# Patient Record
Sex: MALE | Race: WHITE | Employment: UNEMPLOYED | URBAN - METROPOLITAN AREA
[De-identification: names, ages, dates, MRNs, and addresses within clinical notes are randomized per-mention and may not be internally consistent; named-entity substitution may affect disease eponyms.]

---

## 2020-04-03 ENCOUNTER — HOSPITAL ENCOUNTER (EMERGENCY)
Age: 45
Discharge: HOME | End: 2020-04-03

## 2020-04-03 VITALS
HEART RATE: 95 BPM | DIASTOLIC BLOOD PRESSURE: 72 MMHG | RESPIRATION RATE: 14 BRPM | WEIGHT: 135 LBS | SYSTOLIC BLOOD PRESSURE: 122 MMHG | BODY MASS INDEX: 20.46 KG/M2 | HEIGHT: 68 IN | OXYGEN SATURATION: 98 % | TEMPERATURE: 98.8 F

## 2020-04-03 DIAGNOSIS — R73.9 HYPERGLYCEMIA: Primary | ICD-10-CM

## 2020-04-03 LAB
ANION GAP SERPL CALC-SCNC: 10 MMOL/L (ref 7–16)
BASOPHILS # BLD: 0 K/UL (ref 0–0.2)
BASOPHILS NFR BLD: 0 % (ref 0–2)
BUN SERPL-MCNC: 7 MG/DL (ref 6–23)
CALCIUM SERPL-MCNC: 8.3 MG/DL (ref 8.3–10.4)
CHLORIDE SERPL-SCNC: 101 MMOL/L (ref 98–107)
CO2 SERPL-SCNC: 22 MMOL/L (ref 21–32)
CREAT SERPL-MCNC: 1.07 MG/DL (ref 0.8–1.5)
DIFFERENTIAL METHOD BLD: ABNORMAL
EOSINOPHIL # BLD: 0.1 K/UL (ref 0–0.8)
EOSINOPHIL NFR BLD: 1 % (ref 0.5–7.8)
ERYTHROCYTE [DISTWIDTH] IN BLOOD BY AUTOMATED COUNT: 15.5 % (ref 11.9–14.6)
GLUCOSE BLD STRIP.AUTO-MCNC: 443 MG/DL (ref 65–100)
GLUCOSE BLD STRIP.AUTO-MCNC: 447 MG/DL (ref 65–100)
GLUCOSE BLD STRIP.AUTO-MCNC: >600 MG/DL (ref 65–100)
GLUCOSE SERPL-MCNC: 617 MG/DL (ref 65–100)
HCT VFR BLD AUTO: 37.2 % (ref 41.1–50.3)
HGB BLD-MCNC: 11.2 G/DL (ref 13.6–17.2)
IMM GRANULOCYTES # BLD AUTO: 0 K/UL (ref 0–0.5)
IMM GRANULOCYTES NFR BLD AUTO: 0 % (ref 0–5)
LIPASE SERPL-CCNC: 22 U/L (ref 73–393)
LYMPHOCYTES # BLD: 1.4 K/UL (ref 0.5–4.6)
LYMPHOCYTES NFR BLD: 24 % (ref 13–44)
MCH RBC QN AUTO: 24.7 PG (ref 26.1–32.9)
MCHC RBC AUTO-ENTMCNC: 30.1 G/DL (ref 31.4–35)
MCV RBC AUTO: 82.1 FL (ref 79.6–97.8)
MONOCYTES # BLD: 0.5 K/UL (ref 0.1–1.3)
MONOCYTES NFR BLD: 8 % (ref 4–12)
NEUTS SEG # BLD: 3.8 K/UL (ref 1.7–8.2)
NEUTS SEG NFR BLD: 66 % (ref 43–78)
NRBC # BLD: 0 K/UL (ref 0–0.2)
PLATELET # BLD AUTO: 229 K/UL (ref 150–450)
PMV BLD AUTO: 10.3 FL (ref 9.4–12.3)
POTASSIUM SERPL-SCNC: 4.4 MMOL/L (ref 3.5–5.1)
RBC # BLD AUTO: 4.53 M/UL (ref 4.23–5.6)
SODIUM SERPL-SCNC: 133 MMOL/L (ref 136–145)
WBC # BLD AUTO: 5.8 K/UL (ref 4.3–11.1)

## 2020-04-03 PROCEDURE — 74011250637 HC RX REV CODE- 250/637

## 2020-04-03 PROCEDURE — 80048 BASIC METABOLIC PNL TOTAL CA: CPT

## 2020-04-03 PROCEDURE — 99285 EMERGENCY DEPT VISIT HI MDM: CPT

## 2020-04-03 PROCEDURE — 82962 GLUCOSE BLOOD TEST: CPT

## 2020-04-03 PROCEDURE — 85025 COMPLETE CBC W/AUTO DIFF WBC: CPT

## 2020-04-03 PROCEDURE — 74011636637 HC RX REV CODE- 636/637

## 2020-04-03 PROCEDURE — 96374 THER/PROPH/DIAG INJ IV PUSH: CPT

## 2020-04-03 PROCEDURE — 83690 ASSAY OF LIPASE: CPT

## 2020-04-03 PROCEDURE — 96375 TX/PRO/DX INJ NEW DRUG ADDON: CPT

## 2020-04-03 PROCEDURE — 74011250636 HC RX REV CODE- 250/636

## 2020-04-03 PROCEDURE — 96376 TX/PRO/DX INJ SAME DRUG ADON: CPT

## 2020-04-03 RX ORDER — SODIUM CHLORIDE 9 MG/ML
1000 INJECTION, SOLUTION INTRAVENOUS ONCE
Status: COMPLETED | OUTPATIENT
Start: 2020-04-03 | End: 2020-04-03

## 2020-04-03 RX ORDER — ONDANSETRON 2 MG/ML
4 INJECTION INTRAMUSCULAR; INTRAVENOUS
Status: COMPLETED | OUTPATIENT
Start: 2020-04-03 | End: 2020-04-03

## 2020-04-03 RX ORDER — IBUPROFEN 400 MG/1
400 TABLET ORAL
Status: COMPLETED | OUTPATIENT
Start: 2020-04-03 | End: 2020-04-03

## 2020-04-03 RX ORDER — METOCLOPRAMIDE HYDROCHLORIDE 5 MG/ML
10 INJECTION INTRAMUSCULAR; INTRAVENOUS
Status: COMPLETED | OUTPATIENT
Start: 2020-04-03 | End: 2020-04-03

## 2020-04-03 RX ORDER — BENZTROPINE MESYLATE 1 MG/ML
2 INJECTION INTRAMUSCULAR; INTRAVENOUS ONCE
Status: COMPLETED | OUTPATIENT
Start: 2020-04-03 | End: 2020-04-03

## 2020-04-03 RX ORDER — SODIUM CHLORIDE 9 MG/ML
1000 INJECTION, SOLUTION INTRAVENOUS ONCE
Status: DISCONTINUED | OUTPATIENT
Start: 2020-04-03 | End: 2020-04-03 | Stop reason: HOSPADM

## 2020-04-03 RX ORDER — DICLOFENAC SODIUM 50 MG/1
50 TABLET, DELAYED RELEASE ORAL 2 TIMES DAILY
Qty: 14 TAB | Refills: 0 | Status: SHIPPED | OUTPATIENT
Start: 2020-04-03 | End: 2020-04-05

## 2020-04-03 RX ADMIN — IBUPROFEN 400 MG: 400 TABLET, FILM COATED ORAL at 13:25

## 2020-04-03 RX ADMIN — ONDANSETRON 4 MG: 2 INJECTION INTRAMUSCULAR; INTRAVENOUS at 13:25

## 2020-04-03 RX ADMIN — INSULIN HUMAN 12 UNITS: 100 INJECTION, SOLUTION PARENTERAL at 12:59

## 2020-04-03 RX ADMIN — INSULIN HUMAN 6 UNITS: 100 INJECTION, SOLUTION PARENTERAL at 15:16

## 2020-04-03 RX ADMIN — METOCLOPRAMIDE HYDROCHLORIDE 10 MG: 5 INJECTION INTRAMUSCULAR; INTRAVENOUS at 15:15

## 2020-04-03 RX ADMIN — SODIUM CHLORIDE 1000 ML: 900 INJECTION, SOLUTION INTRAVENOUS at 12:21

## 2020-04-03 RX ADMIN — BENZTROPINE MESYLATE 2 MG: 1 INJECTION INTRAMUSCULAR; INTRAVENOUS at 15:18

## 2020-04-03 RX ADMIN — SODIUM CHLORIDE 1000 ML: 900 INJECTION, SOLUTION INTRAVENOUS at 15:15

## 2020-04-03 NOTE — ED NOTES
I have reviewed discharge instructions with the patient. The patient verbalized understanding. Patient left ED via Discharge Method: ambulatory to homeless shelter with (self). Opportunity for questions and clarification provided. Patient given 1 scripts. No e-sign. To continue your aftercare when you leave the hospital, you may receive an automated call from our care team to check in on how you are doing. This is a free service and part of our promise to provide the best care and service to meet your aftercare needs.  If you have questions, or wish to unsubscribe from this service please call [phone]. Thank you for Choosing our [facility] Department.

## 2020-04-03 NOTE — ED TRIAGE NOTES
Pt was at [place] yesterday to get sugar checked. It was running high and they kept him overnight. Pt has problems with DKA, and typically goes into DKA about \"every two weeks\" according to patient. Blood sugar was 328 in EMS. Patient is nauseous and has not been able to eat, therefore he has not been taking insulin as he is scared sugar will drop. Patient also does not have a place to stay tonight and needs to see a .

## 2020-04-03 NOTE — DISCHARGE INSTRUCTIONS
Patient Education        Learning About High Blood Sugar  What is high blood sugar? Your body turns the food you eat into glucose (sugar), which it uses for energy. But if your body isn't able to use the sugar right away, it can build up in your blood and lead to high blood sugar. When the amount of sugar in your blood stays too high for too much of the time, you may have diabetes. Diabetes is a disease that can cause serious health problems. The good news is that lifestyle changes may help you get your blood sugar back to normal and avoid or delay diabetes. What causes high blood sugar? Sugar (glucose) can build up in your blood if you:  · Are overweight. · Have a family history of diabetes. · Take certain medicines, such as steroids. What are the symptoms? Having high blood sugar may not cause any symptoms at all. Or it may make you feel very thirsty or very hungry. You may also urinate more often than usual, have blurry vision, or lose weight without trying. How is high blood sugar treated? You can take steps to lower your blood sugar level if you understand what makes it get higher. Your doctor may want you to learn how to test your blood sugar level at home. Then you can see how illness, stress, or different kinds of food or medicine raise or lower your blood sugar level. Other tests may be needed to see if you have diabetes. How can you prevent high blood sugar? · Watch your weight. If you're overweight, losing just a small amount of weight may help. Reducing fat around your waist is most important. · Limit the amount of calories, sweets, and unhealthy fat you eat. Ask your doctor if a dietitian can help you. A registered dietitian can help you create meal plans that fit your lifestyle. · Get at least 30 minutes of exercise on most days of the week. Exercise helps control your blood sugar. It also helps you maintain a healthy weight. Walking is a good choice.  You also may want to do other activities, such as running, swimming, cycling, or playing tennis or team sports. · If your doctor prescribed medicines, take them exactly as prescribed. Call your doctor if you think you are having a problem with your medicine. You will get more details on the specific medicines your doctor prescribes. Follow-up care is a key part of your treatment and safety. Be sure to make and go to all appointments, and call your doctor if you are having problems. It's also a good idea to know your test results and keep a list of the medicines you take. Where can you learn more? Go to http://pedrito-kandi.info/  Enter O108 in the search box to learn more about \"Learning About High Blood Sugar. \"  Current as of: December 19, 2019Content Version: 12.4  © 9186-9033 Health[employer], [employer]. Care instructions adapted under license by [employer] (which disclaims liability or warranty for this information). If you have questions about a medical condition or this instruction, always ask your healthcare professional. [address] any warranty or liability for your use of this information.

## 2020-04-03 NOTE — ED PROVIDER NOTES
82-year-old male brought in by ambulance was at [facility] yesterday reportedly was at another hospital prior to that possibly at [facility]. Patient has diabetes and his blood sugars been running high has not been feeling well she is not been eating is also been in and out of several homeless shelters. Patient complains of periumbilical pain which is been going on for over a month. Pain waxes and wanes. Patient also has dyskinesia which is chronic. Abnormal Lab Results   This is a chronic problem. The current episode started yesterday. The problem occurs constantly. The problem has not changed since onset. Pertinent negatives include no chest pain, no abdominal pain, no headaches and no shortness of breath. Nothing aggravates the symptoms. Nothing relieves the symptoms. He has tried nothing for the symptoms. No past medical history on file. No past surgical history on file. No family history on file.     Social History     Socioeconomic History    Marital status: Not on file     Spouse name: Not on file    Number of children: Not on file    Years of education: Not on file    Highest education level: Not on file   Occupational History    Not on file   Social Needs    Financial resource strain: Not on file    Food insecurity     Worry: Not on file     Inability: Not on file    Transportation needs     Medical: Not on file     Non-medical: Not on file   Tobacco Use    Smoking status: Not on file   Substance and Sexual Activity    Alcohol use: Not on file    Drug use: Not on file    Sexual activity: Not on file   Lifestyle    Physical activity     Days per week: Not on file     Minutes per session: Not on file    Stress: Not on file   Relationships    Social connections     Talks on phone: Not on file     Gets together: Not on file     Attends Mosque service: Not on file     Active member of club or organization: Not on file     Attends meetings of clubs or organizations: Not on file     Relationship status: Not on file    Intimate partner violence     Fear of current or ex partner: Not on file     Emotionally abused: Not on file     Physically abused: Not on file     Forced sexual activity: Not on file   Other Topics Concern    Not on file   Social History Narrative    Not on file         ALLERGIES: Patient has no allergy information on record. Review of Systems   Constitutional: Negative. Negative for activity change. HENT: Negative. Eyes: Negative. Respiratory: Negative. Negative for shortness of breath. Cardiovascular: Negative. Negative for chest pain. Gastrointestinal: Negative. Negative for abdominal pain. Genitourinary: Negative. Musculoskeletal: Negative. Skin: Negative. Neurological: Negative. Negative for headaches. Psychiatric/Behavioral: Negative. All other systems reviewed and are negative. There were no vitals filed for this visit. Physical Exam  Vitals signs and nursing note reviewed. Constitutional:       General: He is not in acute distress. Appearance: He is well-developed. He is not diaphoretic. HENT:      Head: Normocephalic and atraumatic. Right Ear: External ear normal.      Left Ear: External ear normal.      Nose: Nose normal.      Mouth/Throat:      Pharynx: No oropharyngeal exudate. Eyes:      General: No scleral icterus. Right eye: No discharge. Left eye: No discharge. Conjunctiva/sclera: Conjunctivae normal.      Pupils: Pupils are equal, round, and reactive to light. Neck:      Musculoskeletal: Normal range of motion and neck supple. Vascular: No JVD. Trachea: No tracheal deviation. Cardiovascular:      Rate and Rhythm: Normal rate and regular rhythm. Pulmonary:      Effort: Pulmonary effort is normal. No respiratory distress. Breath sounds: Normal breath sounds. No stridor. No wheezing. Chest:      Chest wall: No tenderness. Abdominal:      General: Bowel sounds are normal. There is no distension. Palpations: Abdomen is soft. There is no mass. Tenderness: There is no abdominal tenderness. Musculoskeletal: Normal range of motion. General: No tenderness. Skin:     General: Skin is warm and dry. Coloration: Skin is not pale. Findings: No erythema or rash. Neurological:      Mental Status: He is alert and oriented to person, place, and time. Cranial Nerves: No cranial nerve deficit. Psychiatric:         Behavior: Behavior normal.         Thought Content: Thought content normal.          MDM  Number of Diagnoses or Management Options  Hyperglycemia:   Diagnosis management comments: Patient's not been eating well so has not been taking his insulin which is making his blood sugar run high. A big part of this patient's issue is he is homeless and likes to be in the hospital.     discussed the possibility for shelters in the area he has a shelter open to him and is willing to go. Patient blood sugars trending down pending repeat of his blood sugar and lipase to be discharged if stable.        Amount and/or Complexity of Data Reviewed  Clinical lab tests: ordered and reviewed  Tests in the medicine section of CPT®: ordered and reviewed    Risk of Complications, Morbidity, and/or Mortality  Presenting problems: low  Diagnostic procedures: low  Management options: low           Procedures

## 2020-04-05 ENCOUNTER — APPOINTMENT (OUTPATIENT)
Dept: CT IMAGING | Age: 45
End: 2020-04-05
Attending: EMERGENCY MEDICINE
Payer: SUBSIDIZED

## 2020-04-05 ENCOUNTER — HOSPITAL ENCOUNTER (EMERGENCY)
Age: 45
Discharge: HOME | End: 2020-04-08
Attending: EMERGENCY MEDICINE
Payer: SUBSIDIZED

## 2020-04-05 DIAGNOSIS — Z59.00 HOMELESSNESS: ICD-10-CM

## 2020-04-05 DIAGNOSIS — R19.7 COMBINED ABDOMINAL PAIN, VOMITING, AND DIARRHEA: ICD-10-CM

## 2020-04-05 DIAGNOSIS — Z79.4 TYPE 2 DIABETES MELLITUS WITH HYPERGLYCEMIA, WITH LONG-TERM CURRENT USE OF INSULIN (HCC): Primary | ICD-10-CM

## 2020-04-05 DIAGNOSIS — R45.851 SUICIDAL IDEATION: ICD-10-CM

## 2020-04-05 DIAGNOSIS — R11.10 COMBINED ABDOMINAL PAIN, VOMITING, AND DIARRHEA: ICD-10-CM

## 2020-04-05 DIAGNOSIS — E11.65 TYPE 2 DIABETES MELLITUS WITH HYPERGLYCEMIA, WITH LONG-TERM CURRENT USE OF INSULIN (HCC): Primary | ICD-10-CM

## 2020-04-05 DIAGNOSIS — R10.9 COMBINED ABDOMINAL PAIN, VOMITING, AND DIARRHEA: ICD-10-CM

## 2020-04-05 LAB
ALBUMIN SERPL-MCNC: 3.3 G/DL (ref 3.5–5)
ALBUMIN/GLOB SERPL: 1.1 {RATIO} (ref 1.2–3.5)
ALP SERPL-CCNC: 99 U/L (ref 50–136)
ALT SERPL-CCNC: 38 U/L (ref 12–65)
AMPHET UR QL SCN: NEGATIVE
ANION GAP SERPL CALC-SCNC: 13 MMOL/L (ref 7–16)
AST SERPL-CCNC: 38 U/L (ref 15–37)
BARBITURATES UR QL SCN: NEGATIVE
BASOPHILS # BLD: 0 K/UL (ref 0–0.2)
BASOPHILS NFR BLD: 1 % (ref 0–2)
BENZODIAZ UR QL: NEGATIVE
BILIRUB SERPL-MCNC: 0.3 MG/DL (ref 0.2–1.1)
BUN SERPL-MCNC: 10 MG/DL (ref 6–23)
CALCIUM SERPL-MCNC: 8.2 MG/DL (ref 8.3–10.4)
CANNABINOIDS UR QL SCN: NEGATIVE
CHLORIDE SERPL-SCNC: 91 MMOL/L (ref 98–107)
CO2 SERPL-SCNC: 19 MMOL/L (ref 21–32)
COCAINE UR QL SCN: NEGATIVE
CREAT SERPL-MCNC: 1.24 MG/DL (ref 0.8–1.5)
DIFFERENTIAL METHOD BLD: ABNORMAL
EOSINOPHIL # BLD: 0.1 K/UL (ref 0–0.8)
EOSINOPHIL NFR BLD: 1 % (ref 0.5–7.8)
ERYTHROCYTE [DISTWIDTH] IN BLOOD BY AUTOMATED COUNT: 16.5 % (ref 11.9–14.6)
EST. AVERAGE GLUCOSE BLD GHB EST-MCNC: 249 MG/DL
ETHANOL SERPL-MCNC: <3 MG/DL
GLOBULIN SER CALC-MCNC: 3 G/DL (ref 2.3–3.5)
GLUCOSE BLD STRIP.AUTO-MCNC: 552 MG/DL (ref 65–100)
GLUCOSE SERPL-MCNC: 975 MG/DL (ref 65–100)
HBA1C MFR BLD: 10.3 % (ref 4.8–6)
HCT VFR BLD AUTO: 31.1 % (ref 41.1–50.3)
HGB BLD-MCNC: 9.2 G/DL (ref 13.6–17.2)
IMM GRANULOCYTES # BLD AUTO: 0 K/UL (ref 0–0.5)
IMM GRANULOCYTES NFR BLD AUTO: 0 % (ref 0–5)
LIPASE SERPL-CCNC: 26 U/L (ref 73–393)
LYMPHOCYTES # BLD: 1.2 K/UL (ref 0.5–4.6)
LYMPHOCYTES NFR BLD: 19 % (ref 13–44)
MCH RBC QN AUTO: 24.5 PG (ref 26.1–32.9)
MCHC RBC AUTO-ENTMCNC: 29.6 G/DL (ref 31.4–35)
MCV RBC AUTO: 82.9 FL (ref 79.6–97.8)
METHADONE UR QL: NEGATIVE
MONOCYTES # BLD: 0.5 K/UL (ref 0.1–1.3)
MONOCYTES NFR BLD: 8 % (ref 4–12)
NEUTS SEG # BLD: 4.5 K/UL (ref 1.7–8.2)
NEUTS SEG NFR BLD: 71 % (ref 43–78)
NRBC # BLD: 0 K/UL (ref 0–0.2)
OPIATES UR QL: NEGATIVE
PCP UR QL: NEGATIVE
PLATELET # BLD AUTO: 213 K/UL (ref 150–450)
PMV BLD AUTO: 10.7 FL (ref 9.4–12.3)
POTASSIUM SERPL-SCNC: 4.7 MMOL/L (ref 3.5–5.1)
PROT SERPL-MCNC: 6.3 G/DL (ref 6.3–8.2)
RBC # BLD AUTO: 3.75 M/UL (ref 4.23–5.6)
SODIUM SERPL-SCNC: 123 MMOL/L (ref 136–145)
WBC # BLD AUTO: 6.3 K/UL (ref 4.3–11.1)

## 2020-04-05 PROCEDURE — 81003 URINALYSIS AUTO W/O SCOPE: CPT

## 2020-04-05 PROCEDURE — 96375 TX/PRO/DX INJ NEW DRUG ADDON: CPT

## 2020-04-05 PROCEDURE — 80307 DRUG TEST PRSMV CHEM ANLYZR: CPT

## 2020-04-05 PROCEDURE — 82962 GLUCOSE BLOOD TEST: CPT

## 2020-04-05 PROCEDURE — 96372 THER/PROPH/DIAG INJ SC/IM: CPT

## 2020-04-05 PROCEDURE — 74011636637 HC RX REV CODE- 636/637: Performed by: EMERGENCY MEDICINE

## 2020-04-05 PROCEDURE — 96374 THER/PROPH/DIAG INJ IV PUSH: CPT

## 2020-04-05 PROCEDURE — 83036 HEMOGLOBIN GLYCOSYLATED A1C: CPT

## 2020-04-05 PROCEDURE — 85025 COMPLETE CBC W/AUTO DIFF WBC: CPT

## 2020-04-05 PROCEDURE — 74011000258 HC RX REV CODE- 258: Performed by: EMERGENCY MEDICINE

## 2020-04-05 PROCEDURE — 80053 COMPREHEN METABOLIC PANEL: CPT

## 2020-04-05 PROCEDURE — 83690 ASSAY OF LIPASE: CPT

## 2020-04-05 PROCEDURE — 99285 EMERGENCY DEPT VISIT HI MDM: CPT

## 2020-04-05 PROCEDURE — 74011250637 HC RX REV CODE- 250/637: Performed by: EMERGENCY MEDICINE

## 2020-04-05 PROCEDURE — 74011250636 HC RX REV CODE- 250/636: Performed by: EMERGENCY MEDICINE

## 2020-04-05 PROCEDURE — 74177 CT ABD & PELVIS W/CONTRAST: CPT

## 2020-04-05 PROCEDURE — 74011636320 HC RX REV CODE- 636/320: Performed by: EMERGENCY MEDICINE

## 2020-04-05 RX ORDER — DIPHENHYDRAMINE HYDROCHLORIDE 50 MG/ML
12.5 INJECTION, SOLUTION INTRAMUSCULAR; INTRAVENOUS ONCE
Status: COMPLETED | OUTPATIENT
Start: 2020-04-05 | End: 2020-04-05

## 2020-04-05 RX ORDER — ONDANSETRON 2 MG/ML
4 INJECTION INTRAMUSCULAR; INTRAVENOUS
Status: COMPLETED | OUTPATIENT
Start: 2020-04-05 | End: 2020-04-05

## 2020-04-05 RX ORDER — LORAZEPAM 1 MG/1
1 TABLET ORAL
Status: DISCONTINUED | OUTPATIENT
Start: 2020-04-05 | End: 2020-04-08 | Stop reason: HOSPADM

## 2020-04-05 RX ORDER — METFORMIN HYDROCHLORIDE 1000 MG/1
1000 TABLET ORAL 2 TIMES DAILY WITH MEALS
COMMUNITY
End: 2020-07-28 | Stop reason: SDUPTHER

## 2020-04-05 RX ORDER — QUETIAPINE FUMARATE 100 MG/1
100 TABLET, FILM COATED ORAL 2 TIMES DAILY
Status: DISCONTINUED | OUTPATIENT
Start: 2020-04-05 | End: 2020-04-08

## 2020-04-05 RX ORDER — MORPHINE SULFATE 4 MG/ML
4 INJECTION INTRAVENOUS
Status: COMPLETED | OUTPATIENT
Start: 2020-04-05 | End: 2020-04-05

## 2020-04-05 RX ORDER — SODIUM CHLORIDE 0.9 % (FLUSH) 0.9 %
10 SYRINGE (ML) INJECTION
Status: ACTIVE | OUTPATIENT
Start: 2020-04-05 | End: 2020-04-06

## 2020-04-05 RX ORDER — HYDROCODONE BITARTRATE AND ACETAMINOPHEN 5; 325 MG/1; MG/1
1 TABLET ORAL ONCE
Status: DISPENSED | OUTPATIENT
Start: 2020-04-05 | End: 2020-04-06

## 2020-04-05 RX ORDER — DICYCLOMINE HYDROCHLORIDE 20 MG/1
20 TABLET ORAL
Status: COMPLETED | OUTPATIENT
Start: 2020-04-05 | End: 2020-04-05

## 2020-04-05 RX ORDER — DOCUSATE SODIUM 100 MG/1
100 CAPSULE, LIQUID FILLED ORAL 2 TIMES DAILY
Status: DISCONTINUED | OUTPATIENT
Start: 2020-04-06 | End: 2020-04-08 | Stop reason: HOSPADM

## 2020-04-05 RX ORDER — HALOPERIDOL 5 MG/ML
5 INJECTION INTRAMUSCULAR
Status: COMPLETED | OUTPATIENT
Start: 2020-04-05 | End: 2020-04-05

## 2020-04-05 RX ADMIN — SODIUM CHLORIDE 100 ML: 900 INJECTION, SOLUTION INTRAVENOUS at 20:26

## 2020-04-05 RX ADMIN — HALOPERIDOL LACTATE 5 MG: 5 INJECTION INTRAMUSCULAR at 19:33

## 2020-04-05 RX ADMIN — DICYCLOMINE HYDROCHLORIDE 20 MG: 20 TABLET ORAL at 16:21

## 2020-04-05 RX ADMIN — ONDANSETRON 4 MG: 2 INJECTION INTRAMUSCULAR; INTRAVENOUS at 16:52

## 2020-04-05 RX ADMIN — MORPHINE SULFATE 4 MG: 4 INJECTION INTRAVENOUS at 18:26

## 2020-04-05 RX ADMIN — IOPAMIDOL 100 ML: 755 INJECTION, SOLUTION INTRAVENOUS at 20:26

## 2020-04-05 RX ADMIN — SODIUM CHLORIDE 1000 ML: 900 INJECTION, SOLUTION INTRAVENOUS at 18:26

## 2020-04-05 RX ADMIN — SODIUM CHLORIDE 500 ML: 900 INJECTION, SOLUTION INTRAVENOUS at 16:52

## 2020-04-05 RX ADMIN — INSULIN HUMAN 8 UNITS: 100 INJECTION, SOLUTION PARENTERAL at 16:53

## 2020-04-05 RX ADMIN — DIPHENHYDRAMINE HYDROCHLORIDE 12.5 MG: 50 INJECTION, SOLUTION INTRAMUSCULAR; INTRAVENOUS at 19:33

## 2020-04-05 RX ADMIN — DIATRIZOATE MEGLUMINE AND DIATRIZOATE SODIUM 15 ML: 660; 100 LIQUID ORAL; RECTAL at 18:26

## 2020-04-05 RX ADMIN — QUETIAPINE FUMARATE 100 MG: 100 TABLET ORAL at 18:26

## 2020-04-05 SDOH — ECONOMIC STABILITY - HOUSING INSECURITY: HOMELESSNESS UNSPECIFIED: Z59.00

## 2020-04-05 NOTE — ED TRIAGE NOTES
Patient arrives via EMS from [facility] rescue mission for high blood sugar. BGL was 595. Seen recently for the same here and at [address] [facility]. Patient took insulin this morning as well as drank water and ate a jelly donut. Patient reports feeling woozy. VSS for EMS. Patient states he is on metformin twice daily, an insulin with meals, and a long-acting insulin. Patient cannot recall the name of his insulins. Patient also reports abdominal pain \"all around outline of abdomen. \"

## 2020-04-05 NOTE — ED PROVIDER NOTES
HPI:  39 male who is currently homeless is here with complaint of severe abdominal pain has been ongoing for the past 3 days. Stated soft stool. Stated he has had prior history of pancreatitis diabetes and gastroparesis. Still consume alcohol but denies any drug use. Also history of diabetes which he takes some type of injection but cannot remember the name. Denies any flank pain. No cough. No fever. No known direct exposure to coronavirus. Of note patient is also stated that he is in a bad spot right now and has been thinking of jumping in front of a car to harm himself. Stated he has history psychiatric problem. ROS  Constitutional: No fever, no chills  Skin: no rash  Eye: No vision changes  ENMT: No sore throat  Respiratory: No shortness of breath, no cough  Cardiovascular: No chest pain, no palpitations  Gastrointestinal: + vomiting, + nausea, + diarrhea, + abdominal pain  : No dysuria  MSK: No back pain, no muscle pain, no joint pain  Neuro: No headache, no change in mental status, no numbness, no tingling, no weakness  Psych: suicidal ideation   Endocrine:   All other review of systems positive per history of present illness and the above otherwise negative or noncontributory. Visit Vitals  /72 (BP 1 Location: Left arm, BP Patient Position: At rest)   Pulse 92   Temp 99.1 °F (37.3 °C)   Resp 20   Ht 5' 8\" (1.727 m)   Wt 61.2 kg (135 lb)   SpO2 99%   BMI 20.53 kg/m²     No past medical history on file. No past surgical history on file. Prior to Admission Medications   Prescriptions Last Dose Informant Patient Reported? Taking?   metFORMIN (GLUCOPHAGE) 1,000 mg tablet   Yes Yes   Sig: Take 1,000 mg by mouth two (2) times daily (with meals).       Facility-Administered Medications: None         Adult Exam   General: alert, no acute distress  Head: normocephalic, atraumatic  ENT: moist mucous membranes  Neck: supple, non-tender; full range of motion  Cardiovascular: regular rate and rhythm, normal peripheral perfusion, no edema  Respiratory:  normal respirations; no wheezing, rales or rhonchi  Gastrointestinal: soft, complains of diffuse abdominal pain throughout the abdomen. Patient would not relax his abdomen during exam. no rebound or guarding, no peritoneal signs, no distension  Back: non-tender, full range of motion  Musculoskeletal: normal range of motion, normal strength, no gross deformities  Neurological: alert and oriented x 4, no gross focal deficits; normal speech  Psychiatric: cooperative; appropriate mood and affect    MDM:  Temp 99.1. He is not tachycardic. Normal oxygenation with normal blood pressure. Complaint of severe abdominal pain on exam.  Unable to obtain a good abdominal exam because he would not relax his abdominal wall during exam.  Will obtain lab work to evaluate for pancreatitis, DKA, colitis, diverticulitis. He also complained of psychiatric suicidal ideation. Will request psychiatry evaluation. Will give a dose of Zofran, Bentyl and 500 cc of IV fluid and reassess    5:52 PM  Give a dose of morphine here for pain. He spoke with psychiatry. Would like to reassess the patient in a few days however does have concern patient may be suicidal.  Recommended restarting Seroquel 100 mg twice daily since she is unsure whether he is taking it. He is very vague about what he is supposed to be taking and how compliant he is. Glucose of 975. Not in DKA. Hemoglobin A1c of 10.3. Will give 8 units of insulin. Urine does not show signs of infection. We will obtain a CT scan of the abdomen pelvis for assessment of his abdominal pain. This may be secondary to pancreatitis, colitis, diverticulitis. Corrected sodium level is 137-1 44    7:15 PM  2-hour after 8 units of insulin glucose is now down to 550. Awaiting CT abdomen pelvis for assessment of abdominal pain.   We will give a dose of Haldol and Benadryl for abdominal pain  Will needs psychiatric inpatient admission for bipolar with concern for manic state with suicidal ideation as recommended by psychiatry. We will also consult diabetic educator for recommendation on insulin. Patient is unable to tell me what medication he is on.    8:59 PM  CT abdomen pelvis is unremarkable. His blood sugar has dropped significantly. He appears well-hydrated. Still has some abdominal discomfort. Will give a tablet of Norco.  I have ordered for Seroquel 100 mg twice daily as recommended by psychiatry. Case management for placement. Involuntary commitment paperwork has been filed. There are no acute surgical pathology noted. Have also consulted diabetic educator for recommendation on insulin and medication regimen. The pain is likely secondary to a combination gastroparesis and constipation. Recent Results (from the past 12 hour(s))   METABOLIC PANEL, COMPREHENSIVE    Collection Time: 04/05/20  3:52 PM   Result Value Ref Range    Sodium 123 (LL) 136 - 145 mmol/L    Potassium 4.7 3.5 - 5.1 mmol/L    Chloride 91 (L) 98 - 107 mmol/L    CO2 19 (L) 21 - 32 mmol/L    Anion gap 13 7 - 16 mmol/L    Glucose 975 (HH) 65 - 100 mg/dL    BUN 10 6 - 23 MG/DL    Creatinine 1.24 0.8 - 1.5 MG/DL    GFR est AA >60 >60 ml/min/1.73m2    GFR est non-AA >60 >60 ml/min/1.73m2    Calcium 8.2 (L) 8.3 - 10.4 MG/DL    Bilirubin, total 0.3 0.2 - 1.1 MG/DL    ALT (SGPT) 38 12 - 65 U/L    AST (SGOT) 38 (H) 15 - 37 U/L    Alk.  phosphatase 99 50 - 136 U/L    Protein, total 6.3 6.3 - 8.2 g/dL    Albumin 3.3 (L) 3.5 - 5.0 g/dL    Globulin 3.0 2.3 - 3.5 g/dL    A-G Ratio 1.1 (L) 1.2 - 3.5     LIPASE    Collection Time: 04/05/20  3:52 PM   Result Value Ref Range    Lipase 26 (L) 73 - 393 U/L   ETHYL ALCOHOL    Collection Time: 04/05/20  3:52 PM   Result Value Ref Range    ALCOHOL(ETHYL),SERUM <3 MG/DL   DRUG SCREEN, URINE    Collection Time: 04/05/20  4:11 PM   Result Value Ref Range    PCP(PHENCYCLIDINE) NEGATIVE       BENZODIAZEPINES NEGATIVE COCAINE NEGATIVE       AMPHETAMINES NEGATIVE       METHADONE NEGATIVE       THC (TH-CANNABINOL) NEGATIVE       OPIATES NEGATIVE       BARBITURATES NEGATIVE      CBC WITH AUTOMATED DIFF    Collection Time: 04/05/20  4:24 PM   Result Value Ref Range    WBC 6.3 4.3 - 11.1 K/uL    RBC 3.75 (L) 4.23 - 5.6 M/uL    HGB 9.2 (L) 13.6 - 17.2 g/dL    HCT 31.1 (L) 41.1 - 50.3 %    MCV 82.9 79.6 - 97.8 FL    MCH 24.5 (L) 26.1 - 32.9 PG    MCHC 29.6 (L) 31.4 - 35.0 g/dL    RDW 16.5 (H) 11.9 - 14.6 %    PLATELET 995 305 - 286 K/uL    MPV 10.7 9.4 - 12.3 FL    ABSOLUTE NRBC 0.00 0.0 - 0.2 K/uL    DF AUTOMATED      NEUTROPHILS 71 43 - 78 %    LYMPHOCYTES 19 13 - 44 %    MONOCYTES 8 4.0 - 12.0 %    EOSINOPHILS 1 0.5 - 7.8 %    BASOPHILS 1 0.0 - 2.0 %    IMMATURE GRANULOCYTES 0 0.0 - 5.0 %    ABS. NEUTROPHILS 4.5 1.7 - 8.2 K/UL    ABS. LYMPHOCYTES 1.2 0.5 - 4.6 K/UL    ABS. MONOCYTES 0.5 0.1 - 1.3 K/UL    ABS. EOSINOPHILS 0.1 0.0 - 0.8 K/UL    ABS. BASOPHILS 0.0 0.0 - 0.2 K/UL    ABS. IMM. GRANS. 0.0 0.0 - 0.5 K/UL   HEMOGLOBIN A1C WITH EAG    Collection Time: 04/05/20  4:24 PM   Result Value Ref Range    Hemoglobin A1c 10.3 (H) 4.8 - 6.0 %    Est. average glucose 249 mg/dL   GLUCOSE, POC    Collection Time: 04/05/20  6:59 PM   Result Value Ref Range    Glucose (POC) 552 (HH) 65 - 100 mg/dL       Ct Abd Pelv W Cont    Result Date: 4/5/2020  CT abdomen and pelvis with contrast COMPARISON: None. INDICATION: Epigastric pain. TECHNIQUE: CT imaging was performed of the abdomen and pelvis following the uncomplicated administration of intravenous contrast (Isovue 370, 100 mL). Oral contrast was administered. Radiation dose reduction techniques were used for this study:  Our CT scanners use one or all of the following: Automated exposure control, adjustment of the mA and/or kVp according to patient's size, iterative reconstruction. FINDINGS: Visualized lung bases are unremarkable. Abdomen findings:  The liver, gallbladder, spleen, adrenal glands and the abdominal aorta are unremarkable. Stomach is normal in contour. Small bowel loops are normal in caliber. There is no small bowel obstruction. Pancreas is atrophic. There are multiple coarse calcifications within the pancreas, most likely sequela of chronic pancreatitis. No peripancreatic fluid collection. There is a 13 mm cyst in the left kidney. Mild symmetric bilateral hydronephrosis, likely secondary to overdistended urinary bladder. There is no evidence of lymphadenopathy. Pelvic findings: The colon is normal in course and caliber with moderate stool volume. Appendix is not positively identified. No findings to suggest appendicitis. There is no free air or free fluid. Surrounding bones are intact. IMPRESSION: 1. Moderate stool volume in the colon. No evidence of colitis, diverticulitis or bowel obstruction. No acute inflammatory process is identified, by CT imaging. 2. Mild symmetric dilatation of the renal collecting systems, likely due to overdistended urinary bladder. Dragon voice recognition software was used to create this note. Although the note has been reviewed and corrected where necessary, additional errors may have been overlooked and remain in the text.

## 2020-04-06 ENCOUNTER — PATIENT OUTREACH (OUTPATIENT)
Dept: CASE MANAGEMENT | Age: 45
End: 2020-04-06

## 2020-04-06 LAB
GLUCOSE BLD STRIP.AUTO-MCNC: 221 MG/DL (ref 65–100)
GLUCOSE BLD STRIP.AUTO-MCNC: 234 MG/DL (ref 65–100)
GLUCOSE BLD STRIP.AUTO-MCNC: 263 MG/DL (ref 65–100)
GLUCOSE BLD STRIP.AUTO-MCNC: 273 MG/DL (ref 65–100)
GLUCOSE BLD STRIP.AUTO-MCNC: 393 MG/DL (ref 65–100)
GLUCOSE BLD STRIP.AUTO-MCNC: 420 MG/DL (ref 65–100)

## 2020-04-06 PROCEDURE — 74011636637 HC RX REV CODE- 636/637: Performed by: EMERGENCY MEDICINE

## 2020-04-06 PROCEDURE — 82962 GLUCOSE BLOOD TEST: CPT

## 2020-04-06 PROCEDURE — 96375 TX/PRO/DX INJ NEW DRUG ADDON: CPT

## 2020-04-06 PROCEDURE — 96376 TX/PRO/DX INJ SAME DRUG ADON: CPT

## 2020-04-06 PROCEDURE — 74011250637 HC RX REV CODE- 250/637: Performed by: EMERGENCY MEDICINE

## 2020-04-06 PROCEDURE — 96372 THER/PROPH/DIAG INJ SC/IM: CPT

## 2020-04-06 RX ORDER — INSULIN LISPRO 100 [IU]/ML
INJECTION, SOLUTION INTRAVENOUS; SUBCUTANEOUS
Status: DISCONTINUED | OUTPATIENT
Start: 2020-04-06 | End: 2020-04-07

## 2020-04-06 RX ORDER — ACETAMINOPHEN 500 MG
1000 TABLET ORAL
Status: DISCONTINUED | OUTPATIENT
Start: 2020-04-06 | End: 2020-04-08 | Stop reason: HOSPADM

## 2020-04-06 RX ORDER — INSULIN GLARGINE 100 [IU]/ML
30 INJECTION, SOLUTION SUBCUTANEOUS DAILY
Status: DISCONTINUED | OUTPATIENT
Start: 2020-04-06 | End: 2020-04-08 | Stop reason: HOSPADM

## 2020-04-06 RX ADMIN — LORAZEPAM 1 MG: 1 TABLET ORAL at 09:50

## 2020-04-06 RX ADMIN — ACETAMINOPHEN 1000 MG: 500 TABLET, FILM COATED ORAL at 09:47

## 2020-04-06 RX ADMIN — QUETIAPINE FUMARATE 100 MG: 100 TABLET ORAL at 08:55

## 2020-04-06 RX ADMIN — INSULIN LISPRO 10 UNITS: 100 INJECTION, SOLUTION INTRAVENOUS; SUBCUTANEOUS at 12:20

## 2020-04-06 RX ADMIN — INSULIN LISPRO 4 UNITS: 100 INJECTION, SOLUTION INTRAVENOUS; SUBCUTANEOUS at 22:51

## 2020-04-06 RX ADMIN — LORAZEPAM 1 MG: 1 TABLET ORAL at 18:06

## 2020-04-06 RX ADMIN — QUETIAPINE FUMARATE 100 MG: 100 TABLET ORAL at 18:06

## 2020-04-06 RX ADMIN — DOCUSATE SODIUM 100 MG: 100 CAPSULE, LIQUID FILLED ORAL at 18:06

## 2020-04-06 RX ADMIN — INSULIN LISPRO 4 UNITS: 100 INJECTION, SOLUTION INTRAVENOUS; SUBCUTANEOUS at 16:30

## 2020-04-06 RX ADMIN — ACETAMINOPHEN 1000 MG: 500 TABLET, FILM COATED ORAL at 18:06

## 2020-04-06 RX ADMIN — DOCUSATE SODIUM 100 MG: 100 CAPSULE, LIQUID FILLED ORAL at 08:55

## 2020-04-06 RX ADMIN — INSULIN HUMAN 10 UNITS: 100 INJECTION, SOLUTION PARENTERAL at 04:15

## 2020-04-06 RX ADMIN — INSULIN GLARGINE 30 UNITS: 100 INJECTION, SOLUTION SUBCUTANEOUS at 09:26

## 2020-04-06 NOTE — ED NOTES
Pt ambulated back to bed at this time, pt lying on right side facing wall with blankets over him, lights of room are off, call button within reach, bed in low and locked position with right side rail elevated.

## 2020-04-06 NOTE — DIABETES MGMT
Patient presented to ED from rescue mission for high blood sugar. Admitting blood glucose 975. HbA1c 70.3 (eAG 249). Patient currently has sitter due to suicide risk. Per chart review patient is homeless and has a history of ETOH abuse. Patient seen for assessment regarding diabetes management. When entering room, noted lights off, patient awakens to voice, patient laying on side in bed facing wall, patient will not turn over to look at educator. Introduced self and asked if I could do an assessment regarding diabetes patient states \"yes if it's quick. \" Patient states they were diagnosed \"5-6\" years ago with diabetes and voices no known family history of diabetes. Patient cannot state what medications he was taking at home for management of diabetes. Patient states he uses insulin pens but cannot tell educator any of the doses he takes stating \"its a sliding scale. \" Patient \"does not remember\" if he has attended formal diabetes education in the past. Questioned if patient would like diabetes educational materials left at bedside. Patient states \"yes. \" Questioned if patient would like educational materials reviewed with him patient states \"no. \" Noted last FSBS was 420. Noted patient has empty togo box at bedside. Reviewed patient current regimen: Lantus 30 units daily and Humalog SSI. Patient would likely benefit from prandial insulin to help offset post meal time glucose spike.

## 2020-04-06 NOTE — ED NOTES
at this time. Dr. Marisol Keller notified. 10 units humalog given as ordered. No new orders received.

## 2020-04-06 NOTE — ED NOTES
Patient report received from Lanette Black, [address] [place] [address]. Care assumed at this time.

## 2020-04-06 NOTE — ED NOTES
Patient resting with eyes closed on right side with blankets. Respirations even and unlabored. Sitter at doorway.

## 2020-04-06 NOTE — ED NOTES
Patient  Yes - Name: [facility]   Patient  Oriented YES   High risk patients are in line of sight at all times Yes   Excess equipment/medical supplies not necessary for the care of the patient removed Yes   All sharp or dangerous objects are removed from room: including but not limited to belts, pens & pencils, needles, medications, cosmetics, lighters, matches, nail files, watches, necklaces, glass objects, razors, razor blades, knives, aerosol sprays, drawstring pants, shoes, cords (telephone, call bells, etc.) cleaning wipes or other cleaning items, aluminum cans, not permanently attached wall décor Yes   Telephone/cell phone removed as well as TV remote (batteries can be swallowed) Yes   Patient belongings removed and labeled at nurses station Yes   Excess linen is removed from room Yes   All plastic bags are removed from the room and replaced with paper trash bags Yes   Patient is in gown and using hospital socks with rubber soles Yes   No metal, hard eating utensils or hard plates are on meal tray Yes   Remove all cleaning agents used by Иван's Yes   Ensure bathroom door key is easily accessible Yes   If Crucifix is hanging on a nail, remove Crucifix as well as the nail Yes       *If any question above is answered \"No,\" documentation is required.

## 2020-04-06 NOTE — ED NOTES
Patient changed into paper scrubs. Patient belongings taken, placed in bag, labeled, and put under charge nurses desk.  Pt bed changed, clean linens put on stretcher,

## 2020-04-06 NOTE — ED NOTES
Pt resting on stretcher on left side with eyes closed. Lights off. Respirations even and nonlabored. Sitter at door. Will continue to monitor.

## 2020-04-07 LAB
ANION GAP SERPL CALC-SCNC: 8 MMOL/L (ref 7–16)
BUN SERPL-MCNC: 8 MG/DL (ref 6–23)
CALCIUM SERPL-MCNC: 6.8 MG/DL (ref 8.3–10.4)
CHLORIDE SERPL-SCNC: 111 MMOL/L (ref 98–107)
CO2 SERPL-SCNC: 22 MMOL/L (ref 21–32)
CREAT SERPL-MCNC: 0.47 MG/DL (ref 0.8–1.5)
GLUCOSE BLD STRIP.AUTO-MCNC: 144 MG/DL (ref 65–100)
GLUCOSE BLD STRIP.AUTO-MCNC: 215 MG/DL (ref 65–100)
GLUCOSE BLD STRIP.AUTO-MCNC: 307 MG/DL (ref 65–100)
GLUCOSE BLD STRIP.AUTO-MCNC: 61 MG/DL (ref 65–100)
GLUCOSE SERPL-MCNC: 235 MG/DL (ref 65–100)
POTASSIUM SERPL-SCNC: 3.6 MMOL/L (ref 3.5–5.1)
SODIUM SERPL-SCNC: 141 MMOL/L (ref 136–145)

## 2020-04-07 PROCEDURE — 96372 THER/PROPH/DIAG INJ SC/IM: CPT

## 2020-04-07 PROCEDURE — 74011250636 HC RX REV CODE- 250/636: Performed by: EMERGENCY MEDICINE

## 2020-04-07 PROCEDURE — 82962 GLUCOSE BLOOD TEST: CPT

## 2020-04-07 PROCEDURE — 80048 BASIC METABOLIC PNL TOTAL CA: CPT

## 2020-04-07 PROCEDURE — 96376 TX/PRO/DX INJ SAME DRUG ADON: CPT

## 2020-04-07 PROCEDURE — 74011250637 HC RX REV CODE- 250/637: Performed by: EMERGENCY MEDICINE

## 2020-04-07 PROCEDURE — 74011636637 HC RX REV CODE- 636/637: Performed by: EMERGENCY MEDICINE

## 2020-04-07 RX ORDER — PROMETHAZINE HYDROCHLORIDE 25 MG/1
25 TABLET ORAL
Status: DISPENSED | OUTPATIENT
Start: 2020-04-07 | End: 2020-04-08

## 2020-04-07 RX ORDER — HALOPERIDOL 5 MG/ML
5 INJECTION INTRAMUSCULAR ONCE
Status: COMPLETED | OUTPATIENT
Start: 2020-04-07 | End: 2020-04-07

## 2020-04-07 RX ORDER — LORAZEPAM 1 MG/1
2 TABLET ORAL
Status: COMPLETED | OUTPATIENT
Start: 2020-04-07 | End: 2020-04-07

## 2020-04-07 RX ORDER — DIPHENHYDRAMINE HYDROCHLORIDE 50 MG/ML
12.5 INJECTION, SOLUTION INTRAMUSCULAR; INTRAVENOUS ONCE
Status: COMPLETED | OUTPATIENT
Start: 2020-04-07 | End: 2020-04-07

## 2020-04-07 RX ORDER — INSULIN LISPRO 100 [IU]/ML
6 INJECTION, SOLUTION INTRAVENOUS; SUBCUTANEOUS
Status: DISCONTINUED | OUTPATIENT
Start: 2020-04-07 | End: 2020-04-08 | Stop reason: HOSPADM

## 2020-04-07 RX ORDER — SUCRALFATE 1 G/1
1 TABLET ORAL
Status: COMPLETED | OUTPATIENT
Start: 2020-04-07 | End: 2020-04-07

## 2020-04-07 RX ORDER — MAG HYDROX/ALUMINUM HYD/SIMETH 200-200-20
30 SUSPENSION, ORAL (FINAL DOSE FORM) ORAL
Status: DISPENSED | OUTPATIENT
Start: 2020-04-07 | End: 2020-04-08

## 2020-04-07 RX ORDER — METFORMIN HYDROCHLORIDE 500 MG/1
1000 TABLET ORAL 2 TIMES DAILY WITH MEALS
Status: DISCONTINUED | OUTPATIENT
Start: 2020-04-07 | End: 2020-04-08 | Stop reason: HOSPADM

## 2020-04-07 RX ORDER — HALOPERIDOL 5 MG/ML
5 INJECTION INTRAMUSCULAR
Status: COMPLETED | OUTPATIENT
Start: 2020-04-07 | End: 2020-04-07

## 2020-04-07 RX ADMIN — LORAZEPAM 1 MG: 1 TABLET ORAL at 08:18

## 2020-04-07 RX ADMIN — LORAZEPAM 2 MG: 1 TABLET ORAL at 14:13

## 2020-04-07 RX ADMIN — SUCRALFATE 1 G: 1 TABLET ORAL at 14:14

## 2020-04-07 RX ADMIN — DIPHENHYDRAMINE HYDROCHLORIDE 12.5 MG: 50 INJECTION, SOLUTION INTRAMUSCULAR; INTRAVENOUS at 13:36

## 2020-04-07 RX ADMIN — INSULIN LISPRO 10 UNITS: 100 INJECTION, SOLUTION INTRAVENOUS; SUBCUTANEOUS at 18:19

## 2020-04-07 RX ADMIN — ACETAMINOPHEN 1000 MG: 500 TABLET, FILM COATED ORAL at 08:18

## 2020-04-07 RX ADMIN — HALOPERIDOL LACTATE 5 MG: 5 INJECTION INTRAMUSCULAR at 13:37

## 2020-04-07 RX ADMIN — INSULIN LISPRO 8 UNITS: 100 INJECTION, SOLUTION INTRAVENOUS; SUBCUTANEOUS at 12:24

## 2020-04-07 RX ADMIN — DOCUSATE SODIUM 100 MG: 100 CAPSULE, LIQUID FILLED ORAL at 07:46

## 2020-04-07 RX ADMIN — HALOPERIDOL LACTATE 5 MG: 5 INJECTION INTRAMUSCULAR at 18:42

## 2020-04-07 RX ADMIN — QUETIAPINE FUMARATE 100 MG: 100 TABLET ORAL at 07:46

## 2020-04-07 RX ADMIN — INSULIN GLARGINE 30 UNITS: 100 INJECTION, SOLUTION SUBCUTANEOUS at 07:53

## 2020-04-07 NOTE — ED NOTES
Patient  Yes - Name: Shauna Degree   Patient  Oriented YES   High risk patients are in line of sight at all times Yes   Excess equipment/medical supplies not necessary for the care of the patient removed Yes   All sharp or dangerous objects are removed from room: including but not limited to belts, pens & pencils, needles, medications, cosmetics, lighters, matches, nail files, watches, necklaces, glass objects, razors, razor blades, knives, aerosol sprays, drawstring pants, shoes, cords (telephone, call bells, etc.) cleaning wipes or other cleaning items, aluminum cans, not permanently attached wall décor Yes   Telephone/cell phone removed as well as TV remote (batteries can be swallowed) Yes   Patient belongings removed and labeled at nurses station Yes   Excess linen is removed from room Yes   All plastic bags are removed from the room and replaced with paper trash bags Yes   Patient is in gown and using hospital socks with rubber soles Yes   No metal, hard eating utensils or hard plates are on meal tray Yes   Remove all cleaning agents used by Иван's Yes   Ensure bathroom door key is easily accessible Yes   If Crucifix is hanging on a nail, remove Crucifix as well as the nail Yes         *If any question above is answered \"No,\" documentation is required.

## 2020-04-07 NOTE — ED NOTES
Patient is angry he cannot go home, and states we Kale Stake not doing anything to help his stomach pain\" and he is \"very hungry\" and wants more food even though he has already received a meal tray for lunch today. Patient became belligerent and screamed \"F*ck\" multiple times. Security was called. Patient given haldol, benedryl, and an extra meal tray and calmed down. Patient is now resting comfortably.

## 2020-04-07 NOTE — ED NOTES
Patient  Yes - Name: [facility]   Patient  Oriented YES   High risk patients are in line of sight at all times Yes   Excess equipment/medical supplies not necessary for the care of the patient removed Yes   All sharp or dangerous objects are removed from room: including but not limited to belts, pens & pencils, needles, medications, cosmetics, lighters, matches, nail files, watches, necklaces, glass objects, razors, razor blades, knives, aerosol sprays, drawstring pants, shoes, cords (telephone, call bells, etc.) cleaning wipes or other cleaning items, aluminum cans, not permanently attached wall décor Yes   Telephone/cell phone removed as well as TV remote (batteries can be swallowed) Yes   Patient belongings removed and labeled at nurses station Yes   Excess linen is removed from room Yes   All plastic bags are removed from the room and replaced with paper trash bags Yes   Patient is in gown and using hospital socks with rubber soles Yes   No metal, hard eating utensils or hard plates are on meal tray Yes   Remove all cleaning agents used by Иван's Yes   Ensure bathroom door key is easily accessible Yes   If Crucifix is hanging on a nail, remove Crucifix as well as the nail Yes       *If any question above is answered \"No,\" documentation is required.

## 2020-04-07 NOTE — ED NOTES
[facility]. Care assumed. Pt resting quietly in bed, sitter at bedside.

## 2020-04-07 NOTE — DIABETES MGMT
Patient's blood glucose ranged 221-402 yesterday with patient receiving Lantus 30 units and 28 units of bolus insulin. Blood glucose 144 with breakfast and 307 at lunch. Patient would likely benefit from initiation of prandial insulin with meals to improve control due to mealtime excursions. Notified primary RN. Patient still awaiting placement.

## 2020-04-07 NOTE — ED NOTES
Was reevaluated by tele-psychiatry and they feel as though he needs to be maintained on involuntary commitment papers. Will have them follow his blood sugar serially.   We will also recontact tele-psychiatry for long-term ongoing emergency medical  management psychiatric meds

## 2020-04-07 NOTE — ED NOTES
Pt eating dinner tray while actively vomiting. RN instructed patient to stop eating if vomiting. Pt states he is hungry and will continue eating. MD notified, see orders.

## 2020-04-07 NOTE — PROGRESS NOTES
Spoke with patient today who stated that he felt better and wanted to be re-evaluated. Primary ED MD agreed. Tele-psych has evaluated and felt patient needed to continue to stay on involuntary commitment papers. Patient is still on wait list for [facility].      ALAN has also referred patient to [facility].

## 2020-04-08 VITALS
RESPIRATION RATE: 16 BRPM | TEMPERATURE: 99 F | HEART RATE: 79 BPM | DIASTOLIC BLOOD PRESSURE: 75 MMHG | WEIGHT: 135 LBS | HEIGHT: 68 IN | BODY MASS INDEX: 20.46 KG/M2 | OXYGEN SATURATION: 99 % | SYSTOLIC BLOOD PRESSURE: 117 MMHG

## 2020-04-08 LAB
GLUCOSE BLD STRIP.AUTO-MCNC: 109 MG/DL (ref 65–100)
GLUCOSE BLD STRIP.AUTO-MCNC: 129 MG/DL (ref 65–100)
GLUCOSE BLD STRIP.AUTO-MCNC: 285 MG/DL (ref 65–100)
GLUCOSE BLD STRIP.AUTO-MCNC: 317 MG/DL (ref 65–100)

## 2020-04-08 PROCEDURE — 74011636637 HC RX REV CODE- 636/637: Performed by: EMERGENCY MEDICINE

## 2020-04-08 PROCEDURE — 74011250637 HC RX REV CODE- 250/637: Performed by: EMERGENCY MEDICINE

## 2020-04-08 PROCEDURE — 74011250636 HC RX REV CODE- 250/636: Performed by: EMERGENCY MEDICINE

## 2020-04-08 PROCEDURE — 96372 THER/PROPH/DIAG INJ SC/IM: CPT

## 2020-04-08 PROCEDURE — 96375 TX/PRO/DX INJ NEW DRUG ADDON: CPT

## 2020-04-08 PROCEDURE — 74011000250 HC RX REV CODE- 250: Performed by: EMERGENCY MEDICINE

## 2020-04-08 PROCEDURE — 82962 GLUCOSE BLOOD TEST: CPT

## 2020-04-08 RX ORDER — HALOPERIDOL 5 MG/1
5 TABLET ORAL
Status: DISCONTINUED | OUTPATIENT
Start: 2020-04-08 | End: 2020-04-08 | Stop reason: HOSPADM

## 2020-04-08 RX ORDER — KETOROLAC TROMETHAMINE 30 MG/ML
60 INJECTION, SOLUTION INTRAMUSCULAR; INTRAVENOUS
Status: COMPLETED | OUTPATIENT
Start: 2020-04-08 | End: 2020-04-08

## 2020-04-08 RX ORDER — DIPHENHYDRAMINE HCL 50 MG
50 CAPSULE ORAL
Status: DISCONTINUED | OUTPATIENT
Start: 2020-04-08 | End: 2020-04-08 | Stop reason: HOSPADM

## 2020-04-08 RX ORDER — PROCHLORPERAZINE MALEATE 10 MG
10 TABLET ORAL
Status: DISCONTINUED | OUTPATIENT
Start: 2020-04-08 | End: 2020-04-08 | Stop reason: SDUPTHER

## 2020-04-08 RX ORDER — ESCITALOPRAM OXALATE 10 MG/1
10 TABLET ORAL DAILY
Status: DISCONTINUED | OUTPATIENT
Start: 2020-04-08 | End: 2020-04-08 | Stop reason: HOSPADM

## 2020-04-08 RX ORDER — QUETIAPINE FUMARATE 100 MG/1
200 TABLET, FILM COATED ORAL
Status: DISCONTINUED | OUTPATIENT
Start: 2020-04-08 | End: 2020-04-08 | Stop reason: HOSPADM

## 2020-04-08 RX ORDER — MAG HYDROX/ALUMINUM HYD/SIMETH 200-200-20
30 SUSPENSION, ORAL (FINAL DOSE FORM) ORAL
Status: COMPLETED | OUTPATIENT
Start: 2020-04-08 | End: 2020-04-08

## 2020-04-08 RX ORDER — KETOROLAC TROMETHAMINE 30 MG/ML
60 INJECTION, SOLUTION INTRAMUSCULAR; INTRAVENOUS
Status: DISCONTINUED | OUTPATIENT
Start: 2020-04-08 | End: 2020-04-08 | Stop reason: SDUPTHER

## 2020-04-08 RX ORDER — PROCHLORPERAZINE EDISYLATE 5 MG/ML
10 INJECTION INTRAMUSCULAR; INTRAVENOUS
Status: DISCONTINUED | OUTPATIENT
Start: 2020-04-08 | End: 2020-04-08 | Stop reason: HOSPADM

## 2020-04-08 RX ORDER — PROCHLORPERAZINE EDISYLATE 5 MG/ML
10 INJECTION INTRAMUSCULAR; INTRAVENOUS ONCE
Status: DISCONTINUED | OUTPATIENT
Start: 2020-04-08 | End: 2020-04-08 | Stop reason: SDUPTHER

## 2020-04-08 RX ORDER — LIDOCAINE HYDROCHLORIDE 20 MG/ML
15 SOLUTION OROPHARYNGEAL
Status: COMPLETED | OUTPATIENT
Start: 2020-04-08 | End: 2020-04-08

## 2020-04-08 RX ADMIN — ESCITALOPRAM OXALATE 10 MG: 10 TABLET ORAL at 10:18

## 2020-04-08 RX ADMIN — ALUMINUM HYDROXIDE, MAGNESIUM HYDROXIDE, AND SIMETHICONE 30 ML: 200; 200; 20 SUSPENSION ORAL at 14:50

## 2020-04-08 RX ADMIN — LORAZEPAM 1 MG: 1 TABLET ORAL at 11:02

## 2020-04-08 RX ADMIN — INSULIN GLARGINE 30 UNITS: 100 INJECTION, SOLUTION SUBCUTANEOUS at 09:40

## 2020-04-08 RX ADMIN — LIDOCAINE HYDROCHLORIDE 15 ML: 20 SOLUTION ORAL; TOPICAL at 14:50

## 2020-04-08 RX ADMIN — LORAZEPAM 1 MG: 1 TABLET ORAL at 17:03

## 2020-04-08 RX ADMIN — KETOROLAC TROMETHAMINE 60 MG: 30 INJECTION, SOLUTION INTRAMUSCULAR at 08:47

## 2020-04-08 RX ADMIN — METFORMIN HYDROCHLORIDE 1000 MG: 500 TABLET ORAL at 10:18

## 2020-04-08 RX ADMIN — HALOPERIDOL 5 MG: 5 TABLET ORAL at 11:22

## 2020-04-08 RX ADMIN — PROCHLORPERAZINE EDISYLATE 10 MG: 5 INJECTION INTRAMUSCULAR; INTRAVENOUS at 08:47

## 2020-04-08 RX ADMIN — ACETAMINOPHEN 1000 MG: 500 TABLET, FILM COATED ORAL at 10:18

## 2020-04-08 RX ADMIN — INSULIN LISPRO 6 UNITS: 100 INJECTION, SOLUTION INTRAVENOUS; SUBCUTANEOUS at 13:45

## 2020-04-08 NOTE — ED NOTES
Pt is screaming out constantly across the ER about being in pain . Pt was asked not to yell. Pt is being ride and disruptive . Pt was told that the doctor would be notified of his pain issues.

## 2020-04-08 NOTE — ED NOTES
Patient  Yes - Name: Julian Phelps   Patient  Oriented YES   High risk patients are in line of sight at all times Yes   Excess equipment/medical supplies not necessary for the care of the patient removed Yes   All sharp or dangerous objects are removed from room: including but not limited to belts, pens & pencils, needles, medications, cosmetics, lighters, matches, nail files, watches, necklaces, glass objects, razors, razor blades, knives, aerosol sprays, drawstring pants, shoes, cords (telephone, call bells, etc.) cleaning wipes or other cleaning items, aluminum cans, not permanently attached wall décor Yes   Telephone/cell phone removed as well as TV remote (batteries can be swallowed) Yes   Patient belongings removed and labeled at nurses station Yes   Excess linen is removed from room Yes   All plastic bags are removed from the room and replaced with paper trash bags Yes   Patient is in gown and using hospital socks with rubber soles Yes   No metal, hard eating utensils or hard plates are on meal tray Yes   Remove all cleaning agents used by Иван's Yes   Ensure bathroom door key is easily accessible Yes   If Crucifix is hanging on a nail, remove Crucifix as well as the nail Yes       *If any question above is answered \"No,\" documentation is required.

## 2020-04-08 NOTE — ED NOTES
Pt attempting to leave room to smoke. Pt made aware by primary RN that he cannot go outside to smoke. Pt asking for another meal tray.  Pt has been provided with snacks and an extra meal tray this AM. Security called to assistance patient back into room

## 2020-04-08 NOTE — PROGRESS NOTES
ROSE discussed with patient of plan to [facility] on tomorrow. Patient still continues to be disruptive in ER. He has asked ROSE constantly for a room with a TV. He has a history of multiple ER visits to [facility]. Very similar behavior in their ER.

## 2020-04-08 NOTE — PROGRESS NOTES
SW-CM attempted to speak with patient who continued to use offensive and rude language as SW-CM attempted to update patient on Psychiatric hold.

## 2020-04-08 NOTE — ED NOTES
I have reviewed discharge instructions with the patient. The patient verbalized understanding. Patient left ED via Discharge Method: ambulatory to Home with self). Opportunity for questions and clarification provided. Patient given 0 scripts. To continue your aftercare when you leave the hospital, you may receive an automated call from our care team to check in on how you are doing. This is a free service and part of our promise to provide the best care and service to meet your aftercare needs.  If you have questions, or wish to unsubscribe from this service please call [phone]. Thank you for Choosing our [facility] Emergency Department.

## 2020-04-08 NOTE — PROGRESS NOTES
Spoke with [facility] Karthik  at [employer] who is agreeable to accept the patient to on tomorrow.  has discussed plan with patient who is agreeable and optimistic about going. He states that he is also agreeable to follow-up with Mental Health while he is there. ROSE discussed plan for medication vouchers for mental health medication vouchers with patient and TJ. Patient will need medications prior to arrival. Case Management will arrange. ROSE has discussed with Charge Nurse Dave Rodrigues and Attending MD Dr. Dougie Weaver. Both are agreeable with plan and will discussed with providers at shift change.

## 2020-04-08 NOTE — ED NOTES
[facility] ED Psychiatric RECHECK NOTE for 2020  Arrival Date/Time: 2020  3:44 PM      Adán Rockwell  MRN: [id number]    YOB: 1975   39 y.o. male    [facility] EMERGENCY DEPT ER14/14  Seen on 2020 @ 11:01 AM        he is on papers. Commitment Papers  on: 20 @ 17:30    he has completed a tele-psych evaluation. 20     Adán Rockwell is a 39 y.o. male here for suicidal ideations. He continues to be verbally abusive at times. Medication changes made. Awaiting placement    Objective:   Vitals:    20 2301 20 2331 20 1300 20 0936   BP: 101/65 119/74 112/67 114/74   Pulse:   81 82   Resp:   20 16   Temp:   98.4 °F (36.9 °C) 99 °F (37.2 °C)   SpO2:   94% 99%       Recent Labs:   Recent Labs     20  1158 20  1552    123*   K 3.6 4.7   * 91*   CO2 22 19*   BUN 8 10   CREA 0.47* 1.24   CA 6.8* 8.2*   * 975*      No lab exists for component: JENNIFER,  YAKELIN    Current Facility-Administered Medications   Medication Dose Route Frequency    prochlorperazine (COMPAZINE) injection 10 mg  10 mg IntraMUSCular Q6H PRN    QUEtiapine (SEROquel) tablet 200 mg  200 mg Oral QHS    escitalopram oxalate (LEXAPRO) tablet 10 mg  10 mg Oral DAILY    haloperidoL (HALDOL) tablet 5 mg  5 mg Oral Q6H PRN    diphenhydrAMINE (BENADRYL) capsule 50 mg  50 mg Oral Q6H PRN    metFORMIN (GLUCOPHAGE) tablet 1,000 mg  1,000 mg Oral BID WITH MEALS    insulin lispro (HUMALOG) injection 6 Units  6 Units SubCUTAneous AC&HS    insulin glargine (LANTUS) injection 30 Units  30 Units SubCUTAneous DAILY    acetaminophen (TYLENOL) tablet 1,000 mg  1,000 mg Oral Q6H PRN    docusate sodium (COLACE) capsule 100 mg  100 mg Oral BID    LORazepam (ATIVAN) tablet 1 mg  1 mg Oral Q6H PRN     Current Outpatient Medications   Medication Sig    metFORMIN (GLUCOPHAGE) 1,000 mg tablet Take 1,000 mg by mouth two (2) times daily (with meals).        Assessment and Plan:  Bipolar; DM; SI Romana Vazquez DO; 4/8/2020 @11:01 AM =============== negative...

## 2020-04-08 NOTE — ED NOTES
Spoke with diabetic educator.  She states to not give him the premeal 6units of insulin if patient is still vomiting this AM.

## 2020-04-08 NOTE — ED NOTES
Pt is disruptive and combative again at this time . Pt has been given meds for anxiety and agitation but pt is still yelling across ER.

## 2020-04-08 NOTE — DIABETES MGMT
Patient's blood glucose ranged  yesterday with patient receiving Lantus 30 units and Humalog 18 units. Note that Humalog 6 units in addition to SSI was started with dinner yesterday, but patient was vomiting and trying to eat per RN progress note. Patient had received prandial dose, but was unable to retain food this likely contributed to blood glucose of 61. Spoke with primary RN and clarified that prandial insulin (the set amount the patient receives with meals) is only to be given if patient is not vomiting and can retain his food. SSI can be given to correct the elevation in blood glucose but prandial insulin is meant for the excursion in blood glucose due to carb intake so if patient is unable to to eat and absorb those carbs he won't require the prandial insulin. Primary RN verbalized understanding. Primary RN to reevaluate patient's blood glucose this AM. Patient continues to await bed at [facility]. Benedict Ospina as telepsych reevaluation still recommends involuntary commitment.

## 2020-04-08 NOTE — ED NOTES
Gladys stated that patient had vomited. Patient stating that he has nausea and abdominal pain. Dr hernandez notified.

## 2020-04-09 ENCOUNTER — HOSPITAL ENCOUNTER (EMERGENCY)
Age: 45
Discharge: HOME | End: 2020-04-09
Attending: EMERGENCY MEDICINE

## 2020-04-09 VITALS
OXYGEN SATURATION: 99 % | SYSTOLIC BLOOD PRESSURE: 138 MMHG | BODY MASS INDEX: 23.49 KG/M2 | RESPIRATION RATE: 16 BRPM | HEIGHT: 68 IN | DIASTOLIC BLOOD PRESSURE: 93 MMHG | TEMPERATURE: 98.2 F | WEIGHT: 155 LBS | HEART RATE: 94 BPM

## 2020-04-09 DIAGNOSIS — R73.9 HYPERGLYCEMIA: Primary | ICD-10-CM

## 2020-04-09 LAB — GLUCOSE BLD STRIP.AUTO-MCNC: 595 MG/DL (ref 65–100)

## 2020-04-09 PROCEDURE — 82962 GLUCOSE BLOOD TEST: CPT

## 2020-04-09 PROCEDURE — 99284 EMERGENCY DEPT VISIT MOD MDM: CPT

## 2020-04-09 PROCEDURE — 81003 URINALYSIS AUTO W/O SCOPE: CPT

## 2020-04-09 NOTE — ED PROVIDER NOTES
HPI:  39 male homeless was just discharged from our facility this morning after 7 days in the emergency department with complaint of his sugar being high, abdominal pain. He stated he cannot find his insulin. He apparently approach paramedic unit at a local  parking lot and requested to be transferred back here. ROS  Constitutional: No fever, no chills  Skin: no rash  Eye:   ENMT:  Respiratory: No shortness of breath, no cough  Cardiovascular: No chest pain, no palpitations  Gastrointestinal: + vomiting, + nausea, no diarrhea, + abdominal pain  : No dysuria  MSK:  no muscle pain, no joint pain  Neuro: no numbness, no tingling, no weakness  Psych:   Endocrine:   All other review of systems positive per history of present illness and the above otherwise negative or noncontributory. Visit Vitals  BP (!) 138/93 (BP 1 Location: Left arm, BP Patient Position: At rest)   Pulse 94   Temp 98.2 °F (36.8 °C)   Resp 16   Ht 5' 8\" (1.727 m)   Wt 70.3 kg (155 lb)   SpO2 99%   BMI 23.57 kg/m²     History reviewed. No pertinent past medical history. History reviewed. No pertinent surgical history. Cannot display prior to admission medications because the patient has not been admitted in this contact. Adult Exam   General: alert, no acute distress  Head: normocephalic, atraumatic  ENT: moist mucous membranes  Neck:  full range of motion  Cardiovascular:  Respiratory:  normal respirations  Gastrointestinal:   Back: non-tender, full range of motion  Musculoskeletal: normal range of motion, normal strength, no gross deformities  Neurological:  no gross focal deficits; normal speech  Psychiatric:     MDM:  While patient was here he was constantly requesting a room with a TV. Was constantly asking for pain medication. Has exhibited similar behavior on multiple other ER visit at [facility].  Case management has been involved. We have offer an help from multiple different standpoint.   Now the patient is complaining he cannot find his insulin. He told me the same similar story last week when I initially saw him in the ER. I am concerned the patient is malingering. I suspect he is not following any sort of diabetic diet and is likely noncompliant with his medication which is causing recurrent hypoglycemia. I am also concerned that he is drug-seeking. He is constantly asking for pain medication. At this point he has been here for 1 week was discharged a few hours ago and back. His urine shows no ketones. No signs of infection. Glucose of 500. I have asked case management to help with his insulin. Patient will be discharged at this time. No results found. Recent Results (from the past 24 hour(s))   GLUCOSE, POC    Collection Time: 04/08/20  1:37 PM   Result Value Ref Range    Glucose (POC) 285 (H) 65 - 100 mg/dL   GLUCOSE, POC    Collection Time: 04/08/20  5:05 PM   Result Value Ref Range    Glucose (POC) 317 (H) 65 - 100 mg/dL         Dragon voice recognition software was used to create this note. Although the note has been reviewed and corrected where necessary, additional errors may have been overlooked and remain in the text.

## 2020-04-09 NOTE — ED NOTES
Patient arrived via EMS with c/o high blood sugar. Pt walked up to ambulance when parked at  and requested transport. Pt was discharged from our facility yesterday.  VSS en route for EMS.

## 2020-04-09 NOTE — PROGRESS NOTES
Looked for patient to discuss assist with medications / patient already discharged / not in lobby or outside of ER.

## 2020-04-09 NOTE — ED NOTES
I have reviewed discharge instructions with the patient. The patient verbalized understanding. Patient left ED via Discharge Method: ambulatory to Home with self. Opportunity for questions and clarification provided. Patient given 0 scripts. To continue your aftercare when you leave the hospital, you may receive an automated call from our care team to check in on how you are doing. This is a free service and part of our promise to provide the best care and service to meet your aftercare needs.  If you have questions, or wish to unsubscribe from this service please call [phone]. Thank you for Choosing our [facility] Emergency Department.

## 2020-04-10 ENCOUNTER — PATIENT OUTREACH (OUTPATIENT)
Dept: CASE MANAGEMENT | Age: 45
End: 2020-04-10

## 2020-04-10 NOTE — PROGRESS NOTES
Attempted to contact patient for COVID-19 risk education. Patient's numbers on file are both not in service. No further outreaches will be attempted at this time. CC will be removed from care team and the episode will be closed.

## 2020-05-30 ENCOUNTER — HOSPITAL ENCOUNTER (INPATIENT)
Age: 45
LOS: 6 days | Discharge: HOME | DRG: 638 | End: 2020-06-05
Attending: EMERGENCY MEDICINE | Admitting: HOSPITALIST
Payer: SUBSIDIZED

## 2020-05-30 DIAGNOSIS — E10.10 DIABETIC KETOACIDOSIS WITHOUT COMA ASSOCIATED WITH TYPE 1 DIABETES MELLITUS (HCC): Primary | ICD-10-CM

## 2020-05-30 PROBLEM — F31.9 BIPOLAR DISORDER (HCC): Status: ACTIVE | Noted: 2020-05-30

## 2020-05-30 PROBLEM — F10.20 ALCOHOL DEPENDENCE (HCC): Status: ACTIVE | Noted: 2020-05-30

## 2020-05-30 PROBLEM — I10 HTN (HYPERTENSION): Status: ACTIVE | Noted: 2020-05-30

## 2020-05-30 PROBLEM — E11.10 DKA (DIABETIC KETOACIDOSES): Status: ACTIVE | Noted: 2020-05-30

## 2020-05-30 LAB
ADMINISTERED INITIALS, ADMINIT: NORMAL
ALBUMIN SERPL-MCNC: 3.4 G/DL (ref 3.5–5)
ALBUMIN/GLOB SERPL: 1.1 {RATIO} (ref 1.2–3.5)
ALP SERPL-CCNC: 104 U/L (ref 50–136)
ALT SERPL-CCNC: 28 U/L (ref 12–65)
ANION GAP SERPL CALC-SCNC: 16 MMOL/L (ref 7–16)
ANION GAP SERPL CALC-SCNC: 7 MMOL/L (ref 7–16)
ANION GAP SERPL CALC-SCNC: 7 MMOL/L (ref 7–16)
AST SERPL-CCNC: 28 U/L (ref 15–37)
BASOPHILS # BLD: 0 K/UL (ref 0–0.2)
BASOPHILS NFR BLD: 0 % (ref 0–2)
BILIRUB SERPL-MCNC: 0.3 MG/DL (ref 0.2–1.1)
BUN SERPL-MCNC: 4 MG/DL (ref 6–23)
BUN SERPL-MCNC: 5 MG/DL (ref 6–23)
BUN SERPL-MCNC: 5 MG/DL (ref 6–23)
CALCIUM SERPL-MCNC: 8.2 MG/DL (ref 8.3–10.4)
CALCIUM SERPL-MCNC: 8.3 MG/DL (ref 8.3–10.4)
CALCIUM SERPL-MCNC: 8.3 MG/DL (ref 8.3–10.4)
CHLORIDE SERPL-SCNC: 106 MMOL/L (ref 98–107)
CHLORIDE SERPL-SCNC: 107 MMOL/L (ref 98–107)
CHLORIDE SERPL-SCNC: 93 MMOL/L (ref 98–107)
CO2 SERPL-SCNC: 15 MMOL/L (ref 21–32)
CO2 SERPL-SCNC: 25 MMOL/L (ref 21–32)
CO2 SERPL-SCNC: 26 MMOL/L (ref 21–32)
CREAT SERPL-MCNC: 0.6 MG/DL (ref 0.8–1.5)
CREAT SERPL-MCNC: 0.64 MG/DL (ref 0.8–1.5)
CREAT SERPL-MCNC: 1.04 MG/DL (ref 0.8–1.5)
D50 ADMINISTERED, D50ADM: 0 ML
D50 ADMINISTERED, D50ADM: 18 ML
D50 ORDER, D50ORD: 0 ML
D50 ORDER, D50ORD: 18 ML
DIFFERENTIAL METHOD BLD: ABNORMAL
EOSINOPHIL # BLD: 0.1 K/UL (ref 0–0.8)
EOSINOPHIL NFR BLD: 1 % (ref 0.5–7.8)
ERYTHROCYTE [DISTWIDTH] IN BLOOD BY AUTOMATED COUNT: 20 % (ref 11.9–14.6)
EST. AVERAGE GLUCOSE BLD GHB EST-MCNC: 280 MG/DL
GLOBULIN SER CALC-MCNC: 3.1 G/DL (ref 2.3–3.5)
GLSCOM COMMENTS: NORMAL
GLUCOSE BLD STRIP.AUTO-MCNC: 102 MG/DL (ref 65–100)
GLUCOSE BLD STRIP.AUTO-MCNC: 107 MG/DL (ref 65–100)
GLUCOSE BLD STRIP.AUTO-MCNC: 119 MG/DL (ref 65–100)
GLUCOSE BLD STRIP.AUTO-MCNC: 285 MG/DL (ref 65–100)
GLUCOSE BLD STRIP.AUTO-MCNC: 54 MG/DL (ref 65–100)
GLUCOSE BLD STRIP.AUTO-MCNC: 542 MG/DL (ref 65–100)
GLUCOSE BLD STRIP.AUTO-MCNC: 94 MG/DL (ref 65–100)
GLUCOSE SERPL-MCNC: 860 MG/DL (ref 65–100)
GLUCOSE SERPL-MCNC: 91 MG/DL (ref 65–100)
GLUCOSE SERPL-MCNC: 92 MG/DL (ref 65–100)
GLUCOSE, GLC: 102 MG/DL
GLUCOSE, GLC: 107 MG/DL
GLUCOSE, GLC: 121 MG/DL
GLUCOSE, GLC: 285 MG/DL
GLUCOSE, GLC: 54 MG/DL
GLUCOSE, GLC: 542 MG/DL
GLUCOSE, GLC: 94 MG/DL
HBA1C MFR BLD: 11.4 % (ref 4.8–6)
HCT VFR BLD AUTO: 33.7 % (ref 41.1–50.3)
HGB BLD-MCNC: 10 G/DL (ref 13.6–17.2)
HIGH TARGET, HITG: 250 MG/DL
IMM GRANULOCYTES # BLD AUTO: 0 K/UL (ref 0–0.5)
IMM GRANULOCYTES NFR BLD AUTO: 0 % (ref 0–5)
INSULIN ADMINSTERED, INSADM: 0 UNITS/HOUR
INSULIN ADMINSTERED, INSADM: 0.6 UNITS/HOUR
INSULIN ADMINSTERED, INSADM: 4.5 UNITS/HOUR
INSULIN ADMINSTERED, INSADM: 9.6 UNITS/HOUR
INSULIN ORDER, INSORD: 0 UNITS/HOUR
INSULIN ORDER, INSORD: 0.6 UNITS/HOUR
INSULIN ORDER, INSORD: 4.5 UNITS/HOUR
INSULIN ORDER, INSORD: 9.6 UNITS/HOUR
LACTATE SERPL-SCNC: 2.8 MMOL/L (ref 0.4–2)
LIPASE SERPL-CCNC: 28 U/L (ref 73–393)
LOW TARGET, LOT: 150 MG/DL
LYMPHOCYTES # BLD: 1.7 K/UL (ref 0.5–4.6)
LYMPHOCYTES NFR BLD: 19 % (ref 13–44)
MAGNESIUM SERPL-MCNC: 1.9 MG/DL (ref 1.8–2.4)
MCH RBC QN AUTO: 24.3 PG (ref 26.1–32.9)
MCHC RBC AUTO-ENTMCNC: 29.7 G/DL (ref 31.4–35)
MCV RBC AUTO: 82 FL (ref 79.6–97.8)
MINUTES UNTIL NEXT BG, NBG: 15 MIN
MINUTES UNTIL NEXT BG, NBG: 60 MIN
MONOCYTES # BLD: 0.7 K/UL (ref 0.1–1.3)
MONOCYTES NFR BLD: 7 % (ref 4–12)
MULTIPLIER, MUL: 0
MULTIPLIER, MUL: 0.01
MULTIPLIER, MUL: 0.02
MULTIPLIER, MUL: 0.02
NEUTS SEG # BLD: 6.5 K/UL (ref 1.7–8.2)
NEUTS SEG NFR BLD: 72 % (ref 43–78)
NRBC # BLD: 0 K/UL (ref 0–0.2)
ORDER INITIALS, ORDINIT: NORMAL
PHOSPHATE SERPL-MCNC: 3.5 MG/DL (ref 2.5–4.5)
PHOSPHATE SERPL-MCNC: 3.6 MG/DL (ref 2.5–4.5)
PLATELET # BLD AUTO: 271 K/UL (ref 150–450)
PMV BLD AUTO: 10.5 FL (ref 9.4–12.3)
POTASSIUM SERPL-SCNC: 3.2 MMOL/L (ref 3.5–5.1)
POTASSIUM SERPL-SCNC: 3.5 MMOL/L (ref 3.5–5.1)
POTASSIUM SERPL-SCNC: 4.2 MMOL/L (ref 3.5–5.1)
PROT SERPL-MCNC: 6.5 G/DL (ref 6.3–8.2)
RBC # BLD AUTO: 4.11 M/UL (ref 4.23–5.6)
SODIUM SERPL-SCNC: 124 MMOL/L (ref 136–145)
SODIUM SERPL-SCNC: 139 MMOL/L (ref 136–145)
SODIUM SERPL-SCNC: 139 MMOL/L (ref 136–145)
WBC # BLD AUTO: 9.1 K/UL (ref 4.3–11.1)

## 2020-05-30 PROCEDURE — 82962 GLUCOSE BLOOD TEST: CPT

## 2020-05-30 PROCEDURE — 74011000250 HC RX REV CODE- 250: Performed by: HOSPITALIST

## 2020-05-30 PROCEDURE — 84100 ASSAY OF PHOSPHORUS: CPT

## 2020-05-30 PROCEDURE — 74011000258 HC RX REV CODE- 258: Performed by: EMERGENCY MEDICINE

## 2020-05-30 PROCEDURE — 96375 TX/PRO/DX INJ NEW DRUG ADDON: CPT

## 2020-05-30 PROCEDURE — 74011250637 HC RX REV CODE- 250/637: Performed by: HOSPITALIST

## 2020-05-30 PROCEDURE — 80053 COMPREHEN METABOLIC PANEL: CPT

## 2020-05-30 PROCEDURE — 74011636637 HC RX REV CODE- 636/637: Performed by: HOSPITALIST

## 2020-05-30 PROCEDURE — 65610000001 HC ROOM ICU GENERAL

## 2020-05-30 PROCEDURE — 74011636637 HC RX REV CODE- 636/637: Performed by: EMERGENCY MEDICINE

## 2020-05-30 PROCEDURE — 80048 BASIC METABOLIC PNL TOTAL CA: CPT

## 2020-05-30 PROCEDURE — 83605 ASSAY OF LACTIC ACID: CPT

## 2020-05-30 PROCEDURE — 96365 THER/PROPH/DIAG IV INF INIT: CPT

## 2020-05-30 PROCEDURE — 83735 ASSAY OF MAGNESIUM: CPT

## 2020-05-30 PROCEDURE — 74011000258 HC RX REV CODE- 258: Performed by: HOSPITALIST

## 2020-05-30 PROCEDURE — 74011250636 HC RX REV CODE- 250/636: Performed by: EMERGENCY MEDICINE

## 2020-05-30 PROCEDURE — 85025 COMPLETE CBC W/AUTO DIFF WBC: CPT

## 2020-05-30 PROCEDURE — 74011250636 HC RX REV CODE- 250/636: Performed by: HOSPITALIST

## 2020-05-30 PROCEDURE — 83690 ASSAY OF LIPASE: CPT

## 2020-05-30 PROCEDURE — 83036 HEMOGLOBIN GLYCOSYLATED A1C: CPT

## 2020-05-30 PROCEDURE — 99284 EMERGENCY DEPT VISIT MOD MDM: CPT

## 2020-05-30 PROCEDURE — 81003 URINALYSIS AUTO W/O SCOPE: CPT

## 2020-05-30 PROCEDURE — 36415 COLL VENOUS BLD VENIPUNCTURE: CPT

## 2020-05-30 RX ORDER — SODIUM CHLORIDE AND POTASSIUM CHLORIDE .9; .15 G/100ML; G/100ML
SOLUTION INTRAVENOUS CONTINUOUS
Status: DISCONTINUED | OUTPATIENT
Start: 2020-05-30 | End: 2020-05-30

## 2020-05-30 RX ORDER — DEXTROSE 50 % IN WATER (D50W) INTRAVENOUS SYRINGE
25-50 AS NEEDED
Status: DISCONTINUED | OUTPATIENT
Start: 2020-05-30 | End: 2020-06-05 | Stop reason: HOSPADM

## 2020-05-30 RX ORDER — DEXTROSE 40 %
15 GEL (GRAM) ORAL AS NEEDED
Status: DISCONTINUED | OUTPATIENT
Start: 2020-05-30 | End: 2020-05-31

## 2020-05-30 RX ORDER — SODIUM CHLORIDE 0.9 % (FLUSH) 0.9 %
5-40 SYRINGE (ML) INJECTION AS NEEDED
Status: DISCONTINUED | OUTPATIENT
Start: 2020-05-30 | End: 2020-06-05 | Stop reason: HOSPADM

## 2020-05-30 RX ORDER — MORPHINE SULFATE 2 MG/ML
2 INJECTION, SOLUTION INTRAMUSCULAR; INTRAVENOUS
Status: DISCONTINUED | OUTPATIENT
Start: 2020-05-30 | End: 2020-05-31

## 2020-05-30 RX ORDER — DEXTROSE 40 %
15 GEL (GRAM) ORAL AS NEEDED
Status: DISCONTINUED | OUTPATIENT
Start: 2020-05-30 | End: 2020-06-05 | Stop reason: HOSPADM

## 2020-05-30 RX ORDER — DEXTROSE 50 % IN WATER (D50W) INTRAVENOUS SYRINGE
25-50 AS NEEDED
Status: DISCONTINUED | OUTPATIENT
Start: 2020-05-30 | End: 2020-05-31

## 2020-05-30 RX ORDER — ACETAMINOPHEN 325 MG/1
650 TABLET ORAL
Status: DISCONTINUED | OUTPATIENT
Start: 2020-05-30 | End: 2020-06-05 | Stop reason: HOSPADM

## 2020-05-30 RX ORDER — ONDANSETRON 2 MG/ML
4 INJECTION INTRAMUSCULAR; INTRAVENOUS
Status: DISCONTINUED | OUTPATIENT
Start: 2020-05-30 | End: 2020-06-03

## 2020-05-30 RX ORDER — BISACODYL 5 MG
5 TABLET, DELAYED RELEASE (ENTERIC COATED) ORAL DAILY PRN
Status: DISCONTINUED | OUTPATIENT
Start: 2020-05-30 | End: 2020-06-05 | Stop reason: HOSPADM

## 2020-05-30 RX ORDER — SODIUM CHLORIDE 0.9 % (FLUSH) 0.9 %
5-40 SYRINGE (ML) INJECTION EVERY 8 HOURS
Status: DISCONTINUED | OUTPATIENT
Start: 2020-05-30 | End: 2020-06-05 | Stop reason: HOSPADM

## 2020-05-30 RX ORDER — DEXTROSE, SODIUM CHLORIDE, AND POTASSIUM CHLORIDE 5; .9; .15 G/100ML; G/100ML; G/100ML
150 INJECTION INTRAVENOUS CONTINUOUS
Status: DISCONTINUED | OUTPATIENT
Start: 2020-05-30 | End: 2020-05-31

## 2020-05-30 RX ORDER — ONDANSETRON 2 MG/ML
4 INJECTION INTRAMUSCULAR; INTRAVENOUS
Status: COMPLETED | OUTPATIENT
Start: 2020-05-30 | End: 2020-05-30

## 2020-05-30 RX ORDER — NALOXONE HYDROCHLORIDE 0.4 MG/ML
0.4 INJECTION, SOLUTION INTRAMUSCULAR; INTRAVENOUS; SUBCUTANEOUS AS NEEDED
Status: DISCONTINUED | OUTPATIENT
Start: 2020-05-30 | End: 2020-06-05 | Stop reason: HOSPADM

## 2020-05-30 RX ORDER — HYDROCODONE BITARTRATE AND ACETAMINOPHEN 5; 325 MG/1; MG/1
1 TABLET ORAL
Status: DISCONTINUED | OUTPATIENT
Start: 2020-05-30 | End: 2020-06-02

## 2020-05-30 RX ORDER — DIPHENHYDRAMINE HCL 25 MG
25 CAPSULE ORAL
Status: DISCONTINUED | OUTPATIENT
Start: 2020-05-30 | End: 2020-06-05 | Stop reason: HOSPADM

## 2020-05-30 RX ORDER — ENOXAPARIN SODIUM 100 MG/ML
40 INJECTION SUBCUTANEOUS EVERY 24 HOURS
Status: DISCONTINUED | OUTPATIENT
Start: 2020-05-30 | End: 2020-06-05 | Stop reason: HOSPADM

## 2020-05-30 RX ORDER — HYDROMORPHONE HYDROCHLORIDE 1 MG/ML
0.5 INJECTION, SOLUTION INTRAMUSCULAR; INTRAVENOUS; SUBCUTANEOUS
Status: COMPLETED | OUTPATIENT
Start: 2020-05-30 | End: 2020-05-30

## 2020-05-30 RX ADMIN — HYDROMORPHONE HYDROCHLORIDE 0.5 MG: 1 INJECTION, SOLUTION INTRAMUSCULAR; INTRAVENOUS; SUBCUTANEOUS at 17:22

## 2020-05-30 RX ADMIN — MORPHINE SULFATE 2 MG: 2 INJECTION, SOLUTION INTRAMUSCULAR; INTRAVENOUS at 23:56

## 2020-05-30 RX ADMIN — SODIUM CHLORIDE 0.6 UNITS/HR: 900 INJECTION, SOLUTION INTRAVENOUS at 19:56

## 2020-05-30 RX ADMIN — ONDANSETRON 4 MG: 2 INJECTION INTRAMUSCULAR; INTRAVENOUS at 19:56

## 2020-05-30 RX ADMIN — ONDANSETRON 4 MG: 2 INJECTION INTRAMUSCULAR; INTRAVENOUS at 17:22

## 2020-05-30 RX ADMIN — HYDROCODONE BITARTRATE AND ACETAMINOPHEN 1 TABLET: 5; 325 TABLET ORAL at 21:46

## 2020-05-30 RX ADMIN — SODIUM CHLORIDE 9.6 UNITS/HR: 900 INJECTION, SOLUTION INTRAVENOUS at 17:42

## 2020-05-30 RX ADMIN — Medication 10 ML: at 21:12

## 2020-05-30 RX ADMIN — ONDANSETRON 4 MG: 2 INJECTION INTRAMUSCULAR; INTRAVENOUS at 23:56

## 2020-05-30 RX ADMIN — SODIUM CHLORIDE 1000 ML: 9 INJECTION, SOLUTION INTRAVENOUS at 17:25

## 2020-05-30 RX ADMIN — DEXTROSE 50 % IN WATER (D50W) INTRAVENOUS SYRINGE 18 ML: at 21:07

## 2020-05-30 RX ADMIN — DEXTROSE MONOHYDRATE, SODIUM CHLORIDE, AND POTASSIUM CHLORIDE 150 ML/HR: 50; 9; 1.49 INJECTION, SOLUTION INTRAVENOUS at 21:23

## 2020-05-30 RX ADMIN — MORPHINE SULFATE 2 MG: 2 INJECTION, SOLUTION INTRAMUSCULAR; INTRAVENOUS at 19:56

## 2020-05-30 RX ADMIN — INSULIN HUMAN 10 UNITS: 100 INJECTION, SOLUTION PARENTERAL at 17:22

## 2020-05-30 NOTE — ED PROVIDER NOTES
Patient is a 82-year-old male who presents with nausea, vomiting, abdominal pain. States he has not been taking his insulin because he has not had a place to live, was seen at emergency department yesterday for abdominal pain with a CT abdomen consistent with chronic pancreatitis with no acute findings. Today states symptoms have worsened. History reviewed. No pertinent past medical history. History reviewed. No pertinent surgical history. History reviewed. No pertinent family history. Social History     Socioeconomic History    Marital status: SINGLE     Spouse name: Not on file    Number of children: Not on file    Years of education: Not on file    Highest education level: Not on file   Occupational History    Not on file   Social Needs    Financial resource strain: Not on file    Food insecurity     Worry: Not on file     Inability: Not on file    Transportation needs     Medical: Not on file     Non-medical: Not on file   Tobacco Use    Smoking status: Not on file   Substance and Sexual Activity    Alcohol use: Not on file    Drug use: Not on file    Sexual activity: Not on file   Lifestyle    Physical activity     Days per week: Not on file     Minutes per session: Not on file    Stress: Not on file   Relationships    Social connections     Talks on phone: Not on file     Gets together: Not on file     Attends [employer] service: Not on file     Active member of club or organization: Not on file     Attends meetings of clubs or organizations: Not on file     Relationship status: Not on file    Intimate partner violence     Fear of current or ex partner: Not on file     Emotionally abused: Not on file     Physically abused: Not on file     Forced sexual activity: Not on file   Other Topics Concern    Not on file   Social History Narrative    Not on file         ALLERGIES: Toradol [ketorolac]    Review of Systems   Constitutional: Positive for fatigue.  Negative for chills and fever. HENT: Negative for rhinorrhea and sore throat. Eyes: Negative for visual disturbance. Respiratory: Negative for cough and shortness of breath. Cardiovascular: Negative for chest pain and leg swelling. Gastrointestinal: Positive for abdominal pain, nausea and vomiting. Negative for diarrhea. Genitourinary: Negative for dysuria. Musculoskeletal: Negative for back pain and neck pain. Skin: Negative for rash. Neurological: Negative for weakness and headaches. Psychiatric/Behavioral: The patient is not nervous/anxious. Vitals:    05/30/20 1401   BP: 120/67   Pulse: 98   Resp: 18   Temp: 98.7 °F (37.1 °C)   SpO2: 98%   Weight: 65.8 kg (145 lb)   Height: 5' 8\" (1.727 m)            Physical Exam  Vitals signs and nursing note reviewed. Constitutional:       Appearance: He is well-developed. HENT:      Head: Normocephalic. Right Ear: External ear normal.      Left Ear: External ear normal.   Eyes:      Conjunctiva/sclera: Conjunctivae normal.      Pupils: Pupils are equal, round, and reactive to light. Neck:      Musculoskeletal: Normal range of motion and neck supple. Trachea: No tracheal deviation. Cardiovascular:      Rate and Rhythm: Normal rate and regular rhythm. Heart sounds: Normal heart sounds. No murmur. Pulmonary:      Effort: Pulmonary effort is normal. No respiratory distress. Breath sounds: Normal breath sounds. Abdominal:      General: There is no distension. Palpations: Abdomen is soft. There is no mass. Tenderness: There is abdominal tenderness. Hernia: No hernia is present. Musculoskeletal: Normal range of motion. Skin:     Findings: No rash. Neurological:      Mental Status: He is alert and oriented to person, place, and time. Cranial Nerves: No cranial nerve deficit.           MDM  Number of Diagnoses or Management Options     Amount and/or Complexity of Data Reviewed  Clinical lab tests: ordered and reviewed  Tests in the radiology section of CPT®: ordered and reviewed  Tests in the medicine section of CPT®: ordered and reviewed  Review and summarize past medical records: yes    Risk of Complications, Morbidity, and/or Mortality  Presenting problems: high  Diagnostic procedures: high  Management options: high  General comments: CRITICAL CARE NOTE :    5:27 PM      IMPENDING DETERIORATION -Metabolic    ASSOCIATED RISK FACTORS - Metabolic changes    MANAGEMENT- insulin, fluids    INTERPRETATION -  Blood Gases and ECG    INTERVENTIONS - Metobolic interventions    CASE REVIEW - Hospitalist    TREATMENT RESPONSE -Stable    PERFORMED BY - Self        NOTES   :      I have spent 31 minutes of critical care time involved in lab review, consultations with specialist, family decision- making, bedside attention and documentation. During this entire length of time I was immediately available to the patient . Mariia Arevalo MD                                                Critical Care  Total time providing critical care: 30-74 minutes    Patient Progress  Patient progress: stable         Procedures  Recent Results (from the past 12 hour(s))   CBC WITH AUTOMATED DIFF    Collection Time: 05/30/20  2:07 PM   Result Value Ref Range    WBC 9.1 4.3 - 11.1 K/uL    RBC 4.11 (L) 4.23 - 5.6 M/uL    HGB 10.0 (L) 13.6 - 17.2 g/dL    HCT 33.7 (L) 41.1 - 50.3 %    MCV 82.0 79.6 - 97.8 FL    MCH 24.3 (L) 26.1 - 32.9 PG    MCHC 29.7 (L) 31.4 - 35.0 g/dL    RDW 20.0 (H) 11.9 - 14.6 %    PLATELET 151 708 - 077 K/uL    MPV 10.5 9.4 - 12.3 FL    ABSOLUTE NRBC 0.00 0.0 - 0.2 K/uL    DF AUTOMATED      NEUTROPHILS 72 43 - 78 %    LYMPHOCYTES 19 13 - 44 %    MONOCYTES 7 4.0 - 12.0 %    EOSINOPHILS 1 0.5 - 7.8 %    BASOPHILS 0 0.0 - 2.0 %    IMMATURE GRANULOCYTES 0 0.0 - 5.0 %    ABS. NEUTROPHILS 6.5 1.7 - 8.2 K/UL    ABS. LYMPHOCYTES 1.7 0.5 - 4.6 K/UL    ABS. MONOCYTES 0.7 0.1 - 1.3 K/UL    ABS. EOSINOPHILS 0.1 0.0 - 0.8 K/UL    ABS. BASOPHILS 0.0 0.0 - 0.2 K/UL    ABS. IMM. GRANS. 0.0 0.0 - 0.5 K/UL   METABOLIC PANEL, COMPREHENSIVE    Collection Time: 05/30/20  2:07 PM   Result Value Ref Range    Sodium 124 (LL) 136 - 145 mmol/L    Potassium 4.2 3.5 - 5.1 mmol/L    Chloride 93 (L) 98 - 107 mmol/L    CO2 15 (L) 21 - 32 mmol/L    Anion gap 16 7 - 16 mmol/L    Glucose 860 (HH) 65 - 100 mg/dL    BUN 4 (L) 6 - 23 MG/DL    Creatinine 1.04 0.8 - 1.5 MG/DL    GFR est AA >60 >60 ml/min/1.73m2    GFR est non-AA >60 >60 ml/min/1.73m2    Calcium 8.2 (L) 8.3 - 10.4 MG/DL    Bilirubin, total 0.3 0.2 - 1.1 MG/DL    ALT (SGPT) 28 12 - 65 U/L    AST (SGOT) 28 15 - 37 U/L    Alk. phosphatase 104 50 - 136 U/L    Protein, total 6.5 6.3 - 8.2 g/dL    Albumin 3.4 (L) 3.5 - 5.0 g/dL    Globulin 3.1 2.3 - 3.5 g/dL    A-G Ratio 1.1 (L) 1.2 - 3.5       CT Abd: reviewed from OSH from yesterday and negative for acute process    19-year-old male with DKA:    Given fluids, insulin bolus and gtt, discussed with hospitalist for admission. Will monitor BGL q30 minutes while in the emergency department. Given dilaudid for abdominal pain and zofran for vomiting.

## 2020-05-30 NOTE — ED TRIAGE NOTES
Pt arrives via EMS from [facility] with c/o feeling like BGL has been up for about a week and hasn't been able to get to his meter or meds in that time because it is in [place]. 600mL LR given in route after reads \"hi\" in route on glucometer. All VSS. Pt reports lower abd pain as well. Pt given 4mg Zofran IV in route.

## 2020-05-30 NOTE — H&P
Hospitalist Note     Admit Date:  2020  4:58 PM   Name:  Orlin Muhammad   Age:  39 y.o.  :  1975   MRN:  [id number]   PCP:  None  Treatment Team: Attending Provider: Nadia Kelly MD; Primary Nurse: Yazmin Douglas RN    HPI/Subjective:   Chief complaint nausea vomiting abdominal pain    Patient is a 47-year-old male with past medical history significant for bipolar disorder, chronic pancreatitis, insulin-dependent diabetes mellitus, homelessness, hypertension, tobacco abuse presented to the ER today with nausea vomiting and abdominal pain. Patient states that he has not been taking insulin because he is homeless and could not go to the shelter. Of note patient was seen in the ER at [address] yesterday with similar presentation. He had a CT scan of the abdomen and pelvis which showed findings consistent with chronic pancreatitis and esophagitis. For the last 5 days, he reports he is unable to keep anything down. Intractable nausea and vomiting. Diffuse abdominal pain 10/10 in severity no aggravating or alleviating factors. ER nurse reported that the patient has not been wincing in pain, but started doing it again after I came to see him. No constipation no diarrhea. No cough no shortness of breath no chest pain no palpitations. 10 systems reviewed and negative except as noted in HPI. Patient admitted for further evaluation management of DKA. Past medical history  Diabetes mellitus  Alcohol dependence  Bipolar disorder  Chronic pancreatitis  Hypertension  Tobacco abuse  Narcotic drug-seeking behavior    Past surgical history  Previous history of hydrocephalus with shunt which was subsequently removed as an adult. Family history  Alcohol dependence in father and brother. Social history  Tobacco use-current smoker and smokes about 1 pack a day  Alcohol use patient states he quit drinking about a month ago  He denies any illicit drug use. History reviewed.  No pertinent past medical history. History reviewed. No pertinent surgical history. Allergies   Allergen Reactions    Toradol [Ketorolac] Nausea and Vomiting      Social History     Tobacco Use    Smoking status: Not on file   Substance Use Topics    Alcohol use: Not on file      History reviewed. No pertinent family history. There is no immunization history on file for this patient. PTA Medications:  Prior to Admission Medications   Prescriptions Last Dose Informant Patient Reported? Taking?   metFORMIN (GLUCOPHAGE) 1,000 mg tablet   Yes No   Sig: Take 1,000 mg by mouth two (2) times daily (with meals). Facility-Administered Medications: None       Objective:     Patient Vitals for the past 24 hrs:   Temp Pulse Resp BP SpO2   05/30/20 1401 98.7 °F (37.1 °C) 98 18 120/67 98 %     Oxygen Therapy  O2 Sat (%): 98 % (05/30/20 1401)    No intake or output data in the 24 hours ending 05/30/20 1808    *Note that automatically entered I/Os may not be accurate; dependent on patient compliance with collection and accurate  by assistants. Physical Exam:  General:    Pt is dishevelled,    Eyes:   Normal sclerae. Extraocular movements intact. HENT:  Normocephalic, atraumatic. Very dry mucous membranes  CV:   Tachycardic, regular rhythm. No  Murmurs rubs or gallops  Lungs:  CTAB. No wheezing, rhonchi, or rales. Abdomen: Soft, diffuse tenderness, nondistended. Active bowel sounds, no organomegaly  Extremities: Warm and dry. No cyanosis or edema. Neurologic: CN II-XII grossly intact. Sensation intact. Skin:     No rashes or jaundice. Normal coloration  Psych:  Normal mood and affect. I reviewed the labs, imaging, EKGs, telemetry, and other studies done this admission.   Data Review:   Recent Results (from the past 24 hour(s))   CBC WITH AUTOMATED DIFF    Collection Time: 05/30/20  2:07 PM   Result Value Ref Range    WBC 9.1 4.3 - 11.1 K/uL    RBC 4.11 (L) 4.23 - 5.6 M/uL    HGB 10.0 (L) 13.6 - 17.2 g/dL    HCT 33.7 (L) 41.1 - 50.3 %    MCV 82.0 79.6 - 97.8 FL    MCH 24.3 (L) 26.1 - 32.9 PG    MCHC 29.7 (L) 31.4 - 35.0 g/dL    RDW 20.0 (H) 11.9 - 14.6 %    PLATELET 571 231 - 124 K/uL    MPV 10.5 9.4 - 12.3 FL    ABSOLUTE NRBC 0.00 0.0 - 0.2 K/uL    DF AUTOMATED      NEUTROPHILS 72 43 - 78 %    LYMPHOCYTES 19 13 - 44 %    MONOCYTES 7 4.0 - 12.0 %    EOSINOPHILS 1 0.5 - 7.8 %    BASOPHILS 0 0.0 - 2.0 %    IMMATURE GRANULOCYTES 0 0.0 - 5.0 %    ABS. NEUTROPHILS 6.5 1.7 - 8.2 K/UL    ABS. LYMPHOCYTES 1.7 0.5 - 4.6 K/UL    ABS. MONOCYTES 0.7 0.1 - 1.3 K/UL    ABS. EOSINOPHILS 0.1 0.0 - 0.8 K/UL    ABS. BASOPHILS 0.0 0.0 - 0.2 K/UL    ABS. IMM. GRANS. 0.0 0.0 - 0.5 K/UL   METABOLIC PANEL, COMPREHENSIVE    Collection Time: 05/30/20  2:07 PM   Result Value Ref Range    Sodium 124 (LL) 136 - 145 mmol/L    Potassium 4.2 3.5 - 5.1 mmol/L    Chloride 93 (L) 98 - 107 mmol/L    CO2 15 (L) 21 - 32 mmol/L    Anion gap 16 7 - 16 mmol/L    Glucose 860 (HH) 65 - 100 mg/dL    BUN 4 (L) 6 - 23 MG/DL    Creatinine 1.04 0.8 - 1.5 MG/DL    GFR est AA >60 >60 ml/min/1.73m2    GFR est non-AA >60 >60 ml/min/1.73m2    Calcium 8.2 (L) 8.3 - 10.4 MG/DL    Bilirubin, total 0.3 0.2 - 1.1 MG/DL    ALT (SGPT) 28 12 - 65 U/L    AST (SGOT) 28 15 - 37 U/L    Alk.  phosphatase 104 50 - 136 U/L    Protein, total 6.5 6.3 - 8.2 g/dL    Albumin 3.4 (L) 3.5 - 5.0 g/dL    Globulin 3.1 2.3 - 3.5 g/dL    A-G Ratio 1.1 (L) 1.2 - 3.5     LIPASE    Collection Time: 05/30/20  2:07 PM   Result Value Ref Range    Lipase 28 (L) 73 - 393 U/L   MAGNESIUM    Collection Time: 05/30/20  2:07 PM   Result Value Ref Range    Magnesium 1.9 1.8 - 2.4 mg/dL   PHOSPHORUS    Collection Time: 05/30/20  2:07 PM   Result Value Ref Range    Phosphorus 3.5 2.5 - 4.5 MG/DL   GLUCOSE, POC    Collection Time: 05/30/20  5:38 PM   Result Value Ref Range    Glucose (POC) 542 (HH) 65 - 100 mg/dL   GLUCOSTABILIZER    Collection Time: 05/30/20  5:42 PM   Result Value Ref Range Glucose 542 mg/dL    Insulin order 9.6 units/hour    Insulin adminstered 9.6 units/hour    Multiplier 0.020     Low target 150 mg/dL    High target 250 mg/dL    D50 order 0.0 ml    D50 administered 0.00 ml    Minutes until next BG 60 min    Order initials VC     Administered initials VC     GLSCOM Comments         All Micro Results     None          Current Facility-Administered Medications   Medication Dose Route Frequency    sodium chloride 0.9 % bolus infusion 1,000 mL  1,000 mL IntraVENous ONCE    dextrose 40% (GLUTOSE) oral gel 1 Tube  15 g Oral PRN    dextrose (D50W) injection syrg 12.5-25 g  25-50 mL IntraVENous PRN    insulin regular (NOVOLIN R, HUMULIN R) 100 Units in 0.9% sodium chloride 100 mL infusion  0-50 Units/hr IntraVENous TITRATE    dextrose 40% (GLUTOSE) oral gel 1 Tube  15 g Oral PRN    glucagon (GLUCAGEN) injection 1 mg  1 mg IntraMUSCular PRN    dextrose (D50W) injection syrg 12.5-25 g  25-50 mL IntraVENous PRN    0.9% sodium chloride with KCl 20 mEq/L infusion   IntraVENous CONTINUOUS    sodium chloride (NS) flush 5-40 mL  5-40 mL IntraVENous Q8H    sodium chloride (NS) flush 5-40 mL  5-40 mL IntraVENous PRN    acetaminophen (TYLENOL) tablet 650 mg  650 mg Oral Q4H PRN    HYDROcodone-acetaminophen (NORCO) 5-325 mg per tablet 1 Tab  1 Tab Oral Q4H PRN    morphine injection 2 mg  2 mg IntraVENous Q4H PRN    naloxone (NARCAN) injection 0.4 mg  0.4 mg IntraVENous PRN    diphenhydrAMINE (BENADRYL) capsule 25 mg  25 mg Oral Q4H PRN    ondansetron (ZOFRAN) injection 4 mg  4 mg IntraVENous Q4H PRN    bisacodyL (DULCOLAX) tablet 5 mg  5 mg Oral DAILY PRN    enoxaparin (LOVENOX) injection 40 mg  40 mg SubCUTAneous Q24H     Current Outpatient Medications   Medication Sig    metFORMIN (GLUCOPHAGE) 1,000 mg tablet Take 1,000 mg by mouth two (2) times daily (with meals). Other Studies:  No results found.     Assessment and Plan:     Hospital Problems as of 5/30/2020 Never Reviewed Codes Class Noted - Resolved POA    * (Principal) DKA (diabetic ketoacidoses) ([place] [id number].) ICD-10-CM: E11.10  ICD-9-CM: 250.12  5/30/2020 - Present Unknown        Bipolar disorder ([place] [id number].) ICD-10-CM: F31.9  ICD-9-CM: 296.80  5/30/2020 - Present Unknown        HTN (hypertension) ICD-10-CM: I10  ICD-9-CM: 401.9  5/30/2020 - Present Unknown        Alcohol dependence ([place] [id number].) ICD-10-CM: F10.20  ICD-9-CM: 303.90  5/30/2020 - Present Unknown              Plan: This is a 26-year-old male with    Diabetic ketoacidosis secondary to medication noncompliance  Blood glucose of 860 on arrival.  Anion gap of 16. Bicarbonate of 15. Admit to ICU. DKA protocol. Insulin drip and IV fluids per protocol. BMP every 4 hours until anion gap closed and blood glucoses improved. N.p.o.  Cautious pain medications. A1c 10.3 last month. Diabetes education. Bipolar disorder  Resume home medications when able to take p.o. Hypertension    Alcohol dependence  Alcohol withdrawal protocol.     Discharge planning: Inpatient ICU    DVT ppx: Lovenox    Code status:  DNR  DPOA: Relative [phone]  Estimated LOS:  Greater than 2 midnights  Risk:  high    Signed:  Kaelyn Hernandez MD

## 2020-05-31 PROBLEM — E87.6 HYPOKALEMIA: Status: ACTIVE | Noted: 2020-05-31

## 2020-05-31 PROBLEM — E83.42 HYPOMAGNESEMIA: Status: ACTIVE | Noted: 2020-05-31

## 2020-05-31 LAB
ADMINISTERED INITIALS, ADMINIT: NORMAL
ANION GAP SERPL CALC-SCNC: 8 MMOL/L (ref 7–16)
BUN SERPL-MCNC: 5 MG/DL (ref 6–23)
CALCIUM SERPL-MCNC: 7.6 MG/DL (ref 8.3–10.4)
CHLORIDE SERPL-SCNC: 108 MMOL/L (ref 98–107)
CO2 SERPL-SCNC: 25 MMOL/L (ref 21–32)
CREAT SERPL-MCNC: 0.55 MG/DL (ref 0.8–1.5)
D50 ADMINISTERED, D50ADM: 0 ML
D50 ORDER, D50ORD: 0 ML
GLSCOM COMMENTS: NORMAL
GLUCOSE BLD STRIP.AUTO-MCNC: 102 MG/DL (ref 65–100)
GLUCOSE BLD STRIP.AUTO-MCNC: 145 MG/DL (ref 65–100)
GLUCOSE BLD STRIP.AUTO-MCNC: 202 MG/DL (ref 65–100)
GLUCOSE BLD STRIP.AUTO-MCNC: 205 MG/DL (ref 65–100)
GLUCOSE BLD STRIP.AUTO-MCNC: 219 MG/DL (ref 65–100)
GLUCOSE BLD STRIP.AUTO-MCNC: 441 MG/DL (ref 65–100)
GLUCOSE SERPL-MCNC: 102 MG/DL (ref 65–100)
GLUCOSE, GLC: 102 MG/DL
HIGH TARGET, HITG: 250 MG/DL
INSULIN ADMINSTERED, INSADM: 0 UNITS/HOUR
INSULIN ORDER, INSORD: 0 UNITS/HOUR
LACTATE SERPL-SCNC: 0.8 MMOL/L (ref 0.4–2)
LOW TARGET, LOT: 150 MG/DL
MAGNESIUM SERPL-MCNC: 1.7 MG/DL (ref 1.8–2.4)
MINUTES UNTIL NEXT BG, NBG: 60 MIN
MULTIPLIER, MUL: 0
ORDER INITIALS, ORDINIT: NORMAL
POTASSIUM SERPL-SCNC: 3.2 MMOL/L (ref 3.5–5.1)
SODIUM SERPL-SCNC: 141 MMOL/L (ref 136–145)

## 2020-05-31 PROCEDURE — 74011250636 HC RX REV CODE- 250/636: Performed by: INTERNAL MEDICINE

## 2020-05-31 PROCEDURE — 83605 ASSAY OF LACTIC ACID: CPT

## 2020-05-31 PROCEDURE — 80048 BASIC METABOLIC PNL TOTAL CA: CPT

## 2020-05-31 PROCEDURE — 83735 ASSAY OF MAGNESIUM: CPT

## 2020-05-31 PROCEDURE — 74011250637 HC RX REV CODE- 250/637: Performed by: HOSPITALIST

## 2020-05-31 PROCEDURE — 36415 COLL VENOUS BLD VENIPUNCTURE: CPT

## 2020-05-31 PROCEDURE — 74011636637 HC RX REV CODE- 636/637: Performed by: HOSPITALIST

## 2020-05-31 PROCEDURE — 74011250636 HC RX REV CODE- 250/636: Performed by: HOSPITALIST

## 2020-05-31 PROCEDURE — 74011636637 HC RX REV CODE- 636/637: Performed by: INTERNAL MEDICINE

## 2020-05-31 PROCEDURE — 82962 GLUCOSE BLOOD TEST: CPT

## 2020-05-31 PROCEDURE — 65270000029 HC RM PRIVATE

## 2020-05-31 RX ORDER — MAGNESIUM SULFATE HEPTAHYDRATE 40 MG/ML
2 INJECTION, SOLUTION INTRAVENOUS ONCE
Status: COMPLETED | OUTPATIENT
Start: 2020-05-31 | End: 2020-05-31

## 2020-05-31 RX ORDER — INSULIN GLARGINE 100 [IU]/ML
20 INJECTION, SOLUTION SUBCUTANEOUS
COMMUNITY
End: 2020-06-05

## 2020-05-31 RX ORDER — HYDROMORPHONE HYDROCHLORIDE 1 MG/ML
2 INJECTION, SOLUTION INTRAMUSCULAR; INTRAVENOUS; SUBCUTANEOUS
Status: DISCONTINUED | OUTPATIENT
Start: 2020-05-31 | End: 2020-06-01

## 2020-05-31 RX ORDER — INSULIN LISPRO 100 [IU]/ML
INJECTION, SOLUTION INTRAVENOUS; SUBCUTANEOUS
COMMUNITY
End: 2020-06-15

## 2020-05-31 RX ORDER — QUETIAPINE FUMARATE 200 MG/1
200 TABLET, FILM COATED ORAL 2 TIMES DAILY
Status: ON HOLD | COMMUNITY
End: 2020-06-15 | Stop reason: SDUPTHER

## 2020-05-31 RX ORDER — GABAPENTIN 400 MG/1
400 CAPSULE ORAL 3 TIMES DAILY
Status: ON HOLD | COMMUNITY
End: 2020-06-15 | Stop reason: SDUPTHER

## 2020-05-31 RX ORDER — HYDROMORPHONE HYDROCHLORIDE 2 MG/ML
2 INJECTION, SOLUTION INTRAMUSCULAR; INTRAVENOUS; SUBCUTANEOUS
Status: DISCONTINUED | OUTPATIENT
Start: 2020-05-31 | End: 2020-05-31

## 2020-05-31 RX ORDER — SERTRALINE HYDROCHLORIDE 50 MG/1
100 TABLET, FILM COATED ORAL DAILY
Status: ON HOLD | COMMUNITY
End: 2020-06-15 | Stop reason: SDUPTHER

## 2020-05-31 RX ORDER — INSULIN GLARGINE 100 [IU]/ML
20 INJECTION, SOLUTION SUBCUTANEOUS
Status: DISCONTINUED | OUTPATIENT
Start: 2020-05-31 | End: 2020-06-02

## 2020-05-31 RX ORDER — BUSPIRONE HYDROCHLORIDE 30 MG/1
60 TABLET ORAL DAILY
Status: ON HOLD | COMMUNITY
End: 2020-06-15 | Stop reason: SDUPTHER

## 2020-05-31 RX ORDER — INSULIN LISPRO 100 [IU]/ML
10 INJECTION, SOLUTION INTRAVENOUS; SUBCUTANEOUS ONCE
Status: COMPLETED | OUTPATIENT
Start: 2020-05-31 | End: 2020-05-31

## 2020-05-31 RX ORDER — SODIUM CHLORIDE 9 MG/ML
125 INJECTION, SOLUTION INTRAVENOUS CONTINUOUS
Status: DISCONTINUED | OUTPATIENT
Start: 2020-05-31 | End: 2020-06-01

## 2020-05-31 RX ORDER — INSULIN LISPRO 100 [IU]/ML
INJECTION, SOLUTION INTRAVENOUS; SUBCUTANEOUS
Status: DISCONTINUED | OUTPATIENT
Start: 2020-05-31 | End: 2020-06-05 | Stop reason: HOSPADM

## 2020-05-31 RX ADMIN — HYDROCODONE BITARTRATE AND ACETAMINOPHEN 1 TABLET: 5; 325 TABLET ORAL at 06:58

## 2020-05-31 RX ADMIN — Medication 5 ML: at 22:54

## 2020-05-31 RX ADMIN — ENOXAPARIN SODIUM 40 MG: 40 INJECTION SUBCUTANEOUS at 18:32

## 2020-05-31 RX ADMIN — MORPHINE SULFATE 2 MG: 2 INJECTION, SOLUTION INTRAMUSCULAR; INTRAVENOUS at 04:06

## 2020-05-31 RX ADMIN — ONDANSETRON 4 MG: 2 INJECTION INTRAMUSCULAR; INTRAVENOUS at 22:58

## 2020-05-31 RX ADMIN — SODIUM CHLORIDE 125 ML/HR: 9 INJECTION, SOLUTION INTRAVENOUS at 20:45

## 2020-05-31 RX ADMIN — INSULIN LISPRO 10 UNITS: 100 INJECTION, SOLUTION INTRAVENOUS; SUBCUTANEOUS at 17:16

## 2020-05-31 RX ADMIN — MAGNESIUM SULFATE HEPTAHYDRATE 2 G: 40 INJECTION, SOLUTION INTRAVENOUS at 06:58

## 2020-05-31 RX ADMIN — DEXTROSE MONOHYDRATE, SODIUM CHLORIDE, AND POTASSIUM CHLORIDE 150 ML/HR: 50; 9; 1.49 INJECTION, SOLUTION INTRAVENOUS at 04:10

## 2020-05-31 RX ADMIN — INSULIN LISPRO 4 UNITS: 100 INJECTION, SOLUTION INTRAVENOUS; SUBCUTANEOUS at 06:58

## 2020-05-31 RX ADMIN — INSULIN GLARGINE 20 UNITS: 100 INJECTION, SOLUTION SUBCUTANEOUS at 22:46

## 2020-05-31 RX ADMIN — HYDROCODONE BITARTRATE AND ACETAMINOPHEN 1 TABLET: 5; 325 TABLET ORAL at 16:20

## 2020-05-31 RX ADMIN — ONDANSETRON 4 MG: 2 INJECTION INTRAMUSCULAR; INTRAVENOUS at 06:58

## 2020-05-31 RX ADMIN — ONDANSETRON 4 MG: 2 INJECTION INTRAMUSCULAR; INTRAVENOUS at 11:40

## 2020-05-31 RX ADMIN — MORPHINE SULFATE 2 MG: 2 INJECTION, SOLUTION INTRAMUSCULAR; INTRAVENOUS at 08:44

## 2020-05-31 RX ADMIN — HYDROMORPHONE HYDROCHLORIDE 2 MG: 1 INJECTION, SOLUTION INTRAMUSCULAR; INTRAVENOUS; SUBCUTANEOUS at 18:32

## 2020-05-31 RX ADMIN — POTASSIUM BICARBONATE 40 MEQ: 782 TABLET, EFFERVESCENT ORAL at 08:20

## 2020-05-31 RX ADMIN — POTASSIUM BICARBONATE 40 MEQ: 782 TABLET, EFFERVESCENT ORAL at 17:16

## 2020-05-31 RX ADMIN — INSULIN LISPRO 4 UNITS: 100 INJECTION, SOLUTION INTRAVENOUS; SUBCUTANEOUS at 22:52

## 2020-05-31 RX ADMIN — MORPHINE SULFATE 2 MG: 2 INJECTION, SOLUTION INTRAMUSCULAR; INTRAVENOUS at 15:07

## 2020-05-31 RX ADMIN — SODIUM CHLORIDE 125 ML/HR: 9 INJECTION, SOLUTION INTRAVENOUS at 04:31

## 2020-05-31 RX ADMIN — HYDROMORPHONE HYDROCHLORIDE 2 MG: 1 INJECTION, SOLUTION INTRAMUSCULAR; INTRAVENOUS; SUBCUTANEOUS at 22:45

## 2020-05-31 RX ADMIN — SODIUM CHLORIDE 125 ML/HR: 9 INJECTION, SOLUTION INTRAVENOUS at 11:42

## 2020-05-31 RX ADMIN — HYDROCODONE BITARTRATE AND ACETAMINOPHEN 1 TABLET: 5; 325 TABLET ORAL at 11:45

## 2020-05-31 RX ADMIN — Medication 5 ML: at 06:00

## 2020-05-31 RX ADMIN — ONDANSETRON 4 MG: 2 INJECTION INTRAMUSCULAR; INTRAVENOUS at 16:20

## 2020-05-31 NOTE — PROGRESS NOTES
Problem: Falls - Risk of  Goal: *Absence of Falls  Description: Document Earma Erika Fall Risk and appropriate interventions in the flowsheet.   Outcome: Progressing Towards Goal  Note: Fall Risk Interventions:            Medication Interventions: Teach patient to arise slowly                   Problem: Patient Education: Go to Patient Education Activity  Goal: Patient/Family Education  Outcome: Progressing Towards Goal

## 2020-05-31 NOTE — PROGRESS NOTES
Problem: Falls - Risk of  Goal: *Absence of Falls  Description: Document Arnold Sol Fall Risk and appropriate interventions in the flowsheet.   Outcome: Progressing Towards Goal  Note: Fall Risk Interventions:            Medication Interventions: Evaluate medications/consider consulting pharmacy, Patient to call before getting OOB                   Problem: Patient Education: Go to Patient Education Activity  Goal: Patient/Family Education  Outcome: Progressing Towards Goal

## 2020-05-31 NOTE — PROGRESS NOTES
Hospitalist Note     Admit Date:  2020  4:58 PM   Name:  Kaylyn Figueredo   Age:  39 y.o.  :  1975   MRN:  [id number]   PCP:  None  Treatment Team: Attending Provider: Francesca Paz MD    HPI/Subjective:   Kaylyn Figueredo is a 39 y.o. male with past medical history significant for bipolar disorder, chronic pancreatitis, insulin-dependent diabetes mellitus, homelessness, hypertension, tobacco abuse who was admitted for nausea, vomiting and abdominal pain and was unable to keep anything down in past 5 days. He has not been taking his insulin because he is homeless and unable to go to the shelter. He was seen in the ED and [facility] for similar presentation. CT scan of abdomen/pelvis showed findings consistent of chronic pancreatitis and esophagitis. He was found to have blood glucose of 860 on arrival.  Anion gap of 16. Bicarbonate of 15 and was admitted to ICU for DKA. His hyperglycemia has resolved and gapped has closed and he is transferred to general medical floor for further management. 20 :  Patient is seen and examined at bedside. No acute events reported overnight by nursing staff. Anion gap has been closed. Blood sugars/FS has down trended with latest BG of 102 and FS of 219  Insulin gtt has been stopped overnight. Patient was seen resting comfortably and sleeping. Upon waking him up, he states he has abdominal pain with nausea but appeared comfortable. Patient denies fever, chills, chest pains, shortness of breath. ROS:  10 point review of systems is otherwise negative with the exception of the elements mentioned above.     Objective:     Patient Vitals for the past 24 hrs:   Temp Pulse Resp BP SpO2   20 0700 98.5 °F (36.9 °C) 76 16 103/64 96 %   20 0300 98.2 °F (36.8 °C) 65 10 99/60 97 %   20 0104  65 9 102/65 98 %   20 0000 98 °F (36.7 °C) 66 14 118/74 98 %   20 2345  67 13 112/73 97 %   20 2330  68 30 113/71 98 %   20 2315  76 12 112/74 98 %   05/30/20 2300  72 14 109/71 97 %   05/30/20 2245  77 16 101/68 96 %   05/30/20 2230  76 14 109/68 96 %   05/30/20 2215  74 16 107/65 96 %   05/30/20 2200  73 15 113/67 96 %   05/30/20 2145  78 17 105/62 96 %   05/30/20 2130  80 28 111/71 99 %   05/30/20 2101 98.1 °F (36.7 °C) 80 20 122/77 98 %   05/30/20 2055  81   98 %   05/30/20 2010  87 20 123/79 97 %   05/30/20 1401 98.7 °F (37.1 °C) 98 18 120/67 98 %     Oxygen Therapy  O2 Sat (%): 96 % (05/31/20 0700)  Pulse via Oximetry: 75 beats per minute (05/31/20 0700)  O2 Device: Room air (05/31/20 0700)    Estimated body mass index is 22.05 kg/m² as calculated from the following:    Height as of this encounter: 5' 8\" (1.727 m). Weight as of this encounter: 65.8 kg (145 lb). No intake or output data in the 24 hours ending 05/31/20 0809    *Note that automatically entered I/Os may not be accurate; dependent on patient compliance with collection and accurate  by techs. Physical Exam:   General:     alert, awake, no acute distress. Thin appearing  Head:   normocephalic, atraumatic  Eyes, Ears, nose: PERRL, EOMI. Normal conjunctiva  Neck:    supple, non-tender. Trachea midline. Lungs:   CTAB, no wheezing, rhonchi, rales  Cardiac:   RRR, Normal S1 and S2. No S3, S4 or murmurs. Abdomen:   Soft, non distended, nontender, +BS, no guarding/rebound  Extremities:   Warm, dry. No edema   Skin:   No rashes, no jaundice  Neuro:  AAOx3.  No gross focal neurological deficit  Psychiatric:  No anxiety, calm, cooperative    Data Review:  I have reviewed all labs, meds, and studies from the last 24 hours:    Recent Results (from the past 24 hour(s))   CBC WITH AUTOMATED DIFF    Collection Time: 05/30/20  2:07 PM   Result Value Ref Range    WBC 9.1 4.3 - 11.1 K/uL    RBC 4.11 (L) 4.23 - 5.6 M/uL    HGB 10.0 (L) 13.6 - 17.2 g/dL    HCT 33.7 (L) 41.1 - 50.3 %    MCV 82.0 79.6 - 97.8 FL    MCH 24.3 (L) 26.1 - 32.9 PG    MCHC 29.7 (L) 31.4 - 35.0 g/dL    RDW 20.0 (H) 11.9 - 14.6 %    PLATELET 559 352 - 866 K/uL    MPV 10.5 9.4 - 12.3 FL    ABSOLUTE NRBC 0.00 0.0 - 0.2 K/uL    DF AUTOMATED      NEUTROPHILS 72 43 - 78 %    LYMPHOCYTES 19 13 - 44 %    MONOCYTES 7 4.0 - 12.0 %    EOSINOPHILS 1 0.5 - 7.8 %    BASOPHILS 0 0.0 - 2.0 %    IMMATURE GRANULOCYTES 0 0.0 - 5.0 %    ABS. NEUTROPHILS 6.5 1.7 - 8.2 K/UL    ABS. LYMPHOCYTES 1.7 0.5 - 4.6 K/UL    ABS. MONOCYTES 0.7 0.1 - 1.3 K/UL    ABS. EOSINOPHILS 0.1 0.0 - 0.8 K/UL    ABS. BASOPHILS 0.0 0.0 - 0.2 K/UL    ABS. IMM. GRANS. 0.0 0.0 - 0.5 K/UL   METABOLIC PANEL, COMPREHENSIVE    Collection Time: 05/30/20  2:07 PM   Result Value Ref Range    Sodium 124 (LL) 136 - 145 mmol/L    Potassium 4.2 3.5 - 5.1 mmol/L    Chloride 93 (L) 98 - 107 mmol/L    CO2 15 (L) 21 - 32 mmol/L    Anion gap 16 7 - 16 mmol/L    Glucose 860 (HH) 65 - 100 mg/dL    BUN 4 (L) 6 - 23 MG/DL    Creatinine 1.04 0.8 - 1.5 MG/DL    GFR est AA >60 >60 ml/min/1.73m2    GFR est non-AA >60 >60 ml/min/1.73m2    Calcium 8.2 (L) 8.3 - 10.4 MG/DL    Bilirubin, total 0.3 0.2 - 1.1 MG/DL    ALT (SGPT) 28 12 - 65 U/L    AST (SGOT) 28 15 - 37 U/L    Alk.  phosphatase 104 50 - 136 U/L    Protein, total 6.5 6.3 - 8.2 g/dL    Albumin 3.4 (L) 3.5 - 5.0 g/dL    Globulin 3.1 2.3 - 3.5 g/dL    A-G Ratio 1.1 (L) 1.2 - 3.5     LIPASE    Collection Time: 05/30/20  2:07 PM   Result Value Ref Range    Lipase 28 (L) 73 - 393 U/L   MAGNESIUM    Collection Time: 05/30/20  2:07 PM   Result Value Ref Range    Magnesium 1.9 1.8 - 2.4 mg/dL   PHOSPHORUS    Collection Time: 05/30/20  2:07 PM   Result Value Ref Range    Phosphorus 3.5 2.5 - 4.5 MG/DL   LACTIC ACID    Collection Time: 05/30/20  5:21 PM   Result Value Ref Range    Lactic acid 2.8 (HH) 0.4 - 2.0 MMOL/L   GLUCOSE, POC    Collection Time: 05/30/20  5:38 PM   Result Value Ref Range    Glucose (POC) 542 (HH) 65 - 100 mg/dL   GLUCOSTABILIZER    Collection Time: 05/30/20  5:42 PM   Result Value Ref Range Glucose 542 mg/dL    Insulin order 9.6 units/hour    Insulin adminstered 9.6 units/hour    Multiplier 0.020     Low target 150 mg/dL    High target 250 mg/dL    D50 order 0.0 ml    D50 administered 0.00 ml    Minutes until next BG 60 min    Order initials VC     Administered initials VC     GLSCOM Comments     GLUCOSE, POC    Collection Time: 05/30/20  6:54 PM   Result Value Ref Range    Glucose (POC) 285 (H) 65 - 100 mg/dL   GLUCOSTABILIZER    Collection Time: 05/30/20  6:56 PM   Result Value Ref Range    Glucose 285 mg/dL    Insulin order 4.5 units/hour    Insulin adminstered 4.5 units/hour    Multiplier 0.020     Low target 150 mg/dL    High target 250 mg/dL    D50 order 0.0 ml    D50 administered 0.00 ml    Minutes until next BG 60 min    Order initials VC     Administered initials VC     GLSCOM Comments     GLUCOSE, POC    Collection Time: 05/30/20  7:55 PM   Result Value Ref Range    Glucose (POC) 119 (H) 65 - 100 mg/dL   GLUCOSTABILIZER    Collection Time: 05/30/20  8:02 PM   Result Value Ref Range    Glucose 121 mg/dL    Insulin order 0.6 units/hour    Insulin adminstered 0.6 units/hour    Multiplier 0.010     Low target 150 mg/dL    High target 250 mg/dL    D50 order 0.0 ml    D50 administered 0.00 ml    Minutes until next BG 60 min    Order initials [facility]     Administered initials CL     GLSCOM Comments     GLUCOSE, POC    Collection Time: 05/30/20  9:01 PM   Result Value Ref Range    Glucose (POC) 54 (L) 65 - 100 mg/dL   GLUCOSTABILIZER    Collection Time: 05/30/20  9:02 PM   Result Value Ref Range    Glucose 54 mg/dL    Insulin order 0.0 units/hour    Insulin adminstered 0.0 units/hour    Multiplier 0.000     Low target 150 mg/dL    High target 250 mg/dL    D50 order 18.0 ml    D50 administered 18.00 ml    Minutes until next BG 15 min    Order initials SA     Administered initials SA     GLSCOM Comments     GLUCOSE, POC    Collection Time: 05/30/20  9:17 PM   Result Value Ref Range    Glucose (POC) 107 (H) 65 - 100 mg/dL   GLUCOSTABILIZER    Collection Time: 05/30/20  9:17 PM   Result Value Ref Range    Glucose 107 mg/dL    Insulin order 0.0 units/hour    Insulin adminstered 0.0 units/hour    Multiplier 0.000     Low target 150 mg/dL    High target 250 mg/dL    D50 order 0.0 ml    D50 administered 0.00 ml    Minutes until next BG 60 min    Order initials SA     Administered initials SA     GLSCOM Comments     METABOLIC PANEL, BASIC    Collection Time: 05/30/20  9:22 PM   Result Value Ref Range    Sodium 139 136 - 145 mmol/L    Potassium 3.5 3.5 - 5.1 mmol/L    Chloride 107 98 - 107 mmol/L    CO2 25 21 - 32 mmol/L    Anion gap 7 7 - 16 mmol/L    Glucose 91 65 - 100 mg/dL    BUN 5 (L) 6 - 23 MG/DL    Creatinine 0.60 (L) 0.8 - 1.5 MG/DL    GFR est AA >60 >60 ml/min/1.73m2    GFR est non-AA >60 >60 ml/min/1.73m2    Calcium 8.3 8.3 - 10.4 MG/DL   MAGNESIUM    Collection Time: 05/30/20  9:22 PM   Result Value Ref Range    Magnesium 1.9 1.8 - 2.4 mg/dL   PHOSPHORUS    Collection Time: 05/30/20  9:22 PM   Result Value Ref Range    Phosphorus 3.6 2.5 - 4.5 MG/DL   HEMOGLOBIN A1C WITH EAG    Collection Time: 05/30/20  9:22 PM   Result Value Ref Range    Hemoglobin A1c 11.4 (H) 4.8 - 6.0 %    Est. average glucose 334 mg/dL   METABOLIC PANEL, BASIC    Collection Time: 05/30/20  9:22 PM   Result Value Ref Range    Sodium 139 136 - 145 mmol/L    Potassium 3.2 (L) 3.5 - 5.1 mmol/L    Chloride 106 98 - 107 mmol/L    CO2 26 21 - 32 mmol/L    Anion gap 7 7 - 16 mmol/L    Glucose 92 65 - 100 mg/dL    BUN 5 (L) 6 - 23 MG/DL    Creatinine 0.64 (L) 0.8 - 1.5 MG/DL    GFR est AA >60 >60 ml/min/1.73m2    GFR est non-AA >60 >60 ml/min/1.73m2    Calcium 8.3 8.3 - 10.4 MG/DL   MAGNESIUM    Collection Time: 05/30/20  9:22 PM   Result Value Ref Range    Magnesium 1.9 1.8 - 2.4 mg/dL   GLUCOSE, POC    Collection Time: 05/30/20 10:17 PM   Result Value Ref Range    Glucose (POC) 94 65 - 100 mg/dL   GLUCOSTABILIZER    Collection Time: 05/30/20 10:18 PM   Result Value Ref Range    Glucose 94 mg/dL    Insulin order 0.0 units/hour    Insulin adminstered 0.0 units/hour    Multiplier 0.000     Low target 150 mg/dL    High target 250 mg/dL    D50 order 0.0 ml    D50 administered 0.00 ml    Minutes until next BG 60 min    Order initials SA     Administered initials      GLSCOM Comments     GLUCOSE, POC    Collection Time: 05/30/20 11:15 PM   Result Value Ref Range    Glucose (POC) 102 (H) 65 - 100 mg/dL   GLUCOSTABILIZER    Collection Time: 05/30/20 11:16 PM   Result Value Ref Range    Glucose 102 mg/dL    Insulin order 0.0 units/hour    Insulin adminstered 0.0 units/hour    Multiplier 0.000     Low target 150 mg/dL    High target 250 mg/dL    D50 order 0.0 ml    D50 administered 0.00 ml    Minutes until next BG 60 min    Order initials SA     Administered initials      GLSCOM Comments     GLUCOSE, POC    Collection Time: 05/31/20 12:24 AM   Result Value Ref Range    Glucose (POC) 102 (H) 65 - 100 mg/dL   GLUCOSTABILIZER    Collection Time: 05/31/20 12:25 AM   Result Value Ref Range    Glucose 102 mg/dL    Insulin order 0.0 units/hour    Insulin adminstered 0.0 units/hour    Multiplier 0.000     Low target 150 mg/dL    High target 250 mg/dL    D50 order 0.0 ml    D50 administered 0.00 ml    Minutes until next BG 60 min    Order initials sa     Administered initials      GLSCOM Comments     METABOLIC PANEL, BASIC    Collection Time: 05/31/20 12:28 AM   Result Value Ref Range    Sodium 141 136 - 145 mmol/L    Potassium 3.2 (L) 3.5 - 5.1 mmol/L    Chloride 108 (H) 98 - 107 mmol/L    CO2 25 21 - 32 mmol/L    Anion gap 8 7 - 16 mmol/L    Glucose 102 (H) 65 - 100 mg/dL    BUN 5 (L) 6 - 23 MG/DL    Creatinine 0.55 (L) 0.8 - 1.5 MG/DL    GFR est AA >60 >60 ml/min/1.73m2    GFR est non-AA >60 >60 ml/min/1.73m2    Calcium 7.6 (L) 8.3 - 10.4 MG/DL   MAGNESIUM    Collection Time: 05/31/20 12:28 AM   Result Value Ref Range    Magnesium 1.7 (L) 1.8 - 2.4 mg/dL   LACTIC ACID Collection Time: 05/31/20 12:28 AM   Result Value Ref Range    Lactic acid 0.8 0.4 - 2.0 MMOL/L   GLUCOSE, POC    Collection Time: 05/31/20  4:04 AM   Result Value Ref Range    Glucose (POC) 202 (H) 65 - 100 mg/dL   GLUCOSE, POC    Collection Time: 05/31/20  6:52 AM   Result Value Ref Range    Glucose (POC) 219 (H) 65 - 100 mg/dL        All Micro Results     None          Current Meds:  Current Facility-Administered Medications   Medication Dose Route Frequency    insulin glargine (LANTUS) injection 20 Units  20 Units SubCUTAneous QHS    insulin lispro (HUMALOG) injection   SubCUTAneous AC&HS    0.9% sodium chloride infusion  125 mL/hr IntraVENous CONTINUOUS    NUTRITIONAL SUPPORT ELECTROLYTE PRN ORDERS   Does Not Apply PRN    potassium bicarb-citric acid (EFFER-K) tablet 40 mEq  40 mEq Oral BID    dextrose 40% (GLUTOSE) oral gel 1 Tube  15 g Oral PRN    dextrose (D50W) injection syrg 12.5-25 g  25-50 mL IntraVENous PRN    sodium chloride (NS) flush 5-40 mL  5-40 mL IntraVENous Q8H    sodium chloride (NS) flush 5-40 mL  5-40 mL IntraVENous PRN    acetaminophen (TYLENOL) tablet 650 mg  650 mg Oral Q4H PRN    HYDROcodone-acetaminophen (NORCO) 5-325 mg per tablet 1 Tab  1 Tab Oral Q4H PRN    morphine injection 2 mg  2 mg IntraVENous Q4H PRN    naloxone (NARCAN) injection 0.4 mg  0.4 mg IntraVENous PRN    diphenhydrAMINE (BENADRYL) capsule 25 mg  25 mg Oral Q4H PRN    ondansetron (ZOFRAN) injection 4 mg  4 mg IntraVENous Q4H PRN    bisacodyL (DULCOLAX) tablet 5 mg  5 mg Oral DAILY PRN    enoxaparin (LOVENOX) injection 40 mg  40 mg SubCUTAneous Q24H       Other Studies:    No results found. Assessment and Plan:      Active Hospital Problems    Diagnosis Date Noted    Hypokalemia 05/31/2020    Hypomagnesemia 05/31/2020    Type II diabetes mellitus with complication, uncontrolled ([facility] 05/31/2020    [facility] 05/30/2020    Bipolar disorder ([facility] 05/30/2020    HTN (hypertension) 05/30/2020    Alcohol dependence ([facility] [address] 05/30/2020       Plan:    DKA due to medication noncompliance  T2DM  - A1c of 10.3 last month  - Gap has closed and insulin gtt turned off   - start diabetic diet  - ISS and lantus at night time  - diabetic education  - counseled on medication compliance    Chronic pancreatitis : seen on CT and appeared to be in not in exacerbation. HTN: c/w home meds  Bipolar Disorder: c/w home meds    Hypokalemia and hypomagnesemia: replete and monitor    Alcohol Dependence  - c/w alcohol withdrawal protocol    Narcotic Drug-seeking behavior: judicious use of pain medications    Diet:  DIET DIABETIC CONSISTENT CARB  DVT PPx: lovenox  Code: DNR      Medical Decision Making:    Labs/Imaging reviewed. Additional information obtained from nursing staff. Patient is high risk due to medical condition and comorbidities. In addition, requires monitoring due to receiving medications with high toxic profiles. Plan discussed with nursing staff. Plan discussed with patient/family. All questions/concerns were addressed. Pt/family agrees with the plan.          Signed By: Ruby Vail MD     May 31, 2020

## 2020-05-31 NOTE — PROGRESS NOTES
TRANSFER - IN REPORT:    Verbal report received from 1200 Anant Langley RN(name) on Jaja Ramirez  being received from ER(unit) for routine progression of care      Report consisted of patients Situation, Background, Assessment and   Recommendations(SBAR). Information from the following report(s) ED Summary was reviewed with the receiving nurse. Opportunity for questions and clarification was provided. Assessment completed upon patients arrival to unit and care assumed.

## 2020-05-31 NOTE — PROGRESS NOTES
TRANSFER - IN REPORT:    Verbal report received from Alfredo Ashby 12 (name) on Dilcia Akers  being received from ICU (unit) for routine progression of care      Report consisted of patients Situation, Background, Assessment and   Recommendations(SBAR). Information from the following report(s) SBAR and Kardex was reviewed with the receiving nurse. Opportunity for questions and clarification was provided. Assessment completed upon patients arrival to unit and care assumed.

## 2020-05-31 NOTE — PROGRESS NOTES
Pt arrived from ER. Placed on ICU monitor. A.O x 4. Vitals stable. CHG done, skin intact. Will continue care.

## 2020-05-31 NOTE — PROGRESS NOTES
05/31/20 0850   Dual Skin Pressure Injury Assessment   Dual Skin Pressure Injury Assessment WDL   Second Care Provider (Based on [address] [place] [address]) Yelitza Lim.  RN    Skin Integumentary   Skin Integumentary (WDL) WDL    Pressure  Injury Documentation No Pressure Injury Noted-Pressure Ulcer Prevention Initiated

## 2020-05-31 NOTE — ED NOTES
TRANSFER - OUT REPORT:    Verbal report given to lexx Cox on Crystal Whiteside  being transferred to [facility] for routine progression of care       Report consisted of patients Situation, Background, Assessment and   Recommendations(SBAR). Information from the following report(s) SBAR was reviewed with the receiving nurse. Lines:   Peripheral IV 05/30/20 Left Hand (Active)        Opportunity for questions and clarification was provided.       Patient transported with:   Monitor  Registered Nurse

## 2020-05-31 NOTE — PROGRESS NOTES
TRANSFER - OUT REPORT:    Verbal report given to Magalys Limon (name) on Deandre Bloom  being transferred to 8th floor (unit) for routine progression of care       Report consisted of patients Situation, Background, Assessment and   Recommendations(SBAR). Information from the following report(s) SBAR, ED Summary, Procedure Summary, Intake/Output, Cardiac Rhythm NSR and Alarm Parameters  was reviewed with the receiving nurse. Lines:   Peripheral IV 05/30/20 Left Hand (Active)   Site Assessment Clean, dry, & intact 5/31/2020  7:00 AM   Phlebitis Assessment 0 5/31/2020  7:00 AM   Infiltration Assessment 0 5/31/2020  7:00 AM   Dressing Status Clean, dry, & intact 5/31/2020  7:00 AM   Dressing Type Transparent;Tape 5/31/2020  7:00 AM   Hub Color/Line Status Patent; Flushed 5/31/2020  7:00 AM        Opportunity for questions and clarification was provided.       Patient transported with:   Registered Nurse

## 2020-05-31 NOTE — PROGRESS NOTES
Received report from [place], [facility]. Insulin gtt off and patient is resting quietly in bed.

## 2020-06-01 LAB
ANION GAP SERPL CALC-SCNC: 7 MMOL/L (ref 7–16)
BUN SERPL-MCNC: 8 MG/DL (ref 6–23)
CALCIUM SERPL-MCNC: 8.1 MG/DL (ref 8.3–10.4)
CHLORIDE SERPL-SCNC: 105 MMOL/L (ref 98–107)
CO2 SERPL-SCNC: 26 MMOL/L (ref 21–32)
CREAT SERPL-MCNC: 0.57 MG/DL (ref 0.8–1.5)
ERYTHROCYTE [DISTWIDTH] IN BLOOD BY AUTOMATED COUNT: 19.9 % (ref 11.9–14.6)
GLUCOSE BLD STRIP.AUTO-MCNC: 150 MG/DL (ref 65–100)
GLUCOSE BLD STRIP.AUTO-MCNC: 153 MG/DL (ref 65–100)
GLUCOSE BLD STRIP.AUTO-MCNC: 197 MG/DL (ref 65–100)
GLUCOSE BLD STRIP.AUTO-MCNC: 207 MG/DL (ref 65–100)
GLUCOSE SERPL-MCNC: 146 MG/DL (ref 65–100)
HCT VFR BLD AUTO: 33.6 % (ref 41.1–50.3)
HGB BLD-MCNC: 10.3 G/DL (ref 13.6–17.2)
LIPASE SERPL-CCNC: 18 U/L (ref 73–393)
MAGNESIUM SERPL-MCNC: 1.8 MG/DL (ref 1.8–2.4)
MCH RBC QN AUTO: 24.8 PG (ref 26.1–32.9)
MCHC RBC AUTO-ENTMCNC: 30.7 G/DL (ref 31.4–35)
MCV RBC AUTO: 80.8 FL (ref 79.6–97.8)
NRBC # BLD: 0 K/UL (ref 0–0.2)
PLATELET # BLD AUTO: 226 K/UL (ref 150–450)
PMV BLD AUTO: 10 FL (ref 9.4–12.3)
POTASSIUM SERPL-SCNC: 4.2 MMOL/L (ref 3.5–5.1)
RBC # BLD AUTO: 4.16 M/UL (ref 4.23–5.6)
SODIUM SERPL-SCNC: 138 MMOL/L (ref 136–145)
WBC # BLD AUTO: 4.7 K/UL (ref 4.3–11.1)

## 2020-06-01 PROCEDURE — 74011250637 HC RX REV CODE- 250/637: Performed by: HOSPITALIST

## 2020-06-01 PROCEDURE — 74011250636 HC RX REV CODE- 250/636: Performed by: HOSPITALIST

## 2020-06-01 PROCEDURE — 83690 ASSAY OF LIPASE: CPT

## 2020-06-01 PROCEDURE — 82962 GLUCOSE BLOOD TEST: CPT

## 2020-06-01 PROCEDURE — 36415 COLL VENOUS BLD VENIPUNCTURE: CPT

## 2020-06-01 PROCEDURE — 74011250636 HC RX REV CODE- 250/636: Performed by: INTERNAL MEDICINE

## 2020-06-01 PROCEDURE — 65270000029 HC RM PRIVATE

## 2020-06-01 PROCEDURE — 85027 COMPLETE CBC AUTOMATED: CPT

## 2020-06-01 PROCEDURE — 74011636637 HC RX REV CODE- 636/637: Performed by: HOSPITALIST

## 2020-06-01 PROCEDURE — 74011250637 HC RX REV CODE- 250/637: Performed by: INTERNAL MEDICINE

## 2020-06-01 PROCEDURE — 80048 BASIC METABOLIC PNL TOTAL CA: CPT

## 2020-06-01 PROCEDURE — 83735 ASSAY OF MAGNESIUM: CPT

## 2020-06-01 RX ORDER — PROMETHAZINE HYDROCHLORIDE 25 MG/ML
6.25 INJECTION, SOLUTION INTRAMUSCULAR; INTRAVENOUS
Status: DISCONTINUED | OUTPATIENT
Start: 2020-06-01 | End: 2020-06-03

## 2020-06-01 RX ORDER — HYDROMORPHONE HYDROCHLORIDE 4 MG/1
4 TABLET ORAL
Status: DISCONTINUED | OUTPATIENT
Start: 2020-06-01 | End: 2020-06-04

## 2020-06-01 RX ADMIN — HYDROMORPHONE HYDROCHLORIDE 4 MG: 4 TABLET ORAL at 12:33

## 2020-06-01 RX ADMIN — SODIUM CHLORIDE 125 ML/HR: 9 INJECTION, SOLUTION INTRAVENOUS at 04:47

## 2020-06-01 RX ADMIN — HYDROCODONE BITARTRATE AND ACETAMINOPHEN 1 TABLET: 5; 325 TABLET ORAL at 11:00

## 2020-06-01 RX ADMIN — ONDANSETRON 4 MG: 2 INJECTION INTRAMUSCULAR; INTRAVENOUS at 11:00

## 2020-06-01 RX ADMIN — Medication 10 ML: at 05:10

## 2020-06-01 RX ADMIN — HYDROMORPHONE HYDROCHLORIDE 2 MG: 1 INJECTION, SOLUTION INTRAMUSCULAR; INTRAVENOUS; SUBCUTANEOUS at 08:45

## 2020-06-01 RX ADMIN — Medication 5 ML: at 14:10

## 2020-06-01 RX ADMIN — HYDROMORPHONE HYDROCHLORIDE 4 MG: 4 TABLET ORAL at 16:18

## 2020-06-01 RX ADMIN — INSULIN LISPRO 2 UNITS: 100 INJECTION, SOLUTION INTRAVENOUS; SUBCUTANEOUS at 08:45

## 2020-06-01 RX ADMIN — INSULIN LISPRO 4 UNITS: 100 INJECTION, SOLUTION INTRAVENOUS; SUBCUTANEOUS at 16:53

## 2020-06-01 RX ADMIN — INSULIN LISPRO 2 UNITS: 100 INJECTION, SOLUTION INTRAVENOUS; SUBCUTANEOUS at 21:55

## 2020-06-01 RX ADMIN — INSULIN GLARGINE 20 UNITS: 100 INJECTION, SOLUTION SUBCUTANEOUS at 21:55

## 2020-06-01 RX ADMIN — ENOXAPARIN SODIUM 40 MG: 40 INJECTION SUBCUTANEOUS at 19:36

## 2020-06-01 RX ADMIN — HYDROCODONE BITARTRATE AND ACETAMINOPHEN 1 TABLET: 5; 325 TABLET ORAL at 00:27

## 2020-06-01 RX ADMIN — INSULIN LISPRO 2 UNITS: 100 INJECTION, SOLUTION INTRAVENOUS; SUBCUTANEOUS at 12:32

## 2020-06-01 RX ADMIN — HYDROMORPHONE HYDROCHLORIDE 4 MG: 4 TABLET ORAL at 21:23

## 2020-06-01 RX ADMIN — Medication 10 ML: at 21:56

## 2020-06-01 RX ADMIN — HYDROMORPHONE HYDROCHLORIDE 2 MG: 1 INJECTION, SOLUTION INTRAMUSCULAR; INTRAVENOUS; SUBCUTANEOUS at 04:48

## 2020-06-01 NOTE — PROGRESS NOTES
Hospitalist Note     Admit Date:  2020  4:58 PM   Name:  Jacob Fuentes   Age:  39 y.o.  :  1975   MRN:  [id number]   PCP:  None  Treatment Team: Attending Provider: Ye Ordaz MD; Care Manager: Marisol Quiñones.; Utilization Review: Mikayla Lee RN    HPI/Subjective:   Jacob Fuentes is a 39 y.o. male with past medical history significant for bipolar disorder, chronic pancreatitis, insulin-dependent diabetes mellitus, homelessness, hypertension, tobacco abuse who was admitted for nausea, vomiting and abdominal pain and was unable to keep anything down in past 5 days. He has not been taking his insulin because he is homeless and unable to go to the shelter. He was seen in the ED and [facility] for similar presentation. CT scan of abdomen/pelvis showed findings consistent of chronic pancreatitis and esophagitis. He was found to have blood glucose of 860 on arrival.  Anion gap of 16. Bicarbonate of 15 and was admitted to ICU for DKA. His hyperglycemia has resolved and gapped has closed and he is transferred to general medical floor for further management. 20 :  Patient is seen and examined at bedside. No acute events reported overnight by nursing staff. Continues to complain of abdominal pain continues to complain of abdominal pain and getting pain medications around the clock. Patient denies fever, chills, chest pains, shortness of breath. ROS:  10 point review of systems is otherwise negative with the exception of the elements mentioned above.     Objective:     Patient Vitals for the past 24 hrs:   Temp Pulse Resp BP SpO2   20 1136 97.8 °F (36.6 °C) 67 24 92/54 98 %   20 0730 97.5 °F (36.4 °C) 61 15 92/60 98 %   20 0359 97.6 °F (36.4 °C) 64 18 94/52 96 %   20 0106 97.5 °F (36.4 °C) 60 20 93/50 98 %   20 1950 98 °F (36.7 °C) 74 18 98/57 97 %   20 1654 98 °F (36.7 °C) 70 18 104/69 97 %     Oxygen Therapy  O2 Sat (%): 98 % (20 1136)  Pulse via Oximetry: 73 beats per minute (05/31/20 0822)  O2 Device: Room air (05/31/20 0700)    Estimated body mass index is 22.05 kg/m² as calculated from the following:    Height as of this encounter: 5' 8\" (1.727 m). Weight as of this encounter: 65.8 kg (145 lb). Intake/Output Summary (Last 24 hours) at 6/1/2020 1256  Last data filed at 6/1/2020 0851  Gross per 24 hour   Intake 2075 ml   Output 2450 ml   Net -375 ml       *Note that automatically entered I/Os may not be accurate; dependent on patient compliance with collection and accurate  by techs. Physical Exam:   General:     alert, awake, no acute distress. Thin appearing  Head:   normocephalic, atraumatic  Eyes, Ears, nose: PERRL, EOMI. Normal conjunctiva  Neck:    supple, non-tender. Trachea midline. Lungs:   CTAB, no wheezing, rhonchi, rales  Cardiac:   RRR, Normal S1 and S2. No S3, S4 or murmurs. Abdomen:   Soft, non distended, nontender, +BS, no guarding/rebound  Extremities:   Warm, dry. No edema   Skin:   No rashes, no jaundice  Neuro:  AAOx3.  No gross focal neurological deficit  Psychiatric:  No anxiety, calm, cooperative    Data Review:  I have reviewed all labs, meds, and studies from the last 24 hours:    Recent Results (from the past 24 hour(s))   GLUCOSE, POC    Collection Time: 05/31/20  4:52 PM   Result Value Ref Range    Glucose (POC) 441 (H) 65 - 100 mg/dL   GLUCOSE, POC    Collection Time: 05/31/20  9:16 PM   Result Value Ref Range    Glucose (POC) 205 (H) 65 - 100 mg/dL   MAGNESIUM    Collection Time: 06/01/20  6:33 AM   Result Value Ref Range    Magnesium 1.8 1.8 - 2.4 mg/dL   METABOLIC PANEL, BASIC    Collection Time: 06/01/20  6:33 AM   Result Value Ref Range    Sodium 138 136 - 145 mmol/L    Potassium 4.2 3.5 - 5.1 mmol/L    Chloride 105 98 - 107 mmol/L    CO2 26 21 - 32 mmol/L    Anion gap 7 7 - 16 mmol/L    Glucose 146 (H) 65 - 100 mg/dL    BUN 8 6 - 23 MG/DL    Creatinine 0.57 (L) 0.8 - 1.5 MG/DL GFR est AA >60 >60 ml/min/1.73m2    GFR est non-AA >60 >60 ml/min/1.73m2    Calcium 8.1 (L) 8.3 - 10.4 MG/DL   CBC W/O DIFF    Collection Time: 06/01/20  6:33 AM   Result Value Ref Range    WBC 4.7 4.3 - 11.1 K/uL    RBC 4.16 (L) 4.23 - 5.6 M/uL    HGB 10.3 (L) 13.6 - 17.2 g/dL    HCT 33.6 (L) 41.1 - 50.3 %    MCV 80.8 79.6 - 97.8 FL    MCH 24.8 (L) 26.1 - 32.9 PG    MCHC 30.7 (L) 31.4 - 35.0 g/dL    RDW 19.9 (H) 11.9 - 14.6 %    PLATELET 115 336 - 847 K/uL    MPV 10.0 9.4 - 12.3 FL    ABSOLUTE NRBC 0.00 0.0 - 0.2 K/uL   GLUCOSE, POC    Collection Time: 06/01/20  7:36 AM   Result Value Ref Range    Glucose (POC) 153 (H) 65 - 100 mg/dL   GLUCOSE, POC    Collection Time: 06/01/20 11:31 AM   Result Value Ref Range    Glucose (POC) 197 (H) 65 - 100 mg/dL        All Micro Results     None          Current Meds:  Current Facility-Administered Medications   Medication Dose Route Frequency    HYDROmorphone (DILAUDID) tablet 4 mg  4 mg Oral Q4H PRN    promethazine (PHENERGAN) injection 6.25 mg  6.25 mg IntraMUSCular Q6H PRN    insulin glargine (LANTUS) injection 20 Units  20 Units SubCUTAneous QHS    insulin lispro (HUMALOG) injection   SubCUTAneous AC&HS    NUTRITIONAL SUPPORT ELECTROLYTE PRN ORDERS   Does Not Apply PRN    dextrose 40% (GLUTOSE) oral gel 1 Tube  15 g Oral PRN    dextrose (D50W) injection syrg 12.5-25 g  25-50 mL IntraVENous PRN    sodium chloride (NS) flush 5-40 mL  5-40 mL IntraVENous Q8H    sodium chloride (NS) flush 5-40 mL  5-40 mL IntraVENous PRN    acetaminophen (TYLENOL) tablet 650 mg  650 mg Oral Q4H PRN    HYDROcodone-acetaminophen (NORCO) 5-325 mg per tablet 1 Tab  1 Tab Oral Q4H PRN    naloxone (NARCAN) injection 0.4 mg  0.4 mg IntraVENous PRN    diphenhydrAMINE (BENADRYL) capsule 25 mg  25 mg Oral Q4H PRN    ondansetron (ZOFRAN) injection 4 mg  4 mg IntraVENous Q4H PRN    bisacodyL (DULCOLAX) tablet 5 mg  5 mg Oral DAILY PRN    enoxaparin (LOVENOX) injection 40 mg  40 mg SubCUTAneous Q24H       Other Studies:    No results found. Assessment and Plan: Active Hospital Problems    Diagnosis Date Noted    Hypokalemia 05/31/2020    Hypomagnesemia 05/31/2020    Type II diabetes mellitus with complication, uncontrolled ([facility] 05/31/2020    DKA (diabetic ketoacidoses) ([facility] 05/30/2020    Bipolar disorder ([facility] 05/30/2020    HTN (hypertension) 05/30/2020    [facility] 05/30/2020       Plan:    DKA due to medication noncompliance  T2DM  - A1c of 10.3 last month  - diabetic diet  - ISS and lantus at night time  - diabetic education  - counseled on medication compliance    Chronic pancreatitis : seen on CT (5/24) appeared to be in not in exacerbation. HTN: c/w home meds  Bipolar Disorder: c/w home meds    Hypokalemia and hypomagnesemia: replete and monitor    Alcohol Dependence  - c/w alcohol withdrawal protocol    Narcotic Drug-seeking behavior: judicious use of pain medications. Will try PO meds in attempt to control the pain and for dc. Diet:  DIET DIABETIC WITH OPTIONS  DVT PPx: lovenox  Code: DNR      Medical Decision Making:    Labs/Imaging reviewed. Additional information obtained from nursing staff. Patient is moderate risk due to medical condition and comorbidities. In addition, requires monitoring due to receiving medications with high toxic profiles. Plan discussed with nursing staff. Plan discussed with patient/family. All questions/concerns were addressed. Pt/family agrees with the plan.          Signed By: Iva Rinne, MD     June 1, 2020

## 2020-06-01 NOTE — PROGRESS NOTES
Problem: Falls - Risk of  Goal: *Absence of Falls  Description: Document Bell Toure Fall Risk and appropriate interventions in the flowsheet.   6/1/2020 0742 by Humphrey Weller  Outcome: Progressing Towards Goal  Note: Fall Risk Interventions:            Medication Interventions: Teach patient to arise slowly                6/1/2020 0741 by Humphrey Weller  Outcome: Progressing Towards Goal  Note: Fall Risk Interventions:            Medication Interventions: Teach patient to arise slowly                   Problem: Patient Education: Go to Patient Education Activity  Goal: Patient/Family Education  6/1/2020 0742 by Humphrey Weller  Outcome: Progressing Towards Goal  6/1/2020 0741 by Humphrey Weller  Outcome: Progressing Towards Goal

## 2020-06-01 NOTE — PROGRESS NOTES
Care Management Interventions  PCP Verified by CM: Yes  Transition of Care Consult (CM Consult): Other  Physical Therapy Consult: No  Current Support Network: Shelter  Confirm Follow Up Transport: Other (see comment)  Freedom of Choice List was Provided with Basic Dialogue that Supports the Patient's Individualized Plan of Care/Goals, Treatment Preferences and Shares the Quality Data Associated with the Providers?: Yes  [place] Resource Information Provided?: No  Discharge Location  Discharge Placement: Shelter  Patient lives in a shelter in [facility] and wants transportation at discharge to return to it. Patient tells CM that he doesn't have any family for support. Patient uses [facility] to get his medication. CM following.

## 2020-06-01 NOTE — PROGRESS NOTES
Shift assessment; Pt alert, oriented X4. On room air. Respirations even, unlabored. Lung sounds clear. No distress noted. HR regular. Abdomen soft, tender. Bowel sounds active. C/o pain 5/10. Denies any other needs at this time. Call light within reach. Bed in low, locked position.

## 2020-06-01 NOTE — PROGRESS NOTES
Problem: Falls - Risk of  Goal: *Absence of Falls  Description: Document Meng Mckeon Fall Risk and appropriate interventions in the flowsheet.   Outcome: Progressing Towards Goal  Note: Fall Risk Interventions:            Medication Interventions: Teach patient to arise slowly                   Problem: Pain  Goal: *Control of Pain  Outcome: Progressing Towards Goal

## 2020-06-01 NOTE — PROGRESS NOTES
Received pt from RN Clarita Burkitt) in stable condition. Pt in bed resting quietly. Resp even & unlabored on room air; no acute signs of distress noted. Bed low & locked; call light in reach; no needs voiced.

## 2020-06-01 NOTE — PROGRESS NOTES
Critical Care Outreach Nurse Progress Report:    Subjective: In to assess pt secondary to transfer from ICU     MEWS Score: 1 (05/31/20 1654)    Vitals:    05/31/20 0859 05/31/20 1225 05/31/20 1654 05/31/20 1950   BP: 114/70 113/69 104/69 98/57   Pulse: 74 77 70 74   Resp: 16 17 18 18   Temp: 97.8 °F (36.6 °C) 98.1 °F (36.7 °C) 98 °F (36.7 °C) 98 °F (36.7 °C)   SpO2: 98% 98% 97% 97%   Weight:       Height:            LAB DATA:    Recent Labs     05/31/20  0028 05/30/20 2122 05/30/20  1407    139  139 124*   K 3.2* 3.2*  3.5 4.2   * 106  107 93*   CO2 25 26  25 15*   AGAP 8 7  7 16   * 92  91 860*   BUN 5* 5*  5* 4*   CREA 0.55* 0.64*  0.60* 1.04   GFRAA >60 >60  >60 >60   GFRNA >60 >60  >60 >60   CA 7.6* 8.3  8.3 8.2*   MG 1.7* 1.9  1.9 1.9   PHOS  --  3.6 3.5   ALB  --   --  3.4*   TP  --   --  6.5   GLOB  --   --  3.1   AGRAT  --   --  1.1*   ALT  --   --  28        Recent Labs     05/30/20  1407   WBC 9.1   HGB 10.0*   HCT 33.7*           Objective: In bed. Pain Intensity 1: 10 (05/31/20 1832)  Pain Location 1: Abdomen  Pain Intervention(s) 1: Medication (see MAR)  Patient Stated Pain Goal: 0    Assessment: A/O x 4. No distress noted. Plan: Follow per outreach protocol.

## 2020-06-02 ENCOUNTER — APPOINTMENT (OUTPATIENT)
Dept: GENERAL RADIOLOGY | Age: 45
DRG: 638 | End: 2020-06-02
Attending: INTERNAL MEDICINE
Payer: SUBSIDIZED

## 2020-06-02 PROBLEM — R10.9 ABDOMINAL PAIN: Status: ACTIVE | Noted: 2020-06-02

## 2020-06-02 LAB
ANION GAP SERPL CALC-SCNC: 6 MMOL/L (ref 7–16)
BUN SERPL-MCNC: 6 MG/DL (ref 6–23)
CALCIUM SERPL-MCNC: 8.5 MG/DL (ref 8.3–10.4)
CHLORIDE SERPL-SCNC: 105 MMOL/L (ref 98–107)
CO2 SERPL-SCNC: 27 MMOL/L (ref 21–32)
CREAT SERPL-MCNC: 0.57 MG/DL (ref 0.8–1.5)
ERYTHROCYTE [DISTWIDTH] IN BLOOD BY AUTOMATED COUNT: 19.9 % (ref 11.9–14.6)
GLUCOSE BLD STRIP.AUTO-MCNC: 171 MG/DL (ref 65–100)
GLUCOSE BLD STRIP.AUTO-MCNC: 179 MG/DL (ref 65–100)
GLUCOSE BLD STRIP.AUTO-MCNC: 231 MG/DL (ref 65–100)
GLUCOSE BLD STRIP.AUTO-MCNC: 295 MG/DL (ref 65–100)
GLUCOSE SERPL-MCNC: 172 MG/DL (ref 65–100)
HCT VFR BLD AUTO: 35.3 % (ref 41.1–50.3)
HGB BLD-MCNC: 11 G/DL (ref 13.6–17.2)
MAGNESIUM SERPL-MCNC: 1.9 MG/DL (ref 1.8–2.4)
MCH RBC QN AUTO: 24.9 PG (ref 26.1–32.9)
MCHC RBC AUTO-ENTMCNC: 31.2 G/DL (ref 31.4–35)
MCV RBC AUTO: 79.9 FL (ref 79.6–97.8)
NRBC # BLD: 0 K/UL (ref 0–0.2)
PLATELET # BLD AUTO: 266 K/UL (ref 150–450)
PMV BLD AUTO: 10.4 FL (ref 9.4–12.3)
POTASSIUM SERPL-SCNC: 4.1 MMOL/L (ref 3.5–5.1)
RBC # BLD AUTO: 4.42 M/UL (ref 4.23–5.6)
SODIUM SERPL-SCNC: 138 MMOL/L (ref 136–145)
WBC # BLD AUTO: 7.1 K/UL (ref 4.3–11.1)

## 2020-06-02 PROCEDURE — 74011250637 HC RX REV CODE- 250/637: Performed by: INTERNAL MEDICINE

## 2020-06-02 PROCEDURE — 74011250637 HC RX REV CODE- 250/637: Performed by: HOSPITALIST

## 2020-06-02 PROCEDURE — 80048 BASIC METABOLIC PNL TOTAL CA: CPT

## 2020-06-02 PROCEDURE — 74011250637 HC RX REV CODE- 250/637: Performed by: FAMILY MEDICINE

## 2020-06-02 PROCEDURE — 85027 COMPLETE CBC AUTOMATED: CPT

## 2020-06-02 PROCEDURE — 74011250636 HC RX REV CODE- 250/636: Performed by: HOSPITALIST

## 2020-06-02 PROCEDURE — 74011636637 HC RX REV CODE- 636/637: Performed by: HOSPITALIST

## 2020-06-02 PROCEDURE — 36415 COLL VENOUS BLD VENIPUNCTURE: CPT

## 2020-06-02 PROCEDURE — 65270000029 HC RM PRIVATE

## 2020-06-02 PROCEDURE — 74011636637 HC RX REV CODE- 636/637: Performed by: INTERNAL MEDICINE

## 2020-06-02 PROCEDURE — 83735 ASSAY OF MAGNESIUM: CPT

## 2020-06-02 PROCEDURE — 82962 GLUCOSE BLOOD TEST: CPT

## 2020-06-02 PROCEDURE — 74018 RADEX ABDOMEN 1 VIEW: CPT

## 2020-06-02 RX ORDER — OXYCODONE HYDROCHLORIDE 5 MG/1
10 TABLET ORAL
Status: DISCONTINUED | OUTPATIENT
Start: 2020-06-02 | End: 2020-06-05 | Stop reason: HOSPADM

## 2020-06-02 RX ORDER — ZOLPIDEM TARTRATE 5 MG/1
5 TABLET ORAL ONCE
Status: COMPLETED | OUTPATIENT
Start: 2020-06-02 | End: 2020-06-02

## 2020-06-02 RX ORDER — DOCUSATE SODIUM 100 MG/1
100 CAPSULE, LIQUID FILLED ORAL 2 TIMES DAILY
Status: DISCONTINUED | OUTPATIENT
Start: 2020-06-02 | End: 2020-06-05 | Stop reason: HOSPADM

## 2020-06-02 RX ORDER — KETOROLAC TROMETHAMINE 15 MG/ML
30 INJECTION, SOLUTION INTRAMUSCULAR; INTRAVENOUS
Status: DISCONTINUED | OUTPATIENT
Start: 2020-06-02 | End: 2020-06-02

## 2020-06-02 RX ORDER — ZOLPIDEM TARTRATE 5 MG/1
5 TABLET ORAL ONCE
Status: ACTIVE | OUTPATIENT
Start: 2020-06-02 | End: 2020-06-03

## 2020-06-02 RX ADMIN — ONDANSETRON 4 MG: 2 INJECTION INTRAMUSCULAR; INTRAVENOUS at 12:37

## 2020-06-02 RX ADMIN — INSULIN LISPRO 6 UNITS: 100 INJECTION, SOLUTION INTRAVENOUS; SUBCUTANEOUS at 16:30

## 2020-06-02 RX ADMIN — HUMAN INSULIN 12 UNITS: 100 INJECTION, SUSPENSION SUBCUTANEOUS at 17:10

## 2020-06-02 RX ADMIN — HYDROMORPHONE HYDROCHLORIDE 4 MG: 4 TABLET ORAL at 03:03

## 2020-06-02 RX ADMIN — HYDROMORPHONE HYDROCHLORIDE 4 MG: 4 TABLET ORAL at 08:17

## 2020-06-02 RX ADMIN — ONDANSETRON 4 MG: 2 INJECTION INTRAMUSCULAR; INTRAVENOUS at 19:39

## 2020-06-02 RX ADMIN — Medication 10 ML: at 14:00

## 2020-06-02 RX ADMIN — INSULIN LISPRO 4 UNITS: 100 INJECTION, SOLUTION INTRAVENOUS; SUBCUTANEOUS at 12:41

## 2020-06-02 RX ADMIN — INSULIN LISPRO 2 UNITS: 100 INJECTION, SOLUTION INTRAVENOUS; SUBCUTANEOUS at 22:08

## 2020-06-02 RX ADMIN — Medication 10 ML: at 22:10

## 2020-06-02 RX ADMIN — Medication 10 ML: at 05:21

## 2020-06-02 RX ADMIN — HYDROMORPHONE HYDROCHLORIDE 4 MG: 4 TABLET ORAL at 17:14

## 2020-06-02 RX ADMIN — ENOXAPARIN SODIUM 40 MG: 40 INJECTION SUBCUTANEOUS at 19:39

## 2020-06-02 RX ADMIN — ZOLPIDEM TARTRATE 5 MG: 5 TABLET ORAL at 22:08

## 2020-06-02 RX ADMIN — OXYCODONE 10 MG: 5 TABLET ORAL at 14:04

## 2020-06-02 RX ADMIN — HYDROCODONE BITARTRATE AND ACETAMINOPHEN 1 TABLET: 5; 325 TABLET ORAL at 11:12

## 2020-06-02 RX ADMIN — INSULIN LISPRO 2 UNITS: 100 INJECTION, SOLUTION INTRAVENOUS; SUBCUTANEOUS at 08:18

## 2020-06-02 RX ADMIN — OXYCODONE 10 MG: 5 TABLET ORAL at 19:39

## 2020-06-02 RX ADMIN — HYDROMORPHONE HYDROCHLORIDE 4 MG: 4 TABLET ORAL at 12:37

## 2020-06-02 NOTE — DIABETES MGMT
Consult for DM management noted. Patient refused DM education during previous hospitalization in April. Primary RN to contact educator if patient refuses this time as well.

## 2020-06-02 NOTE — PROGRESS NOTES
Patient medicated with Norco 5/325 mg for pain level of 8/10.  Now states pain is generalized but also abdominal.

## 2020-06-02 NOTE — DIABETES MGMT
Note patient received Zofran and Dilaudid for pain and nausea. Will try DM education tomorrow unless patient refuses.

## 2020-06-02 NOTE — PROGRESS NOTES
Patient medicated with Dilaudid 4 mg PO for pain level of 10/10. Patient states the pain is abdominal in nature.

## 2020-06-02 NOTE — PROGRESS NOTES
Date of Outreach Update:  Kaylyn Figueredo was seen and assessed. MEWS Score: 1 (06/01/20 1949)  Vitals:    06/01/20 1136 06/01/20 1545 06/01/20 1949 06/01/20 2332   BP: 92/54 102/61 110/70 99/61   Pulse: 67 60 60 61   Resp: 24 21 20 20   Temp: 97.8 °F (36.6 °C) 98.3 °F (36.8 °C) 98.4 °F (36.9 °C) 98.3 °F (36.8 °C)   SpO2: 98%  98% 97%   Weight:       Height:             Pain Assessment  Pain Intensity 1: 4 (06/01/20 2208)  Pain Location 1: Abdomen  Pain Intervention(s) 1: Medication (see MAR)  Patient Stated Pain Goal: 0      Previous Outreach assessment has been reviewed. There have been no significant clinical changes since the completion of the last dated Outreach assessment. Patient sleeping at time of assessment. Glucose 150 on last finger stick. VSS. Labs, notes, and chart reviewed. Will monitor. Will continue to follow up per outreach protocol.     Signed By:   Jaqueline Mackay RN    June 1, 2020 11:59 PM

## 2020-06-02 NOTE — PROGRESS NOTES
Hospitalist Progress Note    Patient: Jayden Mar MRN: [id number]  SSN: [id number]    YOB: 1975  Age: 39 y.o. Sex: male      Admit Date: 5/30/2020    LOS: 3 days     Subjective:     39year old CM with a PMH of Bipolar disease, homelessness, drug use and DM admitted for DKA and mild pancreatitis. His DKA and pancreatitis resolved and he was moved from the ICU to the floor. 6/2 - He complains of abdominal pain this AM. Grimaces everywhere abdomen is touched. Denies N/V/D. Denies CP/SOB. Review of systems negative except stated above. Objective:     Visit Vitals  /66   Pulse 74   Temp 97.4 °F (36.3 °C)   Resp 20   Ht 5' 8\" (1.727 m)   Wt 65.8 kg (145 lb)   SpO2 98%   BMI 22.05 kg/m²      Oxygen Therapy  O2 Sat (%): 98 % (06/02/20 0800)  Pulse via Oximetry: 73 beats per minute (05/31/20 0822)  O2 Device: Room air (05/31/20 0700)      Intake and Output:     Intake/Output Summary (Last 24 hours) at 6/2/2020 1226  Last data filed at 6/1/2020 1655  Gross per 24 hour   Intake    Output 450 ml   Net -450 ml         Physical Exam:   GENERAL: alert, cooperative, no distress, appears stated age  EYE: conjunctivae/corneas clear. PERRL. THROAT & NECK: normal and no erythema or exudates noted. LUNG: clear to auscultation bilaterally  HEART: regular rate and rhythm, S1S2, no murmur, no JVD  ABDOMEN: soft, ?tender, non-distended. Bowel sounds normal.   EXTREMITIES:  No edema, 2+ pedal/radial pulses bilaterally  SKIN: no rash or abnormalities  NEUROLOGIC: A&Ox3. Cranial nerves 2-12 grossly intact.     Lab/Data Review:  Recent Results (from the past 24 hour(s))   GLUCOSE, POC    Collection Time: 06/01/20  3:50 PM   Result Value Ref Range    Glucose (POC) 207 (H) 65 - 100 mg/dL   GLUCOSE, POC    Collection Time: 06/01/20  9:21 PM   Result Value Ref Range    Glucose (POC) 150 (H) 65 - 100 mg/dL   CBC W/O DIFF    Collection Time: 06/02/20  7:42 AM   Result Value Ref Range    WBC 7.1 4.3 - 11.1 K/uL RBC 4.42 4.23 - 5.6 M/uL    HGB 11.0 (L) 13.6 - 17.2 g/dL    HCT 35.3 (L) 41.1 - 50.3 %    MCV 79.9 79.6 - 97.8 FL    MCH 24.9 (L) 26.1 - 32.9 PG    MCHC 31.2 (L) 31.4 - 35.0 g/dL    RDW 19.9 (H) 11.9 - 14.6 %    PLATELET 873 953 - 667 K/uL    MPV 10.4 9.4 - 12.3 FL    ABSOLUTE NRBC 0.00 0.0 - 0.2 K/uL   METABOLIC PANEL, BASIC    Collection Time: 06/02/20  7:42 AM   Result Value Ref Range    Sodium 138 136 - 145 mmol/L    Potassium 4.1 3.5 - 5.1 mmol/L    Chloride 105 98 - 107 mmol/L    CO2 27 21 - 32 mmol/L    Anion gap 6 (L) 7 - 16 mmol/L    Glucose 172 (H) 65 - 100 mg/dL    BUN 6 6 - 23 MG/DL    Creatinine 0.57 (L) 0.8 - 1.5 MG/DL    GFR est AA >60 >60 ml/min/1.73m2    GFR est non-AA >60 >60 ml/min/1.73m2    Calcium 8.5 8.3 - 10.4 MG/DL   MAGNESIUM    Collection Time: 06/02/20  7:42 AM   Result Value Ref Range    Magnesium 1.9 1.8 - 2.4 mg/dL   GLUCOSE, POC    Collection Time: 06/02/20  7:48 AM   Result Value Ref Range    Glucose (POC) 171 (H) 65 - 100 mg/dL       Imaging:  No results found. No results found for this visit on 05/30/20. Cultures: All Micro Results     None          Assessment/Plan:     Principal Problem:    DKA (diabetic ketoacidoses) ([facility] (5/30/2020)  - Resolved  - Non-compliant --> A1C 11.4  - Change Lantus 20U to NPH 12U BID  - Humalog SSI    Active Problems:    Abdominal pain (6/2/2020)  - Unsure etiology  - Lipase 18  - CT unremarkable  - Had normal stool  - Check KUB      Alcohol dependence ([facility] (5/30/2020)  - No acute issues      Hypokalemia (5/31/2020)  - Resolved      Hypomagnesemia (5/31/2020)  - Resolved      Bipolar disorder ([facility] (5/30/2020)  - Refuses meds      HTN (hypertension) (5/30/2020)  - Refuses meds      Today's Plan: Check KUB. Change Lantus to NPH.     DIET DIABETIC WITH OPTIONS Consistent Carb 1800kcal; Regular    DVT Prophylaxis: Lovenox    Discharge Plan: \"Home\" tomorrow AM      Signed By: Jamari Marques DO     June 2, 2020

## 2020-06-02 NOTE — PROGRESS NOTES
Patient refused Lactulose and Colace. Patient states he is not constipated and is having loose stools. Discussed results of KUB per Dr. Pat Lieberman and patient insists he does not need to have a bowel movement. Medicated patient with Dilaudid 4 mg PO and Zofran 4 mg IV.

## 2020-06-02 NOTE — PROGRESS NOTES
Assessment complete via flow sheet. Pt A&Ox4. Respirations even and unlabored. S1 S2 auscultated. Bowel sounds active, abdomen soft and tender. Denies other needs. Bed in lowest position, side rails up x3, call bell in reach. Instructed to call for assistance. Pt verbalized understanding. Plan of care reviewed with patient.

## 2020-06-02 NOTE — PROGRESS NOTES
Interdisciplinary team rounds were held 6/2/2020 with the following team members:Care Management, Nursing and Physician. Plan of care discussed. See clinical pathway and/or care plan for interventions and desired outcomes.

## 2020-06-03 ENCOUNTER — APPOINTMENT (OUTPATIENT)
Dept: CT IMAGING | Age: 45
DRG: 638 | End: 2020-06-03
Attending: INTERNAL MEDICINE
Payer: SUBSIDIZED

## 2020-06-03 LAB
ANION GAP SERPL CALC-SCNC: 8 MMOL/L (ref 7–16)
BUN SERPL-MCNC: 10 MG/DL (ref 6–23)
CALCIUM SERPL-MCNC: 8.6 MG/DL (ref 8.3–10.4)
CHLORIDE SERPL-SCNC: 105 MMOL/L (ref 98–107)
CO2 SERPL-SCNC: 26 MMOL/L (ref 21–32)
CREAT SERPL-MCNC: 0.59 MG/DL (ref 0.8–1.5)
ERYTHROCYTE [DISTWIDTH] IN BLOOD BY AUTOMATED COUNT: 19.5 % (ref 11.9–14.6)
GLUCOSE BLD STRIP.AUTO-MCNC: 109 MG/DL (ref 65–100)
GLUCOSE BLD STRIP.AUTO-MCNC: 198 MG/DL (ref 65–100)
GLUCOSE BLD STRIP.AUTO-MCNC: 296 MG/DL (ref 65–100)
GLUCOSE BLD STRIP.AUTO-MCNC: 322 MG/DL (ref 65–100)
GLUCOSE SERPL-MCNC: 73 MG/DL (ref 65–100)
HCT VFR BLD AUTO: 36.7 % (ref 41.1–50.3)
HGB BLD-MCNC: 11.4 G/DL (ref 13.6–17.2)
MAGNESIUM SERPL-MCNC: 1.7 MG/DL (ref 1.8–2.4)
MCH RBC QN AUTO: 24.7 PG (ref 26.1–32.9)
MCHC RBC AUTO-ENTMCNC: 31.1 G/DL (ref 31.4–35)
MCV RBC AUTO: 79.6 FL (ref 79.6–97.8)
NRBC # BLD: 0 K/UL (ref 0–0.2)
PLATELET # BLD AUTO: 282 K/UL (ref 150–450)
PMV BLD AUTO: 9.9 FL (ref 9.4–12.3)
POTASSIUM SERPL-SCNC: 3.8 MMOL/L (ref 3.5–5.1)
RBC # BLD AUTO: 4.61 M/UL (ref 4.23–5.6)
SODIUM SERPL-SCNC: 139 MMOL/L (ref 136–145)
WBC # BLD AUTO: 6.5 K/UL (ref 4.3–11.1)

## 2020-06-03 PROCEDURE — 74011250637 HC RX REV CODE- 250/637: Performed by: INTERNAL MEDICINE

## 2020-06-03 PROCEDURE — 74177 CT ABD & PELVIS W/CONTRAST: CPT

## 2020-06-03 PROCEDURE — 74011636637 HC RX REV CODE- 636/637: Performed by: INTERNAL MEDICINE

## 2020-06-03 PROCEDURE — 82962 GLUCOSE BLOOD TEST: CPT

## 2020-06-03 PROCEDURE — 74011636320 HC RX REV CODE- 636/320: Performed by: INTERNAL MEDICINE

## 2020-06-03 PROCEDURE — 65270000029 HC RM PRIVATE

## 2020-06-03 PROCEDURE — 80048 BASIC METABOLIC PNL TOTAL CA: CPT

## 2020-06-03 PROCEDURE — 74011250637 HC RX REV CODE- 250/637: Performed by: HOSPITALIST

## 2020-06-03 PROCEDURE — 83735 ASSAY OF MAGNESIUM: CPT

## 2020-06-03 PROCEDURE — 74011636637 HC RX REV CODE- 636/637: Performed by: HOSPITALIST

## 2020-06-03 PROCEDURE — 74011000258 HC RX REV CODE- 258: Performed by: INTERNAL MEDICINE

## 2020-06-03 PROCEDURE — 85027 COMPLETE CBC AUTOMATED: CPT

## 2020-06-03 PROCEDURE — 74011250636 HC RX REV CODE- 250/636: Performed by: INTERNAL MEDICINE

## 2020-06-03 PROCEDURE — 36415 COLL VENOUS BLD VENIPUNCTURE: CPT

## 2020-06-03 RX ORDER — KETOROLAC TROMETHAMINE 15 MG/ML
30 INJECTION, SOLUTION INTRAMUSCULAR; INTRAVENOUS
Status: DISCONTINUED | OUTPATIENT
Start: 2020-06-03 | End: 2020-06-03

## 2020-06-03 RX ORDER — PROMETHAZINE HYDROCHLORIDE 25 MG/1
25 TABLET ORAL
Status: DISCONTINUED | OUTPATIENT
Start: 2020-06-03 | End: 2020-06-05 | Stop reason: HOSPADM

## 2020-06-03 RX ORDER — ONDANSETRON 4 MG/1
4 TABLET, ORALLY DISINTEGRATING ORAL
Status: DISCONTINUED | OUTPATIENT
Start: 2020-06-03 | End: 2020-06-05 | Stop reason: HOSPADM

## 2020-06-03 RX ORDER — SODIUM CHLORIDE 0.9 % (FLUSH) 0.9 %
10 SYRINGE (ML) INJECTION
Status: COMPLETED | OUTPATIENT
Start: 2020-06-03 | End: 2020-06-03

## 2020-06-03 RX ORDER — MAGNESIUM SULFATE HEPTAHYDRATE 40 MG/ML
2 INJECTION, SOLUTION INTRAVENOUS ONCE
Status: COMPLETED | OUTPATIENT
Start: 2020-06-03 | End: 2020-06-03

## 2020-06-03 RX ADMIN — INSULIN LISPRO 6 UNITS: 100 INJECTION, SOLUTION INTRAVENOUS; SUBCUTANEOUS at 23:12

## 2020-06-03 RX ADMIN — HYDROMORPHONE HYDROCHLORIDE 4 MG: 4 TABLET ORAL at 00:12

## 2020-06-03 RX ADMIN — HYDROMORPHONE HYDROCHLORIDE 4 MG: 4 TABLET ORAL at 18:25

## 2020-06-03 RX ADMIN — ONDANSETRON 4 MG: 4 TABLET, ORALLY DISINTEGRATING ORAL at 14:09

## 2020-06-03 RX ADMIN — IOPAMIDOL 100 ML: 755 INJECTION, SOLUTION INTRAVENOUS at 20:14

## 2020-06-03 RX ADMIN — INSULIN LISPRO 2 UNITS: 100 INJECTION, SOLUTION INTRAVENOUS; SUBCUTANEOUS at 12:06

## 2020-06-03 RX ADMIN — Medication 10 ML: at 20:14

## 2020-06-03 RX ADMIN — SODIUM CHLORIDE 100 ML: 900 INJECTION, SOLUTION INTRAVENOUS at 20:14

## 2020-06-03 RX ADMIN — PROMETHAZINE HYDROCHLORIDE 25 MG: 25 TABLET ORAL at 23:12

## 2020-06-03 RX ADMIN — Medication 10 ML: at 23:12

## 2020-06-03 RX ADMIN — Medication 10 ML: at 14:00

## 2020-06-03 RX ADMIN — HYDROMORPHONE HYDROCHLORIDE 4 MG: 4 TABLET ORAL at 14:08

## 2020-06-03 RX ADMIN — HYDROMORPHONE HYDROCHLORIDE 4 MG: 4 TABLET ORAL at 09:41

## 2020-06-03 RX ADMIN — MAGNESIUM SULFATE HEPTAHYDRATE 2 G: 40 INJECTION, SOLUTION INTRAVENOUS at 09:09

## 2020-06-03 RX ADMIN — DIPHENHYDRAMINE HYDROCHLORIDE 25 MG: 25 CAPSULE ORAL at 23:40

## 2020-06-03 RX ADMIN — INSULIN LISPRO 8 UNITS: 100 INJECTION, SOLUTION INTRAVENOUS; SUBCUTANEOUS at 17:17

## 2020-06-03 RX ADMIN — HUMAN INSULIN 12 UNITS: 100 INJECTION, SUSPENSION SUBCUTANEOUS at 07:30

## 2020-06-03 RX ADMIN — HYDROMORPHONE HYDROCHLORIDE 4 MG: 4 TABLET ORAL at 23:12

## 2020-06-03 RX ADMIN — Medication 10 ML: at 05:25

## 2020-06-03 RX ADMIN — ONDANSETRON 4 MG: 4 TABLET, ORALLY DISINTEGRATING ORAL at 19:49

## 2020-06-03 RX ADMIN — LACTULOSE 30 ML: 20 SOLUTION ORAL at 11:22

## 2020-06-03 RX ADMIN — HUMAN INSULIN 12 UNITS: 100 INJECTION, SUSPENSION SUBCUTANEOUS at 16:30

## 2020-06-03 RX ADMIN — PROMETHAZINE HYDROCHLORIDE 25 MG: 25 TABLET ORAL at 18:26

## 2020-06-03 RX ADMIN — DIATRIZOATE MEGLUMINE AND DIATRIZOATE SODIUM 15 ML: 660; 100 LIQUID ORAL; RECTAL at 18:27

## 2020-06-03 RX ADMIN — OXYCODONE 10 MG: 5 TABLET ORAL at 19:48

## 2020-06-03 NOTE — PROGRESS NOTES
Patient medicated with Dilaudid 4 mg PO for pain of 8/10. Medicated with Zofran ODT 4 mg PO for nausea.

## 2020-06-03 NOTE — PROGRESS NOTES
Patient requesting to be discharged this afternoon. Discussed with Dr. Maryjo Durham. Awaiting orders.

## 2020-06-03 NOTE — PROGRESS NOTES
called [facility] in [place] ([phone], [address] [place] [address]) to inquire about patient returning today. Shelter can accept patient back today. Addendum  [address] is now saying patient can't return because he has too many health issues. Patient will need to discharge to the [address] [place] [address].

## 2020-06-03 NOTE — PROGRESS NOTES
met with patient. Patient states he is being discharged and has nowhere to go, but would not elaborate.  offered support.

## 2020-06-03 NOTE — DIABETES MGMT
Patient not up for diabetic education receiving Dilaudid and Zofran for pain an nausea. Primary nurse states patient not interested in education which correlates with last hospitalization as well. Spoke with provider about regimen yesterday plan is to send patient out on more affordable regimen. Recommend regimen of NPH and regular as patient usually resides at a shelter currently but concern patient could have food insecurity in the future so would not recommend 70/30 to reduce risk for hypoglycemia.

## 2020-06-03 NOTE — PROGRESS NOTES
Hospitalist Progress Note    Patient: Brittany Ruiz MRN: [id number]  SSN: [id number]    YOB: 1975  Age: 39 y.o. Sex: male      Admit Date: 5/30/2020    LOS: 4 days     Subjective:     39year old CM with a PMH of Bipolar disease, homelessness, drug use and DM admitted for DKA and mild pancreatitis. His DKA and pancreatitis resolved and he was moved from the ICU to the floor. 6/3 - He continues to complain of abdominal pain this AM. Grimaces everywhere abdomen is touched. Denies N/V/D. Denies CP/SOB. KUB and CT with constipation, but patient refusing stool softeners + laxative as of yesterday. Review of systems negative except stated above. Objective:     Visit Vitals  BP 99/59   Pulse 69   Temp 98.4 °F (36.9 °C)   Resp 18   Ht 5' 8\" (1.727 m)   Wt 65.8 kg (145 lb)   SpO2 98%   BMI 22.05 kg/m²      Oxygen Therapy  O2 Sat (%): 98 % (06/03/20 0736)  Pulse via Oximetry: 73 beats per minute (05/31/20 0822)  O2 Device: Room air (05/31/20 0700)      Intake and Output:     Intake/Output Summary (Last 24 hours) at 6/3/2020 1027  Last data filed at 6/2/2020 1843  Gross per 24 hour   Intake 240 ml   Output    Net 240 ml         Physical Exam:   GENERAL: alert, cooperative, no distress, appears stated age  EYE: conjunctivae/corneas clear. PERRL. THROAT & NECK: normal and no erythema or exudates noted. LUNG: clear to auscultation bilaterally  HEART: regular rate and rhythm, S1S2, no murmur, no JVD  ABDOMEN: soft, ?tender, non-distended. Bowel sounds normal.   EXTREMITIES:  No edema, 2+ pedal/radial pulses bilaterally  SKIN: no rash or abnormalities  NEUROLOGIC: A&Ox3. Cranial nerves 2-12 grossly intact.     Lab/Data Review:  Recent Results (from the past 24 hour(s))   GLUCOSE, POC    Collection Time: 06/02/20 12:32 PM   Result Value Ref Range    Glucose (POC) 231 (H) 65 - 100 mg/dL   GLUCOSE, POC    Collection Time: 06/02/20  5:03 PM   Result Value Ref Range    Glucose (POC) 295 (H) 65 - 100 mg/dL GLUCOSE, POC    Collection Time: 06/02/20  9:19 PM   Result Value Ref Range    Glucose (POC) 179 (H) 65 - 100 mg/dL   MAGNESIUM    Collection Time: 06/03/20  6:02 AM   Result Value Ref Range    Magnesium 1.7 (L) 1.8 - 2.4 mg/dL   METABOLIC PANEL, BASIC    Collection Time: 06/03/20  6:02 AM   Result Value Ref Range    Sodium 139 136 - 145 mmol/L    Potassium 3.8 3.5 - 5.1 mmol/L    Chloride 105 98 - 107 mmol/L    CO2 26 21 - 32 mmol/L    Anion gap 8 7 - 16 mmol/L    Glucose 73 65 - 100 mg/dL    BUN 10 6 - 23 MG/DL    Creatinine 0.59 (L) 0.8 - 1.5 MG/DL    GFR est AA >60 >60 ml/min/1.73m2    GFR est non-AA >60 >60 ml/min/1.73m2    Calcium 8.6 8.3 - 10.4 MG/DL   CBC W/O DIFF    Collection Time: 06/03/20  6:02 AM   Result Value Ref Range    WBC 6.5 4.3 - 11.1 K/uL    RBC 4.61 4.23 - 5.6 M/uL    HGB 11.4 (L) 13.6 - 17.2 g/dL    HCT 36.7 (L) 41.1 - 50.3 %    MCV 79.6 79.6 - 97.8 FL    MCH 24.7 (L) 26.1 - 32.9 PG    MCHC 31.1 (L) 31.4 - 35.0 g/dL    RDW 19.5 (H) 11.9 - 14.6 %    PLATELET 187 101 - 092 K/uL    MPV 9.9 9.4 - 12.3 FL    ABSOLUTE NRBC 0.00 0.0 - 0.2 K/uL   GLUCOSE, POC    Collection Time: 06/03/20  7:34 AM   Result Value Ref Range    Glucose (POC) 109 (H) 65 - 100 mg/dL       Imaging:  No results found. No results found for this visit on 05/30/20. Cultures:   All Micro Results     None          Assessment/Plan:     Principal Problem:    DKA (diabetic ketoacidoses) ([facility] (5/30/2020)  - Resolved  - Non-compliant --> A1C 11.4  - Change Lantus 20U to NPH 12U BID  - Humalog SSI    Active Problems:    Abdominal pain (6/2/2020)  - Unsure etiology  - Lipase 18  - CT unremarkable  - Had normal stool  - Check KUB      Alcohol dependence ([facility] (5/30/2020)  - No acute issues      Hypokalemia (5/31/2020)  - Resolved      Hypomagnesemia (5/31/2020)  - Resolved      Bipolar disorder ([facility] (5/30/2020)  - Refuses meds      HTN (hypertension) (5/30/2020)  - Refuses meds      Today's Plan: Give Lactulose + Colace - patient agreed    DIET DIABETIC WITH OPTIONS Consistent Carb 1800kcal; Regular    DVT Prophylaxis: Lovenox    Discharge Plan: \"Home\" tomorrow AM      Signed By: Daily Holliday DO     Prema 3, 2020

## 2020-06-03 NOTE — PROGRESS NOTES
Pt given Roxicodone 10 mg PO for c/o pain of 9/10. Pt given Zofran 4mg IVPS slowly and diluted for c/o nausea.  Will continue to monitor

## 2020-06-03 NOTE — PROGRESS NOTES
2210 Pt given Ambien 5 mg for sleep  0012 Pt given Dilaudid 4 mg PO for complaint of pain 8/10. Will continue to monitor.

## 2020-06-03 NOTE — PROGRESS NOTES
Problem: Falls - Risk of  Goal: *Absence of Falls  Description: Document [place] Fall Risk and appropriate interventions in the flowsheet. Outcome: Progressing Towards Goal  Note: Fall Risk Interventions:    Medication Interventions: Patient to call before getting OOB, Teach patient to arise slowly    Elimination Interventions: Call light in reach              Problem: Patient Education: Go to Patient Education Activity  Goal: Patient/Family Education  Outcome: Progressing Towards Goal     Problem: Diabetes Self-Management  Goal: *Disease process and treatment process  Description: Define diabetes and identify own type of diabetes; list 3 options for treating diabetes. Outcome: Progressing Towards Goal  Goal: *Incorporating nutritional management into lifestyle  Description: Describe effect of type, amount and timing of food on blood glucose; list 3 methods for planning meals. Outcome: Progressing Towards Goal  Goal: *Incorporating physical activity into lifestyle  Description: State effect of exercise on blood glucose levels. Outcome: Progressing Towards Goal  Goal: *Developing strategies to promote health/change behavior  Description: Define the ABC's of diabetes; identify appropriate screenings, schedule and personal plan for screenings. Outcome: Progressing Towards Goal  Goal: *Using medications safely  Description: State effect of diabetes medications on diabetes; name diabetes medication taking, action and side effects. Outcome: Progressing Towards Goal  Goal: *Monitoring blood glucose, interpreting and using results  Description: Identify recommended blood glucose targets  and personal targets. Outcome: Progressing Towards Goal  Goal: *Prevention, detection, treatment of acute complications  Description: List symptoms of hyper- and hypoglycemia; describe how to treat low blood sugar and actions for lowering  high blood glucose level.   Outcome: Progressing Towards Goal  Goal: *Prevention, detection and treatment of chronic complications  Description: Define the natural course of diabetes and describe the relationship of blood glucose levels to long term complications of diabetes.   Outcome: Progressing Towards Goal  Goal: *Developing strategies to address psychosocial issues  Description: Describe feelings about living with diabetes; identify support needed and support network  Outcome: Progressing Towards Goal  Goal: *Insulin pump training  Outcome: Progressing Towards Goal  Goal: *Sick day guidelines  Outcome: Progressing Towards Goal  Goal: *Patient Specific Goal (EDIT GOAL, INSERT TEXT)  Outcome: Progressing Towards Goal     Problem: Patient Education: Go to Patient Education Activity  Goal: Patient/Family Education  Outcome: Progressing Towards Goal     Problem: Pain  Goal: *Control of Pain  Outcome: Progressing Towards Goal  Goal: *PALLIATIVE CARE:  Alleviation of Pain  Outcome: Progressing Towards Goal     Problem: Patient Education: Go to Patient Education Activity  Goal: Patient/Family Education  Outcome: Progressing Towards Goal

## 2020-06-04 LAB
ANION GAP SERPL CALC-SCNC: 6 MMOL/L (ref 7–16)
BUN SERPL-MCNC: 8 MG/DL (ref 6–23)
CALCIUM SERPL-MCNC: 8.7 MG/DL (ref 8.3–10.4)
CHLORIDE SERPL-SCNC: 106 MMOL/L (ref 98–107)
CO2 SERPL-SCNC: 28 MMOL/L (ref 21–32)
CREAT SERPL-MCNC: 0.55 MG/DL (ref 0.8–1.5)
ERYTHROCYTE [DISTWIDTH] IN BLOOD BY AUTOMATED COUNT: 19.2 % (ref 11.9–14.6)
GLUCOSE BLD STRIP.AUTO-MCNC: 111 MG/DL (ref 65–100)
GLUCOSE BLD STRIP.AUTO-MCNC: 146 MG/DL (ref 65–100)
GLUCOSE BLD STRIP.AUTO-MCNC: 147 MG/DL (ref 65–100)
GLUCOSE BLD STRIP.AUTO-MCNC: 213 MG/DL (ref 65–100)
GLUCOSE SERPL-MCNC: 87 MG/DL (ref 65–100)
HCT VFR BLD AUTO: 36.6 % (ref 41.1–50.3)
HGB BLD-MCNC: 11 G/DL (ref 13.6–17.2)
MAGNESIUM SERPL-MCNC: 2 MG/DL (ref 1.8–2.4)
MCH RBC QN AUTO: 24.5 PG (ref 26.1–32.9)
MCHC RBC AUTO-ENTMCNC: 30.1 G/DL (ref 31.4–35)
MCV RBC AUTO: 81.5 FL (ref 79.6–97.8)
NRBC # BLD: 0 K/UL (ref 0–0.2)
PLATELET # BLD AUTO: 243 K/UL (ref 150–450)
PMV BLD AUTO: 10.1 FL (ref 9.4–12.3)
POTASSIUM SERPL-SCNC: 3.9 MMOL/L (ref 3.5–5.1)
RBC # BLD AUTO: 4.49 M/UL (ref 4.23–5.6)
SODIUM SERPL-SCNC: 140 MMOL/L (ref 136–145)
WBC # BLD AUTO: 4.5 K/UL (ref 4.3–11.1)

## 2020-06-04 PROCEDURE — 82962 GLUCOSE BLOOD TEST: CPT

## 2020-06-04 PROCEDURE — 74011250636 HC RX REV CODE- 250/636: Performed by: HOSPITALIST

## 2020-06-04 PROCEDURE — 83735 ASSAY OF MAGNESIUM: CPT

## 2020-06-04 PROCEDURE — 74011250637 HC RX REV CODE- 250/637: Performed by: INTERNAL MEDICINE

## 2020-06-04 PROCEDURE — 36415 COLL VENOUS BLD VENIPUNCTURE: CPT

## 2020-06-04 PROCEDURE — 74011250636 HC RX REV CODE- 250/636: Performed by: INTERNAL MEDICINE

## 2020-06-04 PROCEDURE — 65270000029 HC RM PRIVATE

## 2020-06-04 PROCEDURE — 74011636637 HC RX REV CODE- 636/637: Performed by: HOSPITALIST

## 2020-06-04 PROCEDURE — 85027 COMPLETE CBC AUTOMATED: CPT

## 2020-06-04 PROCEDURE — 80048 BASIC METABOLIC PNL TOTAL CA: CPT

## 2020-06-04 PROCEDURE — 74011636637 HC RX REV CODE- 636/637: Performed by: INTERNAL MEDICINE

## 2020-06-04 RX ORDER — HYDROMORPHONE HYDROCHLORIDE 1 MG/ML
0.2 INJECTION, SOLUTION INTRAMUSCULAR; INTRAVENOUS; SUBCUTANEOUS
Status: DISCONTINUED | OUTPATIENT
Start: 2020-06-04 | End: 2020-06-05 | Stop reason: HOSPADM

## 2020-06-04 RX ORDER — INSULIN LISPRO 100 [IU]/ML
4 INJECTION, SOLUTION INTRAVENOUS; SUBCUTANEOUS
Status: DISCONTINUED | OUTPATIENT
Start: 2020-06-04 | End: 2020-06-05 | Stop reason: HOSPADM

## 2020-06-04 RX ADMIN — LACTULOSE 30 ML: 20 SOLUTION ORAL at 17:06

## 2020-06-04 RX ADMIN — LACTULOSE 30 ML: 20 SOLUTION ORAL at 08:47

## 2020-06-04 RX ADMIN — PROMETHAZINE HYDROCHLORIDE 25 MG: 25 TABLET ORAL at 18:21

## 2020-06-04 RX ADMIN — PROMETHAZINE HYDROCHLORIDE 25 MG: 25 TABLET ORAL at 09:16

## 2020-06-04 RX ADMIN — Medication 10 ML: at 05:07

## 2020-06-04 RX ADMIN — DOCUSATE SODIUM 100 MG: 100 CAPSULE, LIQUID FILLED ORAL at 17:06

## 2020-06-04 RX ADMIN — ENOXAPARIN SODIUM 40 MG: 40 INJECTION SUBCUTANEOUS at 21:42

## 2020-06-04 RX ADMIN — HUMAN INSULIN 12 UNITS: 100 INJECTION, SUSPENSION SUBCUTANEOUS at 17:07

## 2020-06-04 RX ADMIN — HYDROMORPHONE HYDROCHLORIDE 0.2 MG: 1 INJECTION, SOLUTION INTRAMUSCULAR; INTRAVENOUS; SUBCUTANEOUS at 09:23

## 2020-06-04 RX ADMIN — HYDROMORPHONE HYDROCHLORIDE 0.2 MG: 1 INJECTION, SOLUTION INTRAMUSCULAR; INTRAVENOUS; SUBCUTANEOUS at 18:21

## 2020-06-04 RX ADMIN — INSULIN LISPRO 4 UNITS: 100 INJECTION, SOLUTION INTRAVENOUS; SUBCUTANEOUS at 17:07

## 2020-06-04 RX ADMIN — INSULIN LISPRO 4 UNITS: 100 INJECTION, SOLUTION INTRAVENOUS; SUBCUTANEOUS at 12:20

## 2020-06-04 RX ADMIN — DOCUSATE SODIUM 100 MG: 100 CAPSULE, LIQUID FILLED ORAL at 08:47

## 2020-06-04 RX ADMIN — INSULIN LISPRO 4 UNITS: 100 INJECTION, SOLUTION INTRAVENOUS; SUBCUTANEOUS at 17:06

## 2020-06-04 RX ADMIN — Medication 10 ML: at 21:43

## 2020-06-04 RX ADMIN — HYDROMORPHONE HYDROCHLORIDE 0.2 MG: 1 INJECTION, SOLUTION INTRAMUSCULAR; INTRAVENOUS; SUBCUTANEOUS at 14:01

## 2020-06-04 RX ADMIN — INSULIN LISPRO 4 UNITS: 100 INJECTION, SOLUTION INTRAVENOUS; SUBCUTANEOUS at 22:00

## 2020-06-04 RX ADMIN — LACTULOSE 30 ML: 20 SOLUTION ORAL at 21:46

## 2020-06-04 RX ADMIN — HUMAN INSULIN 12 UNITS: 100 INJECTION, SUSPENSION SUBCUTANEOUS at 08:47

## 2020-06-04 RX ADMIN — OXYCODONE 10 MG: 5 TABLET ORAL at 21:42

## 2020-06-04 NOTE — CONSULTS
[facility] Consult Note       Primary GI Physician: New patient-Dr Allan Guajardo    Referring Provider:  Dr Odin Hemphill Date:  6/4/2020    Admit Date:  5/30/2020    Chief Complaint:  Abdominal pain, abnormal CT scan    Subjective:     History of Present Illness:  Patient is a 39 y.o. male with PMH of etoh dependence, bipolar disorder, chronic pancreatitis, HTN, tobacco abuse. Drug seeking behavior, and DM, who is seen in consultation at the request of Dr. Cammy Ramirez for abdominal pain and abnormal CT scan. Pt presented to ED 5/30/2020 with nausea, vomiting, and abdominal pain. He reported that he had not been taking insulin due to being homeless. He had been at another ED 5/29/2020 with abdominal pain. CT scan findings consistent with chronic pancreatitis with no acute findings. Pt complained of fatigue, abdominal pain, nausea, and vomiting. Pt was admitted to hospitalist for diabetic ketoacidosis secondary to medical noncompliance. A1C 11.4.  on arrival. He was admitted to ICU on insulin drip. DKA quickly resolved. He was moved to floor. He continued to complain of abdominal pain and was getting pain medication around the clock as of 6/1/2020. He was switched to PO pain medications 6/1/2020. He continued to complain of abdominal pain. Lipase normal at 18. He has refused his medications for bipolar disorder. He has refused lactulose and colace despite possible constipation seen on KUB 6/2/2020. He has refused diabetic education. He has done this on previous hospitalizations as well. He continues to receive frequent doses of PO pain medication. IV dilaudid has been added back to meds today prn.  CT 6/3/2020 with chronic pancreatitis, gastric distention (possible gastroparesis), and moderate to large amount of gas and stool throughout the colon. Pt asleep when I walk in room.  As he wakes up, he immediately states that he is in severe pain and the \"other doctor\" won't give him pain medication. He denies any nausea or vomiting. The last time he had any n/v was the day of admission. He denies any constipation. He actually reports multiple loose stools daily for years. He denies any BRB or melena. He has no heartburn or GERD. He does smoke daily. Last etoh use reported as 2 months ago. Prior to that was drinking 1.75L liquor daily for years. He has not seen a GI provider in the past. He again requests pain medication, stating he \"needs help and they won't give me pain medication\". He denies any chest pain or SOB. He has been tolerating diet. He has had 4 ED visits to [facility] since April 2020. He had 7 ED visits at [facility] between March 2020 and April 2020. He had 10 ED visits to [facility] between April and May 2020. Documentation from [facility] and [facility] indicate possible drug seeking behavior. He is also non compliant with diabetic medications. There are also indications of malingering. PMH:  PMH of etoh dependence, bipolar disorder, chronic pancreatitis, HTN, tobacco abuse. Drug seeking behavior, and DM    PSH:  History reviewed. No pertinent surgical history. Allergies: Allergies   Allergen Reactions    Toradol [Ketorolac] Nausea and Vomiting       Home Medications:  Prior to Admission medications    Medication Sig Start Date End Date Taking? Authorizing Provider   sertraline (Zoloft) 50 mg tablet Take 100 mg by mouth daily. Indications: anxiousness associated with depression   Yes Provider, Historical   QUEtiapine (SEROqueL) 200 mg tablet Take 200 mg by mouth two (2) times a day. Yes Provider, Historical   gabapentin (Neurontin) 400 mg capsule Take 400 mg by mouth three (3) times daily. Yes Provider, Historical   busPIRone (BUSPAR) 30 mg tablet Take 60 mg by mouth daily. Yes Provider, Historical   insulin lispro (HumaLOG U-100 Insulin) 100 unit/mL injection by SubCUTAneous route.  SSI   Yes Provider, Historical   insulin glargine (Lantus U-100 Insulin) 100 unit/mL injection 20 Units by SubCUTAneous route nightly. Yes Provider, Historical   metFORMIN (GLUCOPHAGE) 1,000 mg tablet Take 1,000 mg by mouth two (2) times daily (with meals). Other, MD Renate       Hospital Medications:  Current Facility-Administered Medications   Medication Dose Route Frequency    lactulose (CHRONULAC) 10 gram/15 mL solution 30 mL  20 g Oral TID    HYDROmorphone (PF) (DILAUDID) injection 0.2 mg  0.2 mg IntraVENous Q4H PRN    insulin lispro (HUMALOG) injection 4 Units  4 Units SubCUTAneous AC&HS    ondansetron (ZOFRAN ODT) tablet 4 mg  4 mg Oral Q6H PRN    promethazine (PHENERGAN) tablet 25 mg  25 mg Oral Q6H PRN    oxyCODONE IR (ROXICODONE) tablet 10 mg  10 mg Oral Q4H PRN    insulin NPH (NOVOLIN N, HUMULIN N) injection 12 Units  12 Units SubCUTAneous ACB&D    docusate sodium (COLACE) capsule 100 mg  100 mg Oral BID    insulin lispro (HUMALOG) injection   SubCUTAneous AC&HS    NUTRITIONAL SUPPORT ELECTROLYTE PRN ORDERS   Does Not Apply PRN    dextrose 40% (GLUTOSE) oral gel 1 Tube  15 g Oral PRN    dextrose (D50W) injection syrg 12.5-25 g  25-50 mL IntraVENous PRN    sodium chloride (NS) flush 5-40 mL  5-40 mL IntraVENous Q8H    sodium chloride (NS) flush 5-40 mL  5-40 mL IntraVENous PRN    acetaminophen (TYLENOL) tablet 650 mg  650 mg Oral Q4H PRN    naloxone (NARCAN) injection 0.4 mg  0.4 mg IntraVENous PRN    diphenhydrAMINE (BENADRYL) capsule 25 mg  25 mg Oral Q4H PRN    bisacodyL (DULCOLAX) tablet 5 mg  5 mg Oral DAILY PRN    enoxaparin (LOVENOX) injection 40 mg  40 mg SubCUTAneous Q24H       Social History:  Social History     Tobacco Use    Smoking status: Not on file   Substance Use Topics    Alcohol use: Not on file         Family History:  History reviewed. No pertinent family history. Review of Systems:  A detailed 10 system ROS is obtained, with pertinent positives as listed above. All others are negative.     Diet:  Diabetic diet    Objective: Physical Exam:  Vitals:  Visit Vitals  BP 98/66   Pulse 62   Temp 97.6 °F (36.4 °C)   Resp 20   Ht 5' 8\" (1.727 m)   Wt 65.8 kg (145 lb)   SpO2 98%   BMI 22.05 kg/m²     Gen:  Pt is alert, cooperative, no acute distress  Skin:  Extremities and face reveal no rashes. HEENT: Sclerae anicteric. Extra-occular muscles are intact. No oral ulcers. No abnormal pigmentation of the lips. The neck is supple. Poor dentition  Cardiovascular: Regular rate and rhythm. No murmurs, gallops, or rubs. Respiratory:  Comfortable breathing with no accessory muscle use. Clear breath sounds anteriorly with no wheezes, rales, or rhonchi. GI:  Abdomen nondistended, soft. Pt winches with pain prior to any palpation. Winching and groaning with light palpation. Normal active bowel sounds. No enlargement of the liver or spleen. No masses palpable. Rectal:  Deferred  Musculoskeletal:  No pitting edema of the lower legs. Neurological:  Gross memory appears intact. Patient is alert and oriented. Psychiatric:  Mood appears appropriate with judgement intact. Lymphatic:  No cervical or supraclavicular adenopathy. Laboratory:    Recent Labs     06/04/20  0617 06/03/20  0602 06/02/20  0742   WBC 4.5 6.5 7.1   HGB 11.0* 11.4* 11.0*   HCT 36.6* 36.7* 35.3*    282 266   MCV 81.5 79.6 79.9    139 138   K 3.9 3.8 4.1    105 105   CO2 28 26 27   BUN 8 10 6   CREA 0.55* 0.59* 0.57*   CA 8.7 8.6 8.5   MG 2.0 1.7* 1.9   GLU 87 73 172*          CT A/P 6/3/2020 [facility]  IMPRESSION  IMPRESSION:   1. Pancreatic atrophy and calcifications compatible with chronic pancreatitis.   2. Significant gastric distention. GI follow-up is recommended to assess for  evidence of gastroparesis.   3. Moderate to large amount of gas and stool throughout the colon. KUB  6/2/2020  FINDINGS:  There is mild diffuse prominence of the stool pattern. There is no  small bowel distention. There is no evidence of obstruction.   No renal calculi  are seen. Lung bases are clear.   IMPRESSION  IMPRESSION: Possible constipation    CT A/P IV contrast 5/29/2020  [facility]  IMPRESSION:   1. Again seen are sequelae of chronic pancreatitis. 2.  Unchanged mild diffuse thickening of the distal esophagus, possibly related to esophagitis. CT A/P with contrast 5/23/2020  [facility]  FINDINGS:   Visualized lower thorax unremarkable. ABDOMEN AND PELVIS  Liver: Within normal limits. Biliary: Within normal limits. Pancreas: Diffuse pancreatic parenchymal atrophy and parenchymal calcification.  Similar prominence of the pancreatic duct. Spleen: Within normal limits. Adrenals: Within normal limits. Kidneys and Ureters: Symmetric renal enhancement.  No hydronephrosis.  Stable left renal cyst  Bladder: No focal bladder wall thickening. Reproductive: Unremarkable. Gastrointestinal tract: Mild circumferential thickening of the distal esophagus.  No evidence of bowel obstruction or inflammation.  Appendix normal.  Vascular: Normal course and caliber of the abdominal aorta. Peritoneum and mesenteries: No pathologic retroperitoneal or mesenteric adenopathy. No free fluid or free air. MSK: No acute or aggressive osseous finding. CT A/P 5/12/2020 [facility]  Impression:  1. No evidence of bowel obstruction. 2. Moderate stool burden. 3. Chronic pancreatitis. CT A/P 4/23/2020 [facility]  FINDINGS:  Abdomen:  Lung bases:  Visualized lung parenchyma demonstrates no consolidation, effusion, nor pneumothorax. Base of heart:  Base of the heart is unremarkable with no pericardial effusion. Osseous structures:  No acute fracture nor dislocation. No acute pathology of the liver, spleen, gallbladder, pancrease, adrenal glands, kidneys, mesentery, vasculature nor samll bowel unless specified below:   Moderate thickening of the distal thoracic esophagus present nonspecific.  Marked distention of the gastric lumen and large volume of retained debris noted in the gastric lumen. [place] volume of retained stool is present in the colon mild prominence of small bowel loops noted with small bowel feces sign. 15 mm Bosniak 1 cyst mid pole left kidney stable.  Extensive pancreatic atrophy calcification related to chronic pancreatitis.  Gallbladder is not enlarged.  No common bile duct dilatation there is some minimal periportal edema present. Pelvis:    No acute pathology of the large bowel nor pelvic viscera unless specified below:  Large volume retained stool present throughout the colon suspicious for constipation bladder is unremarkable. CT A/P 4/9/2020 [facility]  Impression:  1. Mural thickening and mucosal hyperenhancement of the mid and lower thoracic esophagus, consistent with esophagitis  2. Findings consistent with chronic pancreatitis. CT 4/5/2020 [facility]  IMPRESSION:   1. Moderate stool volume in the colon. No evidence of colitis, diverticulitis or  bowel obstruction. No acute inflammatory process is identified, by CT imaging.   2. Mild symmetric dilatation of the renal collecting systems, likely due to  overdistended urinary bladder. CT A/P 3/26/2020 [facility]   IMPRESSION  IMPRESSION:   1. No acute abnormality of the abdomen or pelvis. 2.  Sequelae of chronic pancreatitis. Assessment:     Principal Problem:    DKA (diabetic ketoacidoses) ([address] (5/30/2020)    Active Problems:    Bipolar disorder ([address] (5/30/2020)      HTN (hypertension) (5/30/2020)      Alcohol dependence ([address] (5/30/2020)      Hypokalemia (5/31/2020)      Hypomagnesemia (5/31/2020)      Type II diabetes mellitus with complication, uncontrolled ([address] (5/31/2020)      Abdominal pain (6/2/2020)     Patient is a 39 y.o. male with PMH of etoh dependence, bipolar disorder, chronic pancreatitis, HTN, tobacco abuse, drug seeking behavior, and DM, who is seen in consultation at the request of Dr. Virgil Caraballo for abdominal pain and abnormal CT scan. Pt admitted 5/30 for DKA secondary to non compliance.  He has had numerous ED visits and admissions to multiple hospitals in the last several months. DKA resolved with insulin drip and he was moved to floor. He has continued to complain of severe abdominal pain and is receiving pain medication frequently but reports no improvement. Pt requests pain medications twice while I am in the room. He has no nausea or vomiting. He reports multiple loose stools daily. He denies signs of GIB. He denies any GERD or HB. Multiple imaging studies over the last several months with findings as listed above. Chronic pancreatitis without evidence of acute pancreatitis on all CT scans back to March 2020. Last etoh use 2 months ago. Continues to smoke. Pt is homeless and case management attempting to find placement at a rescue mission. Differential includes pain from chronic pancreatitis, narcotic bowel syndrome, drug seeking behavior, gastritis, esophagitis, PUD, neoplastic process. Plan:     -Pain medication as per primary team. Would limit use given possible gastroparesis  -Large amount of stool on CT-recommend lactulose and colace as ordered by primary team  -Chronic pancreatitis with no evidence for acute pancreatitis. -Recommend strict BG control to manage DM and gastroparesis  KONSTANTIN Baig  Patient is seen and examined in collaboration with Dr. Da Gaines. Assessment and plan as per Dr. Da Gaines. ATTENDING NOTE:  I have seen and examined the patient along with my NP/PA. The assessment and plan above is my own. Clearly demonstrates drug seeking behavior, (over stating pain, multiple admissions to multiple facilities)   Chronic pancreatitis - may be a cause for pain but unwise to prescribe pain meds given social situation and noncompliance with f/u. NORMAL exam when distracted. IV DILAUDID is not going to help if he has chronic pancreatitis   See above for plan   Not planning endoscopy or furtther imaging.   Although he may have some gastroparesis, he isnt having vomiting and tolerating diet. Will be available but OK to discharge.  to help advise for f/u care.   Cecelia Ford MD

## 2020-06-04 NOTE — PROGRESS NOTES
EOS : Norco, Dilaudid, zofran and phenergan given each 1x. Pt got aggravated with me when I did not allow him to smoke a cigarette. . stated I was being \"difficult\". No other complaints.

## 2020-06-04 NOTE — PROGRESS NOTES
Shift assessment complete. Pt resting in bed. A&Ox4. Respirations present, even, unlabored. Lung sounds clear to auscultation. Pt on RA. HR regular, S1&S2 auscultated. Abdomen soft with active bowel sounds in all 4 quadrants. IV capped and patent. Pt denies pain at this time. Bed in lowest position, call light within reach, side rails x3. Encouraged to call for help when needed.

## 2020-06-04 NOTE — PROGRESS NOTES
Hospitalist Progress Note    Patient: Brittany Ruiz MRN: [id number]  SSN: [id number]    YOB: 1975  Age: 39 y.o. Sex: male      Admit Date: 5/30/2020    LOS: 5 days     Subjective:     39year old CM with a PMH of Bipolar disease, homelessness, drug use and DM admitted for DKA and mild pancreatitis. His DKA and pancreatitis resolved and he was moved from the ICU to the floor. 6/4 - He continues to complain of abdominal pain this AM. Grimaces everywhere abdomen is touched. Denies N/V/D. Denies CP/SOB. Asking for IV narcotics. I explained that this caused constipation so he needed to participate with constipation medications, which he agreed to do. Review of systems negative except stated above. Objective:     Visit Vitals  BP 98/66   Pulse 62   Temp 97.6 °F (36.4 °C)   Resp 20   Ht 5' 8\" (1.727 m)   Wt 65.8 kg (145 lb)   SpO2 98%   BMI 22.05 kg/m²      Oxygen Therapy  O2 Sat (%): 98 % (06/04/20 0831)  Pulse via Oximetry: 73 beats per minute (05/31/20 0822)  O2 Device: Room air (06/03/20 0800)      Intake and Output:   No intake or output data in the 24 hours ending 06/04/20 0846      Physical Exam:   GENERAL: alert, cooperative, no distress, appears stated age  EYE: conjunctivae/corneas clear. PERRL. THROAT & NECK: normal and no erythema or exudates noted. LUNG: clear to auscultation bilaterally  HEART: regular rate and rhythm, S1S2, no murmur, no JVD  ABDOMEN: soft, ?tender, non-distended. Bowel sounds normal.   EXTREMITIES:  No edema, 2+ pedal/radial pulses bilaterally  SKIN: no rash or abnormalities  NEUROLOGIC: A&Ox3. Cranial nerves 2-12 grossly intact.     Lab/Data Review:  Recent Results (from the past 24 hour(s))   GLUCOSE, POC    Collection Time: 06/03/20 11:11 AM   Result Value Ref Range    Glucose (POC) 198 (H) 65 - 100 mg/dL   GLUCOSE, POC    Collection Time: 06/03/20  4:35 PM   Result Value Ref Range    Glucose (POC) 322 (H) 65 - 100 mg/dL   GLUCOSE, POC    Collection Time: 06/03/20  9:19 PM   Result Value Ref Range    Glucose (POC) 296 (H) 65 - 100 mg/dL   MAGNESIUM    Collection Time: 06/04/20  6:17 AM   Result Value Ref Range    Magnesium 2.0 1.8 - 2.4 mg/dL   METABOLIC PANEL, BASIC    Collection Time: 06/04/20  6:17 AM   Result Value Ref Range    Sodium 140 136 - 145 mmol/L    Potassium 3.9 3.5 - 5.1 mmol/L    Chloride 106 98 - 107 mmol/L    CO2 28 21 - 32 mmol/L    Anion gap 6 (L) 7 - 16 mmol/L    Glucose 87 65 - 100 mg/dL    BUN 8 6 - 23 MG/DL    Creatinine 0.55 (L) 0.8 - 1.5 MG/DL    GFR est AA >60 >60 ml/min/1.73m2    GFR est non-AA >60 >60 ml/min/1.73m2    Calcium 8.7 8.3 - 10.4 MG/DL   CBC W/O DIFF    Collection Time: 06/04/20  6:17 AM   Result Value Ref Range    WBC 4.5 4.3 - 11.1 K/uL    RBC 4.49 4.23 - 5.6 M/uL    HGB 11.0 (L) 13.6 - 17.2 g/dL    HCT 36.6 (L) 41.1 - 50.3 %    MCV 81.5 79.6 - 97.8 FL    MCH 24.5 (L) 26.1 - 32.9 PG    MCHC 30.1 (L) 31.4 - 35.0 g/dL    RDW 19.2 (H) 11.9 - 14.6 %    PLATELET 601 462 - 482 K/uL    MPV 10.1 9.4 - 12.3 FL    ABSOLUTE NRBC 0.00 0.0 - 0.2 K/uL   GLUCOSE, POC    Collection Time: 06/04/20  7:28 AM   Result Value Ref Range    Glucose (POC) 111 (H) 65 - 100 mg/dL       Imaging:  No results found. No results found for this visit on 05/30/20. Cultures:   All Micro Results     None          Assessment/Plan:     Principal Problem:    DKA (diabetic ketoacidoses) ([facility] (5/30/2020)  - Resolved  - Non-compliant --> A1C 11.4  - Change Lantus 20U to NPH 12U BID  - Add Humalog 4U AC  - Humalog SSI    Active Problems:    Abdominal pain (6/2/2020)  - Unsure etiology  - Lipase 18  - CT unremarkable  - Had normal stool  - CT with large stool and chronic pancreatitis      Alcohol dependence ([facility] (5/30/2020)  - No acute issues      Hypokalemia (5/31/2020)  - Resolved      Hypomagnesemia (5/31/2020)  - Resolved      Bipolar disorder ([facility] [address] (5/30/2020)  - Refuses meds      HTN (hypertension) (5/30/2020)  - Refuses meds      Today's Plan: Give Lactulose + Colace - patient agreed    DIET DIABETIC WITH OPTIONS Consistent Carb 1800kcal; Regular    DVT Prophylaxis: Lovenox    Discharge Plan: \"Home\" once he has BMs      Signed By: Dejah Jefferson DO     June 4, 2020

## 2020-06-04 NOTE — PROGRESS NOTES
Problem: Falls - Risk of  Goal: *Absence of Falls  Description: Document Pedro Brownlee Fall Risk and appropriate interventions in the flowsheet. Outcome: Progressing Towards Goal  Note: Fall Risk Interventions:            Medication Interventions: Bed/chair exit alarm    Elimination Interventions: Bed/chair exit alarm              Problem: Diabetes Self-Management  Goal: *Disease process and treatment process  Description: Define diabetes and identify own type of diabetes; list 3 options for treating diabetes. Outcome: Progressing Towards Goal  Goal: *Incorporating nutritional management into lifestyle  Description: Describe effect of type, amount and timing of food on blood glucose; list 3 methods for planning meals. Outcome: Progressing Towards Goal  Goal: *Incorporating physical activity into lifestyle  Description: State effect of exercise on blood glucose levels. Outcome: Progressing Towards Goal  Goal: *Developing strategies to promote health/change behavior  Description: Define the ABC's of diabetes; identify appropriate screenings, schedule and personal plan for screenings. Outcome: Progressing Towards Goal  Goal: *Using medications safely  Description: State effect of diabetes medications on diabetes; name diabetes medication taking, action and side effects. Outcome: Progressing Towards Goal  Goal: *Monitoring blood glucose, interpreting and using results  Description: Identify recommended blood glucose targets  and personal targets. Outcome: Progressing Towards Goal  Goal: *Prevention, detection, treatment of acute complications  Description: List symptoms of hyper- and hypoglycemia; describe how to treat low blood sugar and actions for lowering  high blood glucose level.   Outcome: Progressing Towards Goal  Goal: *Prevention, detection and treatment of chronic complications  Description: Define the natural course of diabetes and describe the relationship of blood glucose levels to long term complications of diabetes.   Outcome: Progressing Towards Goal  Goal: *Developing strategies to address psychosocial issues  Description: Describe feelings about living with diabetes; identify support needed and support network  Outcome: Progressing Towards Goal  Goal: *Insulin pump training  Outcome: Progressing Towards Goal  Goal: *Sick day guidelines  Outcome: Progressing Towards Goal  Goal: *Patient Specific Goal (EDIT GOAL, INSERT TEXT)  Outcome: Progressing Towards Goal     Problem: Pain  Goal: *Control of Pain  Outcome: Progressing Towards Goal

## 2020-06-04 NOTE — PROGRESS NOTES
Interdisciplinary team rounds were held 6/4/2020 with the following team members:Care Management, Nursing and Physician. Plan of care discussed. See clinical pathway and/or care plan for interventions and desired outcomes.

## 2020-06-05 VITALS
HEART RATE: 78 BPM | RESPIRATION RATE: 18 BRPM | SYSTOLIC BLOOD PRESSURE: 97 MMHG | WEIGHT: 145 LBS | HEIGHT: 68 IN | TEMPERATURE: 98.1 F | OXYGEN SATURATION: 98 % | DIASTOLIC BLOOD PRESSURE: 62 MMHG | BODY MASS INDEX: 21.98 KG/M2

## 2020-06-05 LAB
ANION GAP SERPL CALC-SCNC: 9 MMOL/L (ref 7–16)
BUN SERPL-MCNC: 11 MG/DL (ref 6–23)
CALCIUM SERPL-MCNC: 9 MG/DL (ref 8.3–10.4)
CHLORIDE SERPL-SCNC: 108 MMOL/L (ref 98–107)
CO2 SERPL-SCNC: 23 MMOL/L (ref 21–32)
CREAT SERPL-MCNC: 0.6 MG/DL (ref 0.8–1.5)
ERYTHROCYTE [DISTWIDTH] IN BLOOD BY AUTOMATED COUNT: 20 % (ref 11.9–14.6)
GLUCOSE BLD STRIP.AUTO-MCNC: 126 MG/DL (ref 65–100)
GLUCOSE BLD STRIP.AUTO-MCNC: 183 MG/DL (ref 65–100)
GLUCOSE SERPL-MCNC: 138 MG/DL (ref 65–100)
HCT VFR BLD AUTO: 39 % (ref 41.1–50.3)
HGB BLD-MCNC: 12.1 G/DL (ref 13.6–17.2)
MAGNESIUM SERPL-MCNC: 2 MG/DL (ref 1.8–2.4)
MCH RBC QN AUTO: 24.8 PG (ref 26.1–32.9)
MCHC RBC AUTO-ENTMCNC: 31 G/DL (ref 31.4–35)
MCV RBC AUTO: 80.1 FL (ref 79.6–97.8)
NRBC # BLD: 0 K/UL (ref 0–0.2)
PLATELET # BLD AUTO: 272 K/UL (ref 150–450)
PMV BLD AUTO: 9.9 FL (ref 9.4–12.3)
POTASSIUM SERPL-SCNC: 3.9 MMOL/L (ref 3.5–5.1)
RBC # BLD AUTO: 4.87 M/UL (ref 4.23–5.6)
SODIUM SERPL-SCNC: 140 MMOL/L (ref 136–145)
WBC # BLD AUTO: 4.2 K/UL (ref 4.3–11.1)

## 2020-06-05 PROCEDURE — 36415 COLL VENOUS BLD VENIPUNCTURE: CPT

## 2020-06-05 PROCEDURE — 83735 ASSAY OF MAGNESIUM: CPT

## 2020-06-05 PROCEDURE — 74011250636 HC RX REV CODE- 250/636: Performed by: INTERNAL MEDICINE

## 2020-06-05 PROCEDURE — 80048 BASIC METABOLIC PNL TOTAL CA: CPT

## 2020-06-05 PROCEDURE — 74011636637 HC RX REV CODE- 636/637: Performed by: INTERNAL MEDICINE

## 2020-06-05 PROCEDURE — 85027 COMPLETE CBC AUTOMATED: CPT

## 2020-06-05 PROCEDURE — 82962 GLUCOSE BLOOD TEST: CPT

## 2020-06-05 PROCEDURE — 74011250637 HC RX REV CODE- 250/637: Performed by: INTERNAL MEDICINE

## 2020-06-05 PROCEDURE — 74011636637 HC RX REV CODE- 636/637: Performed by: HOSPITALIST

## 2020-06-05 RX ADMIN — HUMAN INSULIN 12 UNITS: 100 INJECTION, SUSPENSION SUBCUTANEOUS at 08:10

## 2020-06-05 RX ADMIN — HYDROMORPHONE HYDROCHLORIDE 0.2 MG: 1 INJECTION, SOLUTION INTRAMUSCULAR; INTRAVENOUS; SUBCUTANEOUS at 13:24

## 2020-06-05 RX ADMIN — HYDROMORPHONE HYDROCHLORIDE 0.2 MG: 1 INJECTION, SOLUTION INTRAMUSCULAR; INTRAVENOUS; SUBCUTANEOUS at 04:48

## 2020-06-05 RX ADMIN — DOCUSATE SODIUM 100 MG: 100 CAPSULE, LIQUID FILLED ORAL at 08:09

## 2020-06-05 RX ADMIN — INSULIN LISPRO 2 UNITS: 100 INJECTION, SOLUTION INTRAVENOUS; SUBCUTANEOUS at 11:39

## 2020-06-05 RX ADMIN — INSULIN LISPRO 4 UNITS: 100 INJECTION, SOLUTION INTRAVENOUS; SUBCUTANEOUS at 11:39

## 2020-06-05 RX ADMIN — INSULIN LISPRO 4 UNITS: 100 INJECTION, SOLUTION INTRAVENOUS; SUBCUTANEOUS at 08:09

## 2020-06-05 RX ADMIN — ONDANSETRON 4 MG: 4 TABLET, ORALLY DISINTEGRATING ORAL at 13:27

## 2020-06-05 RX ADMIN — OXYCODONE 10 MG: 5 TABLET ORAL at 10:37

## 2020-06-05 RX ADMIN — OXYCODONE 10 MG: 5 TABLET ORAL at 15:04

## 2020-06-05 RX ADMIN — LACTULOSE 30 ML: 20 SOLUTION ORAL at 08:09

## 2020-06-05 NOTE — PROGRESS NOTES
Care Management Interventions  PCP Verified by CM: Yes  Transition of Care Consult (CM Consult): Other  Physical Therapy Consult: No  Current Support Network: Shelter  Confirm Follow Up Transport: Other (see comment)  Freedom of Choice List was Provided with Basic Dialogue that Supports the Patient's Individualized Plan of Care/Goals, Treatment Preferences and Shares the Quality Data Associated with the Providers?: Yes  [place] Resource Information Provided?: No  Discharge Location  Discharge Placement: Shelter  Patient will take a cab to [facility].

## 2020-06-05 NOTE — PROGRESS NOTES
Shift assessment complete. Pt resting in bed. A&Ox4. Respirations present, even, unlabored. Lung sounds clear to auscultation. HR regular, S1&S2 auscultated. IV capped and patent. Pt denies pain at this time. Bed in lowest position, call light within reach, side rails x3. Encouraged to call for help when needed.

## 2020-06-05 NOTE — PROGRESS NOTES
Problem: Falls - Risk of  Goal: *Absence of Falls  Description: Document Katalina Pena Fall Risk and appropriate interventions in the flowsheet. Outcome: Progressing Towards Goal  Note: Fall Risk Interventions:            Medication Interventions: Bed/chair exit alarm    Elimination Interventions: Call light in reach              Problem: Diabetes Self-Management  Goal: *Disease process and treatment process  Description: Define diabetes and identify own type of diabetes; list 3 options for treating diabetes. Outcome: Progressing Towards Goal  Goal: *Incorporating nutritional management into lifestyle  Description: Describe effect of type, amount and timing of food on blood glucose; list 3 methods for planning meals. Outcome: Progressing Towards Goal  Goal: *Incorporating physical activity into lifestyle  Description: State effect of exercise on blood glucose levels. Outcome: Progressing Towards Goal  Goal: *Developing strategies to promote health/change behavior  Description: Define the ABC's of diabetes; identify appropriate screenings, schedule and personal plan for screenings. Outcome: Progressing Towards Goal  Goal: *Using medications safely  Description: State effect of diabetes medications on diabetes; name diabetes medication taking, action and side effects. Outcome: Progressing Towards Goal  Goal: *Monitoring blood glucose, interpreting and using results  Description: Identify recommended blood glucose targets  and personal targets. Outcome: Progressing Towards Goal  Goal: *Prevention, detection, treatment of acute complications  Description: List symptoms of hyper- and hypoglycemia; describe how to treat low blood sugar and actions for lowering  high blood glucose level.   Outcome: Progressing Towards Goal  Goal: *Prevention, detection and treatment of chronic complications  Description: Define the natural course of diabetes and describe the relationship of blood glucose levels to long term complications of diabetes.   Outcome: Progressing Towards Goal  Goal: *Developing strategies to address psychosocial issues  Description: Describe feelings about living with diabetes; identify support needed and support network  Outcome: Progressing Towards Goal  Goal: *Insulin pump training  Outcome: Progressing Towards Goal  Goal: *Sick day guidelines  Outcome: Progressing Towards Goal  Goal: *Patient Specific Goal (EDIT GOAL, INSERT TEXT)  Outcome: Progressing Towards Goal     Problem: Pain  Goal: *Control of Pain  Outcome: Progressing Towards Goal

## 2020-06-05 NOTE — DISCHARGE INSTRUCTIONS
DISCHARGE SUMMARY from Nurse    PATIENT INSTRUCTIONS:    After general anesthesia or intravenous sedation, for 24 hours or while taking prescription Narcotics:  · Limit your activities  · Do not drive and operate hazardous machinery  · Do not make important personal or business decisions  · Do  not drink alcoholic beverages  · If you have not urinated within 8 hours after discharge, please contact your surgeon on call. Report the following to your surgeon:  · Excessive pain, swelling, redness or odor of or around the surgical area  · Temperature over 100.5  · Nausea and vomiting lasting longer than 4 hours or if unable to take medications  · Any signs of decreased circulation or nerve impairment to extremity: change in color, persistent  numbness, tingling, coldness or increase pain  · Any questions    What to do at Home:  Recommended activity: Activity as tolerated    * Please give a list of your current medications to your Primary Care Provider. *  Please update this list whenever your medications are discontinued, doses are      changed, or new medications (including over-the-counter products) are added. *  Please carry medication information at all times in case of emergency situations. These are general instructions for a healthy lifestyle:    No smoking/ No tobacco products/ Avoid exposure to second hand smoke  Surgeon General's Warning:  Quitting smoking now greatly reduces serious risk to your health. Obesity, smoking, and sedentary lifestyle greatly increases your risk for illness    A healthy diet, regular physical exercise & weight monitoring are important for maintaining a healthy lifestyle    You may be retaining fluid if you have a history of heart failure or if you experience any of the following symptoms:  Weight gain of 3 pounds or more overnight or 5 pounds in a week, increased swelling in our hands or feet or shortness of breath while lying flat in bed.   Please call your doctor as soon as you notice any of these symptoms; do not wait until your next office visit. The discharge information has been reviewed with the {PATIENT PARENT GUARDIAN:[id number]}. The {PATIENT PARENT GUARDIAN:85356} verbalized understanding. Discharge medications reviewed with the {D[facility] meds reviewed TXDI:33254} and appropriate educational materials and side effects teaching were provided.   ___________________________________________________________________________________________________________________________________

## 2020-06-05 NOTE — DIABETES MGMT
Patient admitted with DKA. Blood glucose ranged  yesterday with patient receiving NPH 24 units and Humalog 16 units. Blood glucose this morning was 138. Reviewed patient current regimen: NPH 12 units BID, Humalog 4 units with meals and SSI. Noted patient was started on prandial insulin yesterday with lunch. Overall blood glucose levels were in hospital target goals yesterday. Patient previously refused education, will continue to follow along loosely. At discharge patient would likely benefit from NPH and Regular insulin instead of 70/30 insulin as patient could have food insecurity in the future which would increase patient risk of hypoglycemia.

## 2020-06-05 NOTE — DISCHARGE SUMMARY
Hospitalist Discharge Summary     Patient ID:  Prasanth Velázquez  [id number]  42 y.o.  1975  Admit date: 5/30/2020  4:58 PM  Discharge date and time: 6/5/2020  Attending: Ilsa Powers DO  PCP:  None  Treatment Team: Attending Provider: Ilsa Powers DO; Care Manager: Lizzy Jain.; Utilization Review: Reynaldo Mena RN    Principal Diagnosis DKA (diabetic ketoacidoses) [facility])    Hospital Problems as of 6/5/2020 Never Reviewed          Codes Class Noted - Resolved POA    * (Principal) DKA (diabetic ketoacidoses) ([facility] ICD-10-CM: E11.10  ICD-9-CM: 250.12  5/30/2020 - Present Yes        Abdominal pain ICD-10-CM: R10.9  ICD-9-CM: 789.00  6/2/2020 - Present Yes        Hypokalemia ICD-10-CM: E87.6  ICD-9-CM: 276.8  5/31/2020 - Present Yes        Hypomagnesemia ICD-10-CM: E83.42  ICD-9-CM: 275.2  5/31/2020 - Present Yes        Alcohol dependence ([facility] ICD-10-CM: F10.20  ICD-9-CM: 303.90  5/30/2020 - Present Yes        Type II diabetes mellitus with complication, uncontrolled ([facility] ICD-10-CM: E11.8, E11.65  ICD-9-CM: 250.92  5/31/2020 - Present Yes        Bipolar disorder ([facility] ICD-10-CM: F31.9  ICD-9-CM: 296.80  5/30/2020 - Present Yes        HTN (hypertension) ICD-10-CM: I10  ICD-9-CM: 401.9  5/30/2020 - Present Yes              Hospital Course:  39year old CM with a PMH of Bipolar disease, homelessness, drug use and DM admitted for DKA and mild pancreatitis. His DKA and pancreatitis resolved and he was moved from the ICU to the floor. He continued to complain of 10/10 abdominal pain so a CT A/P was ordered which showed chronic pancreatitis and constipation. GI was consulted who had no recommendations other than stopping narcotics. Despite him complaining of 10/10 abdominal pain and asking for IV Dilaudid, he always appeared comfortable and ate every meal 100%. He was discharged back to the homeless shelter.      Significant Diagnostic Studies:    Labs: Results:       Chemistry Recent Labs 06/05/20  0716 06/04/20  0617 06/03/20  0602   * 87 73    140 139   K 3.9 3.9 3.8   * 106 105   CO2 23 28 26   BUN 11 8 10   CREA 0.60* 0.55* 0.59*   CA 9.0 8.7 8.6   AGAP 9 6* 8      CBC w/Diff Recent Labs     06/05/20  0716 06/04/20  0617 06/03/20  0602   WBC 4.2* 4.5 6.5   RBC 4.87 4.49 4.61   HGB 12.1* 11.0* 11.4*   HCT 39.0* 36.6* 36.7*    243 282      Cardiac Enzymes No results for input(s): CPK, CKND1, MARYJO in the last 72 hours. No lab exists for component: CKRMB, TROIP   Coagulation No results for input(s): PTP, INR, APTT, INREXT in the last 72 hours. Lipid Panel No results found for: CHOL, CHOLPOCT, CHOLX, CHLST, CHOLV, 360194, HDL, HDLP, LDL, LDLC, DLDLP, 639769, VLDLC, VLDL, TGLX, TRIGL, TRIGP, TGLPOCT, CHHD, CHHDX   BNP No results for input(s): BNPP in the last 72 hours. Liver Enzymes No results for input(s): TP, ALB, TBIL, AP in the last 72 hours. No lab exists for component: SGOT, GPT, DBIL   Thyroid Studies No results found for: T4, T3U, TSH, TSHEXT         Imaging:  Xr Abd (kub)    Result Date: 6/2/2020  IMPRESSION: Possible constipation    Ct Abd Pelv W Cont    Result Date: 6/3/2020  IMPRESSION: 1. Pancreatic atrophy and calcifications compatible with chronic pancreatitis. 2. Significant gastric distention. GI follow-up is recommended to assess for evidence of gastroparesis. 3. Moderate to large amount of gas and stool throughout the colon. Microbiology/Cultures: All Micro Results     None          Discharge Exam:  Visit Vitals  BP 97/62 (BP 1 Location: Left arm, BP Patient Position: At rest)   Pulse 78   Temp 98.1 °F (36.7 °C)   Resp 18   Ht 5' 8\" (1.727 m)   Wt 65.8 kg (145 lb)   SpO2 98%   BMI 22.05 kg/m²     General appearance: alert, cooperative, no distress, appears stated age  Lungs: clear to auscultation bilaterally  Heart: regular rate and rhythm, S1, S2 normal, no murmur, click, rub or gallop  Abdomen: soft, ?tender.  Bowel sounds normal. No masses, no organomegaly  Extremities: no cyanosis or edema  Neurologic: Grossly normal    Disposition: home  Discharge Condition: stable  Patient Instructions:   Current Discharge Medication List      START taking these medications    Details   insulin NPH (NOVOLIN N, HUMULIN N) 100 unit/mL injection 12 Units by SubCUTAneous route Before breakfast and dinner. Qty: 1 Vial, Refills: 2         CONTINUE these medications which have NOT CHANGED    Details   sertraline (Zoloft) 50 mg tablet Take 100 mg by mouth daily. Indications: anxiousness associated with depression      QUEtiapine (SEROqueL) 200 mg tablet Take 200 mg by mouth two (2) times a day.      gabapentin (Neurontin) 400 mg capsule Take 400 mg by mouth three (3) times daily. busPIRone (BUSPAR) 30 mg tablet Take 60 mg by mouth daily. insulin lispro (HumaLOG U-100 Insulin) 100 unit/mL injection by SubCUTAneous route. SSI      metFORMIN (GLUCOPHAGE) 1,000 mg tablet Take 1,000 mg by mouth two (2) times daily (with meals).          STOP taking these medications       insulin glargine (Lantus U-100 Insulin) 100 unit/mL injection Comments:   Reason for Stopping:               Activity: Activity as tolerated  Diet: Diabetic Diet  Wound Care: None needed    Follow-up  · PCP in one week    Time spent to discharge patient 35 minutes  Signed:  Jamari Marques DO  6/5/2020  11:33 AM

## 2020-06-05 NOTE — PROGRESS NOTES
Shift assessment completed. Refer to flow sheet for details. Drowsy at time of assessment. Responds to verbal stimuli. NAD noted. Pt c/o abdominal pain rated 9/10. Will medicate shortly. Bed in low and locked position. Call light within reach.

## 2020-06-05 NOTE — PROGRESS NOTES
Discharge instructions provided to patient. Chance given to ask questions and answers provided. IV removed, tip intact. Rx provided. Waiting for cab.

## 2020-06-06 ENCOUNTER — PATIENT OUTREACH (OUTPATIENT)
Dept: CASE MANAGEMENT | Age: 45
End: 2020-06-06

## 2020-06-06 NOTE — PROGRESS NOTES
CTN attempted to outreach to patient per hospital discharge 6/5/2020 for concern for COVID call (to provide education due to at risk conditions) Unable to reach patient or leave message due to invalid number listed and no emergency contact listed. Will close episode at this time.

## 2020-06-08 ENCOUNTER — HOSPITAL ENCOUNTER (EMERGENCY)
Age: 45
Discharge: HOME | End: 2020-06-08
Attending: EMERGENCY MEDICINE
Payer: SUBSIDIZED

## 2020-06-08 ENCOUNTER — APPOINTMENT (OUTPATIENT)
Dept: GENERAL RADIOLOGY | Age: 45
End: 2020-06-08
Attending: EMERGENCY MEDICINE
Payer: SUBSIDIZED

## 2020-06-08 VITALS
OXYGEN SATURATION: 99 % | RESPIRATION RATE: 22 BRPM | HEART RATE: 84 BPM | WEIGHT: 140 LBS | BODY MASS INDEX: 21.22 KG/M2 | HEIGHT: 68 IN | DIASTOLIC BLOOD PRESSURE: 73 MMHG | SYSTOLIC BLOOD PRESSURE: 114 MMHG | TEMPERATURE: 98.1 F

## 2020-06-08 DIAGNOSIS — R73.9 HYPERGLYCEMIA: Primary | ICD-10-CM

## 2020-06-08 DIAGNOSIS — R10.84 ABDOMINAL PAIN, GENERALIZED: ICD-10-CM

## 2020-06-08 LAB
ALBUMIN SERPL-MCNC: 3.6 G/DL (ref 3.5–5)
ALBUMIN/GLOB SERPL: 1.1 {RATIO} (ref 1.2–3.5)
ALP SERPL-CCNC: 91 U/L (ref 50–136)
ALT SERPL-CCNC: 25 U/L (ref 12–65)
AMPHET UR QL SCN: NEGATIVE
ANION GAP SERPL CALC-SCNC: 11 MMOL/L (ref 7–16)
AST SERPL-CCNC: 14 U/L (ref 15–37)
BARBITURATES UR QL SCN: NEGATIVE
BASOPHILS # BLD: 0 K/UL (ref 0–0.2)
BASOPHILS NFR BLD: 0 % (ref 0–2)
BENZODIAZ UR QL: NEGATIVE
BILIRUB SERPL-MCNC: 0.2 MG/DL (ref 0.2–1.1)
BUN SERPL-MCNC: 10 MG/DL (ref 6–23)
CALCIUM SERPL-MCNC: 8.9 MG/DL (ref 8.3–10.4)
CANNABINOIDS UR QL SCN: NEGATIVE
CHLORIDE SERPL-SCNC: 98 MMOL/L (ref 98–107)
CO2 SERPL-SCNC: 23 MMOL/L (ref 21–32)
COCAINE UR QL SCN: NEGATIVE
CREAT SERPL-MCNC: 1.39 MG/DL (ref 0.8–1.5)
DIFFERENTIAL METHOD BLD: ABNORMAL
EOSINOPHIL # BLD: 0.1 K/UL (ref 0–0.8)
EOSINOPHIL NFR BLD: 1 % (ref 0.5–7.8)
ERYTHROCYTE [DISTWIDTH] IN BLOOD BY AUTOMATED COUNT: 19.5 % (ref 11.9–14.6)
ETHANOL SERPL-MCNC: <3 MG/DL
GLOBULIN SER CALC-MCNC: 3.3 G/DL (ref 2.3–3.5)
GLUCOSE BLD STRIP.AUTO-MCNC: 154 MG/DL (ref 65–100)
GLUCOSE BLD STRIP.AUTO-MCNC: 404 MG/DL (ref 65–100)
GLUCOSE SERPL-MCNC: 866 MG/DL (ref 65–100)
HCT VFR BLD AUTO: 33.3 % (ref 41.1–50.3)
HGB BLD-MCNC: 10.3 G/DL (ref 13.6–17.2)
IMM GRANULOCYTES # BLD AUTO: 0 K/UL (ref 0–0.5)
IMM GRANULOCYTES NFR BLD AUTO: 0 % (ref 0–5)
LACTATE SERPL-SCNC: 3.8 MMOL/L (ref 0.4–2)
LYMPHOCYTES # BLD: 1.2 K/UL (ref 0.5–4.6)
LYMPHOCYTES NFR BLD: 17 % (ref 13–44)
MCH RBC QN AUTO: 24.9 PG (ref 26.1–32.9)
MCHC RBC AUTO-ENTMCNC: 30.9 G/DL (ref 31.4–35)
MCV RBC AUTO: 80.6 FL (ref 79.6–97.8)
METHADONE UR QL: NEGATIVE
MONOCYTES # BLD: 0.9 K/UL (ref 0.1–1.3)
MONOCYTES NFR BLD: 13 % (ref 4–12)
NEUTS SEG # BLD: 4.8 K/UL (ref 1.7–8.2)
NEUTS SEG NFR BLD: 68 % (ref 43–78)
NRBC # BLD: 0 K/UL (ref 0–0.2)
OPIATES UR QL: NEGATIVE
PCP UR QL: NEGATIVE
PLATELET # BLD AUTO: 265 K/UL (ref 150–450)
PMV BLD AUTO: 10.5 FL (ref 9.4–12.3)
POTASSIUM SERPL-SCNC: 4.4 MMOL/L (ref 3.5–5.1)
PROCALCITONIN SERPL-MCNC: <0.05 NG/ML
PROT SERPL-MCNC: 6.9 G/DL (ref 6.3–8.2)
RBC # BLD AUTO: 4.13 M/UL (ref 4.23–5.6)
SODIUM SERPL-SCNC: 132 MMOL/L (ref 136–145)
TROPONIN-HIGH SENSITIVITY: <3 PG/ML (ref 0–14)
WBC # BLD AUTO: 7.1 K/UL (ref 4.3–11.1)

## 2020-06-08 PROCEDURE — 83605 ASSAY OF LACTIC ACID: CPT

## 2020-06-08 PROCEDURE — 85025 COMPLETE CBC W/AUTO DIFF WBC: CPT

## 2020-06-08 PROCEDURE — 96374 THER/PROPH/DIAG INJ IV PUSH: CPT

## 2020-06-08 PROCEDURE — 96376 TX/PRO/DX INJ SAME DRUG ADON: CPT

## 2020-06-08 PROCEDURE — 80053 COMPREHEN METABOLIC PANEL: CPT

## 2020-06-08 PROCEDURE — 74011250636 HC RX REV CODE- 250/636: Performed by: EMERGENCY MEDICINE

## 2020-06-08 PROCEDURE — 74011636637 HC RX REV CODE- 636/637: Performed by: EMERGENCY MEDICINE

## 2020-06-08 PROCEDURE — 82962 GLUCOSE BLOOD TEST: CPT

## 2020-06-08 PROCEDURE — 81003 URINALYSIS AUTO W/O SCOPE: CPT

## 2020-06-08 PROCEDURE — 80307 DRUG TEST PRSMV CHEM ANLYZR: CPT

## 2020-06-08 PROCEDURE — 96375 TX/PRO/DX INJ NEW DRUG ADDON: CPT

## 2020-06-08 PROCEDURE — 93005 ELECTROCARDIOGRAM TRACING: CPT | Performed by: EMERGENCY MEDICINE

## 2020-06-08 PROCEDURE — 84145 PROCALCITONIN (PCT): CPT

## 2020-06-08 PROCEDURE — 71045 X-RAY EXAM CHEST 1 VIEW: CPT

## 2020-06-08 PROCEDURE — 99285 EMERGENCY DEPT VISIT HI MDM: CPT

## 2020-06-08 PROCEDURE — 84484 ASSAY OF TROPONIN QUANT: CPT

## 2020-06-08 RX ORDER — MORPHINE SULFATE 4 MG/ML
4 INJECTION INTRAVENOUS
Status: DISCONTINUED | OUTPATIENT
Start: 2020-06-08 | End: 2020-06-08 | Stop reason: SDUPTHER

## 2020-06-08 RX ORDER — MORPHINE SULFATE 4 MG/ML
4 INJECTION INTRAVENOUS
Status: DISCONTINUED | OUTPATIENT
Start: 2020-06-08 | End: 2020-06-08

## 2020-06-08 RX ORDER — ONDANSETRON 2 MG/ML
4 INJECTION INTRAMUSCULAR; INTRAVENOUS
Status: COMPLETED | OUTPATIENT
Start: 2020-06-08 | End: 2020-06-08

## 2020-06-08 RX ORDER — MORPHINE SULFATE 2 MG/ML
4 INJECTION, SOLUTION INTRAMUSCULAR; INTRAVENOUS ONCE
Status: COMPLETED | OUTPATIENT
Start: 2020-06-08 | End: 2020-06-08

## 2020-06-08 RX ORDER — MORPHINE SULFATE 4 MG/ML
6 INJECTION INTRAVENOUS
Status: COMPLETED | OUTPATIENT
Start: 2020-06-08 | End: 2020-06-08

## 2020-06-08 RX ADMIN — MORPHINE SULFATE 4 MG: 2 INJECTION, SOLUTION INTRAMUSCULAR; INTRAVENOUS at 14:33

## 2020-06-08 RX ADMIN — INSULIN HUMAN 5 UNITS: 100 INJECTION, SOLUTION PARENTERAL at 14:32

## 2020-06-08 RX ADMIN — MORPHINE SULFATE 6 MG: 4 INJECTION INTRAVENOUS at 10:44

## 2020-06-08 RX ADMIN — SODIUM CHLORIDE 1000 ML: 9 INJECTION, SOLUTION INTRAVENOUS at 11:52

## 2020-06-08 RX ADMIN — ONDANSETRON 4 MG: 2 INJECTION INTRAMUSCULAR; INTRAVENOUS at 10:45

## 2020-06-08 RX ADMIN — SODIUM CHLORIDE 1000 ML: 9 INJECTION, SOLUTION INTRAVENOUS at 10:44

## 2020-06-08 RX ADMIN — INSULIN HUMAN 10 UNITS: 100 INJECTION, SOLUTION PARENTERAL at 11:51

## 2020-06-08 NOTE — ED TRIAGE NOTES
Patient ambulatory to triage without difficulty. States seen at this facility yesterday. States he has been seeing people who are not there or who are dead that began today. States he has been having abdominal pain as well. Patient states he fell down 10 times today. States he doesn't know why keeps falling down, but he would like to see a psychiatrist because he isn't sure if it is working and if the Seroquel is causing the falls. States he is homeless. Had incontinence of bladder today and reports this is because of his fall.  Denies suicidal or homicidal ideations

## 2020-06-08 NOTE — ED NOTES
I have reviewed discharge instructions with the patient. The patient verbalized understanding. Patient left ED via Discharge Method: ambulatory to Home with (self  Opportunity for questions and clarification provided. Patient given 0 scripts. No e-sign        To continue your aftercare when you leave the hospital, you may receive an automated call from our care team to check in on how you are doing. This is a free service and part of our promise to provide the best care and service to meet your aftercare needs.  If you have questions, or wish to unsubscribe from this service please call [phone]. Thank you for Choosing our [facility].

## 2020-06-08 NOTE — DISCHARGE INSTRUCTIONS
Patient Education        Abdominal Pain: Care Instructions  Your Care Instructions     Abdominal pain has many possible causes. Some aren't serious and get better on their own in a few days. Others need more testing and treatment. If your pain continues or gets worse, you need to be rechecked and may need more tests to find out what is wrong. You may need surgery to correct the problem. Don't ignore new symptoms, such as fever, nausea and vomiting, urination problems, pain that gets worse, and dizziness. These may be signs of a more serious problem. Your doctor may have recommended a follow-up visit in the next 8 to 12 hours. If you are not getting better, you may need more tests or treatment. The doctor has checked you carefully, but problems can develop later. If you notice any problems or new symptoms, get medical treatment right away. Follow-up care is a key part of your treatment and safety. Be sure to make and go to all appointments, and call your doctor if you are having problems. It's also a good idea to know your test results and keep a list of the medicines you take. How can you care for yourself at home? · Rest until you feel better. · To prevent dehydration, drink plenty of fluids, enough so that your urine is light yellow or clear like water. Choose water and other caffeine-free clear liquids until you feel better. If you have kidney, heart, or liver disease and have to limit fluids, talk with your doctor before you increase the amount of fluids you drink. · If your stomach is upset, eat mild foods, such as rice, dry toast or crackers, bananas, and applesauce. Try eating several small meals instead of two or three large ones. · Wait until 48 hours after all symptoms have gone away before you have spicy foods, alcohol, and drinks that contain caffeine. · Do not eat foods that are high in fat. · Avoid anti-inflammatory medicines such as aspirin, ibuprofen (Advil, Motrin), and naproxen (Aleve). These can cause stomach upset. Talk to your doctor if you take daily aspirin for another health problem. When should you call for help? [id number] anytime you think you may need emergency care. For example, call if:  · You passed out (lost consciousness). · You pass maroon or very bloody stools. · You vomit blood or what looks like coffee grounds. · You have new, severe belly pain. Call your doctor now or seek immediate medical care if:  · Your pain gets worse, especially if it becomes focused in one area of your belly. · You have a new or higher fever. · Your stools are black and look like tar, or they have streaks of blood. · You have unexpected vaginal bleeding. · You have symptoms of a urinary tract infection. These may include:  ? Pain when you urinate. ? Urinating more often than usual.  ? Blood in your urine. · You are dizzy or lightheaded, or you feel like you may faint. Watch closely for changes in your health, and be sure to contact your doctor if:  · You are not getting better after 1 day (24 hours). Where can you learn more? Go to http://[employer] Groupkandi.info/  Enter J967 in the search box to learn more about \"Abdominal Pain: Care Instructions. \"  Current as of: June 26, 2019               Content Version: 12.5  © 7620-1965 Healthwise, Incorporated. Care instructions adapted under license by [employer] (which disclaims liability or warranty for this information). If you have questions about a medical condition or this instruction, always ask your healthcare professional. [place] [address] any warranty or liability for your use of this information. Patient Education        Learning About High Blood Sugar  What is high blood sugar? Your body turns the food you eat into glucose (sugar), which it uses for energy. But if your body isn't able to use the sugar right away, it can build up in your blood and lead to high blood sugar.   When the amount of sugar in your blood stays too high for too much of the time, you may have diabetes. Diabetes is a disease that can cause serious health problems. The good news is that lifestyle changes may help you get your blood sugar back to normal and avoid or delay diabetes. What causes high blood sugar? Sugar (glucose) can build up in your blood if you:  · Are overweight. · Have a family history of diabetes. · Take certain medicines, such as steroids. What are the symptoms? Having high blood sugar may not cause any symptoms at all. Or it may make you feel very thirsty or very hungry. You may also urinate more often than usual, have blurry vision, or lose weight without trying. How is high blood sugar treated? You can take steps to lower your blood sugar level if you understand what makes it get higher. Your doctor may want you to learn how to test your blood sugar level at home. Then you can see how illness, stress, or different kinds of food or medicine raise or lower your blood sugar level. Other tests may be needed to see if you have diabetes. How can you prevent high blood sugar? · Watch your weight. If you're overweight, losing just a small amount of weight may help. Reducing fat around your waist is most important. · Limit the amount of calories, sweets, and unhealthy fat you eat. Ask your doctor if a dietitian can help you. A registered dietitian can help you create meal plans that fit your lifestyle. · Get at least 30 minutes of exercise on most days of the week. Exercise helps control your blood sugar. It also helps you maintain a healthy weight. Walking is a good choice. You also may want to do other activities, such as running, swimming, cycling, or playing tennis or team sports. · If your doctor prescribed medicines, take them exactly as prescribed. Call your doctor if you think you are having a problem with your medicine.  You will get more details on the specific medicines your doctor prescribes. Follow-up care is a key part of your treatment and safety. Be sure to make and go to all appointments, and call your doctor if you are having problems. It's also a good idea to know your test results and keep a list of the medicines you take. Where can you learn more? Go to http://pedrito-kandi.info/  Enter O108 in the search box to learn more about \"Learning About High Blood Sugar. \"  Current as of: December 20, 2019               Content Version: 12.5  © 2115-3369 Healthwise, Incorporated. Care instructions adapted under license by [employer] (which disclaims liability or warranty for this information). If you have questions about a medical condition or this instruction, always ask your healthcare professional. [address] any warranty or liability for your use of this information.

## 2020-06-08 NOTE — ED PROVIDER NOTES
Patient admitted to [facility] June 5-7. Note follows. Hospital Course:     1. Diabetes mellitus, uncontrolled, off insulin due to cost - pt's initial severe hyperglycemia was managed with IV bolus then subcutaneous insulin dosing and had resolved by the next morning. Pt was continued on his insulin regimen. 2. Suicidal ideation - psychiatry consulted and found pt to deny SI and psych cleared for d/c, reviewed with them again today prior to d/c.  3. Chronic abdominal pain - 6+ CT's in last month, many different hospital ED's. Multifactorial with chronic pancreatitis as well as lab-documented / pt-denied ongoing alcohol use, likely gastroparesis (suggested by CT's, no emptying study available), and now pt tells me he was diagnosed with strictures 3 years ago. Given his difficulty accessing care, I prolonged his hospitalization to pursue inpatient GI evaluation but pt requested to leave AMA today and therefore I have arranged with GI to perform this outpatient instead. I explained face-to-face to patient multiple times per day that this pain is not appropriate for narcotic therapy which overall which just make his long-term outcomes much much worse. He was counseled on small, slow meals but not adherent. I did not make this an [place] discharge as his abdominal complaints can be further evaluated as an outpatient and I was simply trying to do him a courtesy to stay inpatient for expedited evaluation. 4. Alcohol use disorder - pt did not have significant CIWA requirements. He did have some behavioral difficulty, yelling insults at staff, pacing angrily about the room, but these were in the setting of demands about his care (for pain meds, particular foods, etc) and not accompanied by any other s/s withdrawal. He was counseled. Patient was discharged yesterday from [facility]. States the psych doctor yesterday asked him about hallucinations. He has never had these before.   Then while walking around yesterday afternoon/evening felt he was talking to people who were not there. Continues to have chronic abdominal pain. So came here for both. The history is provided by the patient. No  was used. Mental Health Problem    This is a recurrent problem. The current episode started yesterday. The problem has not changed since onset. Associated symptoms include hallucinations. Pertinent negatives include no confusion, no unresponsiveness, no weakness, no agitation, no delusions, no self-injury, no violence, no tingling and no numbness. Mental status baseline is normal.  His past medical history is significant for diabetes and psychotropic medication treatment. Abdominal Pain    Associated symptoms include nausea and vomiting. Pertinent negatives include no fever, no diarrhea, no dysuria, no hematuria, no headaches, no chest pain and no back pain. No past medical history on file. No past surgical history on file. No family history on file.     Social History     Socioeconomic History    Marital status: SINGLE     Spouse name: Not on file    Number of children: Not on file    Years of education: Not on file    Highest education level: Not on file   Occupational History    Not on file   Social Needs    Financial resource strain: Not on file    Food insecurity     Worry: Not on file     Inability: Not on file    Transportation needs     Medical: Not on file     Non-medical: Not on file   Tobacco Use    Smoking status: Not on file   Substance and Sexual Activity    Alcohol use: Not on file    Drug use: Not on file    Sexual activity: Not on file   Lifestyle    Physical activity     Days per week: Not on file     Minutes per session: Not on file    Stress: Not on file   Relationships    Social connections     Talks on phone: Not on file     Gets together: Not on file     Attends Religion service: Not on file     Active member of club or organization: Not on file     Attends meetings of clubs or organizations: Not on file     Relationship status: Not on file    Intimate partner violence     Fear of current or ex partner: Not on file     Emotionally abused: Not on file     Physically abused: Not on file     Forced sexual activity: Not on file   Other Topics Concern    Not on file   Social History Narrative    Not on file         ALLERGIES: Toradol [ketorolac]    Review of Systems   Constitutional: Negative for chills and fever. HENT: Negative for rhinorrhea and sore throat. Eyes: Negative for pain and redness. Respiratory: Negative for chest tightness, shortness of breath and wheezing. Cardiovascular: Negative for chest pain and leg swelling. Gastrointestinal: Positive for abdominal pain, nausea and vomiting. Negative for diarrhea. Genitourinary: Negative for dysuria and hematuria. Musculoskeletal: Negative for back pain, gait problem, neck pain and neck stiffness. Skin: Negative for color change and rash. Neurological: Negative for tingling, weakness, numbness and headaches. Psychiatric/Behavioral: Positive for hallucinations. Negative for agitation, confusion, self-injury and suicidal ideas. Vitals:    06/08/20 0947   BP: 136/87   Pulse: (!) 116   Resp: 22   Temp: 98.1 °F (36.7 °C)   SpO2: 98%   Weight: 63.5 kg (140 lb)   Height: 5' 8\" (1.727 m)            Physical Exam  Constitutional:       Appearance: He is well-developed. HENT:      Head: Normocephalic and atraumatic. Eyes:      Extraocular Movements: Extraocular movements intact. Pupils: Pupils are equal, round, and reactive to light. Neck:      Musculoskeletal: Normal range of motion and neck supple. Cardiovascular:      Rate and Rhythm: Normal rate and regular rhythm. Pulmonary:      Effort: Pulmonary effort is normal.      Breath sounds: Normal breath sounds. Abdominal:      Palpations: Abdomen is soft. Tenderness: There is abdominal tenderness (diffuse).       Comments: JESSE decreased. Musculoskeletal: Normal range of motion. General: No swelling. Skin:     General: Skin is warm and dry. Neurological:      General: No focal deficit present. Mental Status: He is alert and oriented to person, place, and time. MDM  Number of Diagnoses or Management Options  Diagnosis management comments: Negative CT abd 5/29. Patient with hyperglycemia and chronic abdominal pain. Blood sugar improved after insulin. Noncompliant at home. Will discharge with continued treatment at home. Amount and/or Complexity of Data Reviewed  Clinical lab tests: ordered and reviewed  Tests in the radiology section of CPT®: ordered and reviewed  Tests in the medicine section of CPT®: ordered and reviewed    Patient Progress  Patient progress: stable         Procedures          EKG: nonspecific ST and T waves changes, sinus tachycardia, RBBB. Rate 109. Results Include:    Recent Results (from the past 24 hour(s))   EKG, 12 LEAD, INITIAL    Collection Time: 06/08/20  9:53 AM   Result Value Ref Range    Ventricular Rate 109 BPM    Atrial Rate 109 BPM    P-R Interval 164 ms    QRS Duration 150 ms    Q-T Interval 386 ms    QTC Calculation (Bezet) 519 ms    Calculated P Axis 62 degrees    Calculated R Axis 124 degrees    Calculated T Axis 8 degrees    Diagnosis       !!! Poor data quality, interpretation may be adversely affected  Sinus tachycardia  Possible Left atrial enlargement  Right bundle branch block  Left posterior fascicular block  !!! Bifascicular block !!!   Abnormal ECG  No previous ECGs available     CBC WITH AUTOMATED DIFF    Collection Time: 06/08/20 10:15 AM   Result Value Ref Range    WBC 7.1 4.3 - 11.1 K/uL    RBC 4.13 (L) 4.23 - 5.6 M/uL    HGB 10.3 (L) 13.6 - 17.2 g/dL    HCT 33.3 (L) 41.1 - 50.3 %    MCV 80.6 79.6 - 97.8 FL    MCH 24.9 (L) 26.1 - 32.9 PG    MCHC 30.9 (L) 31.4 - 35.0 g/dL    RDW 19.5 (H) 11.9 - 14.6 %    PLATELET 962 281 - 412 K/uL    MPV 10.5 9.4 - 12.3 FL    ABSOLUTE NRBC 0.00 0.0 - 0.2 K/uL    DF AUTOMATED      NEUTROPHILS 68 43 - 78 %    LYMPHOCYTES 17 13 - 44 %    MONOCYTES 13 (H) 4.0 - 12.0 %    EOSINOPHILS 1 0.5 - 7.8 %    BASOPHILS 0 0.0 - 2.0 %    IMMATURE GRANULOCYTES 0 0.0 - 5.0 %    ABS. NEUTROPHILS 4.8 1.7 - 8.2 K/UL    ABS. LYMPHOCYTES 1.2 0.5 - 4.6 K/UL    ABS. MONOCYTES 0.9 0.1 - 1.3 K/UL    ABS. EOSINOPHILS 0.1 0.0 - 0.8 K/UL    ABS. BASOPHILS 0.0 0.0 - 0.2 K/UL    ABS. IMM. GRANS. 0.0 0.0 - 0.5 K/UL   METABOLIC PANEL, COMPREHENSIVE    Collection Time: 06/08/20 10:15 AM   Result Value Ref Range    Sodium 132 (L) 136 - 145 mmol/L    Potassium 4.4 3.5 - 5.1 mmol/L    Chloride 98 98 - 107 mmol/L    CO2 23 21 - 32 mmol/L    Anion gap 11 7 - 16 mmol/L    Glucose 866 (HH) 65 - 100 mg/dL    BUN 10 6 - 23 MG/DL    Creatinine 1.39 0.8 - 1.5 MG/DL    GFR est AA >60 >60 ml/min/1.73m2    GFR est non-AA 59 (L) >60 ml/min/1.73m2    Calcium 8.9 8.3 - 10.4 MG/DL    Bilirubin, total 0.2 0.2 - 1.1 MG/DL    ALT (SGPT) 25 12 - 65 U/L    AST (SGOT) 14 (L) 15 - 37 U/L    Alk.  phosphatase 91 50 - 136 U/L    Protein, total 6.9 6.3 - 8.2 g/dL    Albumin 3.6 3.5 - 5.0 g/dL    Globulin 3.3 2.3 - 3.5 g/dL    A-G Ratio 1.1 (L) 1.2 - 3.5     TROPONIN-HIGH SENSITIVITY    Collection Time: 06/08/20 10:15 AM   Result Value Ref Range    Troponin-High Sensitivity <3.0 0 - 14 pg/mL   PROCALCITONIN    Collection Time: 06/08/20 10:15 AM   Result Value Ref Range    Procalcitonin <0.05 ng/mL   ETHYL ALCOHOL    Collection Time: 06/08/20 10:18 AM   Result Value Ref Range    ALCOHOL(ETHYL),SERUM <3 MG/DL   LACTIC ACID    Collection Time: 06/08/20 10:18 AM   Result Value Ref Range    Lactic acid 3.8 (HH) 0.4 - 2.0 MMOL/L   DRUG SCREEN, URINE    Collection Time: 06/08/20 10:33 AM   Result Value Ref Range    PCP(PHENCYCLIDINE) Negative      BENZODIAZEPINES Negative      COCAINE Negative      AMPHETAMINES Negative      METHADONE Negative      THC (TH-CANNABINOL) Negative OPIATES Negative      BARBITURATES Negative     GLUCOSE, POC    Collection Time: 06/08/20  2:18 PM   Result Value Ref Range    Glucose (POC) 404 (H) 65 - 100 mg/dL          XR CHEST PORT (Final result)   Result time 06/08/20 11:30:10   Final result by Mary Guadalupe MD (06/08/20 11:30:10)                Impression:    IMPRESSION:  Negative for acute change. Narrative:    CHEST X-RAY, one view. HISTORY:  Chest trauma sustained multiple falls.      TECHNIQUE:  AP upright portable view. COMPARISON: None. FINDINGS:     -The lungs: are clear. No pneumothorax.  -The heart size: is normal.   -The costophrenic angles: are sharp.   -The pulmonary vasculature: is unremarkable. -Included portion of the upper abdomen: is unremarkable. -Bones: No gross fracture.   -Other: None.

## 2020-06-09 ENCOUNTER — PATIENT OUTREACH (OUTPATIENT)
Dept: CASE MANAGEMENT | Age: 45
End: 2020-06-09

## 2020-06-09 LAB
ATRIAL RATE: 109 BPM
CALCULATED P AXIS, ECG09: 62 DEGREES
CALCULATED R AXIS, ECG10: 124 DEGREES
CALCULATED T AXIS, ECG11: 8 DEGREES
DIAGNOSIS, 93000: NORMAL
P-R INTERVAL, ECG05: 164 MS
Q-T INTERVAL, ECG07: 386 MS
QRS DURATION, ECG06: 150 MS
QTC CALCULATION (BEZET), ECG08: 519 MS
VENTRICULAR RATE, ECG03: 109 BPM

## 2020-06-09 NOTE — PROGRESS NOTES
RNCM notified of ED ALLISON. Inadequate contact information to perform ED ALLISON for abd pain. Will close episode.

## 2020-06-10 ENCOUNTER — HOSPITAL ENCOUNTER (EMERGENCY)
Age: 45
Discharge: HOME | End: 2020-06-10
Attending: EMERGENCY MEDICINE
Payer: SUBSIDIZED

## 2020-06-10 ENCOUNTER — APPOINTMENT (OUTPATIENT)
Dept: GENERAL RADIOLOGY | Age: 45
End: 2020-06-10
Attending: EMERGENCY MEDICINE
Payer: SUBSIDIZED

## 2020-06-10 VITALS
SYSTOLIC BLOOD PRESSURE: 142 MMHG | HEIGHT: 68 IN | RESPIRATION RATE: 16 BRPM | TEMPERATURE: 98.2 F | HEART RATE: 78 BPM | BODY MASS INDEX: 21.22 KG/M2 | WEIGHT: 140 LBS | DIASTOLIC BLOOD PRESSURE: 93 MMHG | OXYGEN SATURATION: 100 %

## 2020-06-10 DIAGNOSIS — R73.9 HYPERGLYCEMIA: Primary | ICD-10-CM

## 2020-06-10 LAB
ALBUMIN SERPL-MCNC: 3.4 G/DL (ref 3.5–5)
ALBUMIN/GLOB SERPL: 1 {RATIO} (ref 1.2–3.5)
ALP SERPL-CCNC: 114 U/L (ref 50–136)
ALT SERPL-CCNC: 24 U/L (ref 12–65)
ANION GAP SERPL CALC-SCNC: 12 MMOL/L (ref 7–16)
AST SERPL-CCNC: 26 U/L (ref 15–37)
ATRIAL RATE: 76 BPM
BASOPHILS # BLD: 0 K/UL (ref 0–0.2)
BASOPHILS NFR BLD: 1 % (ref 0–2)
BILIRUB SERPL-MCNC: 0.4 MG/DL (ref 0.2–1.1)
BUN SERPL-MCNC: 5 MG/DL (ref 6–23)
CALCIUM SERPL-MCNC: 9 MG/DL (ref 8.3–10.4)
CALCULATED P AXIS, ECG09: 61 DEGREES
CALCULATED R AXIS, ECG10: 91 DEGREES
CALCULATED T AXIS, ECG11: 53 DEGREES
CHLORIDE SERPL-SCNC: 100 MMOL/L (ref 98–107)
CO2 SERPL-SCNC: 21 MMOL/L (ref 21–32)
CREAT SERPL-MCNC: 0.66 MG/DL (ref 0.8–1.5)
DIAGNOSIS, 93000: NORMAL
DIFFERENTIAL METHOD BLD: ABNORMAL
EOSINOPHIL # BLD: 0.2 K/UL (ref 0–0.8)
EOSINOPHIL NFR BLD: 4 % (ref 0.5–7.8)
ERYTHROCYTE [DISTWIDTH] IN BLOOD BY AUTOMATED COUNT: 19.8 % (ref 11.9–14.6)
GLOBULIN SER CALC-MCNC: 3.5 G/DL (ref 2.3–3.5)
GLUCOSE BLD STRIP.AUTO-MCNC: 228 MG/DL (ref 65–100)
GLUCOSE BLD STRIP.AUTO-MCNC: 316 MG/DL (ref 65–100)
GLUCOSE BLD STRIP.AUTO-MCNC: 377 MG/DL (ref 65–100)
GLUCOSE SERPL-MCNC: 391 MG/DL (ref 65–100)
HCT VFR BLD AUTO: 37.1 % (ref 41.1–50.3)
HGB BLD-MCNC: 11.5 G/DL (ref 13.6–17.2)
IMM GRANULOCYTES # BLD AUTO: 0 K/UL (ref 0–0.5)
IMM GRANULOCYTES NFR BLD AUTO: 1 % (ref 0–5)
LIPASE SERPL-CCNC: 23 U/L (ref 73–393)
LYMPHOCYTES # BLD: 1.4 K/UL (ref 0.5–4.6)
LYMPHOCYTES NFR BLD: 24 % (ref 13–44)
MCH RBC QN AUTO: 24.7 PG (ref 26.1–32.9)
MCHC RBC AUTO-ENTMCNC: 31 G/DL (ref 31.4–35)
MCV RBC AUTO: 79.8 FL (ref 79.6–97.8)
MONOCYTES # BLD: 0.5 K/UL (ref 0.1–1.3)
MONOCYTES NFR BLD: 8 % (ref 4–12)
NEUTS SEG # BLD: 3.9 K/UL (ref 1.7–8.2)
NEUTS SEG NFR BLD: 64 % (ref 43–78)
NRBC # BLD: 0 K/UL (ref 0–0.2)
P-R INTERVAL, ECG05: 156 MS
PLATELET # BLD AUTO: 272 K/UL (ref 150–450)
PMV BLD AUTO: 10.3 FL (ref 9.4–12.3)
POTASSIUM SERPL-SCNC: 4 MMOL/L (ref 3.5–5.1)
PROT SERPL-MCNC: 6.9 G/DL (ref 6.3–8.2)
Q-T INTERVAL, ECG07: 440 MS
QRS DURATION, ECG06: 130 MS
QTC CALCULATION (BEZET), ECG08: 495 MS
RBC # BLD AUTO: 4.65 M/UL (ref 4.23–5.6)
SODIUM SERPL-SCNC: 133 MMOL/L (ref 136–145)
VENTRICULAR RATE, ECG03: 76 BPM
WBC # BLD AUTO: 6 K/UL (ref 4.3–11.1)

## 2020-06-10 PROCEDURE — 85025 COMPLETE CBC W/AUTO DIFF WBC: CPT

## 2020-06-10 PROCEDURE — 82962 GLUCOSE BLOOD TEST: CPT

## 2020-06-10 PROCEDURE — 96374 THER/PROPH/DIAG INJ IV PUSH: CPT

## 2020-06-10 PROCEDURE — 83690 ASSAY OF LIPASE: CPT

## 2020-06-10 PROCEDURE — 96376 TX/PRO/DX INJ SAME DRUG ADON: CPT

## 2020-06-10 PROCEDURE — 93005 ELECTROCARDIOGRAM TRACING: CPT | Performed by: EMERGENCY MEDICINE

## 2020-06-10 PROCEDURE — 74022 RADEX COMPL AQT ABD SERIES: CPT

## 2020-06-10 PROCEDURE — 99285 EMERGENCY DEPT VISIT HI MDM: CPT

## 2020-06-10 PROCEDURE — 96361 HYDRATE IV INFUSION ADD-ON: CPT

## 2020-06-10 PROCEDURE — 74011250636 HC RX REV CODE- 250/636: Performed by: EMERGENCY MEDICINE

## 2020-06-10 PROCEDURE — 81003 URINALYSIS AUTO W/O SCOPE: CPT

## 2020-06-10 PROCEDURE — 74011636637 HC RX REV CODE- 636/637: Performed by: EMERGENCY MEDICINE

## 2020-06-10 PROCEDURE — 80053 COMPREHEN METABOLIC PANEL: CPT

## 2020-06-10 RX ADMIN — SODIUM CHLORIDE 1000 ML: 900 INJECTION, SOLUTION INTRAVENOUS at 07:59

## 2020-06-10 RX ADMIN — INSULIN HUMAN 10 UNITS: 100 INJECTION, SOLUTION PARENTERAL at 09:23

## 2020-06-10 RX ADMIN — SODIUM CHLORIDE 1000 ML: 900 INJECTION, SOLUTION INTRAVENOUS at 10:45

## 2020-06-10 RX ADMIN — INSULIN HUMAN 10 UNITS: 100 INJECTION, SOLUTION PARENTERAL at 10:45

## 2020-06-10 NOTE — ED PROVIDER NOTES
Patient is a 44-year-old male who presents with nausea, vomiting, and elevated blood glucose. States that he has been unable to get his medications from the shelter so has not been taking his insulin. States some pain in his abdomen with vomiting, denies any fevers or chills, no further complaints. Of note patient was admitted for DKA within the past couple weeks      Abdominal Pain    Associated symptoms include nausea and vomiting. Pertinent negatives include no fever, no diarrhea, no dysuria, no headaches, no chest pain and no back pain. Vomiting    Associated symptoms include abdominal pain. Pertinent negatives include no chills, no fever, no diarrhea, no headaches, no cough and no headaches. Past Medical History:   Diagnosis Date    [facility]     Depression     Diabetes ([facility]     PTSD (post-traumatic stress disorder)        History reviewed. No pertinent surgical history. History reviewed. No pertinent family history. Social History     Socioeconomic History    Marital status: SINGLE     Spouse name: Not on file    Number of children: Not on file    Years of education: Not on file    Highest education level: Not on file   Occupational History    Not on file   Social Needs    Financial resource strain: Not on file    Food insecurity     Worry: Not on file     Inability: Not on file    Transportation needs     Medical: Not on file     Non-medical: Not on file   Tobacco Use    Smoking status: Current Every Day Smoker     Packs/day: 1.00    Smokeless tobacco: Never Used   Substance and Sexual Activity    Alcohol use:  Yes    Drug use: Not Currently    Sexual activity: Not on file   Lifestyle    Physical activity     Days per week: Not on file     Minutes per session: Not on file    Stress: Not on file   Relationships    Social connections     Talks on phone: Not on file     Gets together: Not on file     Attends Voodoo service: Not on file     Active member of club or organization: Not on file     Attends meetings of clubs or organizations: Not on file     Relationship status: Not on file    Intimate partner violence     Fear of current or ex partner: Not on file     Emotionally abused: Not on file     Physically abused: Not on file     Forced sexual activity: Not on file   Other Topics Concern    Not on file   Social History Narrative    Not on file         ALLERGIES: Toradol [ketorolac]    Review of Systems   Constitutional: Negative for chills and fever. HENT: Negative for rhinorrhea and sore throat. Eyes: Negative for visual disturbance. Respiratory: Negative for cough and shortness of breath. Cardiovascular: Negative for chest pain and leg swelling. Gastrointestinal: Positive for abdominal pain, nausea and vomiting. Negative for diarrhea. Genitourinary: Negative for dysuria. Musculoskeletal: Negative for back pain and neck pain. Skin: Negative for rash. Neurological: Negative for weakness and headaches. Psychiatric/Behavioral: The patient is not nervous/anxious. Vitals:    06/10/20 0730   BP: (!) 158/98   Pulse: 82   Resp: 16   Temp: 98.7 °F (37.1 °C)   SpO2: 100%   Weight: 63.5 kg (140 lb)   Height: 5' 8\" (1.727 m)            Physical Exam  Vitals signs and nursing note reviewed. Constitutional:       Appearance: He is well-developed. HENT:      Head: Normocephalic. Right Ear: External ear normal.      Left Ear: External ear normal.   Eyes:      Conjunctiva/sclera: Conjunctivae normal.      Pupils: Pupils are equal, round, and reactive to light. Neck:      Musculoskeletal: Normal range of motion and neck supple. Trachea: No tracheal deviation. Cardiovascular:      Rate and Rhythm: Normal rate and regular rhythm. Heart sounds: Normal heart sounds. No murmur. Pulmonary:      Effort: Pulmonary effort is normal. No respiratory distress. Breath sounds: Normal breath sounds.    Abdominal:      General: There is no distension. Palpations: Abdomen is soft. There is no mass. Tenderness: There is abdominal tenderness. There is no right CVA tenderness, left CVA tenderness, guarding or rebound. Hernia: No hernia is present. Comments: Mild diffuse nonfocal tenderness. Musculoskeletal: Normal range of motion. Skin:     Findings: No rash. Neurological:      Mental Status: He is alert and oriented to person, place, and time. Cranial Nerves: No cranial nerve deficit.           MDM  Number of Diagnoses or Management Options     Amount and/or Complexity of Data Reviewed  Clinical lab tests: ordered and reviewed  Tests in the radiology section of CPT®: ordered and reviewed  Tests in the medicine section of CPT®: ordered and reviewed  Review and summarize past medical records: yes    Risk of Complications, Morbidity, and/or Mortality  Presenting problems: high  Diagnostic procedures: high  Management options: high    Patient Progress  Patient progress: stable         Procedures  Recent Results (from the past 12 hour(s))   EKG, 12 LEAD, INITIAL    Collection Time: 06/10/20  7:55 AM   Result Value Ref Range    Ventricular Rate 76 BPM    Atrial Rate 76 BPM    P-R Interval 156 ms    QRS Duration 130 ms    Q-T Interval 440 ms    QTC Calculation (Bezet) 495 ms    Calculated P Axis 61 degrees    Calculated R Axis 91 degrees    Calculated T Axis 53 degrees    Diagnosis       Normal sinus rhythm  Right bundle branch block  Abnormal ECG  When compared with ECG of 08-JUN-2020 09:53,  Left posterior fascicular block is no longer Present  T wave inversion no longer evident in Inferior leads  T wave inversion no longer evident in Anterior leads  Confirmed by Maira Decker MD (), CARLEEN MENDOZA ([id number]) on 6/10/2020 9:09:55 AM     CBC WITH AUTOMATED DIFF    Collection Time: 06/10/20  8:01 AM   Result Value Ref Range    WBC 6.0 4.3 - 11.1 K/uL    RBC 4.65 4.23 - 5.6 M/uL    HGB 11.5 (L) 13.6 - 17.2 g/dL    HCT 37.1 (L) 41.1 - 50.3 %    MCV 79.8 79.6 - 97.8 FL    MCH 24.7 (L) 26.1 - 32.9 PG    MCHC 31.0 (L) 31.4 - 35.0 g/dL    RDW 19.8 (H) 11.9 - 14.6 %    PLATELET 812 752 - 528 K/uL    MPV 10.3 9.4 - 12.3 FL    ABSOLUTE NRBC 0.00 0.0 - 0.2 K/uL    DF AUTOMATED      NEUTROPHILS 64 43 - 78 %    LYMPHOCYTES 24 13 - 44 %    MONOCYTES 8 4.0 - 12.0 %    EOSINOPHILS 4 0.5 - 7.8 %    BASOPHILS 1 0.0 - 2.0 %    IMMATURE GRANULOCYTES 1 0.0 - 5.0 %    ABS. NEUTROPHILS 3.9 1.7 - 8.2 K/UL    ABS. LYMPHOCYTES 1.4 0.5 - 4.6 K/UL    ABS. MONOCYTES 0.5 0.1 - 1.3 K/UL    ABS. EOSINOPHILS 0.2 0.0 - 0.8 K/UL    ABS. BASOPHILS 0.0 0.0 - 0.2 K/UL    ABS. IMM. GRANS. 0.0 0.0 - 0.5 K/UL   METABOLIC PANEL, COMPREHENSIVE    Collection Time: 06/10/20  8:01 AM   Result Value Ref Range    Sodium 133 (L) 136 - 145 mmol/L    Potassium 4.0 3.5 - 5.1 mmol/L    Chloride 100 98 - 107 mmol/L    CO2 21 21 - 32 mmol/L    Anion gap 12 7 - 16 mmol/L    Glucose 391 (H) 65 - 100 mg/dL    BUN 5 (L) 6 - 23 MG/DL    Creatinine 0.66 (L) 0.8 - 1.5 MG/DL    GFR est AA >60 >60 ml/min/1.73m2    GFR est non-AA >60 >60 ml/min/1.73m2    Calcium 9.0 8.3 - 10.4 MG/DL    Bilirubin, total 0.4 0.2 - 1.1 MG/DL    ALT (SGPT) 24 12 - 65 U/L    AST (SGOT) 26 15 - 37 U/L    Alk. phosphatase 114 50 - 136 U/L    Protein, total 6.9 6.3 - 8.2 g/dL    Albumin 3.4 (L) 3.5 - 5.0 g/dL    Globulin 3.5 2.3 - 3.5 g/dL    A-G Ratio 1.0 (L) 1.2 - 3.5     LIPASE    Collection Time: 06/10/20  8:01 AM   Result Value Ref Range    Lipase 23 (L) 73 - 393 U/L   GLUCOSE, POC    Collection Time: 06/10/20  9:22 AM   Result Value Ref Range    Glucose (POC) 377 (H) 65 - 100 mg/dL     Xr Abd (kub)    Result Date: 6/2/2020  KUB INDICATION:   Abdominal pain and nausea Supine views of the abdomen were obtained. FINDINGS:  There is mild diffuse prominence of the stool pattern. There is no small bowel distention. There is no evidence of obstruction. No renal calculi are seen. Lung bases are clear.      IMPRESSION: Possible constipation    Xr Abd Acute W 1 V Chest    Result Date: 6/10/2020  CHEST AND ACUTE ABDOMINAL SERIES, 3 VIEWS. HISTORY: Vomiting. TECHNIQUE: AP view of the chest, flat and upright views of the abdomen on a total of 4 images. COMPARISON: Chest x-ray 2 days prior. FINDINGS: -The lungs: are clear. -The heart size: is normal. -The costophrenic angles: are sharp. -The pulmonary vasculature: is unremarkable. -Included portion of the upper abdomen: is unremarkable. -Bones: No gross bony lesions. -Other: None. IMPRESSION: Negative for free air, ileus or obstruction. Ct Abd Pelv W Cont    Result Date: 6/3/2020  CT ABDOMEN AND PELVIS WITH CONTRAST 6/3/2020 HISTORY: Mid abdominal pain for several months. Nausea vomiting and diarrhea. TECHNIQUE: The patient received oral contrast and 100 mL Isovue-370 nonionic IV contrast. Axial images were obtained through the abdomen and pelvis. Coronal reformatted images were generated. All CT scans at this facility used dose modulation, interactive reconstruction and/or weight based dosing when appropriate to reduce radiation dose to as low as reasonably achievable. COMPARISON: April 5, 2020 FINDINGS: Included portions of the lung bases are clear. ABDOMEN: There is significant distention of the stomach with debris and minimal contrast. The gallbladder, liver, spleen, and adrenal glands are normal in appearance. Multiple calcifications are present within an atrophic pancreas. This finding suggests chronic pancreatitis. The kidneys enhance symmetrically and there is no hydronephrosis. A 1.7 cm low-attenuation left renal lesion is unchanged and is most suggestive of a cyst. Enteric contrast is present throughout the small bowel. There is no inflammation in the right lower quadrant. A moderate to large amount of gas and stool are present throughout the colon. PELVIS: The bladder is normal in appearance. There is no free pelvic fluid. There are no aggressive osseous lesions. IMPRESSION: 1. Pancreatic atrophy and calcifications compatible with chronic pancreatitis. 2. Significant gastric distention. GI follow-up is recommended to assess for evidence of gastroparesis. 3. Moderate to large amount of gas and stool throughout the colon. Xr Chest Port    Result Date: 6/8/2020  CHEST X-RAY, one view. HISTORY:  Chest trauma sustained multiple falls. TECHNIQUE:  AP upright portable view. COMPARISON: None. FINDINGS:   -The lungs: are clear. No pneumothorax. -The heart size: is normal. -The costophrenic angles: are sharp. -The pulmonary vasculature: is unremarkable. -Included portion of the upper abdomen: is unremarkable. -Bones: No gross fracture. -Other: None. IMPRESSION:  Negative for acute change. 53-year-old male with hyperglycemia:    Patient symptoms consistent with hyperglycemia without signs of DKA at this time. This is likely due to medication noncompliance which patient admits to. He will obtain his medications upon discharge today and agrees to take them as prescribed. I have given him fluids in the emergency department insulin and will recheck his glucose if improved to near within normal limits I will discharge at that time           12:18 PM Glucose improved, patient well appearing, in NAD, ambulating through-out ED without difficulty and states he will get his insulin from shelter immediately upon discharge and use it as prescribed.

## 2020-06-10 NOTE — ED TRIAGE NOTES
Pt given mask upon arrival to ED. Pt presents c/o sharp abdominal pain to entire outline of abdomen for 1 week with associated nausea/vomiting. Denies fever. States that he has fallen multiple times in the past week due to leg weakness. Hx of DM but unable to obtain needles for his insulin or his insulin that is being held at the shelter. Pt is homeless. Hx of pancreatitis. States 2 beers this morning because \"that's all I had. \" Pt states oily stool as well.

## 2020-06-10 NOTE — PROGRESS NOTES
ALAN met with patient who states that he's homeless and has been living behind the [employer] since being kicked out a few days ago. Patient states that he picked up a hat that he thought was in a pile of things for residents but it belonged to someone else. Patient was asked to leave and told that he could not return for a week. Patient has not tried [facility]. ALAN provided the patient a list of shelters and self pay resources. ALAN also provided the patient with two outfits, a jacket, an umbrella, a pair of socks, and a hat. RN provided the patient with a tray. [facility] will [facility] to [facility]. ALAN arranged for a cab.      Mamadou Merino, [address] [facility]    [address] [facility]    * [email or web address]    ( [phone]

## 2020-06-10 NOTE — ED NOTES
I have reviewed discharge instructions with the patient. The patient verbalized understanding. Patient left ED via Discharge Method: ambulatory to waiting room. Opportunity for questions and clarification provided. Patient given 0 scripts. To continue your aftercare when you leave the hospital, you may receive an automated call from our care team to check in on how you are doing. This is a free service and part of our promise to provide the best care and service to meet your aftercare needs.  If you have questions, or wish to unsubscribe from this service please call [phone]. Thank you for Choosing our [facility] Emergency Department.

## 2020-06-11 ENCOUNTER — PATIENT OUTREACH (OUTPATIENT)
Dept: CASE MANAGEMENT | Age: 45
End: 2020-06-11

## 2020-06-11 ENCOUNTER — APPOINTMENT (OUTPATIENT)
Dept: GENERAL RADIOLOGY | Age: 45
DRG: 638 | End: 2020-06-11
Attending: EMERGENCY MEDICINE
Payer: SUBSIDIZED

## 2020-06-11 ENCOUNTER — HOSPITAL ENCOUNTER (INPATIENT)
Age: 45
LOS: 4 days | Discharge: HOME | DRG: 638 | End: 2020-06-15
Attending: EMERGENCY MEDICINE | Admitting: INTERNAL MEDICINE
Payer: SUBSIDIZED

## 2020-06-11 DIAGNOSIS — R73.9 HYPERGLYCEMIA: ICD-10-CM

## 2020-06-11 DIAGNOSIS — E87.20 METABOLIC ACIDOSIS: Primary | ICD-10-CM

## 2020-06-11 DIAGNOSIS — F10.10 ALCOHOL ABUSE: ICD-10-CM

## 2020-06-11 LAB
ADMINISTERED INITIALS, ADMINIT: NORMAL
ALBUMIN SERPL-MCNC: 3.4 G/DL (ref 3.5–5)
ALBUMIN/GLOB SERPL: 1 {RATIO} (ref 1.2–3.5)
ALP SERPL-CCNC: 103 U/L (ref 50–136)
ALT SERPL-CCNC: 24 U/L (ref 12–65)
AMPHET UR QL SCN: NEGATIVE
ANION GAP SERPL CALC-SCNC: 14 MMOL/L (ref 7–16)
ANION GAP SERPL CALC-SCNC: 18 MMOL/L (ref 7–16)
ANION GAP SERPL CALC-SCNC: 9 MMOL/L (ref 7–16)
APAP SERPL-MCNC: <2 UG/ML (ref 10–30)
APPEARANCE UR: CLEAR
ARTERIAL PATENCY WRIST A: ABNORMAL
AST SERPL-CCNC: 12 U/L (ref 15–37)
BARBITURATES UR QL SCN: NEGATIVE
BASE DEFICIT BLD-SCNC: 9 MMOL/L
BASOPHILS # BLD: 0.1 K/UL (ref 0–0.2)
BASOPHILS NFR BLD: 1 % (ref 0–2)
BDY SITE: ABNORMAL
BENZODIAZ UR QL: NEGATIVE
BILIRUB SERPL-MCNC: 0.3 MG/DL (ref 0.2–1.1)
BILIRUB UR QL: NEGATIVE
BUN SERPL-MCNC: 4 MG/DL (ref 6–23)
BUN SERPL-MCNC: 5 MG/DL (ref 6–23)
BUN SERPL-MCNC: 6 MG/DL (ref 6–23)
CALCIUM SERPL-MCNC: 7.8 MG/DL (ref 8.3–10.4)
CALCIUM SERPL-MCNC: 8 MG/DL (ref 8.3–10.4)
CALCIUM SERPL-MCNC: 8.7 MG/DL (ref 8.3–10.4)
CANNABINOIDS UR QL SCN: NEGATIVE
CHLORIDE SERPL-SCNC: 110 MMOL/L (ref 98–107)
CHLORIDE SERPL-SCNC: 114 MMOL/L (ref 98–107)
CHLORIDE SERPL-SCNC: 99 MMOL/L (ref 98–107)
CO2 BLD-SCNC: 16 MMOL/L
CO2 SERPL-SCNC: 17 MMOL/L (ref 21–32)
CO2 SERPL-SCNC: 18 MMOL/L (ref 21–32)
CO2 SERPL-SCNC: 21 MMOL/L (ref 21–32)
COCAINE UR QL SCN: NEGATIVE
COLLECT TIME,HTIME: 1330
COLOR UR: YELLOW
CREAT SERPL-MCNC: 0.58 MG/DL (ref 0.8–1.5)
CREAT SERPL-MCNC: 0.77 MG/DL (ref 0.8–1.5)
CREAT SERPL-MCNC: 0.91 MG/DL (ref 0.8–1.5)
D50 ADMINISTERED, D50ADM: 0 ML
D50 ORDER, D50ORD: 0 ML
DIFFERENTIAL METHOD BLD: ABNORMAL
EOSINOPHIL # BLD: 0 K/UL (ref 0–0.8)
EOSINOPHIL NFR BLD: 1 % (ref 0.5–7.8)
ERYTHROCYTE [DISTWIDTH] IN BLOOD BY AUTOMATED COUNT: 19.8 % (ref 11.9–14.6)
EST. AVERAGE GLUCOSE BLD GHB EST-MCNC: 280 MG/DL
ETHANOL SERPL-MCNC: 297 MG/DL
GAS FLOW.O2 O2 DELIVERY SYS: ABNORMAL L/MIN
GLOBULIN SER CALC-MCNC: 3.4 G/DL (ref 2.3–3.5)
GLSCOM COMMENTS: NORMAL
GLUCOSE BLD STRIP.AUTO-MCNC: 196 MG/DL (ref 65–100)
GLUCOSE BLD STRIP.AUTO-MCNC: 261 MG/DL (ref 65–100)
GLUCOSE BLD STRIP.AUTO-MCNC: 317 MG/DL (ref 65–100)
GLUCOSE BLD STRIP.AUTO-MCNC: 337 MG/DL (ref 65–100)
GLUCOSE BLD STRIP.AUTO-MCNC: 427 MG/DL (ref 65–100)
GLUCOSE SERPL-MCNC: 225 MG/DL (ref 65–100)
GLUCOSE SERPL-MCNC: 382 MG/DL (ref 65–100)
GLUCOSE SERPL-MCNC: 542 MG/DL (ref 65–100)
GLUCOSE UR STRIP.AUTO-MCNC: >1000 MG/DL
GLUCOSE, GLC: 196 MG/DL
GLUCOSE, GLC: 261 MG/DL
GLUCOSE, GLC: 317 MG/DL
GLUCOSE, GLC: 337 MG/DL
GLUCOSE, GLC: 427 MG/DL
HBA1C MFR BLD: 11.4 % (ref 4.8–6)
HCO3 BLD-SCNC: 15.3 MMOL/L (ref 22–26)
HCT VFR BLD AUTO: 34.3 % (ref 41.1–50.3)
HGB BLD-MCNC: 10.9 G/DL (ref 13.6–17.2)
HGB UR QL STRIP: NEGATIVE
HIGH TARGET, HITG: 180 MG/DL
HIGH TARGET, HITG: 250 MG/DL
HIGH TARGET, HITG: 250 MG/DL
IMM GRANULOCYTES # BLD AUTO: 0.1 K/UL (ref 0–0.5)
IMM GRANULOCYTES NFR BLD AUTO: 1 % (ref 0–5)
INSULIN ADMINSTERED, INSADM: 5.1 UNITS/HOUR
INSULIN ADMINSTERED, INSADM: 5.4 UNITS/HOUR
INSULIN ADMINSTERED, INSADM: 5.5 UNITS/HOUR
INSULIN ADMINSTERED, INSADM: 6 UNITS/HOUR
INSULIN ADMINSTERED, INSADM: 7.3 UNITS/HOUR
INSULIN ORDER, INSORD: 5.1 UNITS/HOUR
INSULIN ORDER, INSORD: 5.4 UNITS/HOUR
INSULIN ORDER, INSORD: 5.5 UNITS/HOUR
INSULIN ORDER, INSORD: 6 UNITS/HOUR
INSULIN ORDER, INSORD: 7.3 UNITS/HOUR
KETONES UR QL STRIP.AUTO: ABNORMAL MG/DL
LEUKOCYTE ESTERASE UR QL STRIP.AUTO: NEGATIVE
LOW TARGET, LOT: 140 MG/DL
LOW TARGET, LOT: 150 MG/DL
LOW TARGET, LOT: 150 MG/DL
LYMPHOCYTES # BLD: 1.6 K/UL (ref 0.5–4.6)
LYMPHOCYTES NFR BLD: 20 % (ref 13–44)
MAGNESIUM SERPL-MCNC: 1.8 MG/DL (ref 1.8–2.4)
MAGNESIUM SERPL-MCNC: 2 MG/DL (ref 1.8–2.4)
MCH RBC QN AUTO: 25.5 PG (ref 26.1–32.9)
MCHC RBC AUTO-ENTMCNC: 31.8 G/DL (ref 31.4–35)
MCV RBC AUTO: 80.1 FL (ref 79.6–97.8)
METHADONE UR QL: NEGATIVE
MINUTES UNTIL NEXT BG, NBG: 60 MIN
MONOCYTES # BLD: 0.4 K/UL (ref 0.1–1.3)
MONOCYTES NFR BLD: 5 % (ref 4–12)
MULTIPLIER, MUL: 0.02
MULTIPLIER, MUL: 0.03
MULTIPLIER, MUL: 0.04
NEUTS SEG # BLD: 6 K/UL (ref 1.7–8.2)
NEUTS SEG NFR BLD: 74 % (ref 43–78)
NITRITE UR QL STRIP.AUTO: NEGATIVE
NRBC # BLD: 0 K/UL (ref 0–0.2)
OPIATES UR QL: NEGATIVE
ORDER INITIALS, ORDINIT: NORMAL
PCO2 BLD: 28 MMHG (ref 35–45)
PCP UR QL: NEGATIVE
PH BLD: 7.35 [PH] (ref 7.35–7.45)
PH UR STRIP: 6 [PH] (ref 5–9)
PHOSPHATE SERPL-MCNC: 3.7 MG/DL (ref 2.5–4.5)
PLATELET # BLD AUTO: 330 K/UL (ref 150–450)
PMV BLD AUTO: 10.2 FL (ref 9.4–12.3)
PO2 BLD: 92 MMHG (ref 75–100)
POTASSIUM SERPL-SCNC: 3.1 MMOL/L (ref 3.5–5.1)
POTASSIUM SERPL-SCNC: 3.4 MMOL/L (ref 3.5–5.1)
POTASSIUM SERPL-SCNC: 3.7 MMOL/L (ref 3.5–5.1)
PROT SERPL-MCNC: 6.8 G/DL (ref 6.3–8.2)
PROT UR STRIP-MCNC: NEGATIVE MG/DL
RBC # BLD AUTO: 4.28 M/UL (ref 4.23–5.6)
SALICYLATES SERPL-MCNC: <1.7 MG/DL (ref 2.8–20)
SAO2 % BLD: 97 % (ref 95–98)
SERVICE CMNT-IMP: ABNORMAL
SODIUM SERPL-SCNC: 134 MMOL/L (ref 136–145)
SODIUM SERPL-SCNC: 142 MMOL/L (ref 136–145)
SODIUM SERPL-SCNC: 144 MMOL/L (ref 136–145)
SP GR UR REFRACTOMETRY: 1.02 (ref 1–1.02)
SPECIMEN TYPE: ABNORMAL
UROBILINOGEN UR QL STRIP.AUTO: 0.2 EU/DL (ref 0.2–1)
WBC # BLD AUTO: 8.2 K/UL (ref 4.3–11.1)

## 2020-06-11 PROCEDURE — 36415 COLL VENOUS BLD VENIPUNCTURE: CPT

## 2020-06-11 PROCEDURE — 74011250637 HC RX REV CODE- 250/637: Performed by: INTERNAL MEDICINE

## 2020-06-11 PROCEDURE — 80307 DRUG TEST PRSMV CHEM ANLYZR: CPT

## 2020-06-11 PROCEDURE — 83735 ASSAY OF MAGNESIUM: CPT

## 2020-06-11 PROCEDURE — 74011000258 HC RX REV CODE- 258: Performed by: INTERNAL MEDICINE

## 2020-06-11 PROCEDURE — 96365 THER/PROPH/DIAG IV INF INIT: CPT

## 2020-06-11 PROCEDURE — 81003 URINALYSIS AUTO W/O SCOPE: CPT

## 2020-06-11 PROCEDURE — 80053 COMPREHEN METABOLIC PANEL: CPT

## 2020-06-11 PROCEDURE — 74011250636 HC RX REV CODE- 250/636: Performed by: INTERNAL MEDICINE

## 2020-06-11 PROCEDURE — 65610000001 HC ROOM ICU GENERAL

## 2020-06-11 PROCEDURE — 36600 WITHDRAWAL OF ARTERIAL BLOOD: CPT

## 2020-06-11 PROCEDURE — 74011250636 HC RX REV CODE- 250/636: Performed by: EMERGENCY MEDICINE

## 2020-06-11 PROCEDURE — 83036 HEMOGLOBIN GLYCOSYLATED A1C: CPT

## 2020-06-11 PROCEDURE — 96360 HYDRATION IV INFUSION INIT: CPT

## 2020-06-11 PROCEDURE — 85025 COMPLETE CBC W/AUTO DIFF WBC: CPT

## 2020-06-11 PROCEDURE — 96375 TX/PRO/DX INJ NEW DRUG ADDON: CPT

## 2020-06-11 PROCEDURE — 80048 BASIC METABOLIC PNL TOTAL CA: CPT

## 2020-06-11 PROCEDURE — 82803 BLOOD GASES ANY COMBINATION: CPT

## 2020-06-11 PROCEDURE — 96361 HYDRATE IV INFUSION ADD-ON: CPT

## 2020-06-11 PROCEDURE — 77030040393 HC DRSG OPTIFOAM GENT MDII -B

## 2020-06-11 PROCEDURE — 74011636637 HC RX REV CODE- 636/637: Performed by: INTERNAL MEDICINE

## 2020-06-11 PROCEDURE — 84100 ASSAY OF PHOSPHORUS: CPT

## 2020-06-11 PROCEDURE — 82962 GLUCOSE BLOOD TEST: CPT

## 2020-06-11 PROCEDURE — 94761 N-INVAS EAR/PLS OXIMETRY MLT: CPT

## 2020-06-11 PROCEDURE — 99285 EMERGENCY DEPT VISIT HI MDM: CPT

## 2020-06-11 PROCEDURE — 71045 X-RAY EXAM CHEST 1 VIEW: CPT

## 2020-06-11 RX ORDER — SODIUM CHLORIDE 0.9 % (FLUSH) 0.9 %
5-40 SYRINGE (ML) INJECTION EVERY 8 HOURS
Status: DISCONTINUED | OUTPATIENT
Start: 2020-06-11 | End: 2020-06-11 | Stop reason: SDUPTHER

## 2020-06-11 RX ORDER — LORAZEPAM 0.5 MG/1
0.5 TABLET ORAL
Status: DISCONTINUED | OUTPATIENT
Start: 2020-06-11 | End: 2020-06-12

## 2020-06-11 RX ORDER — ZOLPIDEM TARTRATE 5 MG/1
5 TABLET ORAL
Status: DISCONTINUED | OUTPATIENT
Start: 2020-06-11 | End: 2020-06-12

## 2020-06-11 RX ORDER — LORAZEPAM 2 MG/ML
1 INJECTION INTRAMUSCULAR
Status: ACTIVE | OUTPATIENT
Start: 2020-06-11 | End: 2020-06-12

## 2020-06-11 RX ORDER — POTASSIUM CHLORIDE 20 MEQ/1
40 TABLET, EXTENDED RELEASE ORAL
Status: DISCONTINUED | OUTPATIENT
Start: 2020-06-11 | End: 2020-06-11

## 2020-06-11 RX ORDER — POTASSIUM CHLORIDE 20 MEQ/1
40 TABLET, EXTENDED RELEASE ORAL
Status: COMPLETED | OUTPATIENT
Start: 2020-06-12 | End: 2020-06-11

## 2020-06-11 RX ORDER — HALOPERIDOL 5 MG/ML
5 INJECTION INTRAMUSCULAR ONCE
Status: COMPLETED | OUTPATIENT
Start: 2020-06-11 | End: 2020-06-11

## 2020-06-11 RX ORDER — AMOXICILLIN 250 MG
2 CAPSULE ORAL
Status: DISCONTINUED | OUTPATIENT
Start: 2020-06-11 | End: 2020-06-15 | Stop reason: HOSPADM

## 2020-06-11 RX ORDER — SODIUM CHLORIDE 0.9 % (FLUSH) 0.9 %
5-40 SYRINGE (ML) INJECTION AS NEEDED
Status: DISCONTINUED | OUTPATIENT
Start: 2020-06-11 | End: 2020-06-11 | Stop reason: SDUPTHER

## 2020-06-11 RX ORDER — LORAZEPAM 2 MG/ML
2 INJECTION INTRAMUSCULAR
Status: DISCONTINUED | OUTPATIENT
Start: 2020-06-11 | End: 2020-06-12

## 2020-06-11 RX ORDER — LORAZEPAM 1 MG/1
1-2 TABLET ORAL
Status: DISCONTINUED | OUTPATIENT
Start: 2020-06-11 | End: 2020-06-11

## 2020-06-11 RX ORDER — DEXTROSE 40 %
15 GEL (GRAM) ORAL AS NEEDED
Status: DISCONTINUED | OUTPATIENT
Start: 2020-06-11 | End: 2020-06-12

## 2020-06-11 RX ORDER — DIPHENHYDRAMINE HCL 25 MG
25 CAPSULE ORAL
Status: DISCONTINUED | OUTPATIENT
Start: 2020-06-11 | End: 2020-06-12

## 2020-06-11 RX ORDER — LORAZEPAM 1 MG/1
2 TABLET ORAL
Status: DISCONTINUED | OUTPATIENT
Start: 2020-06-11 | End: 2020-06-12

## 2020-06-11 RX ORDER — ENOXAPARIN SODIUM 100 MG/ML
40 INJECTION SUBCUTANEOUS EVERY 24 HOURS
Status: DISCONTINUED | OUTPATIENT
Start: 2020-06-11 | End: 2020-06-12

## 2020-06-11 RX ORDER — SERTRALINE HYDROCHLORIDE 50 MG/1
100 TABLET, FILM COATED ORAL DAILY
Status: DISCONTINUED | OUTPATIENT
Start: 2020-06-12 | End: 2020-06-12

## 2020-06-11 RX ORDER — DEXTROSE 50 % IN WATER (D50W) INTRAVENOUS SYRINGE
25-50 AS NEEDED
Status: DISCONTINUED | OUTPATIENT
Start: 2020-06-11 | End: 2020-06-12

## 2020-06-11 RX ORDER — NALOXONE HYDROCHLORIDE 0.4 MG/ML
0.4 INJECTION, SOLUTION INTRAMUSCULAR; INTRAVENOUS; SUBCUTANEOUS AS NEEDED
Status: DISCONTINUED | OUTPATIENT
Start: 2020-06-11 | End: 2020-06-15 | Stop reason: HOSPADM

## 2020-06-11 RX ORDER — ONDANSETRON 2 MG/ML
4 INJECTION INTRAMUSCULAR; INTRAVENOUS
Status: DISCONTINUED | OUTPATIENT
Start: 2020-06-11 | End: 2020-06-12

## 2020-06-11 RX ORDER — LORAZEPAM 1 MG/1
1 TABLET ORAL EVERY 6 HOURS
Status: DISCONTINUED | OUTPATIENT
Start: 2020-06-11 | End: 2020-06-11

## 2020-06-11 RX ORDER — LORAZEPAM 1 MG/1
3-4 TABLET ORAL
Status: DISCONTINUED | OUTPATIENT
Start: 2020-06-11 | End: 2020-06-11

## 2020-06-11 RX ORDER — DEXTROSE, SODIUM CHLORIDE, AND POTASSIUM CHLORIDE 5; .45; .3 G/100ML; G/100ML; G/100ML
INJECTION INTRAVENOUS CONTINUOUS
Status: DISCONTINUED | OUTPATIENT
Start: 2020-06-12 | End: 2020-06-12

## 2020-06-11 RX ORDER — CHLORDIAZEPOXIDE HYDROCHLORIDE 25 MG/1
25 CAPSULE, GELATIN COATED ORAL
Status: DISCONTINUED | OUTPATIENT
Start: 2020-06-11 | End: 2020-06-11

## 2020-06-11 RX ORDER — HALOPERIDOL 5 MG/ML
2 INJECTION INTRAMUSCULAR
Status: DISCONTINUED | OUTPATIENT
Start: 2020-06-11 | End: 2020-06-12

## 2020-06-11 RX ORDER — SODIUM CHLORIDE 0.9 % (FLUSH) 0.9 %
5-40 SYRINGE (ML) INJECTION AS NEEDED
Status: DISCONTINUED | OUTPATIENT
Start: 2020-06-11 | End: 2020-06-15 | Stop reason: HOSPADM

## 2020-06-11 RX ORDER — SODIUM CHLORIDE 9 MG/ML
150 INJECTION, SOLUTION INTRAVENOUS CONTINUOUS
Status: DISCONTINUED | OUTPATIENT
Start: 2020-06-11 | End: 2020-06-11

## 2020-06-11 RX ORDER — ACETAMINOPHEN 325 MG/1
650 TABLET ORAL
Status: DISCONTINUED | OUTPATIENT
Start: 2020-06-11 | End: 2020-06-12

## 2020-06-11 RX ORDER — SODIUM CHLORIDE 0.9 % (FLUSH) 0.9 %
5-40 SYRINGE (ML) INJECTION EVERY 8 HOURS
Status: DISCONTINUED | OUTPATIENT
Start: 2020-06-11 | End: 2020-06-15 | Stop reason: HOSPADM

## 2020-06-11 RX ORDER — LORAZEPAM 0.5 MG/1
1 TABLET ORAL
Status: DISCONTINUED | OUTPATIENT
Start: 2020-06-11 | End: 2020-06-15 | Stop reason: HOSPADM

## 2020-06-11 RX ORDER — QUETIAPINE FUMARATE 100 MG/1
200 TABLET, FILM COATED ORAL 2 TIMES DAILY
Status: DISCONTINUED | OUTPATIENT
Start: 2020-06-11 | End: 2020-06-12

## 2020-06-11 RX ADMIN — Medication 10 ML: at 23:54

## 2020-06-11 RX ADMIN — POTASSIUM CHLORIDE 40 MEQ: 20 TABLET, EXTENDED RELEASE ORAL at 23:53

## 2020-06-11 RX ADMIN — SODIUM CHLORIDE 150 ML/HR: 9 INJECTION, SOLUTION INTRAVENOUS at 19:39

## 2020-06-11 RX ADMIN — SODIUM CHLORIDE 7.3 UNITS/HR: 900 INJECTION, SOLUTION INTRAVENOUS at 19:39

## 2020-06-11 RX ADMIN — QUETIAPINE FUMARATE 200 MG: 100 TABLET ORAL at 19:37

## 2020-06-11 RX ADMIN — SODIUM CHLORIDE 1000 ML: 9 INJECTION, SOLUTION INTRAVENOUS at 12:25

## 2020-06-11 RX ADMIN — POTASSIUM CHLORIDE: 2 INJECTION, SOLUTION, CONCENTRATE INTRAVENOUS at 22:39

## 2020-06-11 RX ADMIN — LORAZEPAM 1 MG: 1 TABLET ORAL at 19:38

## 2020-06-11 RX ADMIN — SODIUM CHLORIDE 1000 ML: 9 INJECTION, SOLUTION INTRAVENOUS at 14:33

## 2020-06-11 RX ADMIN — ENOXAPARIN SODIUM 40 MG: 40 INJECTION SUBCUTANEOUS at 21:33

## 2020-06-11 RX ADMIN — DEXTROSE MONOHYDRATE, SODIUM CHLORIDE, AND POTASSIUM CHLORIDE: 50; 4.5; 2.98 INJECTION, SOLUTION INTRAVENOUS at 23:51

## 2020-06-11 RX ADMIN — HALOPERIDOL LACTATE 5 MG: 5 INJECTION, SOLUTION INTRAMUSCULAR at 14:32

## 2020-06-11 NOTE — H&P
Hospitalist H&P Note     Admit Date:  2020  1:04 PM   Name:  Sherin Mason   Age:  39 y.o.  :  1975   MRN:  [id number]   PCP:  None  Treatment Team: Attending Provider: Mikala Jc MD; Primary Nurse: Josselin De Jesus RN; Consulting Provider: Mike Augustin MD    HPI:   39year old male presents to the ER with intoxication. Just discharged from [facility] on the day of this admission. He stated he drank alcohol and did not take his insulin. He states he takes insulin, when asked how much, he says \"not enough\". Last drink was  in AM but does not know the time. He does endorse abdominal pain but cannot describe a numerical value of the pain, radiation, what makes it better/worse. 10 systems reviewed and negative except as noted in HPI. Past Medical History:   Diagnosis Date    Bipolar 1 disorder ([facility]     Depression     Diabetes ([facility]     PTSD (post-traumatic stress disorder)       History reviewed. No pertinent surgical history. Allergies   Allergen Reactions    Toradol [Ketorolac] Nausea and Vomiting      Social History     Tobacco Use    Smoking status: Current Every Day Smoker     Packs/day: 1.00    Smokeless tobacco: Never Used   Substance Use Topics    Alcohol use: Yes      History reviewed. No pertinent family history. There is no immunization history on file for this patient. PTA Medications:  Prior to Admission Medications   Prescriptions Last Dose Informant Patient Reported? Taking? QUEtiapine (SEROqueL) 200 mg tablet   Yes No   Sig: Take 200 mg by mouth two (2) times a day. busPIRone (BUSPAR) 30 mg tablet   Yes No   Sig: Take 60 mg by mouth daily. gabapentin (Neurontin) 400 mg capsule   Yes No   Sig: Take 400 mg by mouth three (3) times daily. insulin NPH (NOVOLIN N, HUMULIN N) 100 unit/mL injection   No No   Si Units by SubCUTAneous route Before breakfast and dinner.    insulin lispro (HumaLOG U-100 Insulin) 100 unit/mL injection   Yes No   Sig: by SubCUTAneous route. SSI   metFORMIN (GLUCOPHAGE) 1,000 mg tablet   Yes No   Sig: Take 1,000 mg by mouth two (2) times daily (with meals). sertraline (Zoloft) 50 mg tablet   Yes No   Sig: Take 100 mg by mouth daily. Indications: anxiousness associated with depression      Facility-Administered Medications: None       Objective:     Patient Vitals for the past 24 hrs:   Temp Pulse Resp BP SpO2   06/11/20 1530    101/59    06/11/20 1520    98/54    06/11/20 1222 98.1 °F (36.7 °C) 91 16 (!) 89/46 96 %     Oxygen Therapy  O2 Sat (%): 96 % (06/11/20 1222)  O2 Device: Room air (06/11/20 1222)  No intake or output data in the 24 hours ending 06/11/20 1746    Physical Exam:  General:    Well nourished. Alert. unkempt  Eyes:   Normal sclera. Extraocular movements intact. ENT:  Normocephalic, atraumatic. Moist mucous membranes  CV:   RRR. No m/r/g. Peripheral pulses present. Capillary refill normal  Lungs:  CTAB. No wheezing, rhonchi, or rales. Abdomen: Soft, tender diffusely, nondistended. Bowel sounds normal.   Extremities: Warm and dry. No cyanosis or edema. Neurologic: Sensation intact. Skin:     No rashes or jaundice. Normal coloration  Psych:  Normal mood and affect. I reviewed the labs, imaging, EKGs, telemetry, and other studies done this admission.   Data Review:   Recent Results (from the past 24 hour(s))   CBC WITH AUTOMATED DIFF    Collection Time: 06/11/20 12:25 PM   Result Value Ref Range    WBC 8.2 4.3 - 11.1 K/uL    RBC 4.28 4.23 - 5.6 M/uL    HGB 10.9 (L) 13.6 - 17.2 g/dL    HCT 34.3 (L) 41.1 - 50.3 %    MCV 80.1 79.6 - 97.8 FL    MCH 25.5 (L) 26.1 - 32.9 PG    MCHC 31.8 31.4 - 35.0 g/dL    RDW 19.8 (H) 11.9 - 14.6 %    PLATELET 079 943 - 816 K/uL    MPV 10.2 9.4 - 12.3 FL    ABSOLUTE NRBC 0.00 0.0 - 0.2 K/uL    DF AUTOMATED      NEUTROPHILS 74 43 - 78 %    LYMPHOCYTES 20 13 - 44 %    MONOCYTES 5 4.0 - 12.0 %    EOSINOPHILS 1 0.5 - 7.8 %    BASOPHILS 1 0.0 - 2.0 %    IMMATURE GRANULOCYTES 1 0.0 - 5.0 %    ABS. NEUTROPHILS 6.0 1.7 - 8.2 K/UL    ABS. LYMPHOCYTES 1.6 0.5 - 4.6 K/UL    ABS. MONOCYTES 0.4 0.1 - 1.3 K/UL    ABS. EOSINOPHILS 0.0 0.0 - 0.8 K/UL    ABS. BASOPHILS 0.1 0.0 - 0.2 K/UL    ABS. IMM. GRANS. 0.1 0.0 - 0.5 K/UL   METABOLIC PANEL, COMPREHENSIVE    Collection Time: 06/11/20 12:25 PM   Result Value Ref Range    Sodium 134 (L) 136 - 145 mmol/L    Potassium 3.7 3.5 - 5.1 mmol/L    Chloride 99 98 - 107 mmol/L    CO2 17 (L) 21 - 32 mmol/L    Anion gap 18 (H) 7 - 16 mmol/L    Glucose 542 (HH) 65 - 100 mg/dL    BUN 6 6 - 23 MG/DL    Creatinine 0.91 0.8 - 1.5 MG/DL    GFR est AA >60 >60 ml/min/1.73m2    GFR est non-AA >60 >60 ml/min/1.73m2    Calcium 8.7 8.3 - 10.4 MG/DL    Bilirubin, total 0.3 0.2 - 1.1 MG/DL    ALT (SGPT) 24 12 - 65 U/L    AST (SGOT) 12 (L) 15 - 37 U/L    Alk.  phosphatase 103 50 - 136 U/L    Protein, total 6.8 6.3 - 8.2 g/dL    Albumin 3.4 (L) 3.5 - 5.0 g/dL    Globulin 3.4 2.3 - 3.5 g/dL    A-G Ratio 1.0 (L) 1.2 - 3.5     ETHYL ALCOHOL    Collection Time: 06/11/20 12:25 PM   Result Value Ref Range    ALCOHOL(ETHYL),SERUM 100 MG/DL   SALICYLATE    Collection Time: 06/11/20 12:25 PM   Result Value Ref Range    Salicylate level <9.4 (L) 2.8 - 20.0 MG/DL   ACETAMINOPHEN    Collection Time: 06/11/20 12:25 PM   Result Value Ref Range    Acetaminophen level <2 (L) 10.0 - 30.0 ug/mL   HEMOGLOBIN A1C WITH EAG    Collection Time: 06/11/20 12:25 PM   Result Value Ref Range    Hemoglobin A1c 11.4 (H) 4.8 - 6.0 %    Est. average glucose 280 mg/dL   POC G3    Collection Time: 06/11/20  1:33 PM   Result Value Ref Range    Device: ROOM AIR      pH (POC) 7.346 (L) 7.35 - 7.45      pCO2 (POC) 28.0 (L) 35 - 45 MMHG    pO2 (POC) 92 75 - 100 MMHG    HCO3 (POC) 15.3 (L) 22 - 26 MMOL/L    sO2 (POC) 97 95 - 98 %    Base deficit (POC) 9 mmol/L    Allens test (POC) NOT APPLICABLE      Site LEFT BRACHIAL      Specimen type (POC) ARTERIAL      Performed by Jessica     CO2, POC 16 MMOL/L    COLLECT TIME 1,330     DRUG SCREEN, URINE    Collection Time: 06/11/20  4:59 PM   Result Value Ref Range    PCP(PHENCYCLIDINE) Negative      BENZODIAZEPINES Negative      COCAINE Negative      AMPHETAMINES Negative      METHADONE Negative      THC (TH-CANNABINOL) Negative      OPIATES Negative      BARBITURATES Negative     URINALYSIS W/ RFLX MICROSCOPIC    Collection Time: 06/11/20  4:59 PM   Result Value Ref Range    Color YELLOW      Appearance CLEAR      Specific gravity 1.020 1.001 - 1.023      pH (UA) 6.0 5.0 - 9.0      Protein Negative NEG mg/dL    Glucose >1,000 mg/dL    Ketone TRACE (A) NEG mg/dL    Bilirubin Negative NEG      Blood Negative NEG      Urobilinogen 0.2 0.2 - 1.0 EU/dL    Nitrites Negative NEG      Leukocyte Esterase Negative NEG         All Micro Results     None          Other Studies:  Xr Chest Port    Result Date: 6/11/2020  History: Hyperglycemia Exam: portable chest Comparison: 6/10/2020 Findings: No new alveolar infiltrate or pleural effusion. No change in the appearance of the mediastinal contour or osseous structures. Impressions: No acute findings.         Assessment and Plan:     Hospital Problems as of 6/11/2020 Never Reviewed          Codes Class Noted - Resolved POA    Abdominal pain ICD-10-CM: R10.9  ICD-9-CM: 789.00  6/2/2020 - Present Yes        Type II diabetes mellitus with complication, uncontrolled ([facility] ICD-10-CM: E11.8, E11.65  ICD-9-CM: 250.92  5/31/2020 - Present Yes        * (Principal) DKA (diabetic ketoacidoses) ([facility] ICD-10-CM: E11.10  ICD-9-CM: 250.12  5/30/2020 - Present Yes        Bipolar disorder ([facility] ICD-10-CM: F31.9  ICD-9-CM: 296.80  5/30/2020 - Present Yes        HTN (hypertension) ICD-10-CM: I10  ICD-9-CM: 401.9  5/30/2020 - Present Yes        Alcohol dependence ([facility] ICD-10-CM: F10.20  ICD-9-CM: 303.90  5/30/2020 - Present Yes              PLAN:  DKA  -anion gap 18, bicarb 17, sugar 542, pH 7.346  -ICU placement due to glucose stabilizer  -started on glucose stabilizer  -diabetic education  -BMP q4h until anion gap closes  -NPO    Abdominal pain  -suspect gastroparesis from uncontrolled diabetes  -plan as per #1  -better diabetic control    EtOH misuse   -CIWA protocol   -encourage cessation  -case management consult for placement options    Bipolar  -home meds    HTN  -monitor bp/hr  -hold home meds due to hypotension    DVT ppx:  lovenox  Anticipated DC needs:  Unclear at this time  Code status:  Full  Estimated LOS:  Greater than 2 midnights  Risk:  high    Signed:  Anca Hernandez MD

## 2020-06-11 NOTE — ED TRIAGE NOTES
Pt arrives via EMS. Was just discharged from [facility] this morning, drank ETOH and called 911. Pt states he does not take his insulin and that his BGL is always 500-600. Pt reports they did not feed him breakfast this morning.  Pt also reports that he is feeling suicidal. Mask on

## 2020-06-11 NOTE — PROGRESS NOTES
Patient on [facility] list today for [facility] [place] for ER visit on 6/10/2020  Per [facility] patient is back in the ER again today   Patient doesn't have a valid contact number listed   Patient has had 34 ED/hospital admits over the past 6 months (today's ED visit makes number 34)  PLAN:  Case closed due to patient does not have valid contact number and unable to contact

## 2020-06-11 NOTE — ED PROVIDER NOTES
57-year-old noncompliant diabetic presenting acutely intoxicated with alcohol. Patient was seen yesterday at this facility discharge seen at another facility across [facility] and discharged now presents again via EMS to this facility. Patient clearly intoxicated and slurring his speech. Cooperative at this time. Patient well-known to this facility and the other facility. Frequently flips and will become verbally aggressive with staff. Recent admission to the hospital for DKA. The history is provided by the patient. Mental Health Problem           Past Medical History:   Diagnosis Date    Bipolar 1 disorder ([facility]     Depression     Diabetes ([facility]     PTSD (post-traumatic stress disorder)        History reviewed. No pertinent surgical history. History reviewed. No pertinent family history. Social History     Socioeconomic History    Marital status: SINGLE     Spouse name: Not on file    Number of children: Not on file    Years of education: Not on file    Highest education level: Not on file   Occupational History    Not on file   Social Needs    Financial resource strain: Not on file    Food insecurity     Worry: Not on file     Inability: Not on file    Transportation needs     Medical: Not on file     Non-medical: Not on file   Tobacco Use    Smoking status: Current Every Day Smoker     Packs/day: 1.00    Smokeless tobacco: Never Used   Substance and Sexual Activity    Alcohol use:  Yes    Drug use: Not Currently    Sexual activity: Not on file   Lifestyle    Physical activity     Days per week: Not on file     Minutes per session: Not on file    Stress: Not on file   Relationships    Social connections     Talks on phone: Not on file     Gets together: Not on file     Attends [employer] service: Not on file     Active member of club or organization: Not on file     Attends meetings of clubs or organizations: Not on file     Relationship status: Not on file    Intimate partner violence     Fear of current or ex partner: Not on file     Emotionally abused: Not on file     Physically abused: Not on file     Forced sexual activity: Not on file   Other Topics Concern    Not on file   Social History Narrative    Not on file         ALLERGIES: Toradol [ketorolac]    Review of Systems   Unable to perform ROS: Mental status change   Psychiatric/Behavioral: Positive for behavioral problems. All other systems reviewed and are negative. Vitals:    06/11/20 1222   BP: (!) 89/46   Pulse: 91   Resp: 16   Temp: 98.1 °F (36.7 °C)   SpO2: 96%   Weight: 63.5 kg (140 lb)   Height: 5' 8\" (1.727 m)            Physical Exam  Vitals signs and nursing note reviewed. Constitutional:       Appearance: He is well-developed. HENT:      Head: Normocephalic and atraumatic. Eyes:      Conjunctiva/sclera: Conjunctivae normal.      Pupils: Pupils are equal, round, and reactive to light. Neck:      Musculoskeletal: Normal range of motion and neck supple. Cardiovascular:      Rate and Rhythm: Regular rhythm. Tachycardia present. Heart sounds: Normal heart sounds. Pulmonary:      Effort: Pulmonary effort is normal.      Breath sounds: Normal breath sounds. Abdominal:      General: Bowel sounds are normal.      Palpations: Abdomen is soft. Musculoskeletal: Normal range of motion. General: No deformity. Skin:     General: Skin is warm and dry. Comments: Flushed   Neurological:      Mental Status: He is alert and oriented to person, place, and time. Cranial Nerves: No cranial nerve deficit. Psychiatric:         Behavior: Behavior normal.          MDM  Number of Diagnoses or Management Options  Alcohol abuse:   Hyperglycemia:   Metabolic acidosis:   Diagnosis management comments: 68-year-old male frequently bringing himself to the emergency department for hyperglycemia.   There is some suspicion that the patient does this to himself in the hopes that he becomes hospitalized because he is homeless. Unfortunately patient is hypotensive with a blood sugar of almost 600. We will get IV access, work the patient up for DKA, fluid resuscitate and bring the patient sugars down. Amount and/or Complexity of Data Reviewed  Clinical lab tests: ordered and reviewed (Results for orders placed or performed during the hospital encounter of 06/11/20  -CBC WITH AUTOMATED DIFF       Result                      Value             Ref Range           WBC                         8.2               4.3 - 11.1 K*       RBC                         4.28              4.23 - 5.6 M*       HGB                         10.9 (L)          13.6 - 17.2 *       HCT                         34.3 (L)          41.1 - 50.3 %       MCV                         80.1              79.6 - 97.8 *       MCH                         25.5 (L)          26.1 - 32.9 *       MCHC                        31.8              31.4 - 35.0 *       RDW                         19.8 (H)          11.9 - 14.6 %       PLATELET                    330               150 - 450 K/*       MPV                         10.2              9.4 - 12.3 FL       ABSOLUTE NRBC               0.00              0.0 - 0.2 K/*       DF                          AUTOMATED                             NEUTROPHILS                 74                43 - 78 %           LYMPHOCYTES                 20                13 - 44 %           MONOCYTES                   5                 4.0 - 12.0 %        EOSINOPHILS                 1                 0.5 - 7.8 %         BASOPHILS                   1                 0.0 - 2.0 %         IMMATURE GRANULOCYTES       1                 0.0 - 5.0 %         ABS. NEUTROPHILS            6.0               1.7 - 8.2 K/*       ABS. LYMPHOCYTES            1.6               0.5 - 4.6 K/*       ABS. MONOCYTES              0.4               0.1 - 1.3 K/*       ABS. EOSINOPHILS            0.0               0.0 - 0.8 K/*       ABS.  BASOPHILS 0.1               0.0 - 0.2 K/*       ABS. IMM. GRANS.            0.1               0.0 - 0.5 K/*  -METABOLIC PANEL, COMPREHENSIVE       Result                      Value             Ref Range           Sodium                      134 (L)           136 - 145 mm*       Potassium                   3.7               3.5 - 5.1 mm*       Chloride                    99                98 - 107 mmo*       CO2                         17 (L)            21 - 32 mmol*       Anion gap                   18 (H)            7 - 16 mmol/L       Glucose                     542 (HH)          65 - 100 mg/*       BUN                         6                 6 - 23 MG/DL        Creatinine                  0.91              0.8 - 1.5 MG*       GFR est AA                  >60               >60 ml/min/1*       GFR est non-AA              >60               >60 ml/min/1*       Calcium                     8.7               8.3 - 10.4 M*       Bilirubin, total            0.3               0.2 - 1.1 MG*       ALT (SGPT)                  24                12 - 65 U/L         AST (SGOT)                  12 (L)            15 - 37 U/L         Alk.  phosphatase            103               50 - 136 U/L        Protein, total              6.8               6.3 - 8.2 g/*       Albumin                     3.4 (L)           3.5 - 5.0 g/*       Globulin                    3.4               2.3 - 3.5 g/*       A-G Ratio                   1.0 (L)           1.2 - 3.5      -ETHYL ALCOHOL       Result                      Value             Ref Range           ALCOHOL(ETHYL),SERUM        297               MG/DL          -SALICYLATE       Result                      Value             Ref Range           Salicylate level            <1.7 (L)          2.8 - 20.0 M*  -ACETAMINOPHEN       Result                      Value             Ref Range           Acetaminophen level         <2 (L)            10.0 - 30.0 *  -POC G3       Result                      Value Ref Range           Device:                     ROOM AIR                              pH (POC)                    7.346 (L)         7.35 - 7.45         pCO2 (POC)                  28.0 (L)          35 - 45 MMHG        pO2 (POC)                   92                75 - 100 MMHG       HCO3 (POC)                  15.3 (L)          22 - 26 MMOL*       sO2 (POC)                   97                95 - 98 %           Base deficit (POC)          9                 mmol/L              Allens test (POC)                                             NOT APPLICABLE       Site                        LEFT BRACHIAL                         Specimen type (POC)         ARTERIAL                              Performed by                                                  Mercedesifer       CO2, POC                    16                MMOL/L              COLLECT TIME                1,330                            )  Tests in the radiology section of CPT®: ordered and reviewed (Xr Abd (kub)    Result Date: 6/2/2020  KUB INDICATION:   Abdominal pain and nausea Supine views of the abdomen were obtained. FINDINGS:  There is mild diffuse prominence of the stool pattern. There is no small bowel distention. There is no evidence of obstruction. No renal calculi are seen. Lung bases are clear. IMPRESSION: Possible constipation    Xr Abd Acute W 1 V Chest    Result Date: 6/10/2020  CHEST AND ACUTE ABDOMINAL SERIES, 3 VIEWS. HISTORY: Vomiting. TECHNIQUE: AP view of the chest, flat and upright views of the abdomen on a total of 4 images. COMPARISON: Chest x-ray 2 days prior. FINDINGS: -The lungs: are clear. -The heart size: is normal. -The costophrenic angles: are sharp. -The pulmonary vasculature: is unremarkable. -Included portion of the upper abdomen: is unremarkable. -Bones: No gross bony lesions. -Other: None. IMPRESSION: Negative for free air, ileus or obstruction.      Ct Abd Pelv W Cont    Result Date: 6/3/2020  CT ABDOMEN AND PELVIS WITH CONTRAST 6/3/2020 HISTORY: Mid abdominal pain for several months. Nausea vomiting and diarrhea. TECHNIQUE: The patient received oral contrast and 100 mL Isovue-370 nonionic IV contrast. Axial images were obtained through the abdomen and pelvis. Coronal reformatted images were generated. All CT scans at this facility used dose modulation, interactive reconstruction and/or weight based dosing when appropriate to reduce radiation dose to as low as reasonably achievable. COMPARISON: April 5, 2020 FINDINGS: Included portions of the lung bases are clear. ABDOMEN: There is significant distention of the stomach with debris and minimal contrast. The gallbladder, liver, spleen, and adrenal glands are normal in appearance. Multiple calcifications are present within an atrophic pancreas. This finding suggests chronic pancreatitis. The kidneys enhance symmetrically and there is no hydronephrosis. A 1.7 cm low-attenuation left renal lesion is unchanged and is most suggestive of a cyst. Enteric contrast is present throughout the small bowel. There is no inflammation in the right lower quadrant. A moderate to large amount of gas and stool are present throughout the colon. PELVIS: The bladder is normal in appearance. There is no free pelvic fluid. There are no aggressive osseous lesions. IMPRESSION: 1. Pancreatic atrophy and calcifications compatible with chronic pancreatitis. 2. Significant gastric distention. GI follow-up is recommended to assess for evidence of gastroparesis. 3. Moderate to large amount of gas and stool throughout the colon. Xr Chest Port    Result Date: 6/11/2020  History: Hyperglycemia Exam: portable chest Comparison: 6/10/2020 Findings: No new alveolar infiltrate or pleural effusion. No change in the appearance of the mediastinal contour or osseous structures. Impressions: No acute findings. Xr Chest Port    Result Date: 6/8/2020  CHEST X-RAY, one view.  HISTORY:  Chest trauma sustained multiple falls. TECHNIQUE:  AP upright portable view. COMPARISON: None. FINDINGS:   -The lungs: are clear. No pneumothorax. -The heart size: is normal. -The costophrenic angles: are sharp. -The pulmonary vasculature: is unremarkable. -Included portion of the upper abdomen: is unremarkable. -Bones: No gross fracture. -Other: None. IMPRESSION:  Negative for acute change.     )  Tests in the medicine section of CPT®: ordered and reviewed  Discuss the patient with other providers: yes (Discussed with the hospitalist who will assume care)    Risk of Complications, Morbidity, and/or Mortality  Presenting problems: high  Diagnostic procedures: high  Management options: high  General comments: I personally reviewed the patient's vital signs, laboratory tests, and/or radiological findings. I discussed these findings with the patient and their significance. I answered all questions and explained that given these findings there is significant concern for increased morbidity and/or mortality without immediate intervention. As a result, I recommended admission to the hospital, consulted the appropriate service, and transitioned care to that service in improved condition      Critical Care  Total time providing critical care: 30-74 minutes    Patient Progress  Patient progress: stable    ED Course as of Jun 11 1623   Thu Jun 11, 2020   1310 Patient getting started in a non-monitored room but concern for DKA with hypotension and an anion gap of 18     [JS]   1320 Patient with recurrent blood alcohol of almost 300. Anion gap may be secondary to alcohol abuse versus DKA. [JS]   26 Consulting hospitalist due to patient's high risk for bounce back, metabolic acidosis, hyperglycemia, alcoholic ketoacidosis.     [JS]      ED Course User Index  [JS] Christelle Raymond MD       CRITICAL CARE (ASAP ONLY)  Performed by: Christelle Raymond MD  Authorized by: Christelle Raymond MD     Critical care provider statement: Critical care time (minutes):  36    Critical care was necessary to treat or prevent imminent or life-threatening deterioration of the following conditions:  Circulatory failure and endocrine crisis    Critical care was time spent personally by me on the following activities:  Blood draw for specimens, ordering and performing treatments and interventions, development of treatment plan with patient or surrogate, ordering and review of laboratory studies, discussions with consultants, ordering and review of radiographic studies, interpretation of cardiac output measurements, examination of patient, discussions with primary provider and evaluation of patient's response to treatment    I assumed direction of critical care for this patient from another provider in my specialty: no

## 2020-06-12 LAB
ADMINISTERED INITIALS, ADMINIT: NORMAL
ANION GAP SERPL CALC-SCNC: 5 MMOL/L (ref 7–16)
ANION GAP SERPL CALC-SCNC: 8 MMOL/L (ref 7–16)
BASOPHILS # BLD: 0 K/UL (ref 0–0.2)
BASOPHILS NFR BLD: 0 % (ref 0–2)
BUN SERPL-MCNC: 5 MG/DL (ref 6–23)
BUN SERPL-MCNC: 6 MG/DL (ref 6–23)
CALCIUM SERPL-MCNC: 8.1 MG/DL (ref 8.3–10.4)
CALCIUM SERPL-MCNC: 8.3 MG/DL (ref 8.3–10.4)
CHLORIDE SERPL-SCNC: 115 MMOL/L (ref 98–107)
CHLORIDE SERPL-SCNC: 117 MMOL/L (ref 98–107)
CO2 SERPL-SCNC: 22 MMOL/L (ref 21–32)
CO2 SERPL-SCNC: 25 MMOL/L (ref 21–32)
CREAT SERPL-MCNC: 0.47 MG/DL (ref 0.8–1.5)
CREAT SERPL-MCNC: 0.54 MG/DL (ref 0.8–1.5)
D50 ADMINISTERED, D50ADM: 0 ML
D50 ORDER, D50ORD: 0 ML
DIFFERENTIAL METHOD BLD: ABNORMAL
EOSINOPHIL # BLD: 0.2 K/UL (ref 0–0.8)
EOSINOPHIL NFR BLD: 3 % (ref 0.5–7.8)
ERYTHROCYTE [DISTWIDTH] IN BLOOD BY AUTOMATED COUNT: 20 % (ref 11.9–14.6)
GLSCOM COMMENTS: NORMAL
GLUCOSE BLD STRIP.AUTO-MCNC: 110 MG/DL (ref 65–100)
GLUCOSE BLD STRIP.AUTO-MCNC: 128 MG/DL (ref 65–100)
GLUCOSE BLD STRIP.AUTO-MCNC: 146 MG/DL (ref 65–100)
GLUCOSE BLD STRIP.AUTO-MCNC: 149 MG/DL (ref 65–100)
GLUCOSE BLD STRIP.AUTO-MCNC: 173 MG/DL (ref 65–100)
GLUCOSE BLD STRIP.AUTO-MCNC: 301 MG/DL (ref 65–100)
GLUCOSE BLD STRIP.AUTO-MCNC: 54 MG/DL (ref 65–100)
GLUCOSE BLD STRIP.AUTO-MCNC: 64 MG/DL (ref 65–100)
GLUCOSE BLD STRIP.AUTO-MCNC: 67 MG/DL (ref 65–100)
GLUCOSE BLD STRIP.AUTO-MCNC: 84 MG/DL (ref 65–100)
GLUCOSE BLD STRIP.AUTO-MCNC: 85 MG/DL (ref 65–100)
GLUCOSE BLD STRIP.AUTO-MCNC: 87 MG/DL (ref 65–100)
GLUCOSE BLD STRIP.AUTO-MCNC: 88 MG/DL (ref 65–100)
GLUCOSE BLD STRIP.AUTO-MCNC: 92 MG/DL (ref 65–100)
GLUCOSE BLD STRIP.AUTO-MCNC: 98 MG/DL (ref 65–100)
GLUCOSE SERPL-MCNC: 82 MG/DL (ref 65–100)
GLUCOSE SERPL-MCNC: 88 MG/DL (ref 65–100)
GLUCOSE, GLC: 146 MG/DL
GLUCOSE, GLC: 173 MG/DL
GLUCOSE, GLC: 85 MG/DL
GLUCOSE, GLC: 87 MG/DL
GLUCOSE, GLC: 88 MG/DL
GLUCOSE, GLC: 92 MG/DL
GLUCOSE, GLC: 98 MG/DL
HCT VFR BLD AUTO: 30.6 % (ref 41.1–50.3)
HGB BLD-MCNC: 9.9 G/DL (ref 13.6–17.2)
HIGH TARGET, HITG: 180 MG/DL
IMM GRANULOCYTES # BLD AUTO: 0 K/UL (ref 0–0.5)
IMM GRANULOCYTES NFR BLD AUTO: 1 % (ref 0–5)
INSULIN ADMINSTERED, INSADM: 0 UNITS/HOUR
INSULIN ADMINSTERED, INSADM: 0 UNITS/HOUR
INSULIN ADMINSTERED, INSADM: 0.3 UNITS/HOUR
INSULIN ADMINSTERED, INSADM: 0.5 UNITS/HOUR
INSULIN ADMINSTERED, INSADM: 1.1 UNITS/HOUR
INSULIN ADMINSTERED, INSADM: 3.4 UNITS/HOUR
INSULIN ADMINSTERED, INSADM: 4.5 UNITS/HOUR
INSULIN ORDER, INSORD: 0 UNITS/HOUR
INSULIN ORDER, INSORD: 0 UNITS/HOUR
INSULIN ORDER, INSORD: 0.3 UNITS/HOUR
INSULIN ORDER, INSORD: 0.5 UNITS/HOUR
INSULIN ORDER, INSORD: 1.1 UNITS/HOUR
INSULIN ORDER, INSORD: 3.4 UNITS/HOUR
INSULIN ORDER, INSORD: 4.5 UNITS/HOUR
LIPASE SERPL-CCNC: 17 U/L (ref 73–393)
LOW TARGET, LOT: 140 MG/DL
LYMPHOCYTES # BLD: 2.1 K/UL (ref 0.5–4.6)
LYMPHOCYTES NFR BLD: 46 % (ref 13–44)
MAGNESIUM SERPL-MCNC: 1.8 MG/DL (ref 1.8–2.4)
MAGNESIUM SERPL-MCNC: 1.8 MG/DL (ref 1.8–2.4)
MCH RBC QN AUTO: 25.7 PG (ref 26.1–32.9)
MCHC RBC AUTO-ENTMCNC: 32.4 G/DL (ref 31.4–35)
MCV RBC AUTO: 79.5 FL (ref 79.6–97.8)
MINUTES UNTIL NEXT BG, NBG: 60 MIN
MONOCYTES # BLD: 0.4 K/UL (ref 0.1–1.3)
MONOCYTES NFR BLD: 8 % (ref 4–12)
MULTIPLIER, MUL: 0
MULTIPLIER, MUL: 0
MULTIPLIER, MUL: 0.01
MULTIPLIER, MUL: 0.02
MULTIPLIER, MUL: 0.03
MULTIPLIER, MUL: 0.04
MULTIPLIER, MUL: 0.04
NEUTS SEG # BLD: 1.9 K/UL (ref 1.7–8.2)
NEUTS SEG NFR BLD: 42 % (ref 43–78)
NRBC # BLD: 0 K/UL (ref 0–0.2)
ORDER INITIALS, ORDINIT: NORMAL
PLATELET # BLD AUTO: 240 K/UL (ref 150–450)
PMV BLD AUTO: 10 FL (ref 9.4–12.3)
POTASSIUM SERPL-SCNC: 3.6 MMOL/L (ref 3.5–5.1)
POTASSIUM SERPL-SCNC: 3.9 MMOL/L (ref 3.5–5.1)
RBC # BLD AUTO: 3.85 M/UL (ref 4.23–5.6)
SODIUM SERPL-SCNC: 145 MMOL/L (ref 136–145)
SODIUM SERPL-SCNC: 147 MMOL/L (ref 136–145)
WBC # BLD AUTO: 4.6 K/UL (ref 4.3–11.1)

## 2020-06-12 PROCEDURE — 83735 ASSAY OF MAGNESIUM: CPT

## 2020-06-12 PROCEDURE — 74011636637 HC RX REV CODE- 636/637: Performed by: HOSPITALIST

## 2020-06-12 PROCEDURE — 85025 COMPLETE CBC W/AUTO DIFF WBC: CPT

## 2020-06-12 PROCEDURE — 74011250636 HC RX REV CODE- 250/636: Performed by: HOSPITALIST

## 2020-06-12 PROCEDURE — 80048 BASIC METABOLIC PNL TOTAL CA: CPT

## 2020-06-12 PROCEDURE — 36415 COLL VENOUS BLD VENIPUNCTURE: CPT

## 2020-06-12 PROCEDURE — 83690 ASSAY OF LIPASE: CPT

## 2020-06-12 PROCEDURE — 74011250637 HC RX REV CODE- 250/637: Performed by: INTERNAL MEDICINE

## 2020-06-12 PROCEDURE — 65270000029 HC RM PRIVATE

## 2020-06-12 PROCEDURE — 74011250636 HC RX REV CODE- 250/636: Performed by: INTERNAL MEDICINE

## 2020-06-12 PROCEDURE — 74011250637 HC RX REV CODE- 250/637: Performed by: HOSPITALIST

## 2020-06-12 PROCEDURE — 82962 GLUCOSE BLOOD TEST: CPT

## 2020-06-12 RX ORDER — MORPHINE SULFATE 2 MG/ML
2 INJECTION, SOLUTION INTRAMUSCULAR; INTRAVENOUS
Status: DISCONTINUED | OUTPATIENT
Start: 2020-06-12 | End: 2020-06-12

## 2020-06-12 RX ORDER — HYDROMORPHONE HYDROCHLORIDE 1 MG/ML
1 INJECTION, SOLUTION INTRAMUSCULAR; INTRAVENOUS; SUBCUTANEOUS
Status: DISCONTINUED | OUTPATIENT
Start: 2020-06-12 | End: 2020-06-12

## 2020-06-12 RX ORDER — LANOLIN ALCOHOL/MO/W.PET/CERES
100 CREAM (GRAM) TOPICAL DAILY
Status: DISCONTINUED | OUTPATIENT
Start: 2020-06-12 | End: 2020-06-15 | Stop reason: HOSPADM

## 2020-06-12 RX ORDER — HYDROMORPHONE HYDROCHLORIDE 1 MG/ML
1 INJECTION, SOLUTION INTRAMUSCULAR; INTRAVENOUS; SUBCUTANEOUS
Status: DISCONTINUED | OUTPATIENT
Start: 2020-06-12 | End: 2020-06-15 | Stop reason: HOSPADM

## 2020-06-12 RX ORDER — DEXTROSE 40 %
15 GEL (GRAM) ORAL AS NEEDED
Status: DISCONTINUED | OUTPATIENT
Start: 2020-06-12 | End: 2020-06-15 | Stop reason: HOSPADM

## 2020-06-12 RX ORDER — INSULIN LISPRO 100 [IU]/ML
INJECTION, SOLUTION INTRAVENOUS; SUBCUTANEOUS
Status: DISCONTINUED | OUTPATIENT
Start: 2020-06-12 | End: 2020-06-15 | Stop reason: HOSPADM

## 2020-06-12 RX ORDER — PANTOPRAZOLE SODIUM 40 MG/1
40 TABLET, DELAYED RELEASE ORAL
Status: DISCONTINUED | OUTPATIENT
Start: 2020-06-12 | End: 2020-06-15 | Stop reason: HOSPADM

## 2020-06-12 RX ORDER — ACETAMINOPHEN 500 MG
500 TABLET ORAL
Status: DISCONTINUED | OUTPATIENT
Start: 2020-06-12 | End: 2020-06-15 | Stop reason: HOSPADM

## 2020-06-12 RX ORDER — QUETIAPINE FUMARATE 100 MG/1
100 TABLET, FILM COATED ORAL 2 TIMES DAILY
Status: DISCONTINUED | OUTPATIENT
Start: 2020-06-12 | End: 2020-06-15 | Stop reason: HOSPADM

## 2020-06-12 RX ORDER — SERTRALINE HYDROCHLORIDE 50 MG/1
50 TABLET, FILM COATED ORAL DAILY
Status: DISCONTINUED | OUTPATIENT
Start: 2020-06-13 | End: 2020-06-15 | Stop reason: HOSPADM

## 2020-06-12 RX ORDER — HYDROCODONE BITARTRATE AND ACETAMINOPHEN 7.5; 325 MG/1; MG/1
1 TABLET ORAL
Status: DISCONTINUED | OUTPATIENT
Start: 2020-06-12 | End: 2020-06-15 | Stop reason: HOSPADM

## 2020-06-12 RX ORDER — INSULIN LISPRO 100 [IU]/ML
3 INJECTION, SOLUTION INTRAVENOUS; SUBCUTANEOUS
Status: DISCONTINUED | OUTPATIENT
Start: 2020-06-13 | End: 2020-06-15 | Stop reason: HOSPADM

## 2020-06-12 RX ORDER — METOCLOPRAMIDE HYDROCHLORIDE 5 MG/ML
10 INJECTION INTRAMUSCULAR; INTRAVENOUS ONCE
Status: COMPLETED | OUTPATIENT
Start: 2020-06-12 | End: 2020-06-12

## 2020-06-12 RX ORDER — SODIUM CHLORIDE 9 MG/ML
150 INJECTION, SOLUTION INTRAVENOUS CONTINUOUS
Status: DISCONTINUED | OUTPATIENT
Start: 2020-06-12 | End: 2020-06-13

## 2020-06-12 RX ORDER — TRAMADOL HYDROCHLORIDE 50 MG/1
50 TABLET ORAL
Status: DISCONTINUED | OUTPATIENT
Start: 2020-06-12 | End: 2020-06-12

## 2020-06-12 RX ORDER — DEXTROSE 50 % IN WATER (D50W) INTRAVENOUS SYRINGE
25-50 AS NEEDED
Status: DISCONTINUED | OUTPATIENT
Start: 2020-06-12 | End: 2020-06-15 | Stop reason: HOSPADM

## 2020-06-12 RX ORDER — METOCLOPRAMIDE HYDROCHLORIDE 5 MG/ML
10 INJECTION INTRAMUSCULAR; INTRAVENOUS
Status: DISCONTINUED | OUTPATIENT
Start: 2020-06-12 | End: 2020-06-15 | Stop reason: HOSPADM

## 2020-06-12 RX ORDER — LORAZEPAM 2 MG/ML
2 INJECTION INTRAMUSCULAR
Status: DISCONTINUED | OUTPATIENT
Start: 2020-06-12 | End: 2020-06-15 | Stop reason: HOSPADM

## 2020-06-12 RX ORDER — INSULIN LISPRO 100 [IU]/ML
5 INJECTION, SOLUTION INTRAVENOUS; SUBCUTANEOUS
Status: DISCONTINUED | OUTPATIENT
Start: 2020-06-12 | End: 2020-06-12

## 2020-06-12 RX ADMIN — INSULIN LISPRO 5 UNITS: 100 INJECTION, SOLUTION INTRAVENOUS; SUBCUTANEOUS at 08:32

## 2020-06-12 RX ADMIN — QUETIAPINE FUMARATE 200 MG: 100 TABLET ORAL at 08:06

## 2020-06-12 RX ADMIN — ACETAMINOPHEN 650 MG: 325 TABLET, FILM COATED ORAL at 10:47

## 2020-06-12 RX ADMIN — Medication 5 ML: at 21:34

## 2020-06-12 RX ADMIN — MORPHINE SULFATE 2 MG: 2 INJECTION, SOLUTION INTRAMUSCULAR; INTRAVENOUS at 13:03

## 2020-06-12 RX ADMIN — Medication 1 TUBE: at 22:17

## 2020-06-12 RX ADMIN — TRAMADOL HYDROCHLORIDE 50 MG: 50 TABLET, FILM COATED ORAL at 11:55

## 2020-06-12 RX ADMIN — MORPHINE SULFATE 2 MG: 2 INJECTION, SOLUTION INTRAMUSCULAR; INTRAVENOUS at 16:56

## 2020-06-12 RX ADMIN — ONDANSETRON 4 MG: 2 INJECTION INTRAMUSCULAR; INTRAVENOUS at 01:17

## 2020-06-12 RX ADMIN — METOCLOPRAMIDE HYDROCHLORIDE 10 MG: 5 INJECTION INTRAMUSCULAR; INTRAVENOUS at 13:42

## 2020-06-12 RX ADMIN — SERTRALINE HYDROCHLORIDE 100 MG: 50 TABLET ORAL at 08:06

## 2020-06-12 RX ADMIN — Medication 100 MG: at 08:06

## 2020-06-12 RX ADMIN — Medication 10 ML: at 13:42

## 2020-06-12 RX ADMIN — SODIUM CHLORIDE 150 ML/HR: 9 INJECTION, SOLUTION INTRAVENOUS at 18:15

## 2020-06-12 RX ADMIN — HYDROMORPHONE HYDROCHLORIDE 1 MG: 1 INJECTION, SOLUTION INTRAMUSCULAR; INTRAVENOUS; SUBCUTANEOUS at 21:26

## 2020-06-12 RX ADMIN — PANTOPRAZOLE SODIUM 40 MG: 40 TABLET, DELAYED RELEASE ORAL at 16:56

## 2020-06-12 RX ADMIN — INSULIN LISPRO 5 UNITS: 100 INJECTION, SOLUTION INTRAVENOUS; SUBCUTANEOUS at 11:28

## 2020-06-12 RX ADMIN — INSULIN HUMAN 10 UNITS: 100 INJECTION, SUSPENSION SUBCUTANEOUS at 16:57

## 2020-06-12 RX ADMIN — HYDROCODONE BITARTRATE AND ACETAMINOPHEN 1 TABLET: 7.5; 325 TABLET ORAL at 14:11

## 2020-06-12 RX ADMIN — ONDANSETRON 4 MG: 2 INJECTION INTRAMUSCULAR; INTRAVENOUS at 10:47

## 2020-06-12 RX ADMIN — QUETIAPINE FUMARATE 100 MG: 100 TABLET ORAL at 17:02

## 2020-06-12 RX ADMIN — HYDROCODONE BITARTRATE AND ACETAMINOPHEN 1 TABLET: 7.5; 325 TABLET ORAL at 20:07

## 2020-06-12 RX ADMIN — DEXTROSE MONOHYDRATE, SODIUM CHLORIDE, AND POTASSIUM CHLORIDE: 50; 4.5; 2.98 INJECTION, SOLUTION INTRAVENOUS at 07:06

## 2020-06-12 RX ADMIN — INSULIN LISPRO 7 UNITS: 100 INJECTION, SOLUTION INTRAVENOUS; SUBCUTANEOUS at 16:57

## 2020-06-12 RX ADMIN — METOCLOPRAMIDE HYDROCHLORIDE 10 MG: 5 INJECTION INTRAMUSCULAR; INTRAVENOUS at 11:22

## 2020-06-12 RX ADMIN — PANTOPRAZOLE SODIUM 40 MG: 40 TABLET, DELAYED RELEASE ORAL at 11:22

## 2020-06-12 RX ADMIN — HYDROMORPHONE HYDROCHLORIDE 1 MG: 1 INJECTION, SOLUTION INTRAMUSCULAR; INTRAVENOUS; SUBCUTANEOUS at 18:16

## 2020-06-12 RX ADMIN — Medication 10 ML: at 05:20

## 2020-06-12 RX ADMIN — INSULIN LISPRO 5 UNITS: 100 INJECTION, SOLUTION INTRAVENOUS; SUBCUTANEOUS at 16:57

## 2020-06-12 NOTE — PROGRESS NOTES
TRANSFER - OUT REPORT:    Verbal report given to [address] [place] [address] Steff, RN (name) on Krishna Carter  being transferred to  (unit) for routine progression of care       Report consisted of patients Situation, Background, Assessment and   Recommendations(SBAR). Information from the following report(s) SBAR, Kardex, Intake/Output, MAR, Recent Results, Cardiac Rhythm NSR and Alarm Parameters  was reviewed with the receiving nurse. Lines:   Peripheral IV 06/11/20 Right Hand (Active)   Site Assessment Clean, dry, & intact 6/12/2020  8:00 AM   Phlebitis Assessment 0 6/12/2020  8:00 AM   Infiltration Assessment 0 6/12/2020  8:00 AM   Dressing Status Clean, dry, & intact 6/12/2020  8:00 AM   Dressing Type Transparent;Tape 6/12/2020  8:00 AM   Hub Color/Line Status Blue;Patent; Flushed 6/12/2020  8:00 AM   Alcohol Cap Used No 6/12/2020  8:00 AM       Peripheral IV 06/11/20 Left Forearm (Active)   Site Assessment Clean, dry, & intact 6/12/2020  8:00 AM   Phlebitis Assessment 0 6/12/2020  8:00 AM   Infiltration Assessment 0 6/12/2020  8:00 AM   Dressing Status Clean, dry, & intact 6/12/2020  8:00 AM   Dressing Type Transparent;Tape 6/12/2020  8:00 AM   Hub Color/Line Status Pink;Patent; Flushed 6/12/2020  8:00 AM   Alcohol Cap Used No 6/12/2020  8:00 AM        Opportunity for questions and clarification was provided.

## 2020-06-12 NOTE — PROGRESS NOTES
Pt seen s/p admission to ICU for DKA. Confirms demographics. States he does not have any family, mother and father . Brother, completely disabled and unable to list him as emergency contact. Has ex wife and 15 y/o, but does not want them listed. He states he has niece in [place], [facility], but no number or where she might live. When ask him who would make decisions for him, if he could not, states, \"smartest person in the room\". He is unemployed and not eligible for SSA or BATOOL per [facility]. Pt is homeless, [place]. He has frequent hospitalizations/ER visits at [facility], and here. [facility] has [facility] in past. List of shelters and self pay resources have been given to patient in past and just recent on 6/10/2020 by [facility]. He does not drive, walks to where he needs to go. Pt mainly asking for mental health appointment currently and primary RN aware. Gets meds through [facility] he confirms. CM will follow for d/c needs/POC. Care Management Interventions  PCP Verified by CM: No  Mode of Transport at Discharge: Other (see comment)  Transition of Care Consult (CM Consult): Discharge Planning  Discharge Durable Medical Equipment: (none)  Current Support Network:  Other  Confirm Follow Up Transport: Other (see comment)  [employer] Information Provided?: (no payor source/DECO seen)  Discharge Location  Discharge Placement: Unable to determine at this time

## 2020-06-12 NOTE — INTERDISCIPLINARY ROUNDS
Interdisciplinary team rounds were held 6/12/2020 with the following team members:Care Management, Nursing, Nurse Practitioner, Nutrition, Palliative Care, Pastoral Care, Pharmacy, Physical Therapy, Physician, Respiratory Therapy and Clinical Coordinator and the patient. Plan of care discussed. See clinical pathway and/or care plan for interventions and desired outcomes.

## 2020-06-12 NOTE — PROGRESS NOTES
TRANSFER - IN REPORT:    Verbal report received from Yarely Wyatt RN(name) on Kiki Canal  being received from ED(unit) for routine progression of care      Report consisted of patients Situation, Background, Assessment and   Recommendations(SBAR). Information from the following report(s) SBAR, Kardex, ED Summary, Intake/Output, MAR, Recent Results and Alarm Parameters  was reviewed with the receiving nurse. Opportunity for questions and clarification was provided. Assessment to be completed upon patients arrival to unit when care is assumed.

## 2020-06-12 NOTE — PROGRESS NOTES
Hospitalist Note     Admit Date:  2020  1:04 PM   Name:  Jayden Mar   Age:  39 y.o.  :  1975   MRN:  [id number]   PCP:  None    subj:     39year old male presents to the ER with intoxication. Just discharged from [facility] on the day of this admission. He stated he drank alcohol and did not take his insulin. He states he takes insulin, when asked how much, he says \"not enough\". Last drink was  in AM but does not know the time. He does endorse abdominal pain but cannot describe a numerical value of the pain, radiation, what makes it better/worse. Today, dka resolved, pt has not well controlled nausea/ vomiting/ abdominal pain but able to maintain oral intake.     Objective:     Patient Vitals for the past 24 hrs:   Temp Pulse Resp BP SpO2   20 1504  69 16     20 1431  71 23     20 1429  68  128/77    20 1424  70 20     20 1416  73 21  98 %   20 1413  76 24  99 %   20 1400  81 23 124/69 99 %   20 1331  76 14  97 %   20 1329    122/67    20 1320  79 15  99 %   20 1300    (!) 136/98    20 1259  89 25  97 %   20 1258  85 26  98 %   20 1256  82 20  99 %   20 1229    123/76    20 1228  79 18     20 1225  80 (!) 3     20 1221  89 17     20 1206  85 19  99 %   20 1200  86 14 123/69 98 %   20 1129 98.2 °F (36.8 °C) 90 14 131/86 99 %   20 1100  77 17 124/70 100 %   20 1029  72 14 102/56 97 %   20 1000  78 13 (!) 89/53 97 %   20 0929  84 14 103/59 95 %   20 0928  84 13  95 %   06/12/20 0900  83 18 109/66 96 %   20 0829  69 14 121/72 98 %   20 0809  68 10  100 %   20 0800 97.6 °F (36.4 °C) 65 14 102/60 98 %   20 0745  68 16 124/75 97 %   20 0743  68 21  97 %   20 0729  66 14 113/73 97 %   20 0715  66 15 116/71 98 %   20 0700  63 13 127/62 99 %   06/12/20 0645  66 13 106/59 99 %   06/12/20 0629  64 13 116/59 98 %   06/12/20 0615  68 25 118/80 97 %   06/12/20 0600  66 14 113/74 97 %   06/12/20 0545  66 17 116/69 97 %   06/12/20 0529  69 13 116/70 96 %   06/12/20 0515  69 17 122/76 98 %   06/12/20 0500  70 14 109/69 97 %   06/12/20 0445  72 16 105/64 96 %   06/12/20 0429  75 13 101/62 98 %   06/12/20 0415  76 14 111/63 97 %   06/12/20 0400  75 12 108/63 97 %   06/12/20 0329  79 15 106/59 97 %   06/12/20 0315 98.3 °F (36.8 °C) 81 15 107/58 97 %   06/12/20 0300  82 11 96/54 97 %   06/12/20 0245  86 13 100/55 97 %   06/12/20 0229  86 15 94/52 97 %   06/12/20 0215  84 17 93/55 96 %   06/12/20 0200  81 18 91/50 97 %   06/12/20 0145  84 18 116/70 97 %   06/12/20 0100  87 16 107/58 95 %   06/12/20 0045  91 17 114/61 94 %   06/12/20 0029  88 16 111/57 95 %   06/12/20 0015  86 15 111/63 94 %   06/12/20 0000  87 14 111/58 96 %   06/11/20 2345 98.6 °F (37 °C) 83 15 112/59 94 %   06/11/20 2329  83 13 108/59 94 %   06/11/20 2316  81 15  96 %   06/11/20 2315  80  108/55 94 %   06/11/20 2300  78 15 101/58 95 %   06/11/20 2245  79 15 107/61 93 %   06/11/20 2229  74 14 104/57 96 %   06/11/20 2215  75 14 106/62 96 %   06/11/20 2200  72 14 95/57 96 %   06/11/20 2145  70 14 92/55 95 %   06/11/20 2129 98.6 °F (37 °C) 72 13 92/51 95 %   06/11/20 2115  75 10 (!) 89/51 96 %   06/11/20 2104  76 16 91/54 95 %   06/11/20 2003  70  (!) 87/54 96 %   06/11/20 1948 98.1 °F (36.7 °C)       06/11/20 1944  77  (!) 80/50 97 %   06/11/20 1941  73  (!) 85/54 96 %   06/11/20 1924    (!) 85/54    06/11/20 1530    101/59      Oxygen Therapy  O2 Sat (%): 98 % (06/12/20 1416)  Pulse via Oximetry: 72 beats per minute (06/12/20 1416)  O2 Device: Room air (06/12/20 0800)    Intake/Output Summary (Last 24 hours) at 6/12/2020 1526  Last data filed at 6/12/2020 1310  Gross per 24 hour   Intake 1135.34 ml   Output 1125 ml   Net 10.34 ml Physical Exam:  General:    Well nourished. Alert. Unkempt, pale  CV:   RRR. No m/r/g. Lungs:  CTAB. No wheezing, rhonchi, or rales. Abdomen: Soft, tender diffusely mildly, nondistended. Bowel sounds normal.   Extremities: Warm and dry. No cyanosis or edema. Neurologic: grossly intact. Skin:     No rashes or jaundice. Normal coloration  Psych:  Normal mood and affect. I reviewed the labs, imaging, EKGs, telemetry, and other studies done this admission.   Data Review:   Recent Results (from the past 24 hour(s))   DRUG SCREEN, URINE    Collection Time: 06/11/20  4:59 PM   Result Value Ref Range    PCP(PHENCYCLIDINE) Negative      BENZODIAZEPINES Negative      COCAINE Negative      AMPHETAMINES Negative      METHADONE Negative      THC (TH-CANNABINOL) Negative      OPIATES Negative      BARBITURATES Negative     URINALYSIS W/ RFLX MICROSCOPIC    Collection Time: 06/11/20  4:59 PM   Result Value Ref Range    Color YELLOW      Appearance CLEAR      Specific gravity 1.020 1.001 - 1.023      pH (UA) 6.0 5.0 - 9.0      Protein Negative NEG mg/dL    Glucose >1,000 mg/dL    Ketone TRACE (A) NEG mg/dL    Bilirubin Negative NEG      Blood Negative NEG      Urobilinogen 0.2 0.2 - 1.0 EU/dL    Nitrites Negative NEG      Leukocyte Esterase Negative NEG     GLUCOSE, POC    Collection Time: 06/11/20  6:38 PM   Result Value Ref Range    Glucose (POC) 427 (H) 65 - 100 mg/dL   GLUCOSTABILIZER    Collection Time: 06/11/20  7:34 PM   Result Value Ref Range    Glucose 427 mg/dL    Insulin order 7.3 units/hour    Insulin adminstered 7.3 units/hour    Multiplier 0.020     Low target 150 mg/dL    High target 250 mg/dL    D50 order 0.0 ml    D50 administered 0.00 ml    Minutes until next BG 60 min    Order initials cs, rn     Administered initials favian rn     GLSCOM Comments     METABOLIC PANEL, BASIC    Collection Time: 06/11/20  7:41 PM   Result Value Ref Range    Sodium 142 136 - 145 mmol/L    Potassium 3.4 (L) 3.5 - 5.1 mmol/L    Chloride 110 (H) 98 - 107 mmol/L    CO2 18 (L) 21 - 32 mmol/L    Anion gap 14 7 - 16 mmol/L    Glucose 382 (H) 65 - 100 mg/dL    BUN 5 (L) 6 - 23 MG/DL    Creatinine 0.77 (L) 0.8 - 1.5 MG/DL    GFR est AA >60 >60 ml/min/1.73m2    GFR est non-AA >60 >60 ml/min/1.73m2    Calcium 7.8 (L) 8.3 - 10.4 MG/DL   MAGNESIUM    Collection Time: 06/11/20  7:41 PM   Result Value Ref Range    Magnesium 2.0 1.8 - 2.4 mg/dL   PHOSPHORUS    Collection Time: 06/11/20  7:41 PM   Result Value Ref Range    Phosphorus 3.7 2.5 - 4.5 MG/DL   GLUCOSE, POC    Collection Time: 06/11/20  8:39 PM   Result Value Ref Range    Glucose (POC) 337 (H) 65 - 100 mg/dL   GLUCOSTABILIZER    Collection Time: 06/11/20  8:40 PM   Result Value Ref Range    Glucose 337 mg/dL    Insulin order 5.5 units/hour    Insulin adminstered 5.5 units/hour    Multiplier 0.020     Low target 150 mg/dL    High target 250 mg/dL    D50 order 0.0 ml    D50 administered 0.00 ml    Minutes until next BG 60 min    Order initials [id number]     Administered initials JKJ     GLSCOM Comments     GLUCOSE, POC    Collection Time: 06/11/20  9:07 PM   Result Value Ref Range    Glucose (POC) 317 (H) 65 - 100 mg/dL   GLUCOSTABILIZER    Collection Time: 06/11/20  9:08 PM   Result Value Ref Range    Glucose 317 mg/dL    Insulin order 5.1 units/hour    Insulin adminstered 5.1 units/hour    Multiplier 0.020     Low target 140 mg/dL    High target 180 mg/dL    D50 order 0.0 ml    D50 administered 0.00 ml    Minutes until next BG 60 min    Order initials JCP     Administered initials JCVIKTORIA     GLSCOM Comments     GLUCOSE, POC    Collection Time: 06/11/20 10:07 PM   Result Value Ref Range    Glucose (POC) 261 (H) 65 - 100 mg/dL   GLUCOSTABILIZER    Collection Time: 06/11/20 10:08 PM   Result Value Ref Range    Glucose 261 mg/dL    Insulin order 6.0 units/hour    Insulin adminstered 6.0 units/hour    Multiplier 0.030     Low target 140 mg/dL    High target 180 mg/dL    D50 order 0.0 ml    D50 administered 0.00 ml    Minutes until next BG 60 min    Order initials MRM     Administered initials MRM     GLSCOM Comments     METABOLIC PANEL, BASIC    Collection Time: 06/11/20 10:57 PM   Result Value Ref Range    Sodium 144 136 - 145 mmol/L    Potassium 3.1 (L) 3.5 - 5.1 mmol/L    Chloride 114 (H) 98 - 107 mmol/L    CO2 21 21 - 32 mmol/L    Anion gap 9 7 - 16 mmol/L    Glucose 225 (H) 65 - 100 mg/dL    BUN 4 (L) 6 - 23 MG/DL    Creatinine 0.58 (L) 0.8 - 1.5 MG/DL    GFR est AA >60 >60 ml/min/1.73m2    GFR est non-AA >60 >60 ml/min/1.73m2    Calcium 8.0 (L) 8.3 - 10.4 MG/DL   MAGNESIUM    Collection Time: 06/11/20 10:57 PM   Result Value Ref Range    Magnesium 1.8 1.8 - 2.4 mg/dL   GLUCOSE, POC    Collection Time: 06/11/20 11:08 PM   Result Value Ref Range    Glucose (POC) 196 (H) 65 - 100 mg/dL   GLUCOSTABILIZER    Collection Time: 06/11/20 11:09 PM   Result Value Ref Range    Glucose 196 mg/dL    Insulin order 5.4 units/hour    Insulin adminstered 5.4 units/hour    Multiplier 0.040     Low target 140 mg/dL    High target 180 mg/dL    D50 order 0.0 ml    D50 administered 0.00 ml    Minutes until next BG 60 min    Order initials JCP     Administered initials JCP     GLSCOM Comments     GLUCOSE, POC    Collection Time: 06/12/20 12:11 AM   Result Value Ref Range    Glucose (POC) 173 (H) 65 - 100 mg/dL   GLUCOSTABILIZER    Collection Time: 06/12/20 12:12 AM   Result Value Ref Range    Glucose 173 mg/dL    Insulin order 4.5 units/hour    Insulin adminstered 4.5 units/hour    Multiplier 0.040     Low target 140 mg/dL    High target 180 mg/dL    D50 order 0.0 ml    D50 administered 0.00 ml    Minutes until next BG 60 min    Order initials JCP     Administered initials JC     GLSCOM Comments     GLUCOSE, POC    Collection Time: 06/12/20  1:18 AM   Result Value Ref Range    Glucose (POC) 146 (H) 65 - 100 mg/dL   GLUCOSTABILIZER    Collection Time: 06/12/20  1:19 AM   Result Value Ref Range    Glucose 146 mg/dL Insulin order 3.4 units/hour    Insulin adminstered 3.4 units/hour    Multiplier 0.040     Low target 140 mg/dL    High target 180 mg/dL    D50 order 0.0 ml    D50 administered 0.00 ml    Minutes until next BG 60 min    Order initials JCP     Administered initials JCP     GLSCOM Comments     GLUCOSE, POC    Collection Time: 06/12/20  2:19 AM   Result Value Ref Range    Glucose (POC) 98 65 - 100 mg/dL   GLUCOSTABILIZER    Collection Time: 06/12/20  2:19 AM   Result Value Ref Range    Glucose 98 mg/dL    Insulin order 1.1 units/hour    Insulin adminstered 1.1 units/hour    Multiplier 0.030     Low target 140 mg/dL    High target 180 mg/dL    D50 order 0.0 ml    D50 administered 0.00 ml    Minutes until next BG 60 min    Order initials JCP     Administered initials JCP     GLSCOM Comments     METABOLIC PANEL, BASIC    Collection Time: 06/12/20  3:13 AM   Result Value Ref Range    Sodium 145 136 - 145 mmol/L    Potassium 3.6 3.5 - 5.1 mmol/L    Chloride 115 (H) 98 - 107 mmol/L    CO2 22 21 - 32 mmol/L    Anion gap 8 7 - 16 mmol/L    Glucose 88 65 - 100 mg/dL    BUN 5 (L) 6 - 23 MG/DL    Creatinine 0.54 (L) 0.8 - 1.5 MG/DL    GFR est AA >60 >60 ml/min/1.73m2    GFR est non-AA >60 >60 ml/min/1.73m2    Calcium 8.1 (L) 8.3 - 10.4 MG/DL   CBC WITH AUTOMATED DIFF    Collection Time: 06/12/20  3:13 AM   Result Value Ref Range    WBC 4.6 4.3 - 11.1 K/uL    RBC 3.85 (L) 4.23 - 5.6 M/uL    HGB 9.9 (L) 13.6 - 17.2 g/dL    HCT 30.6 (L) 41.1 - 50.3 %    MCV 79.5 (L) 79.6 - 97.8 FL    MCH 25.7 (L) 26.1 - 32.9 PG    MCHC 32.4 31.4 - 35.0 g/dL    RDW 20.0 (H) 11.9 - 14.6 %    PLATELET 556 989 - 343 K/uL    MPV 10.0 9.4 - 12.3 FL    ABSOLUTE NRBC 0.00 0.0 - 0.2 K/uL    DF AUTOMATED      NEUTROPHILS 42 (L) 43 - 78 %    LYMPHOCYTES 46 (H) 13 - 44 %    MONOCYTES 8 4.0 - 12.0 %    EOSINOPHILS 3 0.5 - 7.8 %    BASOPHILS 0 0.0 - 2.0 %    IMMATURE GRANULOCYTES 1 0.0 - 5.0 %    ABS. NEUTROPHILS 1.9 1.7 - 8.2 K/UL    ABS.  LYMPHOCYTES 2.1 0.5 - 4.6 K/UL    ABS. MONOCYTES 0.4 0.1 - 1.3 K/UL    ABS. EOSINOPHILS 0.2 0.0 - 0.8 K/UL    ABS. BASOPHILS 0.0 0.0 - 0.2 K/UL    ABS. IMM.  GRANS. 0.0 0.0 - 0.5 K/UL   MAGNESIUM    Collection Time: 06/12/20  3:13 AM   Result Value Ref Range    Magnesium 1.8 1.8 - 2.4 mg/dL   GLUCOSE, POC    Collection Time: 06/12/20  3:19 AM   Result Value Ref Range    Glucose (POC) 87 65 - 100 mg/dL   GLUCOSTABILIZER    Collection Time: 06/12/20  3:20 AM   Result Value Ref Range    Glucose 87 mg/dL    Insulin order 0.5 units/hour    Insulin adminstered 0.5 units/hour    Multiplier 0.020     Low target 140 mg/dL    High target 180 mg/dL    D50 order 0.0 ml    D50 administered 0.00 ml    Minutes until next BG 60 min    Order initials [facility]     Administered initials [facility]     GLSCOM Comments     GLUCOSE, POC    Collection Time: 06/12/20  4:16 AM   Result Value Ref Range    Glucose (POC) 92 65 - 100 mg/dL   GLUCOSTABILIZER    Collection Time: 06/12/20  4:16 AM   Result Value Ref Range    Glucose 92 mg/dL    Insulin order 0.3 units/hour    Insulin adminstered 0.3 units/hour    Multiplier 0.010     Low target 140 mg/dL    High target 180 mg/dL    D50 order 0.0 ml    D50 administered 0.00 ml    Minutes until next BG 60 min    Order initials P     Administered initials [facility]     GLSCOM Comments     GLUCOSE, POC    Collection Time: 06/12/20  5:18 AM   Result Value Ref Range    Glucose (POC) 85 65 - 100 mg/dL   GLUCOSTABILIZER    Collection Time: 06/12/20  5:19 AM   Result Value Ref Range    Glucose 85 mg/dL    Insulin order 0.0 units/hour    Insulin adminstered 0.0 units/hour    Multiplier 0.000     Low target 140 mg/dL    High target 180 mg/dL    D50 order 0.0 ml    D50 administered 0.00 ml    Minutes until next BG 60 min    Order initials JC     Administered initials [facility]     GLSCOM Comments     METABOLIC PANEL, BASIC    Collection Time: 06/12/20  6:12 AM   Result Value Ref Range    Sodium 147 (H) 136 - 145 mmol/L    Potassium 3.9 3.5 - 5.1 mmol/L Chloride 117 (H) 98 - 107 mmol/L    CO2 25 21 - 32 mmol/L    Anion gap 5 (L) 7 - 16 mmol/L    Glucose 82 65 - 100 mg/dL    BUN 6 6 - 23 MG/DL    Creatinine 0.47 (L) 0.8 - 1.5 MG/DL    GFR est AA >60 >60 ml/min/1.73m2    GFR est non-AA >60 >60 ml/min/1.73m2    Calcium 8.3 8.3 - 10.4 MG/DL   MAGNESIUM    Collection Time: 06/12/20  6:12 AM   Result Value Ref Range    Magnesium 1.8 1.8 - 2.4 mg/dL   GLUCOSE, POC    Collection Time: 06/12/20  6:20 AM   Result Value Ref Range    Glucose (POC) 88 65 - 100 mg/dL   GLUCOSTABILIZER    Collection Time: 06/12/20  6:21 AM   Result Value Ref Range    Glucose 88 mg/dL    Insulin order 0.0 units/hour    Insulin adminstered 0.0 units/hour    Multiplier 0.000     Low target 140 mg/dL    High target 180 mg/dL    D50 order 0.0 ml    D50 administered 0.00 ml    Minutes until next BG 60 min    Order initials JCP     Administered initials JCP     GLSCOM Comments     GLUCOSE, POC    Collection Time: 06/12/20  7:07 AM   Result Value Ref Range    Glucose (POC) 110 (H) 65 - 100 mg/dL   GLUCOSE, POC    Collection Time: 06/12/20  8:08 AM   Result Value Ref Range    Glucose (POC) 128 (H) 65 - 100 mg/dL   GLUCOSE, POC    Collection Time: 06/12/20 11:25 AM   Result Value Ref Range    Glucose (POC) 149 (H) 65 - 100 mg/dL       All Micro Results     None          Assessment and Plan:     Dx:  1- DKA due to pancreatitis flare and missing insulin, non compliance, resolved  2- Possibly accute on chronic alcoholic pancreatitis, patient has burnt out pancreas per history w/ expected lipase to be negative possibly.   3- Dehydration, resolved  4- Alcoholism, last drink 1 week ago, no withdrawal spx  5- Bipolar dz, stable    Rx:  NPH/ Humalog SQ  Symptpomatic control  Ativan as needed  Thiamine  IVF aggressive  Pain control  Clear liquid diet    Dispo: TBD, homeless, followed by CM    Signed:  Adebayo Garay MD

## 2020-06-12 NOTE — ROUTINE PROCESS
TRANSFER - IN REPORT: 
 
Verbal [facility] [address] [facility] for routine progression of care Report consisted of patients Situation, Background, Assessment and  
Recommendations(SBAR). Information from the following report(s) SBAR, Kardex, Procedure Summary, Intake/Output and MAR was reviewed with the receiving nurse. Opportunity for questions and clarification was provided. Assessment completed upon patients arrival to unit and care assumed.

## 2020-06-12 NOTE — PROGRESS NOTES
Critical Care Outreach Nurse Progress Report:    Subjective: In to assess pt secondary to transfer from ICU. MEWS Score: 0 (06/12/20 1129)    Vitals:    06/12/20 1431 06/12/20 1504 06/12/20 1545 06/12/20 1622   BP:    139/83   Pulse: 71 69 72 63   Resp: 23 16 17 17   Temp:    97.9 °F (36.6 °C)   SpO2:   96% 98%   Weight:       Height:            LAB DATA:    Recent Labs     06/12/20 0612 06/12/20 0313 06/11/20 2257 06/11/20  1941  06/11/20  1225 06/10/20  0801   * 145 144 142  --  134* 133*   K 3.9 3.6 3.1* 3.4*  --  3.7 4.0   * 115* 114* 110*  --  99 100   CO2 25 22 21 18*  --  17* 21   AGAP 5* 8 9 14  --  18* 12   GLU 82 88 225* 382*  --  542* 391*   BUN 6 5* 4* 5*  --  6 5*   CREA 0.47* 0.54* 0.58* 0.77*  --  0.91 0.66*   GFRAA >60 >60 >60 >60  --  >60 >60   GFRNA >60 >60 >60 >60  --  >60 >60   CA 8.3 8.1* 8.0* 7.8*  --  8.7 9.0   MG 1.8 1.8 1.8 2.0   < >  --   --    PHOS  --   --   --  3.7  --   --   --    ALB  --   --   --   --   --  3.4* 3.4*   TP  --   --   --   --   --  6.8 6.9   GLOB  --   --   --   --   --  3.4 3.5   AGRAT  --   --   --   --   --  1.0* 1.0*   ALT  --   --   --   --   --  24 24    < > = values in this interval not displayed. Recent Labs     06/12/20 0313 06/11/20  1225 06/10/20  0801   WBC 4.6 8.2 6.0   HGB 9.9* 10.9* 11.5*   HCT 30.6* 34.3* 37.1*    330 272        Objective:     Pain Intensity 1: 9 (06/12/20 1620)  Pain Location 1: Abdomen  Pain Intervention(s) 1: Emotional support(too early for meds)  Patient Stated Pain Goal: 0    Assessment: Patient alert and oriented. Respirations unlabored. VS, labs, and progress notes reviewed. Plan: Will continue to follow per outreach protocol.

## 2020-06-12 NOTE — DIABETES MGMT
Patient admitted with DKA. Admitting blood glucose 542. HbA1c 11.4 (eAG 280). Patient placed on insulin gtt via Janki Longoria. Blood glucose this morning was 82. Creatinine 0.47. GFR >60. Patient last seen by diabetes management team 6/5/2020 with an admission for DKA. At that time patient refused education. Patient has also refused education previously during other admissions. During last admission patient was receiving NPH 12 units BID, Humalog 4 units with meals, and Humalog SSI. Provider updated via [employer] regarding patient history. Also discussed patient discharge regimen, last admission had recommend Novolin N and Novolin R as patient is self pay, had considered 70/30 insulin but was concerned of hypoglycemia risk due to food insecurity.

## 2020-06-12 NOTE — CONSULTS
LEAP[facility]OG PROTOCOL NOTE    Jaja Ramirez  6/12/2020    The patient is currently in the critical care setting managed by Dr. Jigna George with DKA and ETOH abuse. The patient's chart is reviewed and the patient is discussed with the staff. On room air. Patient is currently hemodynamically stable. Patient has no needs identified for Intensivist management in the critical care setting at this time. Please notify us if can be of assistance. No charge billed to the patient. Thank you.     Florrie Bernheim, NP

## 2020-06-12 NOTE — PROGRESS NOTES
Initial visit made to patient and a prayer was provided. A  card was left. He was not alert.         CARMINA Whatley

## 2020-06-12 NOTE — PROGRESS NOTES
06/12/20 1620   Dual Skin Pressure Injury Assessment   Dual Skin Pressure Injury Assessment WDL   Second Care Provider (Based on Facility Policy) ANGELIQUE Vásquez   Skin Integumentary   Skin Integumentary (WDL) X   Skin Color Appropriate for ethnicity   Skin Condition/Temp Dry; Warm   Skin Integrity Abrasion    Pressure  Injury Documentation No Pressure Injury Noted-Pressure Ulcer Prevention Initiated   Wound Prevention and Protection Methods   Orientation of Wound Prevention Mid;Posterior   Location of Wound Prevention Sacrum/Coccyx   Dressing Present  No   Wound Offloading (Prevention Methods) Bed, pressure reduction mattress;Repositioning

## 2020-06-12 NOTE — PROGRESS NOTES
Bedside, Verbal and Written shift change report given to Janice Snellen, RN (oncoming nurse) by Carter Colón RN (offgoing nurse). Report included the following information SBAR, Kardex, ED Summary, Intake/Output, MAR, Recent Results, Cardiac Rhythm NSR and Alarm Parameters .

## 2020-06-13 PROBLEM — Z66 DNR (DO NOT RESUSCITATE): Status: ACTIVE | Noted: 2020-06-13

## 2020-06-13 LAB
ALBUMIN SERPL-MCNC: 2.8 G/DL (ref 3.5–5)
ALBUMIN/GLOB SERPL: 1 {RATIO} (ref 1.2–3.5)
ALP SERPL-CCNC: 78 U/L (ref 50–136)
ALT SERPL-CCNC: 18 U/L (ref 12–65)
AMPHET UR QL SCN: NEGATIVE
ANION GAP SERPL CALC-SCNC: 7 MMOL/L (ref 7–16)
AST SERPL-CCNC: 8 U/L (ref 15–37)
BARBITURATES UR QL SCN: NEGATIVE
BENZODIAZ UR QL: NEGATIVE
BILIRUB SERPL-MCNC: 0.2 MG/DL (ref 0.2–1.1)
BUN SERPL-MCNC: 5 MG/DL (ref 6–23)
CALCIUM SERPL-MCNC: 8.1 MG/DL (ref 8.3–10.4)
CANNABINOIDS UR QL SCN: NEGATIVE
CHLORIDE SERPL-SCNC: 110 MMOL/L (ref 98–107)
CO2 SERPL-SCNC: 26 MMOL/L (ref 21–32)
COCAINE UR QL SCN: NEGATIVE
CREAT SERPL-MCNC: 0.54 MG/DL (ref 0.8–1.5)
ERYTHROCYTE [DISTWIDTH] IN BLOOD BY AUTOMATED COUNT: 19.9 % (ref 11.9–14.6)
GLOBULIN SER CALC-MCNC: 2.8 G/DL (ref 2.3–3.5)
GLUCOSE BLD STRIP.AUTO-MCNC: 100 MG/DL (ref 65–100)
GLUCOSE BLD STRIP.AUTO-MCNC: 119 MG/DL (ref 65–100)
GLUCOSE BLD STRIP.AUTO-MCNC: 146 MG/DL (ref 65–100)
GLUCOSE BLD STRIP.AUTO-MCNC: 203 MG/DL (ref 65–100)
GLUCOSE BLD STRIP.AUTO-MCNC: 218 MG/DL (ref 65–100)
GLUCOSE SERPL-MCNC: 90 MG/DL (ref 65–100)
HCT VFR BLD AUTO: 33.2 % (ref 41.1–50.3)
HGB BLD-MCNC: 9.8 G/DL (ref 13.6–17.2)
LACTATE SERPL-SCNC: 2.3 MMOL/L (ref 0.4–2)
LIPASE SERPL-CCNC: 13 U/L (ref 73–393)
MCH RBC QN AUTO: 24.9 PG (ref 26.1–32.9)
MCHC RBC AUTO-ENTMCNC: 29.5 G/DL (ref 31.4–35)
MCV RBC AUTO: 84.5 FL (ref 79.6–97.8)
METHADONE UR QL: NEGATIVE
NRBC # BLD: 0 K/UL (ref 0–0.2)
OPIATES UR QL: POSITIVE
PCP UR QL: NEGATIVE
PLATELET # BLD AUTO: 218 K/UL (ref 150–450)
PMV BLD AUTO: 9.6 FL (ref 9.4–12.3)
POTASSIUM SERPL-SCNC: 3.9 MMOL/L (ref 3.5–5.1)
PROT SERPL-MCNC: 5.6 G/DL (ref 6.3–8.2)
RBC # BLD AUTO: 3.93 M/UL (ref 4.23–5.6)
SODIUM SERPL-SCNC: 143 MMOL/L (ref 136–145)
WBC # BLD AUTO: 4.8 K/UL (ref 4.3–11.1)

## 2020-06-13 PROCEDURE — 83690 ASSAY OF LIPASE: CPT

## 2020-06-13 PROCEDURE — 74011250637 HC RX REV CODE- 250/637: Performed by: HOSPITALIST

## 2020-06-13 PROCEDURE — 74011636637 HC RX REV CODE- 636/637: Performed by: HOSPITALIST

## 2020-06-13 PROCEDURE — 83605 ASSAY OF LACTIC ACID: CPT

## 2020-06-13 PROCEDURE — 74011250637 HC RX REV CODE- 250/637: Performed by: INTERNAL MEDICINE

## 2020-06-13 PROCEDURE — 80053 COMPREHEN METABOLIC PANEL: CPT

## 2020-06-13 PROCEDURE — 82962 GLUCOSE BLOOD TEST: CPT

## 2020-06-13 PROCEDURE — 74011250636 HC RX REV CODE- 250/636: Performed by: HOSPITALIST

## 2020-06-13 PROCEDURE — 80307 DRUG TEST PRSMV CHEM ANLYZR: CPT

## 2020-06-13 PROCEDURE — 36415 COLL VENOUS BLD VENIPUNCTURE: CPT

## 2020-06-13 PROCEDURE — 85027 COMPLETE CBC AUTOMATED: CPT

## 2020-06-13 PROCEDURE — 65270000029 HC RM PRIVATE

## 2020-06-13 RX ORDER — HYOSCYAMINE SULFATE 0.12 MG/1
0.12 TABLET SUBLINGUAL
Status: DISCONTINUED | OUTPATIENT
Start: 2020-06-13 | End: 2020-06-15 | Stop reason: HOSPADM

## 2020-06-13 RX ORDER — SIMETHICONE 80 MG
80 TABLET,CHEWABLE ORAL
Status: DISCONTINUED | OUTPATIENT
Start: 2020-06-13 | End: 2020-06-15 | Stop reason: HOSPADM

## 2020-06-13 RX ADMIN — Medication 10 ML: at 13:13

## 2020-06-13 RX ADMIN — HYOSCYAMINE SULFATE 0.12 MG: 0.12 TABLET ORAL; SUBLINGUAL at 13:06

## 2020-06-13 RX ADMIN — INSULIN LISPRO 3 UNITS: 100 INJECTION, SOLUTION INTRAVENOUS; SUBCUTANEOUS at 16:59

## 2020-06-13 RX ADMIN — HYDROMORPHONE HYDROCHLORIDE 1 MG: 1 INJECTION, SOLUTION INTRAMUSCULAR; INTRAVENOUS; SUBCUTANEOUS at 06:35

## 2020-06-13 RX ADMIN — PANTOPRAZOLE SODIUM 40 MG: 40 TABLET, DELAYED RELEASE ORAL at 05:22

## 2020-06-13 RX ADMIN — QUETIAPINE FUMARATE 100 MG: 100 TABLET ORAL at 17:00

## 2020-06-13 RX ADMIN — INSULIN HUMAN 6 UNITS: 100 INJECTION, SUSPENSION SUBCUTANEOUS at 09:23

## 2020-06-13 RX ADMIN — HYDROCODONE BITARTRATE AND ACETAMINOPHEN 1 TABLET: 7.5; 325 TABLET ORAL at 19:23

## 2020-06-13 RX ADMIN — SERTRALINE HYDROCHLORIDE 50 MG: 50 TABLET ORAL at 09:23

## 2020-06-13 RX ADMIN — METOCLOPRAMIDE HYDROCHLORIDE 10 MG: 5 INJECTION INTRAMUSCULAR; INTRAVENOUS at 17:54

## 2020-06-13 RX ADMIN — HYDROMORPHONE HYDROCHLORIDE 1 MG: 1 INJECTION, SOLUTION INTRAMUSCULAR; INTRAVENOUS; SUBCUTANEOUS at 21:00

## 2020-06-13 RX ADMIN — HYDROMORPHONE HYDROCHLORIDE 1 MG: 1 INJECTION, SOLUTION INTRAMUSCULAR; INTRAVENOUS; SUBCUTANEOUS at 03:23

## 2020-06-13 RX ADMIN — INSULIN LISPRO 4 UNITS: 100 INJECTION, SOLUTION INTRAVENOUS; SUBCUTANEOUS at 16:59

## 2020-06-13 RX ADMIN — HYDROCODONE BITARTRATE AND ACETAMINOPHEN 1 TABLET: 7.5; 325 TABLET ORAL at 05:22

## 2020-06-13 RX ADMIN — INSULIN LISPRO 4 UNITS: 100 INJECTION, SOLUTION INTRAVENOUS; SUBCUTANEOUS at 11:32

## 2020-06-13 RX ADMIN — Medication 10 ML: at 05:23

## 2020-06-13 RX ADMIN — PANTOPRAZOLE SODIUM 40 MG: 40 TABLET, DELAYED RELEASE ORAL at 17:00

## 2020-06-13 RX ADMIN — HYDROCODONE BITARTRATE AND ACETAMINOPHEN 1 TABLET: 7.5; 325 TABLET ORAL at 09:23

## 2020-06-13 RX ADMIN — METOCLOPRAMIDE HYDROCHLORIDE 10 MG: 5 INJECTION INTRAMUSCULAR; INTRAVENOUS at 00:26

## 2020-06-13 RX ADMIN — QUETIAPINE FUMARATE 100 MG: 100 TABLET ORAL at 09:23

## 2020-06-13 RX ADMIN — INSULIN LISPRO 3 UNITS: 100 INJECTION, SOLUTION INTRAVENOUS; SUBCUTANEOUS at 11:32

## 2020-06-13 RX ADMIN — HYDROMORPHONE HYDROCHLORIDE 1 MG: 1 INJECTION, SOLUTION INTRAMUSCULAR; INTRAVENOUS; SUBCUTANEOUS at 11:31

## 2020-06-13 RX ADMIN — Medication 100 MG: at 09:23

## 2020-06-13 RX ADMIN — HYDROMORPHONE HYDROCHLORIDE 1 MG: 1 INJECTION, SOLUTION INTRAMUSCULAR; INTRAVENOUS; SUBCUTANEOUS at 00:22

## 2020-06-13 RX ADMIN — HYDROMORPHONE HYDROCHLORIDE 1 MG: 1 INJECTION, SOLUTION INTRAMUSCULAR; INTRAVENOUS; SUBCUTANEOUS at 17:39

## 2020-06-13 RX ADMIN — HYDROCODONE BITARTRATE AND ACETAMINOPHEN 1 TABLET: 7.5; 325 TABLET ORAL at 01:37

## 2020-06-13 RX ADMIN — Medication 10 ML: at 21:01

## 2020-06-13 RX ADMIN — SODIUM CHLORIDE 150 ML/HR: 9 INJECTION, SOLUTION INTRAVENOUS at 00:27

## 2020-06-13 RX ADMIN — INSULIN LISPRO 3 UNITS: 100 INJECTION, SOLUTION INTRAVENOUS; SUBCUTANEOUS at 09:23

## 2020-06-13 RX ADMIN — INSULIN HUMAN 6 UNITS: 100 INJECTION, SUSPENSION SUBCUTANEOUS at 16:58

## 2020-06-13 NOTE — PROGRESS NOTES
Pt BS checked and found to be 67. Pt given 4oz orange juice. Rechecked BS at 2214 and it was 54. Pt given one tube of glutose. Will recheck BS is 15min.

## 2020-06-13 NOTE — PROGRESS NOTES
Date of Outreach Update:  Tracy Brown was seen and assessed. MEWS Score: 1 (06/12/20 1622)  Vitals:    06/12/20 1504 06/12/20 1545 06/12/20 1622 06/12/20 2032   BP:   139/83 116/68   Pulse: 69 72 63 65   Resp: 16 17 17 17   Temp:   97.9 °F (36.6 °C) 97.4 °F (36.3 °C)   SpO2:  96% 98% 98%   Weight:       Height:             Pain Assessment  Pain Intensity 1: 9 (06/12/20 1819)  Pain Location 1: Abdomen  Pain Intervention(s) 1: Medication (see MAR)  Patient Stated Pain Goal: 0      Previous Outreach assessment has been reviewed. There have been no significant clinical changes since the completion of the last dated Outreach assessment. A/O x 4. Resp even/unlabored. No distress or needs stated. Will continue to follow up per outreach protocol.     Signed By:   Erica Lacy RN    June 12, 2020 8:36 PM

## 2020-06-13 NOTE — PROGRESS NOTES
Date of Outreach Update:  Brittany Ruiz was seen and assessed. MEWS Score: 2 (06/13/20 0739)  Vitals:    06/12/20 2301 06/13/20 0134 06/13/20 0519 06/13/20 0739   BP: 94/51 109/66 116/73 93/58   Pulse: 73  61 73   Resp: 17  17 18   Temp: (!) 96.3 °F (35.7 °C)  97.3 °F (36.3 °C) 97.3 °F (36.3 °C)   SpO2: 97%  98% 96%   Weight:       Height:             Pain Assessment  Pain Intensity 1: 0 (06/13/20 0708)  Pain Location 1: Abdomen  Pain Intervention(s) 1: Medication (see MAR)  Patient Stated Pain Goal: 0      Previous Outreach assessment has been reviewed. There have been no significant clinical changes since the completion of the last dated Outreach assessment. Patient alert and oriented. Respirations unlabored. Ongoing complaints of pain and nausea after eating, but reports better appetite. VS, labs, and progress notes reviewed. Will continue to follow up per outreach protocol.     Signed By:   Shea Zhong RN    June 13, 2020 10:20 AM

## 2020-06-13 NOTE — PROGRESS NOTES
Hospitalist Note     Admit Date:  2020  1:04 PM   Name:  Krissy Lockhart   Age:  39 y.o.  :  1975   MRN:  [id number]   PCP:  None  39year old male presents to the ER with intoxication. Just discharged from [facility] on the day of this admission. He stated he drank alcohol and did not take his insulin. He states he takes insulin, when asked how much, he says \"not enough\". Last drink was  in AM but does not know the time. He does endorse abdominal pain but cannot describe a numerical value of the pain, radiation, what makes it better/worse. He was diagnosed with DKA and was admitted under ICU. Now better, gap has closed and managed on medicine floors. subj:   He was found in mild distress due to abdominal pain, nausea and vomiting. He stated having a diagnosis of esophageal stricture and gastroparesis. Last EGD 4 months ago. He is able to hold liquids and some pureed like consistency food.     Objective:     Patient Vitals for the past 24 hrs:   Temp Pulse Resp BP SpO2   20 0739 97.3 °F (36.3 °C) 73 18 93/58 96 %   20 0519 97.3 °F (36.3 °C) 61 17 116/73 98 %   20 0134    109/66    20 2301 (!) 96.3 °F (35.7 °C) 73 17 94/51 97 %   20 2032 97.4 °F (36.3 °C) 65 17 116/68 98 %   20 1622 97.9 °F (36.6 °C) 63 17 139/83 98 %   20 1545  72 17  96 %   20 1504  69 16     20 1431  71 23     20 1429  68  128/77    20 1424  70 20     20 1416  73 21  98 %   20 1413  76 24  99 %   20 1400  81 23 124/69 99 %   20 1331  76 14  97 %   20 1329    122/67    20 1320  79 15  99 %   20 1300    (!) 136/98    06/12/20 1259  89 25  97 %   20 1258  85 26  98 %   20 1256  82 20  99 %   20 1229    123/76    20 1228  79 18     20 1225  80 (!) 3     20 1221  89 17     20 1206  85 19  99 %   20 1200  86 14 123/69 98 % 06/12/20 1129 98.2 °F (36.8 °C) 90 14 131/86 99 %   06/12/20 1100  77 17 124/70 100 %   06/12/20 1029  72 14 102/56 97 %   06/12/20 1000  78 13 (!) 89/53 97 %   06/12/20 0929  84 14 103/59 95 %   06/12/20 0928  84 13  95 %   06/12/20 0900  83 18 109/66 96 %   06/12/20 0829  69 14 121/72 98 %     Oxygen Therapy  O2 Sat (%): 96 % (06/13/20 0739)  Pulse via Oximetry: 111 beats per minute (06/12/20 1545)  O2 Device: Room air (06/12/20 0800)    Intake/Output Summary (Last 24 hours) at 6/13/2020 0825  Last data filed at 6/13/2020 0616  Gross per 24 hour   Intake 480 ml   Output 1375 ml   Net -895 ml       Physical Exam:  General:    Well nourished. Alert. pale  CV:   RRR. No m/r/g. Lungs:  CTAB. No wheezing, rhonchi, or rales. Abdomen: Soft, tender diffusely mildly, nondistended. Bowel sounds normal.   Extremities: Warm and dry. No cyanosis or edema. Neurologic: grossly intact. Skin:     No rashes or jaundice. Normal coloration  Psych:  Normal mood and affect. I reviewed the labs, imaging, EKGs, telemetry, and other studies done this admission.   Data Review:   Recent Results (from the past 24 hour(s))   GLUCOSE, POC    Collection Time: 06/12/20 11:25 AM   Result Value Ref Range    Glucose (POC) 149 (H) 65 - 100 mg/dL   GLUCOSE, POC    Collection Time: 06/12/20  4:52 PM   Result Value Ref Range    Glucose (POC) 301 (H) 65 - 100 mg/dL   GLUCOSE, POC    Collection Time: 06/12/20  9:27 PM   Result Value Ref Range    Glucose (POC) 67 65 - 100 mg/dL   GLUCOSE, POC    Collection Time: 06/12/20 10:14 PM   Result Value Ref Range    Glucose (POC) 54 (L) 65 - 100 mg/dL   GLUCOSE, POC    Collection Time: 06/12/20 10:35 PM   Result Value Ref Range    Glucose (POC) 64 (L) 65 - 100 mg/dL   GLUCOSE, POC    Collection Time: 06/12/20 10:55 PM   Result Value Ref Range    Glucose (POC) 84 65 - 100 mg/dL   GLUCOSE, POC    Collection Time: 06/13/20  4:17 AM   Result Value Ref Range    Glucose (POC) 100 65 - 100 mg/dL CBC W/O DIFF    Collection Time: 06/13/20  6:06 AM   Result Value Ref Range    WBC 4.8 4.3 - 11.1 K/uL    RBC 3.93 (L) 4.23 - 5.6 M/uL    HGB 9.8 (L) 13.6 - 17.2 g/dL    HCT 33.2 (L) 41.1 - 50.3 %    MCV 84.5 79.6 - 97.8 FL    MCH 24.9 (L) 26.1 - 32.9 PG    MCHC 29.5 (L) 31.4 - 35.0 g/dL    RDW 19.9 (H) 11.9 - 14.6 %    PLATELET 730 886 - 347 K/uL    MPV 9.6 9.4 - 12.3 FL    ABSOLUTE NRBC 0.00 0.0 - 0.2 K/uL   METABOLIC PANEL, COMPREHENSIVE    Collection Time: 06/13/20  6:06 AM   Result Value Ref Range    Sodium 143 136 - 145 mmol/L    Potassium 3.9 3.5 - 5.1 mmol/L    Chloride 110 (H) 98 - 107 mmol/L    CO2 26 21 - 32 mmol/L    Anion gap 7 7 - 16 mmol/L    Glucose 90 65 - 100 mg/dL    BUN 5 (L) 6 - 23 MG/DL    Creatinine 0.54 (L) 0.8 - 1.5 MG/DL    GFR est AA >60 >60 ml/min/1.73m2    GFR est non-AA >60 >60 ml/min/1.73m2    Calcium 8.1 (L) 8.3 - 10.4 MG/DL    Bilirubin, total 0.2 0.2 - 1.1 MG/DL    ALT (SGPT) 18 12 - 65 U/L    AST (SGOT) 8 (L) 15 - 37 U/L    Alk. phosphatase 78 50 - 136 U/L    Protein, total 5.6 (L) 6.3 - 8.2 g/dL    Albumin 2.8 (L) 3.5 - 5.0 g/dL    Globulin 2.8 2.3 - 3.5 g/dL    A-G Ratio 1.0 (L) 1.2 - 3.5     LACTIC ACID    Collection Time: 06/13/20  6:06 AM   Result Value Ref Range    Lactic acid 2.3 (HH) 0.4 - 2.0 MMOL/L   LIPASE    Collection Time: 06/13/20  6:06 AM   Result Value Ref Range    Lipase 13 (L) 73 - 393 U/L   GLUCOSE, POC    Collection Time: 06/13/20  7:45 AM   Result Value Ref Range    Glucose (POC) 119 (H) 65 - 100 mg/dL       All Micro Results     None          Assessment and Plan:     Dx:  1- DKA due to pancreatitis flare and missing insulin, non compliance, resolved  2- Possibly accute on chronic alcoholic pancreatitis, patient has burnt out pancreas per history w/ expected lipase to be negative possibly.   3- Dehydration, resolved  4- Alcoholism, last drink 1 week ago, no withdrawal spx  5- Bipolar dz, stable    Rx:  Continue regular diet by his suggestion  NPH/ Humalog SQ  Ativan as needed  Thiamine  Pain control  Start levsin, simethicone  For nausea and vomiting: reglan  Counseling given on etoh use. Likely symptoms due to gastroparesis, esophageal strictures and chronic pancreatitis. If he persists with vomiting GI will be contacted to evaluate for EGD. Code statu: DNR.     DVT ppx: compression stockings     Dispo: homeless, followed by CM     Signed:  James Downey MD

## 2020-06-13 NOTE — PROGRESS NOTES
Date of Outreach Update:  Catrachita Oreilly was seen and assessed. MEWS Score: 1 (06/13/20 1509)  Vitals:    06/13/20 0519 06/13/20 0739 06/13/20 1041 06/13/20 1509   BP: 116/73 93/58 111/70 126/76   Pulse: 61 73 66 87   Resp: 17 18 18 19   Temp: 97.3 °F (36.3 °C) 97.3 °F (36.3 °C) 97.4 °F (36.3 °C) 97.6 °F (36.4 °C)   SpO2: 98% 96% 100% 99%   Weight:       Height:             Pain Assessment  Pain Intensity 1: 8 (06/13/20 1406)  Pain Location 1: Abdomen  Pain Intervention(s) 1: Medication (see MAR)  Patient Stated Pain Goal: 0      Previous Outreach assessment has been reviewed. There have been no significant clinical changes since the completion of the last dated Outreach assessment. Will continue to follow up per outreach protocol.     Signed By:   Rodrigue Juárez RN    June 13, 2020 5:14 PM

## 2020-06-14 LAB
ANION GAP SERPL CALC-SCNC: 8 MMOL/L (ref 7–16)
BASOPHILS # BLD: 0 K/UL (ref 0–0.2)
BASOPHILS NFR BLD: 1 % (ref 0–2)
BUN SERPL-MCNC: 7 MG/DL (ref 6–23)
CALCIUM SERPL-MCNC: 8.2 MG/DL (ref 8.3–10.4)
CHLORIDE SERPL-SCNC: 110 MMOL/L (ref 98–107)
CO2 SERPL-SCNC: 22 MMOL/L (ref 21–32)
CREAT SERPL-MCNC: 0.58 MG/DL (ref 0.8–1.5)
DIFFERENTIAL METHOD BLD: ABNORMAL
EOSINOPHIL # BLD: 0.2 K/UL (ref 0–0.8)
EOSINOPHIL NFR BLD: 3 % (ref 0.5–7.8)
ERYTHROCYTE [DISTWIDTH] IN BLOOD BY AUTOMATED COUNT: 19.9 % (ref 11.9–14.6)
GLUCOSE BLD STRIP.AUTO-MCNC: 110 MG/DL (ref 65–100)
GLUCOSE BLD STRIP.AUTO-MCNC: 121 MG/DL (ref 65–100)
GLUCOSE BLD STRIP.AUTO-MCNC: 132 MG/DL (ref 65–100)
GLUCOSE BLD STRIP.AUTO-MCNC: 180 MG/DL (ref 65–100)
GLUCOSE BLD STRIP.AUTO-MCNC: 99 MG/DL (ref 65–100)
GLUCOSE SERPL-MCNC: 157 MG/DL (ref 65–100)
HCT VFR BLD AUTO: 31.8 % (ref 41.1–50.3)
HGB BLD-MCNC: 9.6 G/DL (ref 13.6–17.2)
IMM GRANULOCYTES # BLD AUTO: 0 K/UL (ref 0–0.5)
IMM GRANULOCYTES NFR BLD AUTO: 0 % (ref 0–5)
LYMPHOCYTES # BLD: 2.1 K/UL (ref 0.5–4.6)
LYMPHOCYTES NFR BLD: 39 % (ref 13–44)
MCH RBC QN AUTO: 25.1 PG (ref 26.1–32.9)
MCHC RBC AUTO-ENTMCNC: 30.2 G/DL (ref 31.4–35)
MCV RBC AUTO: 83.2 FL (ref 79.6–97.8)
MONOCYTES # BLD: 0.4 K/UL (ref 0.1–1.3)
MONOCYTES NFR BLD: 7 % (ref 4–12)
NEUTS SEG # BLD: 2.8 K/UL (ref 1.7–8.2)
NEUTS SEG NFR BLD: 51 % (ref 43–78)
NRBC # BLD: 0 K/UL (ref 0–0.2)
PLATELET # BLD AUTO: 235 K/UL (ref 150–450)
PMV BLD AUTO: 9.8 FL (ref 9.4–12.3)
POTASSIUM SERPL-SCNC: 4.2 MMOL/L (ref 3.5–5.1)
RBC # BLD AUTO: 3.82 M/UL (ref 4.23–5.6)
SODIUM SERPL-SCNC: 140 MMOL/L (ref 136–145)
WBC # BLD AUTO: 5.5 K/UL (ref 4.3–11.1)

## 2020-06-14 PROCEDURE — 80048 BASIC METABOLIC PNL TOTAL CA: CPT

## 2020-06-14 PROCEDURE — 74011250636 HC RX REV CODE- 250/636: Performed by: HOSPITALIST

## 2020-06-14 PROCEDURE — 65270000029 HC RM PRIVATE

## 2020-06-14 PROCEDURE — 74011250637 HC RX REV CODE- 250/637: Performed by: HOSPITALIST

## 2020-06-14 PROCEDURE — 85025 COMPLETE CBC W/AUTO DIFF WBC: CPT

## 2020-06-14 PROCEDURE — 74011636637 HC RX REV CODE- 636/637: Performed by: HOSPITALIST

## 2020-06-14 PROCEDURE — 36415 COLL VENOUS BLD VENIPUNCTURE: CPT

## 2020-06-14 PROCEDURE — 82962 GLUCOSE BLOOD TEST: CPT

## 2020-06-14 RX ADMIN — INSULIN HUMAN 6 UNITS: 100 INJECTION, SUSPENSION SUBCUTANEOUS at 16:28

## 2020-06-14 RX ADMIN — INSULIN HUMAN 6 UNITS: 100 INJECTION, SUSPENSION SUBCUTANEOUS at 08:18

## 2020-06-14 RX ADMIN — SERTRALINE HYDROCHLORIDE 50 MG: 50 TABLET ORAL at 08:18

## 2020-06-14 RX ADMIN — HYDROCODONE BITARTRATE AND ACETAMINOPHEN 1 TABLET: 7.5; 325 TABLET ORAL at 04:53

## 2020-06-14 RX ADMIN — HYDROMORPHONE HYDROCHLORIDE 1 MG: 1 INJECTION, SOLUTION INTRAMUSCULAR; INTRAVENOUS; SUBCUTANEOUS at 12:14

## 2020-06-14 RX ADMIN — HYDROMORPHONE HYDROCHLORIDE 1 MG: 1 INJECTION, SOLUTION INTRAMUSCULAR; INTRAVENOUS; SUBCUTANEOUS at 03:00

## 2020-06-14 RX ADMIN — METOCLOPRAMIDE HYDROCHLORIDE 10 MG: 5 INJECTION INTRAMUSCULAR; INTRAVENOUS at 21:28

## 2020-06-14 RX ADMIN — INSULIN LISPRO 3 UNITS: 100 INJECTION, SOLUTION INTRAVENOUS; SUBCUTANEOUS at 16:26

## 2020-06-14 RX ADMIN — HYDROCODONE BITARTRATE AND ACETAMINOPHEN 1 TABLET: 7.5; 325 TABLET ORAL at 10:47

## 2020-06-14 RX ADMIN — HYDROMORPHONE HYDROCHLORIDE 1 MG: 1 INJECTION, SOLUTION INTRAMUSCULAR; INTRAVENOUS; SUBCUTANEOUS at 15:49

## 2020-06-14 RX ADMIN — HYDROCODONE BITARTRATE AND ACETAMINOPHEN 1 TABLET: 7.5; 325 TABLET ORAL at 21:28

## 2020-06-14 RX ADMIN — HYDROMORPHONE HYDROCHLORIDE 1 MG: 1 INJECTION, SOLUTION INTRAMUSCULAR; INTRAVENOUS; SUBCUTANEOUS at 06:06

## 2020-06-14 RX ADMIN — QUETIAPINE FUMARATE 100 MG: 100 TABLET ORAL at 17:04

## 2020-06-14 RX ADMIN — QUETIAPINE FUMARATE 100 MG: 100 TABLET ORAL at 08:18

## 2020-06-14 RX ADMIN — HYDROCODONE BITARTRATE AND ACETAMINOPHEN 1 TABLET: 7.5; 325 TABLET ORAL at 01:08

## 2020-06-14 RX ADMIN — HYDROCODONE BITARTRATE AND ACETAMINOPHEN 1 TABLET: 7.5; 325 TABLET ORAL at 17:01

## 2020-06-14 RX ADMIN — HYDROMORPHONE HYDROCHLORIDE 1 MG: 1 INJECTION, SOLUTION INTRAMUSCULAR; INTRAVENOUS; SUBCUTANEOUS at 19:16

## 2020-06-14 RX ADMIN — HYDROMORPHONE HYDROCHLORIDE 1 MG: 1 INJECTION, SOLUTION INTRAMUSCULAR; INTRAVENOUS; SUBCUTANEOUS at 00:03

## 2020-06-14 RX ADMIN — PANTOPRAZOLE SODIUM 40 MG: 40 TABLET, DELAYED RELEASE ORAL at 04:53

## 2020-06-14 RX ADMIN — INSULIN LISPRO 3 UNITS: 100 INJECTION, SOLUTION INTRAVENOUS; SUBCUTANEOUS at 08:19

## 2020-06-14 RX ADMIN — HYDROMORPHONE HYDROCHLORIDE 1 MG: 1 INJECTION, SOLUTION INTRAMUSCULAR; INTRAVENOUS; SUBCUTANEOUS at 23:25

## 2020-06-14 RX ADMIN — PANTOPRAZOLE SODIUM 40 MG: 40 TABLET, DELAYED RELEASE ORAL at 15:48

## 2020-06-14 RX ADMIN — INSULIN LISPRO 3 UNITS: 100 INJECTION, SOLUTION INTRAVENOUS; SUBCUTANEOUS at 08:18

## 2020-06-14 RX ADMIN — Medication 10 ML: at 06:06

## 2020-06-14 RX ADMIN — Medication 100 MG: at 08:18

## 2020-06-14 RX ADMIN — INSULIN LISPRO 3 UNITS: 100 INJECTION, SOLUTION INTRAVENOUS; SUBCUTANEOUS at 11:20

## 2020-06-14 RX ADMIN — Medication 10 ML: at 13:03

## 2020-06-14 RX ADMIN — Medication 10 ML: at 21:28

## 2020-06-14 RX ADMIN — HYDROMORPHONE HYDROCHLORIDE 1 MG: 1 INJECTION, SOLUTION INTRAMUSCULAR; INTRAVENOUS; SUBCUTANEOUS at 09:02

## 2020-06-14 NOTE — PROGRESS NOTES
Date of Outreach Update:  Marilyn Todd was seen and assessed. MEWS Score: 1 (06/14/20 0421)  Vitals:    06/13/20 1509 06/13/20 1906 06/13/20 2257 06/14/20 0421   BP: 126/76 112/63 132/84 128/79   Pulse: 87 67 63 64   Resp: 19 18 18 17   Temp: 97.6 °F (36.4 °C) 97.4 °F (36.3 °C) 97.9 °F (36.6 °C) 97.7 °F (36.5 °C)   SpO2: 99% 98% 97% 97%   Weight:       Height:             Pain Assessment  Pain Intensity 1: 6 (06/14/20 0452)  Pain Location 1: Abdomen  Pain Intervention(s) 1: Medication (see MAR)  Patient Stated Pain Goal: 0      Previous Outreach assessment has been reviewed. There have been no significant clinical changes since the completion of the last dated Outreach assessment. Will continue to follow up per outreach protocol.     Signed By:   Jessica Youssef RN    June 14, 2020 5:36 AM

## 2020-06-14 NOTE — PROGRESS NOTES
Date of Outreach Update:  Catrachita Oreilly was seen and assessed. MEWS Score: 1 (06/13/20 1906)  Vitals:    06/13/20 0739 06/13/20 1041 06/13/20 1509 06/13/20 1906   BP: 93/58 111/70 126/76 112/63   Pulse: 73 66 87 67   Resp: 18 18 19 18   Temp: 97.3 °F (36.3 °C) 97.4 °F (36.3 °C) 97.6 °F (36.4 °C) 97.4 °F (36.3 °C)   SpO2: 96% 100% 99% 98%   Weight:       Height:             Pain Assessment  Pain Intensity 1: 0 (06/13/20 2149)  Pain Location 1: Abdomen  Pain Intervention(s) 1: Medication (see MAR)  Patient Stated Pain Goal: 0      Previous Outreach assessment has been reviewed. There have been no significant clinical changes since the completion of the last dated Outreach assessment. Will continue to follow up per outreach protocol.     Signed By:   Donya Henry RN    June 13, 2020 10:44 PM

## 2020-06-14 NOTE — PROGRESS NOTES
Hospitalist Note     Admit Date:  2020  1:04 PM   Name:  Shyann Ruiz   Age:  39 y.o.  :  1975   MRN:  [id number]   PCP:  None  39year old male presents to the ER with intoxication. Just discharged from [facility] on the day of this admission. He stated he drank alcohol and did not take his insulin. He states he takes insulin, when asked how much, he says \"not enough\". Last drink was  in AM but does not know the time. He does endorse abdominal pain but cannot describe a numerical value of the pain, radiation, what makes it better/worse. He was diagnosed with DKA and was admitted under ICU. Now better, gap has closed and managed on medicine floors. He stated having a diagnosis of esophageal stricture and gastroparesis. Last EGD 4 months ago. subjective   He feels better. He has been able to tolerate diet  He would like to talk to a CM    Objective:     Patient Vitals for the past 24 hrs:   Temp Pulse Resp BP SpO2   20 0821 97.8 °F (36.6 °C) 64 17 123/75 99 %   20 0421 97.7 °F (36.5 °C) 64 17 128/79 97 %   20 2257 97.9 °F (36.6 °C) 63 18 132/84 97 %   20 1906 97.4 °F (36.3 °C) 67 18 112/63 98 %   20 1509 97.6 °F (36.4 °C) 87 19 126/76 99 %   20 1041 97.4 °F (36.3 °C) 66 18 111/70 100 %     Oxygen Therapy  O2 Sat (%): 99 % (20 0821)  Pulse via Oximetry: 111 beats per minute (20 1545)  O2 Device: Room air (20 0800)    Intake/Output Summary (Last 24 hours) at 2020 0843  Last data filed at 2020 1921  Gross per 24 hour   Intake 120 ml   Output 375 ml   Net -255 ml       Physical Exam:  General:    Well nourished. Alert. pale  CV:   RRR. No m/r/g. Lungs:  CTAB. No wheezing, rhonchi, or rales. Abdomen: Soft, tender diffusely mildly, nondistended. Bowel sounds normal.   Extremities: Warm and dry. No cyanosis or edema. Neurologic: grossly intact. Skin:     No rashes or jaundice.   Normal coloration  Psych:  Normal mood and affect. I reviewed the labs, imaging, EKGs, telemetry, and other studies done this admission. Data Review:   Recent Results (from the past 24 hour(s))   GLUCOSE, POC    Collection Time: 06/13/20 10:44 AM   Result Value Ref Range    Glucose (POC) 218 (H) 65 - 100 mg/dL   DRUG SCREEN, URINE    Collection Time: 06/13/20  3:33 PM   Result Value Ref Range    PCP(PHENCYCLIDINE) Negative      BENZODIAZEPINES Negative      COCAINE Negative      AMPHETAMINES Negative      METHADONE Negative      THC (TH-CANNABINOL) Negative      OPIATES Positive      BARBITURATES Negative     GLUCOSE, POC    Collection Time: 06/13/20  3:54 PM   Result Value Ref Range    Glucose (POC) 203 (H) 65 - 100 mg/dL   GLUCOSE, POC    Collection Time: 06/13/20  8:50 PM   Result Value Ref Range    Glucose (POC) 146 (H) 65 - 100 mg/dL   CBC WITH AUTOMATED DIFF    Collection Time: 06/14/20  5:45 AM   Result Value Ref Range    WBC 5.5 4.3 - 11.1 K/uL    RBC 3.82 (L) 4.23 - 5.6 M/uL    HGB 9.6 (L) 13.6 - 17.2 g/dL    HCT 31.8 (L) 41.1 - 50.3 %    MCV 83.2 79.6 - 97.8 FL    MCH 25.1 (L) 26.1 - 32.9 PG    MCHC 30.2 (L) 31.4 - 35.0 g/dL    RDW 19.9 (H) 11.9 - 14.6 %    PLATELET 446 763 - 075 K/uL    MPV 9.8 9.4 - 12.3 FL    ABSOLUTE NRBC 0.00 0.0 - 0.2 K/uL    DF AUTOMATED      NEUTROPHILS 51 43 - 78 %    LYMPHOCYTES 39 13 - 44 %    MONOCYTES 7 4.0 - 12.0 %    EOSINOPHILS 3 0.5 - 7.8 %    BASOPHILS 1 0.0 - 2.0 %    IMMATURE GRANULOCYTES 0 0.0 - 5.0 %    ABS. NEUTROPHILS 2.8 1.7 - 8.2 K/UL    ABS. LYMPHOCYTES 2.1 0.5 - 4.6 K/UL    ABS. MONOCYTES 0.4 0.1 - 1.3 K/UL    ABS. EOSINOPHILS 0.2 0.0 - 0.8 K/UL    ABS. BASOPHILS 0.0 0.0 - 0.2 K/UL    ABS. IMM.  GRANS. 0.0 0.0 - 0.5 K/UL   METABOLIC PANEL, BASIC    Collection Time: 06/14/20  5:45 AM   Result Value Ref Range    Sodium 140 136 - 145 mmol/L    Potassium 4.2 3.5 - 5.1 mmol/L    Chloride 110 (H) 98 - 107 mmol/L    CO2 22 21 - 32 mmol/L    Anion gap 8 7 - 16 mmol/L    Glucose 157 (H) 65 - 100 mg/dL    BUN 7 6 - 23 MG/DL    Creatinine 0.58 (L) 0.8 - 1.5 MG/DL    GFR est AA >60 >60 ml/min/1.73m2    GFR est non-AA >60 >60 ml/min/1.73m2    Calcium 8.2 (L) 8.3 - 10.4 MG/DL   GLUCOSE, POC    Collection Time: 06/14/20  7:39 AM   Result Value Ref Range    Glucose (POC) 180 (H) 65 - 100 mg/dL       All Micro Results     None          Assessment and Plan:     Dx:  1- DKA due to pancreatitis flare and missing insulin, non compliance, resolved  2- Possibly accute on chronic alcoholic pancreatitis, patient has burnt out pancreas per history w/ expected lipase to be negative possibly. 3- Dehydration, resolved  4- Alcoholism, last drink 1 week ago, no withdrawal spx  5- Bipolar dz, stable    Rx:  Continue regular diet by his suggestion  NPH/ Humalog SQ  Ativan as needed  Thiamine  Pain control  levsin, simethicone  For nausea and vomiting: reglan  Counseling given on etoh use. Likely symptoms due to gastroparesis, esophageal strictures and chronic pancreatitis. If he persists with vomiting GI will be contacted to evaluate for EGD. He has improved    Code status: DNR. DVT ppx: compression stockings     Dispo: homeless, followed by CM.  Possible discharge tomorrow Monday     Signed:  Juventino Bacon MD

## 2020-06-14 NOTE — PROGRESS NOTES
Date of Outreach Update:  Dorina Rivera was seen and assessed. MEWS Score: 1 (06/14/20 0421)  Vitals:    06/13/20 1906 06/13/20 2257 06/14/20 0421 06/14/20 0821   BP: 112/63 132/84 128/79 123/75   Pulse: 67 63 64 64   Resp: 18 18 17 17   Temp: 97.4 °F (36.3 °C) 97.9 °F (36.6 °C) 97.7 °F (36.5 °C) 97.8 °F (36.6 °C)   SpO2: 98% 97% 97% 99%   Weight:       Height:             Pain Assessment  Pain Intensity 1: 8 (06/14/20 0820)  Pain Location 1: Abdomen  Pain Intervention(s) 1: Medication (see MAR)  Patient Stated Pain Goal: 0      Previous Outreach assessment has been reviewed. There have been no significant clinical changes since the completion of the last dated Outreach assessment. Will discharge from outreach program per protocol.     Signed By:   Rishi Cesar RN    June 14, 2020 9:01 AM

## 2020-06-15 VITALS
RESPIRATION RATE: 18 BRPM | TEMPERATURE: 98.1 F | SYSTOLIC BLOOD PRESSURE: 98 MMHG | BODY MASS INDEX: 17.11 KG/M2 | HEART RATE: 87 BPM | DIASTOLIC BLOOD PRESSURE: 62 MMHG | HEIGHT: 68 IN | WEIGHT: 112.9 LBS | OXYGEN SATURATION: 96 %

## 2020-06-15 LAB
ANION GAP SERPL CALC-SCNC: 9 MMOL/L (ref 7–16)
BASOPHILS # BLD: 0 K/UL (ref 0–0.2)
BASOPHILS NFR BLD: 1 % (ref 0–2)
BUN SERPL-MCNC: 13 MG/DL (ref 6–23)
CALCIUM SERPL-MCNC: 8.3 MG/DL (ref 8.3–10.4)
CHLORIDE SERPL-SCNC: 109 MMOL/L (ref 98–107)
CO2 SERPL-SCNC: 23 MMOL/L (ref 21–32)
CREAT SERPL-MCNC: 0.56 MG/DL (ref 0.8–1.5)
DIFFERENTIAL METHOD BLD: ABNORMAL
EOSINOPHIL # BLD: 0.2 K/UL (ref 0–0.8)
EOSINOPHIL NFR BLD: 5 % (ref 0.5–7.8)
ERYTHROCYTE [DISTWIDTH] IN BLOOD BY AUTOMATED COUNT: 19.9 % (ref 11.9–14.6)
GLUCOSE BLD STRIP.AUTO-MCNC: 238 MG/DL (ref 65–100)
GLUCOSE BLD STRIP.AUTO-MCNC: 269 MG/DL (ref 65–100)
GLUCOSE SERPL-MCNC: 164 MG/DL (ref 65–100)
HCT VFR BLD AUTO: 32.9 % (ref 41.1–50.3)
HGB BLD-MCNC: 10.3 G/DL (ref 13.6–17.2)
IMM GRANULOCYTES # BLD AUTO: 0 K/UL (ref 0–0.5)
IMM GRANULOCYTES NFR BLD AUTO: 0 % (ref 0–5)
LYMPHOCYTES # BLD: 1.8 K/UL (ref 0.5–4.6)
LYMPHOCYTES NFR BLD: 40 % (ref 13–44)
MCH RBC QN AUTO: 25.8 PG (ref 26.1–32.9)
MCHC RBC AUTO-ENTMCNC: 31.3 G/DL (ref 31.4–35)
MCV RBC AUTO: 82.3 FL (ref 79.6–97.8)
MONOCYTES # BLD: 0.3 K/UL (ref 0.1–1.3)
MONOCYTES NFR BLD: 7 % (ref 4–12)
NEUTS SEG # BLD: 2.1 K/UL (ref 1.7–8.2)
NEUTS SEG NFR BLD: 47 % (ref 43–78)
NRBC # BLD: 0 K/UL (ref 0–0.2)
PLATELET # BLD AUTO: 239 K/UL (ref 150–450)
PMV BLD AUTO: 10 FL (ref 9.4–12.3)
POTASSIUM SERPL-SCNC: 3.9 MMOL/L (ref 3.5–5.1)
RBC # BLD AUTO: 4 M/UL (ref 4.23–5.6)
SODIUM SERPL-SCNC: 141 MMOL/L (ref 136–145)
WBC # BLD AUTO: 4.6 K/UL (ref 4.3–11.1)

## 2020-06-15 PROCEDURE — 74011250637 HC RX REV CODE- 250/637: Performed by: INTERNAL MEDICINE

## 2020-06-15 PROCEDURE — 74011636637 HC RX REV CODE- 636/637: Performed by: HOSPITALIST

## 2020-06-15 PROCEDURE — 80048 BASIC METABOLIC PNL TOTAL CA: CPT

## 2020-06-15 PROCEDURE — 74011250636 HC RX REV CODE- 250/636: Performed by: HOSPITALIST

## 2020-06-15 PROCEDURE — 85025 COMPLETE CBC W/AUTO DIFF WBC: CPT

## 2020-06-15 PROCEDURE — 74011250637 HC RX REV CODE- 250/637: Performed by: HOSPITALIST

## 2020-06-15 PROCEDURE — 82962 GLUCOSE BLOOD TEST: CPT

## 2020-06-15 PROCEDURE — 36415 COLL VENOUS BLD VENIPUNCTURE: CPT

## 2020-06-15 RX ORDER — GABAPENTIN 400 MG/1
400 CAPSULE ORAL 3 TIMES DAILY
Qty: 42 CAP | Refills: 0 | Status: SHIPPED | OUTPATIENT
Start: 2020-06-15 | End: 2020-06-29

## 2020-06-15 RX ORDER — METOCLOPRAMIDE 10 MG/1
10 TABLET ORAL
Qty: 30 TAB | Refills: 0 | Status: SHIPPED | OUTPATIENT
Start: 2020-06-15 | End: 2020-06-25

## 2020-06-15 RX ORDER — LANCETS
EACH MISCELLANEOUS
Qty: 1 EACH | Refills: 0 | Status: SHIPPED | OUTPATIENT
Start: 2020-06-15 | End: 2020-07-28 | Stop reason: SDUPTHER

## 2020-06-15 RX ORDER — ACETAMINOPHEN 500 MG
500 TABLET ORAL
Qty: 20 TAB | Refills: 0 | Status: SHIPPED | OUTPATIENT
Start: 2020-06-15

## 2020-06-15 RX ORDER — PANTOPRAZOLE SODIUM 40 MG/1
40 TABLET, DELAYED RELEASE ORAL DAILY
Qty: 30 TAB | Refills: 0 | Status: SHIPPED | OUTPATIENT
Start: 2020-06-15 | End: 2020-07-15

## 2020-06-15 RX ORDER — INSULIN LISPRO 100 [IU]/ML
INJECTION, SOLUTION INTRAVENOUS; SUBCUTANEOUS
Qty: 1 VIAL | Refills: 0 | Status: SHIPPED | OUTPATIENT
Start: 2020-06-15 | End: 2020-07-28 | Stop reason: SDUPTHER

## 2020-06-15 RX ORDER — QUETIAPINE FUMARATE 200 MG/1
200 TABLET, FILM COATED ORAL 2 TIMES DAILY
Qty: 60 TAB | Refills: 0 | Status: SHIPPED | OUTPATIENT
Start: 2020-06-15 | End: 2020-07-15

## 2020-06-15 RX ORDER — SERTRALINE HYDROCHLORIDE 50 MG/1
100 TABLET, FILM COATED ORAL DAILY
Qty: 60 TAB | Refills: 0 | Status: SHIPPED | OUTPATIENT
Start: 2020-06-15 | End: 2020-07-15

## 2020-06-15 RX ORDER — BUSPIRONE HYDROCHLORIDE 30 MG/1
30 TABLET ORAL DAILY
Qty: 14 TAB | Refills: 0 | Status: SHIPPED | OUTPATIENT
Start: 2020-06-15 | End: 2020-06-29

## 2020-06-15 RX ORDER — SIMETHICONE 80 MG
80 TABLET,CHEWABLE ORAL
Qty: 30 TAB | Refills: 0 | Status: SHIPPED | OUTPATIENT
Start: 2020-06-15

## 2020-06-15 RX ADMIN — HYDROMORPHONE HYDROCHLORIDE 1 MG: 1 INJECTION, SOLUTION INTRAMUSCULAR; INTRAVENOUS; SUBCUTANEOUS at 02:25

## 2020-06-15 RX ADMIN — INSULIN LISPRO 6 UNITS: 100 INJECTION, SOLUTION INTRAVENOUS; SUBCUTANEOUS at 12:08

## 2020-06-15 RX ADMIN — HYDROCODONE BITARTRATE AND ACETAMINOPHEN 1 TABLET: 7.5; 325 TABLET ORAL at 12:08

## 2020-06-15 RX ADMIN — INSULIN LISPRO 3 UNITS: 100 INJECTION, SOLUTION INTRAVENOUS; SUBCUTANEOUS at 12:08

## 2020-06-15 RX ADMIN — HYDROCODONE BITARTRATE AND ACETAMINOPHEN 1 TABLET: 7.5; 325 TABLET ORAL at 08:20

## 2020-06-15 RX ADMIN — INSULIN HUMAN 6 UNITS: 100 INJECTION, SUSPENSION SUBCUTANEOUS at 08:22

## 2020-06-15 RX ADMIN — SIMETHICONE CHEW TAB 80 MG 80 MG: 80 TABLET ORAL at 04:41

## 2020-06-15 RX ADMIN — HYDROMORPHONE HYDROCHLORIDE 1 MG: 1 INJECTION, SOLUTION INTRAMUSCULAR; INTRAVENOUS; SUBCUTANEOUS at 06:44

## 2020-06-15 RX ADMIN — INSULIN LISPRO 3 UNITS: 100 INJECTION, SOLUTION INTRAVENOUS; SUBCUTANEOUS at 08:22

## 2020-06-15 RX ADMIN — PANTOPRAZOLE SODIUM 40 MG: 40 TABLET, DELAYED RELEASE ORAL at 04:41

## 2020-06-15 RX ADMIN — HYDROMORPHONE HYDROCHLORIDE 1 MG: 1 INJECTION, SOLUTION INTRAMUSCULAR; INTRAVENOUS; SUBCUTANEOUS at 10:09

## 2020-06-15 RX ADMIN — Medication 100 MG: at 09:00

## 2020-06-15 RX ADMIN — INSULIN LISPRO 5 UNITS: 100 INJECTION, SOLUTION INTRAVENOUS; SUBCUTANEOUS at 08:22

## 2020-06-15 RX ADMIN — SERTRALINE HYDROCHLORIDE 50 MG: 50 TABLET ORAL at 08:21

## 2020-06-15 RX ADMIN — Medication 10 ML: at 04:41

## 2020-06-15 RX ADMIN — QUETIAPINE FUMARATE 100 MG: 100 TABLET ORAL at 08:21

## 2020-06-15 RX ADMIN — HYDROCODONE BITARTRATE AND ACETAMINOPHEN 1 TABLET: 7.5; 325 TABLET ORAL at 04:41

## 2020-06-15 NOTE — DISCHARGE SUMMARY
Discharge Summary     Patient: Blayne Henry MRN: [id number]  SSN: [id number]    YOB: 1975  Age: 39 y.o. Sex: male       Admit Date: 6/11/2020    Discharge Date: 6/15/2020      Admission Diagnoses: DKA (diabetic ketoacidoses) ([facility] [E11.10]  ETOH abuse [F10.10]    Discharge Diagnoses:   Problem List as of 6/15/2020 Never Reviewed          Codes Class Noted - Resolved    DNR (do not resuscitate) ICD-10-CM: Z66  ICD-9-CM: V49.86  6/13/2020 - Present        ETOH abuse ICD-10-CM: F10.10  ICD-9-CM: 305.00  6/11/2020 - Present        Abdominal pain ICD-10-CM: R10.9  ICD-9-CM: 789.00  6/2/2020 - Present        Hypokalemia ICD-10-CM: E87.6  ICD-9-CM: 276.8  5/31/2020 - Present        Hypomagnesemia ICD-10-CM: E83.42  ICD-9-CM: 275.2  5/31/2020 - Present        Type II diabetes mellitus with complication, uncontrolled ([facility] ICD-10-CM: E11.8, E11.65  ICD-9-CM: 250.92  5/31/2020 - Present        * (Principal) DKA (diabetic ketoacidoses) ([facility] ICD-10-CM: E11.10  ICD-9-CM: 250.12  5/30/2020 - Present        Bipolar disorder ([facility] ICD-10-CM: F31.9  ICD-9-CM: 296.80  5/30/2020 - Present        HTN (hypertension) ICD-10-CM: I10  ICD-9-CM: 401.9  5/30/2020 - Present        Alcohol dependence ([facility] ICD-10-CM: F10.20  ICD-9-CM: 303.90  5/30/2020 - Present               Discharge Condition: [place] Course:   Mr. Luis Manuel Eaton is a 39year old male presents to the ER with intoxication. Just discharged from [facility] on the day of this admission. He stated he drank alcohol and did not take his insulin. He states he takes insulin, when asked how much, he says \"not enough\". He was diagnosed with DKA and was admitted under ICU on ivfs and insulin. He did well and was moved to regular medicine floors. He stated having a diagnosis of esophageal stricture and gastroparesis. Last EGD 4 months ago. The patient improved on reglan. His clinical status improved and he was able to tolerate diet.  He is medically stable to be discharge and continue outpatient management. Physical Exam:  General:          Well nourished. Alert. pale  CV:                  RRR. No m/r/g. Lungs:             CTAB. No wheezing, rhonchi, or rales. Abdomen:        Soft, tender diffusely mildly, nondistended. Bowel sounds normal.   Extremities:     Warm and dry. No cyanosis or edema. Neurologic:      grossly intact. Skin:                No rashes or jaundice. Normal coloration  Psych:             Normal mood and affect. Consults: None    Significant Diagnostic Studies: see note     Disposition: home    Discharge Medications:   Current Discharge Medication List      START taking these medications    Details   simethicone (MYLICON) 80 mg chewable tablet Take 1 Tab by mouth four (4) times daily as needed for GI Pain. Qty: 30 Tab, Refills: 0      pantoprazole (PROTONIX) 40 mg tablet Take 1 Tab by mouth daily for 30 days. Qty: 30 Tab, Refills: 0      metoclopramide HCl (REGLAN) 10 mg tablet Take 1 Tab by mouth Before breakfast, lunch, and dinner for 10 days. Qty: 30 Tab, Refills: 0      acetaminophen (TYLENOL) 500 mg tablet Take 1 Tab by mouth every four (4) hours as needed for Pain. Qty: 20 Tab, Refills: 0         CONTINUE these medications which have CHANGED    Details   insulin NPH (NOVOLIN N, HUMULIN N) 100 unit/mL injection 6 Units by SubCUTAneous route Before breakfast and dinner. Qty: 1 Vial, Refills: 2      QUEtiapine (SEROqueL) 200 mg tablet Take 1 Tab by mouth two (2) times a day for 30 days. Qty: 60 Tab, Refills: 0      sertraline (Zoloft) 50 mg tablet Take 2 Tabs by mouth daily for 30 days.  Indications: anxiousness associated with depression  Qty: 60 Tab, Refills: 0      insulin lispro (HUMALOG) 100 unit/mL injection   GRL RX SLIDING SCALE SF30    For Blood Sugar of:  150-174 = 2 Units; 175-204 = 3 Units  205-234 = 4 Units; 235-264 = 5 Units  265-294 = 6 Units; 295-324 = 7 Units  325-354 = 8 Units; 355 or > = Call MD    This drug may increase the risk of patient falls Fast Acting - Administer Immediately - or within 15 minutes of start of meal, if mealtime coverage. Qty: 1 Vial, Refills: 0         CONTINUE these medications which have NOT CHANGED    Details   gabapentin (Neurontin) 400 mg capsule Take 400 mg by mouth three (3) times daily. busPIRone (BUSPAR) 30 mg tablet Take 60 mg by mouth daily. metFORMIN (GLUCOPHAGE) 1,000 mg tablet Take 1,000 mg by mouth two (2) times daily (with meals). Activity: Activity as tolerated  Diet: Diabetic Diet  Wound Care: None needed    Follow-up Appointments   Procedures    FOLLOW UP VISIT Appointment in: One Week pcp     pcp     Standing Status:   Standing     Number of Occurrences:   1     Order Specific Question:   Appointment in     Answer:    One Week       Signed By: Malissa Nguyen MD     Prema 15, 2020

## 2020-06-15 NOTE — PROGRESS NOTES
Problem: Diabetes Self-Management  Goal: *Disease process and treatment process  Description: Define diabetes and identify own type of diabetes; list 3 options for treating diabetes. Outcome: Progressing Towards Goal  Goal: *Incorporating nutritional management into lifestyle  Description: Describe effect of type, amount and timing of food on blood glucose; list 3 methods for planning meals. Outcome: Progressing Towards Goal  Goal: *Using medications safely  Description: State effect of diabetes medications on diabetes; name diabetes medication taking, action and side effects. Outcome: Progressing Towards Goal  Goal: *Monitoring blood glucose, interpreting and using results  Description: Identify recommended blood glucose targets  and personal targets. Outcome: Progressing Towards Goal     Problem: Falls - Risk of  Goal: *Absence of Falls  Description: Document Danney Mess Fall Risk and appropriate interventions in the flowsheet.   Outcome: Progressing Towards Goal  Note: Fall Risk Interventions:  Mobility Interventions: Communicate number of staff needed for ambulation/transfer    Medication Interventions: Evaluate medications/consider consulting pharmacy, Teach patient to arise slowly    Elimination Interventions: Call light in reach, Urinal in reach    Problem: Pancreatitis  Goal: *Control of acute pain  Outcome: Progressing Towards Goal  Goal: *Absence of nausea/vomiting  Outcome: Progressing Towards Goal  Goal: *Optimize nutritional status  Outcome: Progressing Towards Goal     Problem: Pain  Goal: *Control of Pain  Outcome: Progressing Towards Goal

## 2020-06-15 NOTE — PROGRESS NOTES
Pt was medically cleared for discharge today and returned to his prior living arrangement. Care Management Interventions  PCP Verified by CM: No  Mode of Transport at Discharge: Other (see comment)  Transition of Care Consult (CM Consult): Discharge Planning  Discharge Durable Medical Equipment: (none)  Current Support Network:  Other  Confirm Follow Up Transport: Other (see comment)  [employer] Information Provided?: (no payor source/DECO seen)  Discharge Location  Discharge Placement: Homeless

## 2020-06-15 NOTE — DISCHARGE INSTRUCTIONS
Patient Education        Diabetes and Alcohol: Care Instructions  Your Care Instructions     People who have diabetes need to be more careful with alcohol. Before you drink, consider a few things: Is your diabetes well controlled? Do you know how drinking alcohol can affect you? Do you have high blood pressure, nerve damage, or eye problems from your diabetes? If you take insulin or another medicine for diabetes, drinking alcohol may cause low blood sugar. This could cause dangerous low blood sugar levels. Too much alcohol can also affect your ability to know your blood sugar is low and to treat it. Drinking alcohol can make you lightheaded at first and drowsy as you drink more, both of which may be similar to the symptoms of low blood sugar. Drinking a lot of alcohol over a long period of time can damage your liver (cirrhosis). If this happens, your body may lose its natural response to protect itself from low blood sugar. If you are controlling your diabetes and do not have other health issues, it may be okay to have a drink once in a while. Learning how alcohol affects your body can help you make the right choices. Follow-up care is a key part of your treatment and safety. Be sure to make and go to all appointments, and call your doctor if you are having problems. It's also a good idea to know your test results and keep a list of the medicines you take. How can you care for yourself at home? If you drink  · Work with your doctor or other diabetes expert to find what is best for you. Make sure you know whether it is safe to drink if you are taking insulin or another medicine for diabetes. · In general, limit alcohol to 1 drink a day with a meal if you are a woman. If you are a man, limit alcohol to 2 drinks a day with a meal. The following is considered a standard drink:  ? One 12-ounce bottle of beer or wine cooler  ? One 5-ounce glass of wine  ?  One mixed drink with 1.5 ounces of 80-proof hard liquor, such as gin, whiskey, or rum  · Choose alcoholic drinks wisely. With hard alcohol, use sugar-free mixers, such as diet tonic, water, or club soda. Pick drinks that have less alcohol, including light beer or dry wine. Or add club soda to wine to dilute it. Also remember that most alcoholic drinks have a lot of calories. · When you drink, check your blood sugar before you go to bed. Have a snack before bed so your blood sugar does not drop while you sleep. When not to drink  · Never drink on an empty stomach. If you do drink alcohol, drink it only with a meal or snack. Having as little as 2 drinks on an empty stomach could lead to low blood sugar. · Do not drink alcohol if you have problems recognizing the signs of low blood sugar until they become severe. · Do not drink alcohol after you exercise. The exercise itself lowers blood sugar. · Do not drink if you have nerve damage. Drinking can make it worse and increase the pain, numbness, and other symptoms. · Do not drink if you have high blood pressure. · Do not drink if you have diabetic eye disease. · Do not drink if you have high triglycerides, a type of fat in your blood. Drinking can raise triglycerides. · Do not drink if you are trying to lose weight. Alcohol provides empty calories that do not give you any nutrients. · Do not drink and drive. The effects of alcohol are greater if you have low blood sugar. When should you call for help? [id number] anytime you think you may need emergency care. For example, call if:  · You passed out (lost consciousness). · You are confused or cannot think clearly. · Your blood sugar is very high or very low. Watch closely for changes in your health, and be sure to contact your doctor if:  · Your blood sugar stays outside the level your doctor set for you. · You have any problems. Where can you learn more?   Go to http://pedrito-kandi.info/  Enter T236 in the search box to learn more about \"Diabetes and Alcohol: Care Instructions. \"  Current as of: December 20, 2019               Content Version: 12.5  © 7893-0649 [employer]. Care instructions adapted under license by [employer] (which disclaims liability or warranty for this information). If you have questions about a medical condition or this instruction, always ask your healthcare professional. [address] any warranty or liability for your use of this information.

## 2020-06-16 ENCOUNTER — APPOINTMENT (OUTPATIENT)
Dept: GENERAL RADIOLOGY | Age: 45
DRG: 638 | End: 2020-06-16
Attending: FAMILY MEDICINE
Payer: SUBSIDIZED

## 2020-06-16 ENCOUNTER — PATIENT OUTREACH (OUTPATIENT)
Dept: CASE MANAGEMENT | Age: 45
End: 2020-06-16

## 2020-06-16 ENCOUNTER — HOSPITAL ENCOUNTER (INPATIENT)
Age: 45
LOS: 1 days | Discharge: LEFT BEFORE DISCHARGE | DRG: 638 | End: 2020-06-17
Attending: EMERGENCY MEDICINE | Admitting: FAMILY MEDICINE
Payer: SUBSIDIZED

## 2020-06-16 DIAGNOSIS — F10.10 ALCOHOL ABUSE: ICD-10-CM

## 2020-06-16 DIAGNOSIS — E13.10 DIABETIC KETOACIDOSIS WITHOUT COMA ASSOCIATED WITH OTHER SPECIFIED DIABETES MELLITUS (HCC): Primary | ICD-10-CM

## 2020-06-16 PROBLEM — N17.9 ACUTE RENAL FAILURE (ARF) (HCC): Status: ACTIVE | Noted: 2020-06-16

## 2020-06-16 PROBLEM — E11.01 HYPEROSMOLAR HYPERGLYCEMIC COMA DUE TO DIABETES MELLITUS WITHOUT KETOACIDOSIS (HCC): Status: ACTIVE | Noted: 2020-06-16

## 2020-06-16 LAB
ADMINISTERED INITIALS, ADMINIT: NORMAL
ALBUMIN SERPL-MCNC: 3.5 G/DL (ref 3.5–5)
ALBUMIN/GLOB SERPL: 1 {RATIO} (ref 1.2–3.5)
ALP SERPL-CCNC: 110 U/L (ref 50–136)
ALT SERPL-CCNC: 33 U/L (ref 12–65)
ANION GAP SERPL CALC-SCNC: 15 MMOL/L (ref 7–16)
APPEARANCE UR: CLEAR
ARTERIAL PATENCY WRIST A: YES
AST SERPL-CCNC: 57 U/L (ref 15–37)
BASE DEFICIT BLDV-SCNC: 7 MMOL/L
BASOPHILS # BLD: 0 K/UL (ref 0–0.2)
BASOPHILS NFR BLD: 1 % (ref 0–2)
BDY SITE: ABNORMAL
BILIRUB SERPL-MCNC: 0.2 MG/DL (ref 0.2–1.1)
BILIRUB UR QL: NEGATIVE
BUN SERPL-MCNC: 13 MG/DL (ref 6–23)
CALCIUM SERPL-MCNC: 8.4 MG/DL (ref 8.3–10.4)
CHLORIDE SERPL-SCNC: 95 MMOL/L (ref 98–107)
CO2 BLD-SCNC: 19 MMOL/L
CO2 SERPL-SCNC: 16 MMOL/L (ref 21–32)
COLLECT TIME,HTIME: 1905
COLOR UR: YELLOW
CREAT SERPL-MCNC: 1.28 MG/DL (ref 0.8–1.5)
D50 ADMINISTERED, D50ADM: 0 ML
D50 ADMINISTERED, D50ADM: 16 ML
D50 ORDER, D50ORD: 0 ML
D50 ORDER, D50ORD: 16 ML
DIFFERENTIAL METHOD BLD: ABNORMAL
EOSINOPHIL # BLD: 0 K/UL (ref 0–0.8)
EOSINOPHIL NFR BLD: 1 % (ref 0.5–7.8)
ERYTHROCYTE [DISTWIDTH] IN BLOOD BY AUTOMATED COUNT: 20.5 % (ref 11.9–14.6)
ETHANOL SERPL-MCNC: 112 MG/DL
GAS FLOW.O2 O2 DELIVERY SYS: ABNORMAL L/MIN
GLOBULIN SER CALC-MCNC: 3.4 G/DL (ref 2.3–3.5)
GLSCOM COMMENTS: NORMAL
GLUCOSE BLD STRIP.AUTO-MCNC: 108 MG/DL (ref 65–100)
GLUCOSE BLD STRIP.AUTO-MCNC: 296 MG/DL (ref 65–100)
GLUCOSE BLD STRIP.AUTO-MCNC: 571 MG/DL (ref 65–100)
GLUCOSE BLD STRIP.AUTO-MCNC: 61 MG/DL (ref 65–100)
GLUCOSE SERPL-MCNC: 978 MG/DL (ref 65–100)
GLUCOSE UR STRIP.AUTO-MCNC: >1000 MG/DL
GLUCOSE, GLC: 108 MG/DL
GLUCOSE, GLC: 296 MG/DL
GLUCOSE, GLC: 571 MG/DL
GLUCOSE, GLC: 600 MG/DL
GLUCOSE, GLC: 61 MG/DL
GLUCOSE, GLC: 84 MG/DL
HCO3 BLDV-SCNC: 18 MMOL/L (ref 23–28)
HCT VFR BLD AUTO: 39.3 % (ref 41.1–50.3)
HGB BLD-MCNC: 11.3 G/DL (ref 13.6–17.2)
HGB UR QL STRIP: NEGATIVE
HIGH TARGET, HITG: 250 MG/DL
IMM GRANULOCYTES # BLD AUTO: 0 K/UL (ref 0–0.5)
IMM GRANULOCYTES NFR BLD AUTO: 0 % (ref 0–5)
INSULIN ADMINSTERED, INSADM: 0 UNITS/HOUR
INSULIN ADMINSTERED, INSADM: 0 UNITS/HOUR
INSULIN ADMINSTERED, INSADM: 1 UNITS/HOUR
INSULIN ADMINSTERED, INSADM: 10.8 UNITS/HOUR
INSULIN ADMINSTERED, INSADM: 15.3 UNITS/HOUR
INSULIN ADMINSTERED, INSADM: 7.1 UNITS/HOUR
INSULIN ORDER, INSORD: 0 UNITS/HOUR
INSULIN ORDER, INSORD: 0 UNITS/HOUR
INSULIN ORDER, INSORD: 1 UNITS/HOUR
INSULIN ORDER, INSORD: 10.8 UNITS/HOUR
INSULIN ORDER, INSORD: 15.3 UNITS/HOUR
INSULIN ORDER, INSORD: 7.1 UNITS/HOUR
KETONES UR QL STRIP.AUTO: NEGATIVE MG/DL
LEUKOCYTE ESTERASE UR QL STRIP.AUTO: NEGATIVE
LIPASE SERPL-CCNC: 20 U/L (ref 73–393)
LOW TARGET, LOT: 150 MG/DL
LYMPHOCYTES # BLD: 1 K/UL (ref 0.5–4.6)
LYMPHOCYTES NFR BLD: 13 % (ref 13–44)
MCH RBC QN AUTO: 25.3 PG (ref 26.1–32.9)
MCHC RBC AUTO-ENTMCNC: 28.8 G/DL (ref 31.4–35)
MCV RBC AUTO: 87.9 FL (ref 79.6–97.8)
MINUTES UNTIL NEXT BG, NBG: 15 MIN
MINUTES UNTIL NEXT BG, NBG: 60 MIN
MONOCYTES # BLD: 0.4 K/UL (ref 0.1–1.3)
MONOCYTES NFR BLD: 5 % (ref 4–12)
MULTIPLIER, MUL: 0
MULTIPLIER, MUL: 0.01
MULTIPLIER, MUL: 0.02
MULTIPLIER, MUL: 0.02
MULTIPLIER, MUL: 0.03
MULTIPLIER, MUL: 0.03
NEUTS SEG # BLD: 6.5 K/UL (ref 1.7–8.2)
NEUTS SEG NFR BLD: 81 % (ref 43–78)
NITRITE UR QL STRIP.AUTO: NEGATIVE
NRBC # BLD: 0 K/UL (ref 0–0.2)
ORDER INITIALS, ORDINIT: NORMAL
PCO2 BLDV: 35.2 MMHG (ref 41–51)
PH BLDV: 7.32 [PH] (ref 7.32–7.42)
PH UR STRIP: 6 [PH] (ref 5–9)
PLATELET # BLD AUTO: 292 K/UL (ref 150–450)
PMV BLD AUTO: 10.7 FL (ref 9.4–12.3)
PO2 BLDV: 18 MMHG
POTASSIUM SERPL-SCNC: 5.3 MMOL/L (ref 3.5–5.1)
PROT SERPL-MCNC: 6.9 G/DL (ref 6.3–8.2)
PROT UR STRIP-MCNC: NEGATIVE MG/DL
RBC # BLD AUTO: 4.47 M/UL (ref 4.23–5.6)
SAO2 % BLDV: 23 % (ref 65–88)
SERVICE CMNT-IMP: ABNORMAL
SODIUM SERPL-SCNC: 126 MMOL/L (ref 136–145)
SP GR UR REFRACTOMETRY: 1.03 (ref 1–1.02)
SPECIMEN TYPE: ABNORMAL
UROBILINOGEN UR QL STRIP.AUTO: 0.2 EU/DL (ref 0.2–1)
WBC # BLD AUTO: 8 K/UL (ref 4.3–11.1)

## 2020-06-16 PROCEDURE — 82803 BLOOD GASES ANY COMBINATION: CPT

## 2020-06-16 PROCEDURE — 80053 COMPREHEN METABOLIC PANEL: CPT

## 2020-06-16 PROCEDURE — 74011250636 HC RX REV CODE- 250/636: Performed by: EMERGENCY MEDICINE

## 2020-06-16 PROCEDURE — 81003 URINALYSIS AUTO W/O SCOPE: CPT

## 2020-06-16 PROCEDURE — 84100 ASSAY OF PHOSPHORUS: CPT

## 2020-06-16 PROCEDURE — 74011000250 HC RX REV CODE- 250: Performed by: FAMILY MEDICINE

## 2020-06-16 PROCEDURE — 74011636637 HC RX REV CODE- 636/637: Performed by: FAMILY MEDICINE

## 2020-06-16 PROCEDURE — 65270000029 HC RM PRIVATE

## 2020-06-16 PROCEDURE — 74018 RADEX ABDOMEN 1 VIEW: CPT

## 2020-06-16 PROCEDURE — 96361 HYDRATE IV INFUSION ADD-ON: CPT

## 2020-06-16 PROCEDURE — 83690 ASSAY OF LIPASE: CPT

## 2020-06-16 PROCEDURE — 96360 HYDRATION IV INFUSION INIT: CPT

## 2020-06-16 PROCEDURE — 74011636637 HC RX REV CODE- 636/637: Performed by: EMERGENCY MEDICINE

## 2020-06-16 PROCEDURE — 83735 ASSAY OF MAGNESIUM: CPT

## 2020-06-16 PROCEDURE — 80048 BASIC METABOLIC PNL TOTAL CA: CPT

## 2020-06-16 PROCEDURE — 74011000258 HC RX REV CODE- 258: Performed by: FAMILY MEDICINE

## 2020-06-16 PROCEDURE — 74011250636 HC RX REV CODE- 250/636: Performed by: FAMILY MEDICINE

## 2020-06-16 PROCEDURE — 80307 DRUG TEST PRSMV CHEM ANLYZR: CPT

## 2020-06-16 PROCEDURE — 82962 GLUCOSE BLOOD TEST: CPT

## 2020-06-16 PROCEDURE — 99285 EMERGENCY DEPT VISIT HI MDM: CPT

## 2020-06-16 PROCEDURE — 85025 COMPLETE CBC W/AUTO DIFF WBC: CPT

## 2020-06-16 PROCEDURE — 74011250637 HC RX REV CODE- 250/637: Performed by: FAMILY MEDICINE

## 2020-06-16 RX ORDER — DEXTROSE 40 %
15 GEL (GRAM) ORAL AS NEEDED
Status: DISCONTINUED | OUTPATIENT
Start: 2020-06-16 | End: 2020-06-17

## 2020-06-16 RX ORDER — SODIUM CHLORIDE 0.9 % (FLUSH) 0.9 %
5-40 SYRINGE (ML) INJECTION AS NEEDED
Status: DISCONTINUED | OUTPATIENT
Start: 2020-06-16 | End: 2020-06-17 | Stop reason: HOSPADM

## 2020-06-16 RX ORDER — FOLIC ACID 1 MG/1
1 TABLET ORAL DAILY
Status: DISCONTINUED | OUTPATIENT
Start: 2020-06-17 | End: 2020-06-17 | Stop reason: HOSPADM

## 2020-06-16 RX ORDER — ENOXAPARIN SODIUM 100 MG/ML
40 INJECTION SUBCUTANEOUS EVERY 24 HOURS
Status: DISCONTINUED | OUTPATIENT
Start: 2020-06-16 | End: 2020-06-17 | Stop reason: HOSPADM

## 2020-06-16 RX ORDER — LANOLIN ALCOHOL/MO/W.PET/CERES
100 CREAM (GRAM) TOPICAL DAILY
Status: DISCONTINUED | OUTPATIENT
Start: 2020-06-17 | End: 2020-06-17 | Stop reason: HOSPADM

## 2020-06-16 RX ORDER — ACETAMINOPHEN 325 MG/1
650 TABLET ORAL
Status: DISCONTINUED | OUTPATIENT
Start: 2020-06-16 | End: 2020-06-17 | Stop reason: HOSPADM

## 2020-06-16 RX ORDER — LORAZEPAM 1 MG/1
1 TABLET ORAL
Status: DISCONTINUED | OUTPATIENT
Start: 2020-06-16 | End: 2020-06-17 | Stop reason: HOSPADM

## 2020-06-16 RX ORDER — SODIUM CHLORIDE, SODIUM LACTATE, POTASSIUM CHLORIDE, CALCIUM CHLORIDE 600; 310; 30; 20 MG/100ML; MG/100ML; MG/100ML; MG/100ML
200 INJECTION, SOLUTION INTRAVENOUS CONTINUOUS
Status: DISCONTINUED | OUTPATIENT
Start: 2020-06-16 | End: 2020-06-17 | Stop reason: HOSPADM

## 2020-06-16 RX ORDER — NALOXONE HYDROCHLORIDE 0.4 MG/ML
0.4 INJECTION, SOLUTION INTRAMUSCULAR; INTRAVENOUS; SUBCUTANEOUS AS NEEDED
Status: DISCONTINUED | OUTPATIENT
Start: 2020-06-16 | End: 2020-06-17 | Stop reason: HOSPADM

## 2020-06-16 RX ORDER — IBUPROFEN 200 MG
1 TABLET ORAL EVERY 24 HOURS
Status: DISCONTINUED | OUTPATIENT
Start: 2020-06-16 | End: 2020-06-17 | Stop reason: HOSPADM

## 2020-06-16 RX ORDER — DEXTROSE 50 % IN WATER (D50W) INTRAVENOUS SYRINGE
25-50 AS NEEDED
Status: DISCONTINUED | OUTPATIENT
Start: 2020-06-16 | End: 2020-06-17

## 2020-06-16 RX ORDER — MORPHINE SULFATE 2 MG/ML
2 INJECTION, SOLUTION INTRAMUSCULAR; INTRAVENOUS
Status: DISCONTINUED | OUTPATIENT
Start: 2020-06-16 | End: 2020-06-17

## 2020-06-16 RX ORDER — SODIUM CHLORIDE 0.9 % (FLUSH) 0.9 %
5-40 SYRINGE (ML) INJECTION EVERY 8 HOURS
Status: DISCONTINUED | OUTPATIENT
Start: 2020-06-16 | End: 2020-06-17 | Stop reason: HOSPADM

## 2020-06-16 RX ORDER — INSULIN GLARGINE 100 [IU]/ML
12 INJECTION, SOLUTION SUBCUTANEOUS EVERY 24 HOURS
Status: DISCONTINUED | OUTPATIENT
Start: 2020-06-16 | End: 2020-06-17

## 2020-06-16 RX ADMIN — HUMAN INSULIN 10 UNITS: 100 INJECTION, SOLUTION SUBCUTANEOUS at 17:20

## 2020-06-16 RX ADMIN — INSULIN GLARGINE 12 UNITS: 100 INJECTION, SOLUTION SUBCUTANEOUS at 19:09

## 2020-06-16 RX ADMIN — SODIUM CHLORIDE 1000 ML: 900 INJECTION, SOLUTION INTRAVENOUS at 16:24

## 2020-06-16 RX ADMIN — PROMETHAZINE HYDROCHLORIDE 12.5 MG: 25 INJECTION INTRAMUSCULAR; INTRAVENOUS at 19:34

## 2020-06-16 RX ADMIN — Medication 10 ML: at 19:00

## 2020-06-16 RX ADMIN — ENOXAPARIN SODIUM 40 MG: 40 INJECTION SUBCUTANEOUS at 21:55

## 2020-06-16 RX ADMIN — DEXTROSE 50 % IN WATER (D50W) INTRAVENOUS SYRINGE 25 G: at 23:35

## 2020-06-16 RX ADMIN — SODIUM CHLORIDE 1000 ML: 9 INJECTION, SOLUTION INTRAVENOUS at 15:25

## 2020-06-16 RX ADMIN — SODIUM CHLORIDE, SODIUM LACTATE, POTASSIUM CHLORIDE, AND CALCIUM CHLORIDE 200 ML/HR: 600; 310; 30; 20 INJECTION, SOLUTION INTRAVENOUS at 18:00

## 2020-06-16 RX ADMIN — SODIUM CHLORIDE, SODIUM LACTATE, POTASSIUM CHLORIDE, AND CALCIUM CHLORIDE 1000 ML: 600; 310; 30; 20 INJECTION, SOLUTION INTRAVENOUS at 19:21

## 2020-06-16 RX ADMIN — MORPHINE SULFATE 2 MG: 2 INJECTION, SOLUTION INTRAMUSCULAR; INTRAVENOUS at 19:31

## 2020-06-16 RX ADMIN — MORPHINE SULFATE 2 MG: 2 INJECTION, SOLUTION INTRAMUSCULAR; INTRAVENOUS at 23:41

## 2020-06-16 RX ADMIN — LORAZEPAM 1 MG: 1 TABLET ORAL at 23:12

## 2020-06-16 RX ADMIN — SODIUM CHLORIDE 10.8 UNITS/HR: 900 INJECTION, SOLUTION INTRAVENOUS at 19:29
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
## 2020-06-16 NOTE — ED PROVIDER NOTES
Patient is a chronic alcoholic with history of diabetes. Was seen at [address] diagnosed with alcohol intoxication. Discharged. On waking this morning had some epigastric pain. Drink alcohol today to try to make himself vomit. Presents with epigastric pain and feels like his blood sugar is high. Diarrhea also. No dysuria hematuria. The history is provided by the patient. No  was used. Abdominal Pain    This is a new problem. The current episode started 6 to 12 hours ago. The problem occurs constantly. The problem has not changed since onset. The pain is associated with ETOH use. The pain is located in the epigastric region. The quality of the pain is aching and sharp. The pain is moderate. Associated symptoms include diarrhea, nausea and vomiting. Pertinent negatives include no fever, no melena, no constipation, no dysuria, no hematuria, no headaches, no chest pain and no back pain. The pain is worsened by eating, drinking alcohol and palpation. The pain is relieved by nothing. His past medical history is significant for DM. Past Medical History:   Diagnosis Date    [facility]     Depression     Diabetes ([facility]     PTSD (post-traumatic stress disorder)        No past surgical history on file. No family history on file. Social History     Socioeconomic History    Marital status: SINGLE     Spouse name: Not on file    Number of children: Not on file    Years of education: Not on file    Highest education level: Not on file   Occupational History    Not on file   Social Needs    Financial resource strain: Not on file    Food insecurity     Worry: Not on file     Inability: Not on file    Transportation needs     Medical: Not on file     Non-medical: Not on file   Tobacco Use    Smoking status: Current Every Day Smoker     Packs/day: 1.00    Smokeless tobacco: Never Used   Substance and Sexual Activity    Alcohol use:  Yes    Drug use: Not Currently    Sexual activity: Not on file   Lifestyle    Physical activity     Days per week: Not on file     Minutes per session: Not on file    Stress: Not on file   Relationships    Social connections     Talks on phone: Not on file     Gets together: Not on file     Attends Anabaptist service: Not on file     Active member of club or organization: Not on file     Attends meetings of clubs or organizations: Not on file     Relationship status: Not on file    Intimate partner violence     Fear of current or ex partner: Not on file     Emotionally abused: Not on file     Physically abused: Not on file     Forced sexual activity: Not on file   Other Topics Concern    Not on file   Social History Narrative    Not on file         ALLERGIES: Toradol [ketorolac]    Review of Systems   Constitutional: Negative for chills and fever. HENT: Negative for rhinorrhea and sore throat. Eyes: Negative for pain and redness. Respiratory: Negative for chest tightness, shortness of breath and wheezing. Cardiovascular: Negative for chest pain and leg swelling. Gastrointestinal: Positive for abdominal pain, diarrhea, nausea and vomiting. Negative for constipation and melena. Genitourinary: Negative for dysuria and hematuria. Musculoskeletal: Negative for back pain, gait problem, neck pain and neck stiffness. Skin: Negative for color change and rash. Neurological: Negative for weakness, numbness and headaches. Vitals:    06/16/20 1518   BP: 102/64   Pulse: (!) 105   Resp: 16   Temp: 97.4 °F (36.3 °C)   SpO2: 99%   Weight: 61.2 kg (135 lb)   Height: 5' 8\" (1.727 m)            Physical Exam  Constitutional:       Appearance: He is well-developed. HENT:      Head: Normocephalic and atraumatic. Neck:      Musculoskeletal: Normal range of motion and neck supple. Cardiovascular:      Rate and Rhythm: Regular rhythm. Tachycardia present.    Pulmonary:      Effort: Pulmonary effort is normal.      Breath sounds: Normal breath sounds. Abdominal:      General: Bowel sounds are normal.      Palpations: Abdomen is soft. Tenderness: There is abdominal tenderness (epigastric). Musculoskeletal: Normal range of motion. General: No swelling. Skin:     General: Skin is warm and dry. Neurological:      General: No focal deficit present. Mental Status: He is alert and oriented to person, place, and time. MDM  Number of Diagnoses or Management Options  Diagnosis management comments: Patient with diabetes and DKA along with alcohol abuse. Will admit for further treatment. Amount and/or Complexity of Data Reviewed  Clinical lab tests: ordered and reviewed  Tests in the medicine section of CPT®: ordered and reviewed    Patient Progress  Patient progress: stable         Procedures      Results Include:    Recent Results (from the past 24 hour(s))   CBC WITH AUTOMATED DIFF    Collection Time: 06/16/20  3:24 PM   Result Value Ref Range    WBC 8.0 4.3 - 11.1 K/uL    RBC 4.47 4.23 - 5.6 M/uL    HGB 11.3 (L) 13.6 - 17.2 g/dL    HCT 39.3 (L) 41.1 - 50.3 %    MCV 87.9 79.6 - 97.8 FL    MCH 25.3 (L) 26.1 - 32.9 PG    MCHC 28.8 (L) 31.4 - 35.0 g/dL    RDW 20.5 (H) 11.9 - 14.6 %    PLATELET 700 015 - 741 K/uL    MPV 10.7 9.4 - 12.3 FL    ABSOLUTE NRBC 0.00 0.0 - 0.2 K/uL    DF AUTOMATED      NEUTROPHILS 81 (H) 43 - 78 %    LYMPHOCYTES 13 13 - 44 %    MONOCYTES 5 4.0 - 12.0 %    EOSINOPHILS 1 0.5 - 7.8 %    BASOPHILS 1 0.0 - 2.0 %    IMMATURE GRANULOCYTES 0 0.0 - 5.0 %    ABS. NEUTROPHILS 6.5 1.7 - 8.2 K/UL    ABS. LYMPHOCYTES 1.0 0.5 - 4.6 K/UL    ABS. MONOCYTES 0.4 0.1 - 1.3 K/UL    ABS. EOSINOPHILS 0.0 0.0 - 0.8 K/UL    ABS. BASOPHILS 0.0 0.0 - 0.2 K/UL    ABS. IMM.  GRANS. 0.0 0.0 - 0.5 K/UL   METABOLIC PANEL, COMPREHENSIVE    Collection Time: 06/16/20  3:24 PM   Result Value Ref Range    Sodium 126 (L) 136 - 145 mmol/L    Potassium 5.3 (H) 3.5 - 5.1 mmol/L    Chloride 95 (L) 98 - 107 mmol/L    CO2 16 (L) 21 - 32 mmol/L    Anion gap 15 7 - 16 mmol/L    Glucose 978 (HH) 65 - 100 mg/dL    BUN 13 6 - 23 MG/DL    Creatinine 1.28 0.8 - 1.5 MG/DL    GFR est AA >60 >60 ml/min/1.73m2    GFR est non-AA >60 >60 ml/min/1.73m2    Calcium 8.4 8.3 - 10.4 MG/DL    Bilirubin, total 0.2 0.2 - 1.1 MG/DL    ALT (SGPT) 33 12 - 65 U/L    AST (SGOT) 57 (H) 15 - 37 U/L    Alk.  phosphatase 110 50 - 136 U/L    Protein, total 6.9 6.3 - 8.2 g/dL    Albumin 3.5 3.5 - 5.0 g/dL    Globulin 3.4 2.3 - 3.5 g/dL    A-G Ratio 1.0 (L) 1.2 - 3.5     LIPASE    Collection Time: 06/16/20  3:24 PM   Result Value Ref Range    Lipase 20 (L) 73 - 393 U/L   ETHYL ALCOHOL    Collection Time: 06/16/20  3:24 PM   Result Value Ref Range    ALCOHOL(ETHYL),SERUM 112 MG/DL

## 2020-06-16 NOTE — ED NOTES
This nurse spoke to MD about Pt only receiving 400ML of NS before disconnecting it and throwing it into the floor. MD stated he wanted Pt to receive LR 2,500 bolus over the next 2.5 hrs in place of NS. This nurse confirmed MD wants Pt to receive scheduled Lantus and Insulin drip together.

## 2020-06-16 NOTE — PROGRESS NOTES
Patient on [facility] list for hospital discharge 6/15/2020. Patient has had 35 ED/hospitalizations between [place] and [facility] within the past 6 months. Patent doesn't have a valid contact number, no emergency , and no Healthcare Decision Maker. Unable to complete ALLISON call. Episode resolved.

## 2020-06-16 NOTE — ED TRIAGE NOTES
Patient to triage via wheelchair with mask in place. Patient reports abdominal pain and feels like his blood sugar is high. Patient states he drank a 1/2 liter of whiskey prior to arrival because he thought it would help him throw up. States he has been sleeping in a field since he was here yesterday.

## 2020-06-17 VITALS
DIASTOLIC BLOOD PRESSURE: 78 MMHG | SYSTOLIC BLOOD PRESSURE: 116 MMHG | HEIGHT: 68 IN | WEIGHT: 135 LBS | HEART RATE: 75 BPM | BODY MASS INDEX: 20.46 KG/M2 | OXYGEN SATURATION: 99 % | RESPIRATION RATE: 18 BRPM | TEMPERATURE: 98.7 F

## 2020-06-17 LAB
ADMINISTERED INITIALS, ADMINIT: NORMAL
AMPHET UR QL SCN: NEGATIVE
ANION GAP SERPL CALC-SCNC: 7 MMOL/L (ref 7–16)
ANION GAP SERPL CALC-SCNC: 8 MMOL/L (ref 7–16)
BARBITURATES UR QL SCN: NEGATIVE
BASOPHILS # BLD: 0 K/UL (ref 0–0.2)
BASOPHILS NFR BLD: 1 % (ref 0–2)
BENZODIAZ UR QL: NEGATIVE
BUN SERPL-MCNC: 6 MG/DL (ref 6–23)
BUN SERPL-MCNC: 6 MG/DL (ref 6–23)
CALCIUM SERPL-MCNC: 8.4 MG/DL (ref 8.3–10.4)
CALCIUM SERPL-MCNC: 8.5 MG/DL (ref 8.3–10.4)
CANNABINOIDS UR QL SCN: NEGATIVE
CHLORIDE SERPL-SCNC: 108 MMOL/L (ref 98–107)
CHLORIDE SERPL-SCNC: 109 MMOL/L (ref 98–107)
CO2 SERPL-SCNC: 24 MMOL/L (ref 21–32)
CO2 SERPL-SCNC: 26 MMOL/L (ref 21–32)
COCAINE UR QL SCN: NEGATIVE
CREAT SERPL-MCNC: 0.56 MG/DL (ref 0.8–1.5)
CREAT SERPL-MCNC: 0.64 MG/DL (ref 0.8–1.5)
D50 ADMINISTERED, D50ADM: 0 ML
D50 ORDER, D50ORD: 0 ML
DIFFERENTIAL METHOD BLD: ABNORMAL
EOSINOPHIL # BLD: 0.2 K/UL (ref 0–0.8)
EOSINOPHIL NFR BLD: 4 % (ref 0.5–7.8)
ERYTHROCYTE [DISTWIDTH] IN BLOOD BY AUTOMATED COUNT: 19.7 % (ref 11.9–14.6)
GLSCOM COMMENTS: NORMAL
GLUCOSE BLD STRIP.AUTO-MCNC: 152 MG/DL (ref 65–100)
GLUCOSE BLD STRIP.AUTO-MCNC: 230 MG/DL (ref 65–100)
GLUCOSE BLD STRIP.AUTO-MCNC: 257 MG/DL (ref 65–100)
GLUCOSE BLD STRIP.AUTO-MCNC: 342 MG/DL (ref 65–100)
GLUCOSE BLD STRIP.AUTO-MCNC: 404 MG/DL (ref 65–100)
GLUCOSE BLD STRIP.AUTO-MCNC: 66 MG/DL (ref 65–100)
GLUCOSE BLD STRIP.AUTO-MCNC: 67 MG/DL (ref 65–100)
GLUCOSE BLD STRIP.AUTO-MCNC: 74 MG/DL (ref 65–100)
GLUCOSE BLD STRIP.AUTO-MCNC: 84 MG/DL (ref 65–100)
GLUCOSE BLD STRIP.AUTO-MCNC: >600 MG/DL (ref 65–100)
GLUCOSE BLD STRIP.AUTO-MCNC: >600 MG/DL (ref 65–100)
GLUCOSE SERPL-MCNC: 229 MG/DL (ref 65–100)
GLUCOSE SERPL-MCNC: 81 MG/DL (ref 65–100)
GLUCOSE, GLC: 74 MG/DL
HCT VFR BLD AUTO: 31.8 % (ref 41.1–50.3)
HGB BLD-MCNC: 10 G/DL (ref 13.6–17.2)
HIGH TARGET, HITG: 250 MG/DL
IMM GRANULOCYTES # BLD AUTO: 0 K/UL (ref 0–0.5)
IMM GRANULOCYTES NFR BLD AUTO: 0 % (ref 0–5)
INSULIN ADMINSTERED, INSADM: 0 UNITS/HOUR
INSULIN ORDER, INSORD: 0 UNITS/HOUR
LOW TARGET, LOT: 150 MG/DL
LYMPHOCYTES # BLD: 2.3 K/UL (ref 0.5–4.6)
LYMPHOCYTES NFR BLD: 42 % (ref 13–44)
MAGNESIUM SERPL-MCNC: 1.5 MG/DL (ref 1.8–2.4)
MAGNESIUM SERPL-MCNC: 1.6 MG/DL (ref 1.8–2.4)
MCH RBC QN AUTO: 25.4 PG (ref 26.1–32.9)
MCHC RBC AUTO-ENTMCNC: 31.4 G/DL (ref 31.4–35)
MCV RBC AUTO: 80.9 FL (ref 79.6–97.8)
METHADONE UR QL: NEGATIVE
MINUTES UNTIL NEXT BG, NBG: 60 MIN
MONOCYTES # BLD: 0.6 K/UL (ref 0.1–1.3)
MONOCYTES NFR BLD: 10 % (ref 4–12)
MULTIPLIER, MUL: 0
NEUTS SEG # BLD: 2.3 K/UL (ref 1.7–8.2)
NEUTS SEG NFR BLD: 43 % (ref 43–78)
NRBC # BLD: 0 K/UL (ref 0–0.2)
OPIATES UR QL: POSITIVE
ORDER INITIALS, ORDINIT: NORMAL
PCP UR QL: NEGATIVE
PHOSPHATE SERPL-MCNC: 2.9 MG/DL (ref 2.5–4.5)
PLATELET # BLD AUTO: 250 K/UL (ref 150–450)
PMV BLD AUTO: 10 FL (ref 9.4–12.3)
POTASSIUM SERPL-SCNC: 3.8 MMOL/L (ref 3.5–5.1)
POTASSIUM SERPL-SCNC: 3.9 MMOL/L (ref 3.5–5.1)
RBC # BLD AUTO: 3.93 M/UL (ref 4.23–5.6)
SODIUM SERPL-SCNC: 141 MMOL/L (ref 136–145)
SODIUM SERPL-SCNC: 141 MMOL/L (ref 136–145)
WBC # BLD AUTO: 5.5 K/UL (ref 4.3–11.1)

## 2020-06-17 PROCEDURE — 74011250637 HC RX REV CODE- 250/637: Performed by: FAMILY MEDICINE

## 2020-06-17 PROCEDURE — 74011000250 HC RX REV CODE- 250: Performed by: FAMILY MEDICINE

## 2020-06-17 PROCEDURE — 80048 BASIC METABOLIC PNL TOTAL CA: CPT

## 2020-06-17 PROCEDURE — 74011250636 HC RX REV CODE- 250/636: Performed by: FAMILY MEDICINE

## 2020-06-17 PROCEDURE — 83735 ASSAY OF MAGNESIUM: CPT

## 2020-06-17 PROCEDURE — 74011636637 HC RX REV CODE- 636/637: Performed by: INTERNAL MEDICINE

## 2020-06-17 PROCEDURE — 36415 COLL VENOUS BLD VENIPUNCTURE: CPT

## 2020-06-17 PROCEDURE — 74011250637 HC RX REV CODE- 250/637: Performed by: INTERNAL MEDICINE

## 2020-06-17 PROCEDURE — 85025 COMPLETE CBC W/AUTO DIFF WBC: CPT

## 2020-06-17 RX ORDER — OXYCODONE HYDROCHLORIDE 5 MG/1
5 TABLET ORAL
Status: DISCONTINUED | OUTPATIENT
Start: 2020-06-17 | End: 2020-06-17

## 2020-06-17 RX ORDER — KETOROLAC TROMETHAMINE 30 MG/ML
30 INJECTION, SOLUTION INTRAMUSCULAR; INTRAVENOUS
Status: DISCONTINUED | OUTPATIENT
Start: 2020-06-17 | End: 2020-06-17

## 2020-06-17 RX ORDER — GABAPENTIN 400 MG/1
400 CAPSULE ORAL 3 TIMES DAILY
Status: DISCONTINUED | OUTPATIENT
Start: 2020-06-17 | End: 2020-06-17 | Stop reason: HOSPADM

## 2020-06-17 RX ORDER — FACIAL-BODY WIPES
10 EACH TOPICAL DAILY PRN
Status: DISCONTINUED | OUTPATIENT
Start: 2020-06-17 | End: 2020-06-17 | Stop reason: HOSPADM

## 2020-06-17 RX ORDER — INSULIN LISPRO 100 [IU]/ML
INJECTION, SOLUTION INTRAVENOUS; SUBCUTANEOUS
Status: DISCONTINUED | OUTPATIENT
Start: 2020-06-17 | End: 2020-06-17 | Stop reason: HOSPADM

## 2020-06-17 RX ORDER — ADHESIVE BANDAGE
30 BANDAGE TOPICAL DAILY
Status: DISCONTINUED | OUTPATIENT
Start: 2020-06-17 | End: 2020-06-17 | Stop reason: HOSPADM

## 2020-06-17 RX ORDER — PROMETHAZINE HYDROCHLORIDE 25 MG/1
12.5 TABLET ORAL
Status: DISCONTINUED | OUTPATIENT
Start: 2020-06-17 | End: 2020-06-17 | Stop reason: HOSPADM

## 2020-06-17 RX ORDER — BISACODYL 5 MG
5 TABLET, DELAYED RELEASE (ENTERIC COATED) ORAL DAILY PRN
Status: DISCONTINUED | OUTPATIENT
Start: 2020-06-17 | End: 2020-06-17 | Stop reason: HOSPADM

## 2020-06-17 RX ORDER — ADHESIVE BANDAGE
30 BANDAGE TOPICAL DAILY PRN
Status: DISCONTINUED | OUTPATIENT
Start: 2020-06-17 | End: 2020-06-17

## 2020-06-17 RX ORDER — INSULIN LISPRO 100 [IU]/ML
4 INJECTION, SOLUTION INTRAVENOUS; SUBCUTANEOUS
Status: DISCONTINUED | OUTPATIENT
Start: 2020-06-17 | End: 2020-06-17 | Stop reason: HOSPADM

## 2020-06-17 RX ORDER — QUETIAPINE FUMARATE 100 MG/1
200 TABLET, FILM COATED ORAL 2 TIMES DAILY
Status: DISCONTINUED | OUTPATIENT
Start: 2020-06-17 | End: 2020-06-17 | Stop reason: HOSPADM

## 2020-06-17 RX ORDER — BUSPIRONE HYDROCHLORIDE 10 MG/1
30 TABLET ORAL DAILY
Status: DISCONTINUED | OUTPATIENT
Start: 2020-06-17 | End: 2020-06-17 | Stop reason: HOSPADM

## 2020-06-17 RX ORDER — OXYCODONE HYDROCHLORIDE 5 MG/1
5 TABLET ORAL
Status: COMPLETED | OUTPATIENT
Start: 2020-06-17 | End: 2020-06-17

## 2020-06-17 RX ORDER — IBUPROFEN 600 MG/1
600 TABLET ORAL
Status: DISCONTINUED | OUTPATIENT
Start: 2020-06-17 | End: 2020-06-17 | Stop reason: HOSPADM

## 2020-06-17 RX ORDER — DOCUSATE SODIUM 100 MG/1
100 CAPSULE, LIQUID FILLED ORAL 2 TIMES DAILY
Status: DISCONTINUED | OUTPATIENT
Start: 2020-06-17 | End: 2020-06-17 | Stop reason: HOSPADM

## 2020-06-17 RX ORDER — PANTOPRAZOLE SODIUM 40 MG/1
40 TABLET, DELAYED RELEASE ORAL
Status: DISCONTINUED | OUTPATIENT
Start: 2020-06-17 | End: 2020-06-17 | Stop reason: HOSPADM

## 2020-06-17 RX ORDER — SERTRALINE HYDROCHLORIDE 100 MG/1
100 TABLET, FILM COATED ORAL DAILY
Status: DISCONTINUED | OUTPATIENT
Start: 2020-06-17 | End: 2020-06-17 | Stop reason: HOSPADM

## 2020-06-17 RX ADMIN — OXYCODONE HYDROCHLORIDE 5 MG: 5 TABLET ORAL at 12:20

## 2020-06-17 RX ADMIN — Medication 10 ML: at 05:03

## 2020-06-17 RX ADMIN — DOCUSATE SODIUM 100 MG: 100 CAPSULE, LIQUID FILLED ORAL at 08:25

## 2020-06-17 RX ADMIN — MAGNESIUM HYDROXIDE 30 ML: 2400 SUSPENSION ORAL at 08:24

## 2020-06-17 RX ADMIN — INSULIN LISPRO 4 UNITS: 100 INJECTION, SOLUTION INTRAVENOUS; SUBCUTANEOUS at 08:25

## 2020-06-17 RX ADMIN — Medication 10 ML: at 14:01

## 2020-06-17 RX ADMIN — MORPHINE SULFATE 2 MG: 2 INJECTION, SOLUTION INTRAMUSCULAR; INTRAVENOUS at 03:44

## 2020-06-17 RX ADMIN — PROMETHAZINE HYDROCHLORIDE 12.5 MG: 25 INJECTION INTRAMUSCULAR; INTRAVENOUS at 02:54

## 2020-06-17 RX ADMIN — GABAPENTIN 400 MG: 400 CAPSULE ORAL at 08:24

## 2020-06-17 RX ADMIN — INSULIN HUMAN 6 UNITS: 100 INJECTION, SUSPENSION SUBCUTANEOUS at 08:26

## 2020-06-17 RX ADMIN — DEXTROSE 50 % IN WATER (D50W) INTRAVENOUS SYRINGE 25 G: at 02:50

## 2020-06-17 RX ADMIN — QUETIAPINE FUMARATE 200 MG: 100 TABLET ORAL at 08:24

## 2020-06-17 RX ADMIN — IBUPROFEN 600 MG: 600 TABLET, FILM COATED ORAL at 11:23

## 2020-06-17 RX ADMIN — SERTRALINE HYDROCHLORIDE 100 MG: 100 TABLET ORAL at 08:24

## 2020-06-17 RX ADMIN — INSULIN LISPRO 4 UNITS: 100 INJECTION, SOLUTION INTRAVENOUS; SUBCUTANEOUS at 12:20

## 2020-06-17 RX ADMIN — FOLIC ACID 1 MG: 1 TABLET ORAL at 08:25

## 2020-06-17 RX ADMIN — LORAZEPAM 1 MG: 1 TABLET ORAL at 13:58

## 2020-06-17 RX ADMIN — PROMETHAZINE HYDROCHLORIDE 12.5 MG: 25 INJECTION INTRAMUSCULAR; INTRAVENOUS at 12:20

## 2020-06-17 RX ADMIN — SODIUM CHLORIDE, SODIUM LACTATE, POTASSIUM CHLORIDE, AND CALCIUM CHLORIDE 200 ML/HR: 600; 310; 30; 20 INJECTION, SOLUTION INTRAVENOUS at 01:24

## 2020-06-17 RX ADMIN — LORAZEPAM 1 MG: 1 TABLET ORAL at 08:27

## 2020-06-17 RX ADMIN — BUSPIRONE HYDROCHLORIDE 30 MG: 10 TABLET ORAL at 08:24

## 2020-06-17 RX ADMIN — Medication 100 MG: at 08:27

## 2020-06-17 RX ADMIN — OXYCODONE HYDROCHLORIDE 5 MG: 5 TABLET ORAL at 15:16

## 2020-06-17 RX ADMIN — PANTOPRAZOLE SODIUM 40 MG: 40 TABLET, DELAYED RELEASE ORAL at 05:03

## 2020-06-17 RX ADMIN — INSULIN LISPRO 5 UNITS: 100 INJECTION, SOLUTION INTRAVENOUS; SUBCUTANEOUS at 12:19

## 2020-06-17 NOTE — DIABETES MGMT
Patient admitted with HHS. Admitting blood glucose 978. Patient given Regular 10 units placed on insulin gtt, received Lantus 12 units, D50W 50g. Blood glucose this morning was 257. Most recent FSBS 342. Patient well known to diabetes management, history of noncompliance and alcohol use. Patient has had multiple admissions due to hyperglycemia, DKA in the past month, and been at multiple hospitals this month. Patient is homeless. Patient states he ran out of needles \"a week ago. \" Questioned what insulin patient has been using \"patient states I have them with me, patient picks up insulin off of the floor. \" Patient has a box of 5 Admelog pens and 5 Lantus pens but cannot tell educator when he received this and states he has not been keeping them cold. Discussed that patient could try using a cup of ice from a gas station to keep insulin cool. Patient states he does not have a meter. [facility] Questioned what address patient uses for his mailing address patient states \"I don't know. \" Questioned if patient would like his insulin pens placed in his bag, patient refused stating to leave them on his table. Educated patient that this could increase the risk of insulin pens being accidentally lost, patient still refused for insulin pens to be placed in bag stating \"leave them there on the bedside table. \" Patient has refused education in the past. Questioned if patient is interested in education at this time. Patient states \"I don't need it. \" Patient refused education. Creatinine 0.64. GFR >60. Reviewed patient current regimen: NPH 6 units BID and Humalog SSI insulin sensitive scale. During last admission patient regimen: NPH 12 units BID, Humalog 4 units with meals, and Humalog SSI. Discussed patient current regimen and history with provider new orders received to initiate Humalog 4 units with meals starting at lunch. Update: spoke with CM regarding patient case.  Patient provider with a meter with 20 strips, 110 lancets, lunchbox, needle clipper, and a box of 50 pen needles. Educated patient that will not be able to voucher patient with supplies in the future as patient received supplies today and that I do not have the capability to voucher patient with insulin. Again reviewed the importance of proper storage of insulin. Patient given information regarding [employer] insulins/glucometers (noted patient can receive needles for roughly $13 at [facility] over-the-counter by showing proof of insulin use), and insulin affordability programs. CM plans to look into patient PCP situation, emphasized with patient regarding the importance of follow up with a PCP so patient will be able to have further titration of regimen and receive prescriptions to be able to obtain his supplies as patient states \"I get that through [facility]. \" Patient verbalizes understanding that he will not be able to obtain free supplies in the future from diabetes management team per policy and [facility] and supply needs. Patient voices no further questions regarding diabetes management.

## 2020-06-17 NOTE — PROGRESS NOTES
Pt complaining of pain and refused Toradol r/t reported allergy. New order for Motrin per Dr. Beto Browning. Pt originally refused but eventually took PRN motrin. Pt sleeping within minutes after requesting pain medication. Dr. Beto Browning attempted to see pt multiple times but pt asleep. Dr. Beto Browning also ordered Oxycodone PRN. Pt FSBS 404. Dr. Beto Browning aware. Insulin given per order.

## 2020-06-17 NOTE — PROGRESS NOTES
06/17/20 0422   Dual Skin Pressure Injury Assessment   Dual Skin Pressure Injury Assessment WDL   Second Care Provider (Based on [address] [place] [address]) ANGELIQUE Barbuor   Skin Integumentary   Skin Integumentary (WDL) WDL    Pressure  Injury Documentation No Pressure Injury Noted-Pressure Ulcer Prevention Initiated   Skin Color Appropriate for ethnicity   Skin Condition/Temp Dry; Warm   Skin Integrity Abrasion; Tattoos (comment)  (abrasion on R. knee and tattoo on LLE)   Turgor Non-tenting   Hair Growth Sparce   Nails WDL   Varicosities Absent

## 2020-06-17 NOTE — ED NOTES
TRANSFER - OUT REPORT:    Verbal report given to Jair(name) on Ruthie Gibson  being transferred to [facility](unit) for routine progression of care       Report consisted of patients Situation, Background, Assessment and   Recommendations(SBAR). Information from the following report(s) SBAR was reviewed with the receiving nurse. Lines:   Peripheral IV 06/16/20 Left Antecubital (Active)       Peripheral IV 06/16/20 Left External jugular (Active)   Site Assessment Clean, dry, & intact 6/16/2020 11:44 PM   Phlebitis Assessment 0 6/16/2020 11:44 PM   Infiltration Assessment 0 6/16/2020 11:44 PM   Dressing Status Clean, dry, & intact 6/16/2020 11:44 PM        Opportunity for questions and clarification was provided.       Patient transported with:   Kadmon

## 2020-06-17 NOTE — PROGRESS NOTES
Pt attempting to leave floor. Pt reports wanting to get more pain medication. Discussed Hospitalist plan with pt again. Pt wants double portion meals. Pt aware FSBS too high for this at the moment but will re-eval with FSBS. Pt reports understanding.

## 2020-06-17 NOTE — PROGRESS NOTES
Hourly rounds performed. All needs met. Bed is in low position and call light is within reach. Pt is resting quietly with no complaints at this time. Will continue to monitor and report to oncoming nurse.

## 2020-06-17 NOTE — PROGRESS NOTES
Contacted Dr. Otilio Campbell r/t need for clarification for 1 hour BS and insulin drip. Dr. Otilio Campbell to review and place new orders.
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
## 2020-06-17 NOTE — DIABETES MGMT
Most recent FSBS 404. Updated provider via [employer] regarding patient previous regimen last week while hospitalized: NPH 12 units BID, Humalog 4 units with meals, and Humalog SSI normal sensitivity.

## 2020-06-17 NOTE — PROGRESS NOTES
Pt is mad he can only have oxycodone every 8 hours. Pt thinks this is very unacceptable. Pt knows he can have motrin in between and says this will not work for him. Pt demands Dr. Luisito Brown come back to his room to discuss pain medication. Pt had Oxycodone less then 2 hours ago. Dr. Luisito Brown aware and came to see pt. New order for pt to have oxy q6h. Pt still not happy with this. PRN Ativan given.
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
## 2020-06-17 NOTE — ED NOTES
Pt resting in bed with eyes closed and laying still. New bag of LR stared and pt awoke and requesting pain meds.  Pt educated that it is not time for meds

## 2020-06-17 NOTE — PROGRESS NOTES
Dr. Grant Davis aware pt wants to leave AMA as he will not stay without more pain medication. Pt being verbally aggressive towards staff. New order for one time dose on oxy for now and all pain medication discontinued. Pt took oxycodone and wanted to leave AMA without more pain medication. With assistance of another nurse pt sign AMA form and left. Dr. Grant Davis aware.

## 2020-06-19 ENCOUNTER — HOSPITAL ENCOUNTER (EMERGENCY)
Age: 45
Discharge: HOME | End: 2020-06-20
Attending: EMERGENCY MEDICINE
Payer: SUBSIDIZED

## 2020-06-19 DIAGNOSIS — R73.9 HYPERGLYCEMIA: Primary | ICD-10-CM

## 2020-06-19 DIAGNOSIS — F10.10 ALCOHOL ABUSE: ICD-10-CM

## 2020-06-19 LAB
ALBUMIN SERPL-MCNC: 3.7 G/DL (ref 3.5–5)
ALBUMIN/GLOB SERPL: 1.1 {RATIO} (ref 1.2–3.5)
ALP SERPL-CCNC: 113 U/L (ref 50–136)
ALT SERPL-CCNC: 34 U/L (ref 12–65)
ANION GAP SERPL CALC-SCNC: 17 MMOL/L (ref 7–16)
AST SERPL-CCNC: 17 U/L (ref 15–37)
BACTERIA URNS QL MICRO: 0 /HPF
BASOPHILS # BLD: 0 K/UL (ref 0–0.2)
BASOPHILS NFR BLD: 0 % (ref 0–2)
BILIRUB SERPL-MCNC: 0.4 MG/DL (ref 0.2–1.1)
BUN SERPL-MCNC: 10 MG/DL (ref 6–23)
CALCIUM SERPL-MCNC: 9.2 MG/DL (ref 8.3–10.4)
CASTS URNS QL MICRO: 0 /LPF
CHLORIDE SERPL-SCNC: 90 MMOL/L (ref 98–107)
CO2 SERPL-SCNC: 23 MMOL/L (ref 21–32)
CREAT SERPL-MCNC: 1.22 MG/DL (ref 0.8–1.5)
DIFFERENTIAL METHOD BLD: ABNORMAL
EOSINOPHIL # BLD: 0 K/UL (ref 0–0.8)
EOSINOPHIL NFR BLD: 0 % (ref 0.5–7.8)
EPI CELLS #/AREA URNS HPF: 0 /HPF
ERYTHROCYTE [DISTWIDTH] IN BLOOD BY AUTOMATED COUNT: 19.8 % (ref 11.9–14.6)
ETHANOL SERPL-MCNC: 82 MG/DL
GLOBULIN SER CALC-MCNC: 3.5 G/DL (ref 2.3–3.5)
GLUCOSE BLD STRIP.AUTO-MCNC: 173 MG/DL (ref 65–100)
GLUCOSE BLD STRIP.AUTO-MCNC: 566 MG/DL (ref 65–100)
GLUCOSE SERPL-MCNC: 812 MG/DL (ref 65–100)
HCT VFR BLD AUTO: 34.7 % (ref 41.1–50.3)
HGB BLD-MCNC: 11 G/DL (ref 13.6–17.2)
IMM GRANULOCYTES # BLD AUTO: 0.1 K/UL (ref 0–0.5)
IMM GRANULOCYTES NFR BLD AUTO: 1 % (ref 0–5)
LIPASE SERPL-CCNC: 24 U/L (ref 73–393)
LYMPHOCYTES # BLD: 1.9 K/UL (ref 0.5–4.6)
LYMPHOCYTES NFR BLD: 20 % (ref 13–44)
MCH RBC QN AUTO: 25.8 PG (ref 26.1–32.9)
MCHC RBC AUTO-ENTMCNC: 31.7 G/DL (ref 31.4–35)
MCV RBC AUTO: 81.3 FL (ref 79.6–97.8)
MONOCYTES # BLD: 0.8 K/UL (ref 0.1–1.3)
MONOCYTES NFR BLD: 8 % (ref 4–12)
NEUTS SEG # BLD: 6.4 K/UL (ref 1.7–8.2)
NEUTS SEG NFR BLD: 70 % (ref 43–78)
NRBC # BLD: 0 K/UL (ref 0–0.2)
PLATELET # BLD AUTO: 285 K/UL (ref 150–450)
PMV BLD AUTO: 10.3 FL (ref 9.4–12.3)
POTASSIUM SERPL-SCNC: 4 MMOL/L (ref 3.5–5.1)
PROT SERPL-MCNC: 7.2 G/DL (ref 6.3–8.2)
RBC # BLD AUTO: 4.27 M/UL (ref 4.23–5.6)
RBC #/AREA URNS HPF: 0 /HPF
SODIUM SERPL-SCNC: 130 MMOL/L (ref 136–145)
WBC # BLD AUTO: 9.1 K/UL (ref 4.3–11.1)
WBC URNS QL MICRO: 0 /HPF

## 2020-06-19 PROCEDURE — 80307 DRUG TEST PRSMV CHEM ANLYZR: CPT

## 2020-06-19 PROCEDURE — 74011250636 HC RX REV CODE- 250/636: Performed by: EMERGENCY MEDICINE

## 2020-06-19 PROCEDURE — 74011000250 HC RX REV CODE- 250: Performed by: EMERGENCY MEDICINE

## 2020-06-19 PROCEDURE — 96375 TX/PRO/DX INJ NEW DRUG ADDON: CPT

## 2020-06-19 PROCEDURE — 82962 GLUCOSE BLOOD TEST: CPT

## 2020-06-19 PROCEDURE — 36415 COLL VENOUS BLD VENIPUNCTURE: CPT

## 2020-06-19 PROCEDURE — 85025 COMPLETE CBC W/AUTO DIFF WBC: CPT

## 2020-06-19 PROCEDURE — 74011636637 HC RX REV CODE- 636/637: Performed by: EMERGENCY MEDICINE

## 2020-06-19 PROCEDURE — 83690 ASSAY OF LIPASE: CPT

## 2020-06-19 PROCEDURE — 96374 THER/PROPH/DIAG INJ IV PUSH: CPT

## 2020-06-19 PROCEDURE — 80053 COMPREHEN METABOLIC PANEL: CPT

## 2020-06-19 PROCEDURE — 99285 EMERGENCY DEPT VISIT HI MDM: CPT

## 2020-06-19 PROCEDURE — 81015 MICROSCOPIC EXAM OF URINE: CPT

## 2020-06-19 RX ORDER — METOCLOPRAMIDE HYDROCHLORIDE 5 MG/ML
10 INJECTION INTRAMUSCULAR; INTRAVENOUS
Status: COMPLETED | OUTPATIENT
Start: 2020-06-19 | End: 2020-06-19

## 2020-06-19 RX ADMIN — INSULIN HUMAN 20 UNITS: 100 INJECTION, SOLUTION PARENTERAL at 21:17

## 2020-06-19 RX ADMIN — SODIUM CHLORIDE 1000 ML: 9 INJECTION, SOLUTION INTRAVENOUS at 20:13

## 2020-06-19 RX ADMIN — FAMOTIDINE 40 MG: 10 INJECTION, SOLUTION INTRAVENOUS at 21:11

## 2020-06-19 RX ADMIN — METOCLOPRAMIDE 10 MG: 5 INJECTION, SOLUTION INTRAMUSCULAR; INTRAVENOUS at 21:09

## 2020-06-19 NOTE — ED TRIAGE NOTES
Pt arrives via EMS from [facility] parking lot. Pt BGL hi and abd pain with rigid and tenderness. Tachycardic 110. 12 lead unremarkable in route. No IV initiated. Pt has rash with sloughing of skin. BGL reads HI on glucometer.

## 2020-06-19 NOTE — ED TRIAGE NOTES
Arrives via [facility] with reports abdominal pain, nausea and high blood sugar. Pt well known to this ED as well as [facility]. Seen last 3 days for same. Noncompliant with glucometer and insulin. +etoh.  Admitted 6/16, signed AMA after admission

## 2020-06-20 VITALS
TEMPERATURE: 98.1 F | OXYGEN SATURATION: 98 % | HEIGHT: 68 IN | RESPIRATION RATE: 17 BRPM | DIASTOLIC BLOOD PRESSURE: 75 MMHG | SYSTOLIC BLOOD PRESSURE: 129 MMHG | HEART RATE: 84 BPM | BODY MASS INDEX: 23.49 KG/M2 | WEIGHT: 155 LBS

## 2020-06-20 NOTE — ED NOTES
I have reviewed discharge instructions with the patient. The patient verbalized understanding. Patient left ED via Discharge Method: ambulatory to Home with (self). Opportunity for questions and clarification provided. Patient given 0 scripts. To continue your aftercare when you leave the hospital, you may receive an automated call from our care team to check in on how you are doing.  This is a free service and part of our promise to provide the best care and service to meet your aftercare needs. \" If you have questions, or wish to unsubscribe from this service please call [phone].  Thank you for Choosing our [address] [place] [address] Emergency Department.
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
## 2020-06-22 ENCOUNTER — PATIENT OUTREACH (OUTPATIENT)
Dept: CASE MANAGEMENT | Age: 45
End: 2020-06-22

## 2020-06-22 LAB
GLUCOSE BLD STRIP.AUTO-MCNC: >600 MG/DL (ref 65–100)
GLUCOSE BLD STRIP.AUTO-MCNC: >600 MG/DL (ref 65–100)

## 2020-06-24 ENCOUNTER — HOSPITAL ENCOUNTER (EMERGENCY)
Age: 45
Discharge: HOME | End: 2020-06-24
Attending: EMERGENCY MEDICINE
Payer: SUBSIDIZED

## 2020-06-24 VITALS
SYSTOLIC BLOOD PRESSURE: 112 MMHG | HEART RATE: 111 BPM | RESPIRATION RATE: 18 BRPM | TEMPERATURE: 97.8 F | DIASTOLIC BLOOD PRESSURE: 72 MMHG | OXYGEN SATURATION: 98 %

## 2020-06-24 DIAGNOSIS — R10.9 CHRONIC ABDOMINAL PAIN: Primary | ICD-10-CM

## 2020-06-24 DIAGNOSIS — G89.29 CHRONIC ABDOMINAL PAIN: Primary | ICD-10-CM

## 2020-06-24 LAB
ALBUMIN SERPL-MCNC: 3.3 G/DL (ref 3.5–5)
ALBUMIN/GLOB SERPL: 0.9 {RATIO} (ref 1.2–3.5)
ALP SERPL-CCNC: 84 U/L (ref 50–136)
ALT SERPL-CCNC: 26 U/L (ref 12–65)
ANION GAP SERPL CALC-SCNC: 8 MMOL/L (ref 7–16)
AST SERPL-CCNC: 33 U/L (ref 15–37)
BASOPHILS # BLD: 0 K/UL (ref 0–0.2)
BASOPHILS NFR BLD: 0 % (ref 0–2)
BILIRUB SERPL-MCNC: 0.3 MG/DL (ref 0.2–1.1)
BUN SERPL-MCNC: 12 MG/DL (ref 6–23)
CALCIUM SERPL-MCNC: 8.6 MG/DL (ref 8.3–10.4)
CHLORIDE SERPL-SCNC: 100 MMOL/L (ref 98–107)
CO2 SERPL-SCNC: 26 MMOL/L (ref 21–32)
CREAT SERPL-MCNC: 1.2 MG/DL (ref 0.8–1.5)
DIFFERENTIAL METHOD BLD: ABNORMAL
EOSINOPHIL # BLD: 0.1 K/UL (ref 0–0.8)
EOSINOPHIL NFR BLD: 1 % (ref 0.5–7.8)
ERYTHROCYTE [DISTWIDTH] IN BLOOD BY AUTOMATED COUNT: 19.6 % (ref 11.9–14.6)
GLOBULIN SER CALC-MCNC: 3.7 G/DL (ref 2.3–3.5)
GLUCOSE SERPL-MCNC: 277 MG/DL (ref 65–100)
HCT VFR BLD AUTO: 34.2 % (ref 41.1–50.3)
HGB BLD-MCNC: 10.7 G/DL (ref 13.6–17.2)
IMM GRANULOCYTES # BLD AUTO: 0 K/UL (ref 0–0.5)
IMM GRANULOCYTES NFR BLD AUTO: 1 % (ref 0–5)
LIPASE SERPL-CCNC: 19 U/L (ref 73–393)
LYMPHOCYTES # BLD: 1.2 K/UL (ref 0.5–4.6)
LYMPHOCYTES NFR BLD: 14 % (ref 13–44)
MCH RBC QN AUTO: 26.2 PG (ref 26.1–32.9)
MCHC RBC AUTO-ENTMCNC: 31.3 G/DL (ref 31.4–35)
MCV RBC AUTO: 83.6 FL (ref 79.6–97.8)
MONOCYTES # BLD: 0.5 K/UL (ref 0.1–1.3)
MONOCYTES NFR BLD: 6 % (ref 4–12)
NEUTS SEG # BLD: 6.7 K/UL (ref 1.7–8.2)
NEUTS SEG NFR BLD: 78 % (ref 43–78)
NRBC # BLD: 0 K/UL (ref 0–0.2)
PLATELET # BLD AUTO: 272 K/UL (ref 150–450)
PMV BLD AUTO: 10.1 FL (ref 9.4–12.3)
POTASSIUM SERPL-SCNC: 4.8 MMOL/L (ref 3.5–5.1)
PROT SERPL-MCNC: 7 G/DL (ref 6.3–8.2)
RBC # BLD AUTO: 4.09 M/UL (ref 4.23–5.6)
SODIUM SERPL-SCNC: 134 MMOL/L (ref 136–145)
WBC # BLD AUTO: 8.7 K/UL (ref 4.3–11.1)

## 2020-06-24 PROCEDURE — 96374 THER/PROPH/DIAG INJ IV PUSH: CPT

## 2020-06-24 PROCEDURE — 99284 EMERGENCY DEPT VISIT MOD MDM: CPT

## 2020-06-24 PROCEDURE — 83690 ASSAY OF LIPASE: CPT

## 2020-06-24 PROCEDURE — 85025 COMPLETE CBC W/AUTO DIFF WBC: CPT

## 2020-06-24 PROCEDURE — 96375 TX/PRO/DX INJ NEW DRUG ADDON: CPT

## 2020-06-24 PROCEDURE — 74011250636 HC RX REV CODE- 250/636: Performed by: EMERGENCY MEDICINE

## 2020-06-24 PROCEDURE — 80053 COMPREHEN METABOLIC PANEL: CPT

## 2020-06-24 PROCEDURE — 81003 URINALYSIS AUTO W/O SCOPE: CPT

## 2020-06-24 RX ORDER — DIPHENHYDRAMINE HYDROCHLORIDE 50 MG/ML
12.5 INJECTION, SOLUTION INTRAMUSCULAR; INTRAVENOUS
Status: COMPLETED | OUTPATIENT
Start: 2020-06-24 | End: 2020-06-24

## 2020-06-24 RX ORDER — HALOPERIDOL 5 MG/ML
5 INJECTION INTRAMUSCULAR ONCE
Status: COMPLETED | OUTPATIENT
Start: 2020-06-24 | End: 2020-06-24

## 2020-06-24 RX ADMIN — HALOPERIDOL LACTATE 5 MG: 5 INJECTION, SOLUTION INTRAMUSCULAR at 19:46

## 2020-06-24 RX ADMIN — DIPHENHYDRAMINE HYDROCHLORIDE 12.5 MG: 50 INJECTION, SOLUTION INTRAMUSCULAR; INTRAVENOUS at 19:47

## 2020-06-24 NOTE — ED TRIAGE NOTES
Patient arrives via EMS. Patient homeless. States discharged from [facility] today and he was not ready to be discharged because he does not have a home. Patient reports chronic abdominal pain from pancreatitis.  for EMS. VSS.

## 2020-06-24 NOTE — ED NOTES
Report received from [facility], [address] [place] [address]. This RN assumes pt care at this time.

## 2020-06-25 ENCOUNTER — PATIENT OUTREACH (OUTPATIENT)
Dept: CASE MANAGEMENT | Age: 45
End: 2020-06-25

## 2020-06-25 NOTE — DISCHARGE INSTRUCTIONS
Patient Education        Abdominal Pain: Care Instructions  Your Care Instructions     Abdominal pain has many possible causes. Some aren't serious and get better on their own in a few days. Others need more testing and treatment. If your pain continues or gets worse, you need to be rechecked and may need more tests to find out what is wrong. You may need surgery to correct the problem. Don't ignore new symptoms, such as fever, nausea and vomiting, urination problems, pain that gets worse, and dizziness. These may be signs of a more serious problem. Your doctor may have recommended a follow-up visit in the next 8 to 12 hours. If you are not getting better, you may need more tests or treatment. The doctor has checked you carefully, but problems can develop later. If you notice any problems or new symptoms, get medical treatment right away. Follow-up care is a key part of your treatment and safety. Be sure to make and go to all appointments, and call your doctor if you are having problems. It's also a good idea to know your test results and keep a list of the medicines you take. How can you care for yourself at home? · Rest until you feel better. · To prevent dehydration, drink plenty of fluids, enough so that your urine is light yellow or clear like water. Choose water and other caffeine-free clear liquids until you feel better. If you have kidney, heart, or liver disease and have to limit fluids, talk with your doctor before you increase the amount of fluids you drink. · If your stomach is upset, eat mild foods, such as rice, dry toast or crackers, bananas, and applesauce. Try eating several small meals instead of two or three large ones. · Wait until 48 hours after all symptoms have gone away before you have spicy foods, alcohol, and drinks that contain caffeine. · Do not eat foods that are high in fat. · Avoid anti-inflammatory medicines such as aspirin, ibuprofen (Advil, Motrin), and naproxen (Aleve). These can cause stomach upset. Talk to your doctor if you take daily aspirin for another health problem. When should you call for help? [id number] anytime you think you may need emergency care. For example, call if:  · You passed out (lost consciousness). · You pass maroon or very bloody stools. · You vomit blood or what looks like coffee grounds. · You have new, severe belly pain. Call your doctor now or seek immediate medical care if:  · Your pain gets worse, especially if it becomes focused in one area of your belly. · You have a new or higher fever. · Your stools are black and look like tar, or they have streaks of blood. · You have unexpected vaginal bleeding. · You have symptoms of a urinary tract infection. These may include:  ? Pain when you urinate. ? Urinating more often than usual.  ? Blood in your urine. · You are dizzy or lightheaded, or you feel like you may faint. Watch closely for changes in your health, and be sure to contact your doctor if:  · You are not getting better after 1 day (24 hours). Where can you learn more? Go to http://pedritoShepherd Intelligent Systemskandi.info/  Enter W118 in the search box to learn more about \"Abdominal Pain: Care Instructions. \"  Current as of: June 26, 2019               Content Version: 12.5  © 6902-3555 Healthwise, Incorporated. Care instructions adapted under license by [facility] (which disclaims liability or warranty for this information). If you have questions about a medical condition or this instruction, always ask your healthcare professional. [place] [address] any warranty or liability for your use of this information. Patient Education        Chronic Pain: Care Instructions  Your Care Instructions     Chronic pain is pain that lasts a long time (months or even years) and may or may not have a clear cause.  It is different from acute pain, which usually does have a clear cause--like an injury or illness--and gets better over time. Chronic pain:  · Lasts over time but may vary from day to day. · Does not go away despite efforts to end it. · May disrupt your sleep and lead to fatigue. · May cause depression or anxiety. · May make your muscles tense, causing more pain. · Can disrupt your work, hobbies, home life, and relationships with friends and family. Chronic pain is a very real condition. It is not just in your head. Treatment can help and usually includes several methods used together, such as medicines, physical therapy, exercise, and other treatments. Learning how to relax and changing negative thought patterns can also help you cope. Chronic pain is complex. Taking an active role in your treatment will help you better manage your pain. Tell your doctor if you have trouble dealing with your pain. You may have to try several things before you find what works best for you. Follow-up care is a key part of your treatment and safety. Be sure to make and go to all appointments, and call your doctor if you are having problems. It's also a good idea to know your test results and keep a list of the medicines you take. How can you care for yourself at home? · Pace yourself. Break up large jobs into smaller tasks. Save harder tasks for days when you have less pain, or go back and forth between hard tasks and easier ones. Take rest breaks. · Relax, and reduce stress. Relaxation techniques such as deep breathing or meditation can help. · Keep moving. Gentle, daily exercise can help reduce pain over the long run. Try low- or no-impact exercises such as walking, swimming, and stationary biking. Do stretches to stay flexible. · Try heat, cold packs, and massage. · Get enough sleep. Chronic pain can make you tired and drain your energy. Talk with your doctor if you have trouble sleeping because of pain. · Think positive. Your thoughts can affect your pain level.  Do things that you enjoy to distract yourself when you have pain instead of focusing on the pain. See a movie, read a book, listen to music, or spend time with a friend. · If you think you are depressed, talk to your doctor about treatment. · Keep a daily pain diary. Record how your moods, thoughts, sleep patterns, activities, and medicine affect your pain. You may find that your pain is worse during or after certain activities or when you are feeling a certain emotion. Having a record of your pain can help you and your doctor find the best ways to treat your pain. · Take pain medicines exactly as directed. ? If the doctor gave you a prescription medicine for pain, take it as prescribed. ? If you are not taking a prescription pain medicine, ask your doctor if you can take an over-the-counter medicine. Reducing constipation caused by pain medicine  · Include fruits, vegetables, beans, and whole grains in your diet each day. These foods are high in fiber. · Drink plenty of fluids, enough so that your urine is light yellow or clear like water. If you have kidney, heart, or liver disease and have to limit fluids, talk with your doctor before you increase the amount of fluids you drink. · If your doctor recommends it, get more exercise. Walking is a good choice. Bit by bit, increase the amount you walk every day. Try for at least 30 minutes on most days of the week. · Schedule time each day for a bowel movement. A daily routine may help. Take your time and do not strain when having a bowel movement. When should you call for help? Call your doctor now or seek immediate medical care if:  · Your pain gets worse or is out of control. · You feel down or blue, or you do not enjoy things like you once did. You may be depressed, which is common in people with chronic pain. Depression can be treated. · You have vomiting or cramps for more than 2 hours.   Watch closely for changes in your health, and be sure to contact your doctor if:  · You cannot sleep because of pain.  · You are very worried or anxious about your pain. · You have trouble taking your pain medicine. · You have any concerns about your pain medicine. · You have trouble with bowel movements, such as:  ? No bowel movement in 3 days. ? Blood in the anal area, in your stool, or on the toilet paper. ? Diarrhea for more than 24 hours. Where can you learn more? Go to http://pedrito-kandi.info/  Enter N004 in the search box to learn more about \"Chronic Pain: Care Instructions. \"  Current as of: November 20, 2019               Content Version: 12.5  © 4370-4580 Healthwise, [employer]. Care instructions adapted under license by [employer] (which disclaims liability or warranty for this information). If you have questions about a medical condition or this instruction, always ask your healthcare professional. [address] any warranty or liability for your use of this information.

## 2020-06-25 NOTE — ED NOTES
Pt is ambulatory with no deficits or difficulty. Pt is found sitting in his own human excrement in the bed in the hallway. Pt has feces all over the bed and his person. Pt ambulates down the hallway to nurses station yelling that he \"needs to be cleaned up. \" Pt is given a new package of wet wipes and shown to the bathroom. Pt returns from the bathroom to his bed briefly, then ambulates to the bathroom outside of triage. Pt witnessed purposely defecating on floor outside triage bathroom. Pt then returns to his bed and begins yelling, \"I need medicine. I need to be treated. \" RN informs pt that he has already been medicated and asks pt why he is purposely defecating on the bed and floor. Pt becomes belligerent and begins yelling and cursing. Dr. Martin Zuniga orders for pt to be d/c immediately. This RN and Charge RN [employer] inform pt he is being d/c and that he needs to leave the ER and hospital property immediately. This RN removes pt's IV and secures site securely with 2x2 and tape. Pt gathers his belongings and continues to curse and yell. Pt directed to leave ER immediately. Pt then purposely pulls 2x2 and tape covering IV site from arm and is slinging his bleeding arm around as he walks from his bed to the ambulance bay doors. Pt continues to scream and curse at this RN, Charge RN, and registration employees. Pt's IV site resecured with 2x2 and coban. Pt escorted out of the ER through ambulance bay doors. Pt continues to scream, yell, and threaten this RN.  Pt begins walking down the street

## 2020-06-26 ENCOUNTER — HOSPITAL ENCOUNTER (EMERGENCY)
Age: 45
Discharge: HOME | End: 2020-06-26
Attending: EMERGENCY MEDICINE
Payer: SUBSIDIZED

## 2020-06-26 ENCOUNTER — APPOINTMENT (OUTPATIENT)
Dept: GENERAL RADIOLOGY | Age: 45
End: 2020-06-26
Attending: EMERGENCY MEDICINE
Payer: SUBSIDIZED

## 2020-06-26 VITALS
HEART RATE: 119 BPM | RESPIRATION RATE: 18 BRPM | OXYGEN SATURATION: 98 % | SYSTOLIC BLOOD PRESSURE: 125 MMHG | DIASTOLIC BLOOD PRESSURE: 80 MMHG | TEMPERATURE: 98.4 F | BODY MASS INDEX: 23.49 KG/M2 | WEIGHT: 155 LBS | HEIGHT: 68 IN

## 2020-06-26 DIAGNOSIS — R73.9 HYPERGLYCEMIA: ICD-10-CM

## 2020-06-26 DIAGNOSIS — G89.29 CHRONIC ABDOMINAL PAIN: Primary | ICD-10-CM

## 2020-06-26 DIAGNOSIS — R10.9 CHRONIC ABDOMINAL PAIN: Primary | ICD-10-CM

## 2020-06-26 LAB
ALBUMIN SERPL-MCNC: 3.3 G/DL (ref 3.5–5)
ALBUMIN/GLOB SERPL: 0.8 {RATIO} (ref 1.2–3.5)
ALP SERPL-CCNC: 91 U/L (ref 50–136)
ALT SERPL-CCNC: 32 U/L (ref 12–65)
ANION GAP SERPL CALC-SCNC: 12 MMOL/L (ref 7–16)
AST SERPL-CCNC: 35 U/L (ref 15–37)
ATRIAL RATE: 92 BPM
BASOPHILS # BLD: 0.1 K/UL (ref 0–0.2)
BASOPHILS NFR BLD: 1 % (ref 0–2)
BILIRUB SERPL-MCNC: 0.3 MG/DL (ref 0.2–1.1)
BUN SERPL-MCNC: 12 MG/DL (ref 6–23)
CALCIUM SERPL-MCNC: 8.5 MG/DL (ref 8.3–10.4)
CALCULATED P AXIS, ECG09: 75 DEGREES
CALCULATED R AXIS, ECG10: 98 DEGREES
CALCULATED T AXIS, ECG11: 56 DEGREES
CHLORIDE SERPL-SCNC: 105 MMOL/L (ref 98–107)
CO2 SERPL-SCNC: 20 MMOL/L (ref 21–32)
CREAT SERPL-MCNC: 1.19 MG/DL (ref 0.8–1.5)
DIAGNOSIS, 93000: NORMAL
DIFFERENTIAL METHOD BLD: ABNORMAL
EOSINOPHIL # BLD: 0.2 K/UL (ref 0–0.8)
EOSINOPHIL NFR BLD: 2 % (ref 0.5–7.8)
ERYTHROCYTE [DISTWIDTH] IN BLOOD BY AUTOMATED COUNT: 20.3 % (ref 11.9–14.6)
ETHANOL SERPL-MCNC: 27 MG/DL
GLOBULIN SER CALC-MCNC: 4 G/DL (ref 2.3–3.5)
GLUCOSE BLD STRIP.AUTO-MCNC: 345 MG/DL (ref 65–100)
GLUCOSE SERPL-MCNC: 327 MG/DL (ref 65–100)
HCT VFR BLD AUTO: 42.6 % (ref 41.1–50.3)
HGB BLD-MCNC: 12.4 G/DL (ref 13.6–17.2)
IMM GRANULOCYTES # BLD AUTO: 0 K/UL (ref 0–0.5)
IMM GRANULOCYTES NFR BLD AUTO: 0 % (ref 0–5)
LIPASE SERPL-CCNC: <10 U/L (ref 73–393)
LYMPHOCYTES # BLD: 2.2 K/UL (ref 0.5–4.6)
LYMPHOCYTES NFR BLD: 33 % (ref 13–44)
MCH RBC QN AUTO: 25.1 PG (ref 26.1–32.9)
MCHC RBC AUTO-ENTMCNC: 29.1 G/DL (ref 31.4–35)
MCV RBC AUTO: 86.1 FL (ref 79.6–97.8)
MONOCYTES # BLD: 0.5 K/UL (ref 0.1–1.3)
MONOCYTES NFR BLD: 7 % (ref 4–12)
NEUTS SEG # BLD: 3.9 K/UL (ref 1.7–8.2)
NEUTS SEG NFR BLD: 57 % (ref 43–78)
NRBC # BLD: 0 K/UL (ref 0–0.2)
P-R INTERVAL, ECG05: 150 MS
PLATELET # BLD AUTO: 305 K/UL (ref 150–450)
PMV BLD AUTO: 9.9 FL (ref 9.4–12.3)
POTASSIUM SERPL-SCNC: 5.2 MMOL/L (ref 3.5–5.1)
PROT SERPL-MCNC: 7.3 G/DL (ref 6.3–8.2)
Q-T INTERVAL, ECG07: 324 MS
QRS DURATION, ECG06: 118 MS
QTC CALCULATION (BEZET), ECG08: 400 MS
RBC # BLD AUTO: 4.95 M/UL (ref 4.23–5.6)
SODIUM SERPL-SCNC: 137 MMOL/L (ref 136–145)
VENTRICULAR RATE, ECG03: 92 BPM
WBC # BLD AUTO: 6.9 K/UL (ref 4.3–11.1)

## 2020-06-26 PROCEDURE — 82962 GLUCOSE BLOOD TEST: CPT

## 2020-06-26 PROCEDURE — 99284 EMERGENCY DEPT VISIT MOD MDM: CPT

## 2020-06-26 PROCEDURE — 80307 DRUG TEST PRSMV CHEM ANLYZR: CPT

## 2020-06-26 PROCEDURE — 74022 RADEX COMPL AQT ABD SERIES: CPT

## 2020-06-26 PROCEDURE — 80053 COMPREHEN METABOLIC PANEL: CPT

## 2020-06-26 PROCEDURE — 85025 COMPLETE CBC W/AUTO DIFF WBC: CPT

## 2020-06-26 PROCEDURE — 96374 THER/PROPH/DIAG INJ IV PUSH: CPT

## 2020-06-26 PROCEDURE — 83690 ASSAY OF LIPASE: CPT

## 2020-06-26 PROCEDURE — 74011250636 HC RX REV CODE- 250/636: Performed by: EMERGENCY MEDICINE

## 2020-06-26 RX ORDER — ONDANSETRON 2 MG/ML
4 INJECTION INTRAMUSCULAR; INTRAVENOUS
Status: COMPLETED | OUTPATIENT
Start: 2020-06-26 | End: 2020-06-26

## 2020-06-26 RX ADMIN — SODIUM CHLORIDE 1000 ML: 9 INJECTION, SOLUTION INTRAVENOUS at 13:37

## 2020-06-26 RX ADMIN — ONDANSETRON 4 MG: 2 INJECTION INTRAMUSCULAR; INTRAVENOUS at 13:35

## 2020-06-26 RX ADMIN — SODIUM CHLORIDE 1000 ML: 900 INJECTION, SOLUTION INTRAVENOUS at 13:37

## 2020-06-26 NOTE — ED NOTES
I have reviewed discharge instructions with the patient. The patient verbalized understanding. Patient left ED via Discharge Method: ambulatory to Home with via [address] for questions and clarification provided. Patient given 0 scripts. To continue your aftercare when you leave the hospital, you may receive an automated call from our care team to check in on how you are doing. This is a free service and part of our promise to provide the best care and service to meet your aftercare needs.  If you have questions, or wish to unsubscribe from this service please call [phone]. Thank you for Choosing our [facility] Emergency Department.

## 2020-06-26 NOTE — ED TRIAGE NOTES
bgl 356 for EMS. Pt has insulin in pocket but will not take it. Called EMS because he \"doesn't feel well\". :\"my belly is killing me\". Wearing mask.

## 2020-06-26 NOTE — ED PROVIDER NOTES
Patient with history of diabetes who frequently drinks alcohol. Has chronic abdominal pain. Was in our ER 2 days ago. Had just been discharged from another hospital.  Today complains of epigastric pain nausea vomiting and diarrhea. No dysuria hematuria. No chest pain or shortness of breath. The history is provided by the patient. No  was used. Abdominal Pain    This is a chronic problem. The current episode started 3 to 5 hours ago. The problem occurs constantly. The problem has not changed since onset. The pain is associated with an unknown factor. The pain is located in the epigastric region. The quality of the pain is aching and sharp. The pain is moderate. Associated symptoms include diarrhea, nausea and vomiting. Pertinent negatives include no fever, no melena, no constipation, no dysuria, no hematuria, no headaches, no chest pain and no back pain. The pain is worsened by eating and palpation. The pain is relieved by nothing. His past medical history is significant for DM. Past Medical History:   Diagnosis Date    Bipolar 1 disorder ([facility]     Depression     Diabetes ([facility]     PTSD (post-traumatic stress disorder)        History reviewed. No pertinent surgical history. History reviewed. No pertinent family history. Social History     Socioeconomic History    Marital status: SINGLE     Spouse name: Not on file    Number of children: Not on file    Years of education: Not on file    Highest education level: Not on file   Occupational History    Not on file   Social Needs    Financial resource strain: Not on file    Food insecurity     Worry: Not on file     Inability: Not on file    Transportation needs     Medical: Not on file     Non-medical: Not on file   Tobacco Use    Smoking status: Current Every Day Smoker     Packs/day: 1.00    Smokeless tobacco: Never Used   Substance and Sexual Activity    Alcohol use:  Yes    Drug use: Not Currently    Sexual activity: Not on file   Lifestyle    Physical activity     Days per week: Not on file     Minutes per session: Not on file    Stress: Not on file   Relationships    Social connections     Talks on phone: Not on file     Gets together: Not on file     Attends Bahai service: Not on file     Active member of club or organization: Not on file     Attends meetings of clubs or organizations: Not on file     Relationship status: Not on file    Intimate partner violence     Fear of current or ex partner: Not on file     Emotionally abused: Not on file     Physically abused: Not on file     Forced sexual activity: Not on file   Other Topics Concern    Not on file   Social History Narrative    Not on file         ALLERGIES: Toradol [ketorolac]    Review of Systems   Constitutional: Negative for chills and fever. HENT: Negative for rhinorrhea and sore throat. Eyes: Negative for pain and redness. Respiratory: Negative for chest tightness, shortness of breath and wheezing. Cardiovascular: Negative for chest pain and leg swelling. Gastrointestinal: Positive for abdominal pain, diarrhea, nausea and vomiting. Negative for constipation and melena. Genitourinary: Negative for dysuria and hematuria. Musculoskeletal: Negative for back pain, gait problem, neck pain and neck stiffness. Skin: Negative for color change and rash. Neurological: Negative for weakness, numbness and headaches. Vitals:    06/26/20 1119   BP: 96/69   Pulse: (!) 116   Resp: 18   Temp: 98.4 °F (36.9 °C)   SpO2: 98%   Weight: 70.3 kg (155 lb)   Height: 5' 8\" (1.727 m)            Physical Exam  Constitutional:       Appearance: Normal appearance. He is well-developed. HENT:      Head: Normocephalic and atraumatic. Neck:      Musculoskeletal: Normal range of motion and neck supple. Cardiovascular:      Rate and Rhythm: Normal rate and regular rhythm.    Pulmonary:      Effort: Pulmonary effort is normal.      Breath sounds: Normal breath sounds. Abdominal:      General: Bowel sounds are normal.      Palpations: Abdomen is soft. Tenderness: There is abdominal tenderness (epigastric). Musculoskeletal: Normal range of motion. General: No swelling. Skin:     General: Skin is warm and dry. Neurological:      Mental Status: He is alert and oriented to person, place, and time. MDM  Number of Diagnoses or Management Options  Diagnosis management comments: Patient with chronic abdominal pain and hyperglycemia. No signs of DKA. Will discharge. Amount and/or Complexity of Data Reviewed  Clinical lab tests: ordered and reviewed  Tests in the radiology section of CPT®: ordered and reviewed  Tests in the medicine section of CPT®: ordered and reviewed    Patient Progress  Patient progress: stable         Procedures          EKG: normal sinus rhythm, nonspecific ST and T waves changes. Rate 92. XR ABD ACUTE W 1 V CHEST (Final result)   Result time 06/26/20 13:28:32   Final result by Herberth Farias MD (06/26/20 13:28:32)                Impression:    IMPRESSION:  No acute findings in the chest or abdomen. Narrative:    Abdominal Series    CLINICAL INDICATION:  Acute generalized severe abdominal pain with nausea,  vomiting, diarrhea. History of diabetes, depression, PTSD, bipolar. COMPARISON: Chest radiograph 6/11/2020 and [facility] 6/16/2020, abdominal series  6/10/2020, CT abdomen and pelvis 6/3/2020    TECHNIQUE: A frontal upright view of the chest and supine and upright views of  the abdomen were obtained. FINDINGS: The lungs are well inflated. No evidence of infiltrate or effusion. Mediastinal contours are unremarkable. Note of old healed left posterior  nondisplaced rib fractures. Flat and upright views of the abdomen show no evidence of obstruction. No  evidence of free air. Chronic tiny pancreatic calcifications compatible with  chronic pancreatitis appear unchanged.  No new or suspicious calcifications are  seen. Moderate stool.

## 2020-06-26 NOTE — DISCHARGE INSTRUCTIONS
Patient Education        Abdominal Pain: Care Instructions  Your Care Instructions     Abdominal pain has many possible causes. Some aren't serious and get better on their own in a few days. Others need more testing and treatment. If your pain continues or gets worse, you need to be rechecked and may need more tests to find out what is wrong. You may need surgery to correct the problem. Don't ignore new symptoms, such as fever, nausea and vomiting, urination problems, pain that gets worse, and dizziness. These may be signs of a more serious problem. Your doctor may have recommended a follow-up visit in the next 8 to 12 hours. If you are not getting better, you may need more tests or treatment. The doctor has checked you carefully, but problems can develop later. If you notice any problems or new symptoms, get medical treatment right away. Follow-up care is a key part of your treatment and safety. Be sure to make and go to all appointments, and call your doctor if you are having problems. It's also a good idea to know your test results and keep a list of the medicines you take. How can you care for yourself at home? · Rest until you feel better. · To prevent dehydration, drink plenty of fluids, enough so that your urine is light yellow or clear like water. Choose water and other caffeine-free clear liquids until you feel better. If you have kidney, heart, or liver disease and have to limit fluids, talk with your doctor before you increase the amount of fluids you drink. · If your stomach is upset, eat mild foods, such as rice, dry toast or crackers, bananas, and applesauce. Try eating several small meals instead of two or three large ones. · Wait until 48 hours after all symptoms have gone away before you have spicy foods, alcohol, and drinks that contain caffeine. · Do not eat foods that are high in fat. · Avoid anti-inflammatory medicines such as aspirin, ibuprofen (Advil, Motrin), and naproxen (Aleve). These can cause stomach upset. Talk to your doctor if you take daily aspirin for another health problem. When should you call for help? [id number] anytime you think you may need emergency care. For example, call if:  · You passed out (lost consciousness). · You pass maroon or very bloody stools. · You vomit blood or what looks like coffee grounds. · You have new, severe belly pain. Call your doctor now or seek immediate medical care if:  · Your pain gets worse, especially if it becomes focused in one area of your belly. · You have a new or higher fever. · Your stools are black and look like tar, or they have streaks of blood. · You have unexpected vaginal bleeding. · You have symptoms of a urinary tract infection. These may include:  ? Pain when you urinate. ? Urinating more often than usual.  ? Blood in your urine. · You are dizzy or lightheaded, or you feel like you may faint. Watch closely for changes in your health, and be sure to contact your doctor if:  · You are not getting better after 1 day (24 hours). Where can you learn more? Go to http://pedritoLightningcastkandi.info/  Enter Z061 in the search box to learn more about \"Abdominal Pain: Care Instructions. \"  Current as of: June 26, 2019               Content Version: 12.5  © 6075-4691 Healthwise, Incorporated. Care instructions adapted under license by [employer] (which disclaims liability or warranty for this information). If you have questions about a medical condition or this instruction, always ask your healthcare professional. [place] [address] any warranty or liability for your use of this information. Patient Education        Learning About High Blood Sugar  What is high blood sugar? Your body turns the food you eat into glucose (sugar), which it uses for energy. But if your body isn't able to use the sugar right away, it can build up in your blood and lead to high blood sugar.   When the amount of sugar in your blood stays too high for too much of the time, you may have diabetes. Diabetes is a disease that can cause serious health problems. The good news is that lifestyle changes may help you get your blood sugar back to normal and avoid or delay diabetes. What causes high blood sugar? Sugar (glucose) can build up in your blood if you:  · Are overweight. · Have a family history of diabetes. · Take certain medicines, such as steroids. What are the symptoms? Having high blood sugar may not cause any symptoms at all. Or it may make you feel very thirsty or very hungry. You may also urinate more often than usual, have blurry vision, or lose weight without trying. How is high blood sugar treated? You can take steps to lower your blood sugar level if you understand what makes it get higher. Your doctor may want you to learn how to test your blood sugar level at home. Then you can see how illness, stress, or different kinds of food or medicine raise or lower your blood sugar level. Other tests may be needed to see if you have diabetes. How can you prevent high blood sugar? · Watch your weight. If you're overweight, losing just a small amount of weight may help. Reducing fat around your waist is most important. · Limit the amount of calories, sweets, and unhealthy fat you eat. Ask your doctor if a dietitian can help you. A registered dietitian can help you create meal plans that fit your lifestyle. · Get at least 30 minutes of exercise on most days of the week. Exercise helps control your blood sugar. It also helps you maintain a healthy weight. Walking is a good choice. You also may want to do other activities, such as running, swimming, cycling, or playing tennis or team sports. · If your doctor prescribed medicines, take them exactly as prescribed. Call your doctor if you think you are having a problem with your medicine.  You will get more details on the specific medicines your doctor prescribes. Follow-up care is a key part of your treatment and safety. Be sure to make and go to all appointments, and call your doctor if you are having problems. It's also a good idea to know your test results and keep a list of the medicines you take. Where can you learn more? Go to http://pedrito-kandi.info/  Enter O108 in the search box to learn more about \"Learning About High Blood Sugar. \"  Current as of: December 20, 2019               Content Version: 12.5  © 4476-4688 Healthwise, Incorporated. Care instructions adapted under license by [employer] (which disclaims liability or warranty for this information). If you have questions about a medical condition or this instruction, always ask your healthcare professional. [address] any warranty or liability for your use of this information.

## 2020-06-29 ENCOUNTER — PATIENT OUTREACH (OUTPATIENT)
Dept: CASE MANAGEMENT | Age: 45
End: 2020-06-29

## 2020-06-29 NOTE — PROGRESS NOTES
Patient on [address] list for hospital discharge 6/26/2020. Patient has had 39 ED/hospitalizations between [facility] within the past 6 months. Patent doesn't have a valid contact number, no emergency , and no Healthcare Decision Maker. Unable to complete ALLISON call. Episode resolved.

## 2020-06-30 ENCOUNTER — HOSPITAL ENCOUNTER (EMERGENCY)
Age: 45
Discharge: LEFT BEFORE DISCHARGE | End: 2020-06-30
Attending: EMERGENCY MEDICINE
Payer: SUBSIDIZED

## 2020-06-30 VITALS
WEIGHT: 145 LBS | TEMPERATURE: 98.4 F | RESPIRATION RATE: 18 BRPM | SYSTOLIC BLOOD PRESSURE: 91 MMHG | DIASTOLIC BLOOD PRESSURE: 53 MMHG | OXYGEN SATURATION: 97 % | HEIGHT: 68 IN | BODY MASS INDEX: 21.98 KG/M2 | HEART RATE: 142 BPM

## 2020-06-30 LAB
ALBUMIN SERPL-MCNC: 3.7 G/DL (ref 3.5–5)
ALBUMIN/GLOB SERPL: 1 {RATIO} (ref 1.2–3.5)
ALP SERPL-CCNC: 80 U/L (ref 50–136)
ALT SERPL-CCNC: 19 U/L (ref 12–65)
ANION GAP SERPL CALC-SCNC: 15 MMOL/L (ref 7–16)
AST SERPL-CCNC: 9 U/L (ref 15–37)
BASOPHILS # BLD: 0 K/UL (ref 0–0.2)
BASOPHILS NFR BLD: 0 % (ref 0–2)
BILIRUB SERPL-MCNC: 0.1 MG/DL (ref 0.2–1.1)
BUN SERPL-MCNC: 12 MG/DL (ref 6–23)
CALCIUM SERPL-MCNC: 9.4 MG/DL (ref 8.3–10.4)
CHLORIDE SERPL-SCNC: 104 MMOL/L (ref 98–107)
CO2 SERPL-SCNC: 17 MMOL/L (ref 21–32)
CREAT SERPL-MCNC: 1.29 MG/DL (ref 0.8–1.5)
DIFFERENTIAL METHOD BLD: ABNORMAL
EOSINOPHIL # BLD: 0.1 K/UL (ref 0–0.8)
EOSINOPHIL NFR BLD: 1 % (ref 0.5–7.8)
ERYTHROCYTE [DISTWIDTH] IN BLOOD BY AUTOMATED COUNT: 19.1 % (ref 11.9–14.6)
ETHANOL SERPL-MCNC: 135 MG/DL
GLOBULIN SER CALC-MCNC: 3.6 G/DL (ref 2.3–3.5)
GLUCOSE BLD STRIP.AUTO-MCNC: 350 MG/DL (ref 65–100)
GLUCOSE SERPL-MCNC: 344 MG/DL (ref 65–100)
HCT VFR BLD AUTO: 36.7 % (ref 41.1–50.3)
HGB BLD-MCNC: 11 G/DL (ref 13.6–17.2)
IMM GRANULOCYTES # BLD AUTO: 0.1 K/UL (ref 0–0.5)
IMM GRANULOCYTES NFR BLD AUTO: 1 % (ref 0–5)
LIPASE SERPL-CCNC: 30 U/L (ref 73–393)
LYMPHOCYTES # BLD: 2.2 K/UL (ref 0.5–4.6)
LYMPHOCYTES NFR BLD: 20 % (ref 13–44)
MCH RBC QN AUTO: 25.4 PG (ref 26.1–32.9)
MCHC RBC AUTO-ENTMCNC: 30 G/DL (ref 31.4–35)
MCV RBC AUTO: 84.8 FL (ref 79.6–97.8)
MONOCYTES # BLD: 0.9 K/UL (ref 0.1–1.3)
MONOCYTES NFR BLD: 8 % (ref 4–12)
NEUTS SEG # BLD: 7.8 K/UL (ref 1.7–8.2)
NEUTS SEG NFR BLD: 70 % (ref 43–78)
NRBC # BLD: 0 K/UL (ref 0–0.2)
PLATELET # BLD AUTO: 360 K/UL (ref 150–450)
PMV BLD AUTO: 9.8 FL (ref 9.4–12.3)
POTASSIUM SERPL-SCNC: 3.6 MMOL/L (ref 3.5–5.1)
PROT SERPL-MCNC: 7.3 G/DL (ref 6.3–8.2)
RBC # BLD AUTO: 4.33 M/UL (ref 4.23–5.6)
SODIUM SERPL-SCNC: 136 MMOL/L (ref 136–145)
WBC # BLD AUTO: 11.1 K/UL (ref 4.3–11.1)

## 2020-06-30 PROCEDURE — 82962 GLUCOSE BLOOD TEST: CPT

## 2020-06-30 PROCEDURE — 80053 COMPREHEN METABOLIC PANEL: CPT

## 2020-06-30 PROCEDURE — 85025 COMPLETE CBC W/AUTO DIFF WBC: CPT

## 2020-06-30 PROCEDURE — 83690 ASSAY OF LIPASE: CPT

## 2020-06-30 PROCEDURE — 80307 DRUG TEST PRSMV CHEM ANLYZR: CPT

## 2020-06-30 PROCEDURE — 75810000275 HC EMERGENCY DEPT VISIT NO LEVEL OF CARE

## 2020-06-30 PROCEDURE — 74011250636 HC RX REV CODE- 250/636: Performed by: EMERGENCY MEDICINE

## 2020-06-30 RX ADMIN — SODIUM CHLORIDE 1000 ML: 9 INJECTION, SOLUTION INTRAVENOUS at 19:30

## 2020-06-30 NOTE — ED NOTES
Ambulatory into triage room requesting to go outside to smoke. Explained unable to allow pt to go outside with IV in place. Pt then demanding to go to lobby. IVFs infusing well, pt attempting to carry bag to lobby. Explained unable to go to lobby with iv infusing. Iv disconnected, ambulatory to lobby with steady gait. Demanding meds for abdominal pain.  Explained multiple times during triage and again at this time unable to administer meds prior to seeing MD.

## 2020-06-30 NOTE — ED TRIAGE NOTES
Arrives with face mask in place. Reports generalized abdominal pain, n/v/d. Onset this AM. Pt well known to this facility and at [facility]. +etoh, hx pancreatitis.  hyperverbal on arrival. States unable to keep anything down however reports tolerates etoh well

## 2020-07-01 ENCOUNTER — PATIENT OUTREACH (OUTPATIENT)
Dept: CASE MANAGEMENT | Age: 45
End: 2020-07-01

## 2020-07-01 NOTE — PROGRESS NOTES
As noted by previous RNCM:    Patient on [facility] list for hospital discharge 6/29/2020. Patient has had 43 ED/hospitalizations between [facility] and [facility] within the past 6 months. Patent doesn't have a valid contact number, no emergency , and no Healthcare Decision Maker. Unable to complete ALLISON call. Episode resolved. Invalid numbers removed from record.

## 2020-07-03 ENCOUNTER — HOSPITAL ENCOUNTER (EMERGENCY)
Age: 45
Discharge: HOME | End: 2020-07-03
Attending: EMERGENCY MEDICINE
Payer: SUBSIDIZED

## 2020-07-03 VITALS
RESPIRATION RATE: 16 BRPM | TEMPERATURE: 98.3 F | BODY MASS INDEX: 21.98 KG/M2 | WEIGHT: 145 LBS | DIASTOLIC BLOOD PRESSURE: 63 MMHG | SYSTOLIC BLOOD PRESSURE: 108 MMHG | OXYGEN SATURATION: 98 % | HEIGHT: 68 IN | HEART RATE: 117 BPM

## 2020-07-03 DIAGNOSIS — F99 PSYCHIATRIC ILLNESS: Primary | ICD-10-CM

## 2020-07-03 DIAGNOSIS — F10.10 ALCOHOL ABUSE: ICD-10-CM

## 2020-07-03 PROCEDURE — 99283 EMERGENCY DEPT VISIT LOW MDM: CPT

## 2020-07-04 NOTE — ED TRIAGE NOTES
Patient arrives to ED via EMS. Patient complains of suicidal/homicidal ideations. Patient states that he has been trying to get help for the last few days but no one has helped him. Patient states that, \"this is the last time he is going to try to get help and if no one helps him he is going to go ahead and kill himself\". Patient is non compliant with all medications. Patient has been drinking alcohol all day today. Patient is diabetic and blood sugar reading \"high\" with EMS. Patient went to [facility] earlier today. Patient is unable to go back to [facility] because he is banned related to behavioral concerns. [facility] attempted to find placement for patient when he was discharged but patient was declined several times related to behavioral problems.
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
## 2020-07-06 ENCOUNTER — PATIENT OUTREACH (OUTPATIENT)
Dept: CASE MANAGEMENT | Age: 45
End: 2020-07-06

## 2020-07-18 ENCOUNTER — APPOINTMENT (OUTPATIENT)
Dept: GENERAL RADIOLOGY | Age: 45
End: 2020-07-18
Attending: EMERGENCY MEDICINE
Payer: SUBSIDIZED

## 2020-07-18 ENCOUNTER — HOSPITAL ENCOUNTER (EMERGENCY)
Age: 45
Discharge: HOME | End: 2020-07-18
Attending: EMERGENCY MEDICINE
Payer: SUBSIDIZED

## 2020-07-18 VITALS
DIASTOLIC BLOOD PRESSURE: 60 MMHG | RESPIRATION RATE: 16 BRPM | SYSTOLIC BLOOD PRESSURE: 127 MMHG | BODY MASS INDEX: 20.46 KG/M2 | HEIGHT: 68 IN | TEMPERATURE: 98.1 F | HEART RATE: 98 BPM | WEIGHT: 135 LBS | OXYGEN SATURATION: 96 %

## 2020-07-18 DIAGNOSIS — F10.10 ALCOHOL ABUSE: ICD-10-CM

## 2020-07-18 DIAGNOSIS — R73.9 HYPERGLYCEMIA: Primary | ICD-10-CM

## 2020-07-18 LAB
ALBUMIN SERPL-MCNC: 3.4 G/DL (ref 3.5–5)
ALBUMIN/GLOB SERPL: 0.9 {RATIO} (ref 1.2–3.5)
ALP SERPL-CCNC: 93 U/L (ref 50–136)
ALT SERPL-CCNC: 22 U/L (ref 12–65)
ANION GAP SERPL CALC-SCNC: 12 MMOL/L (ref 7–16)
AST SERPL-CCNC: 15 U/L (ref 15–37)
BASOPHILS # BLD: 0 K/UL (ref 0–0.2)
BASOPHILS NFR BLD: 0 % (ref 0–2)
BILIRUB SERPL-MCNC: 0.2 MG/DL (ref 0.2–1.1)
BUN SERPL-MCNC: 14 MG/DL (ref 6–23)
CALCIUM SERPL-MCNC: 8.4 MG/DL (ref 8.3–10.4)
CHLORIDE SERPL-SCNC: 96 MMOL/L (ref 98–107)
CO2 SERPL-SCNC: 26 MMOL/L (ref 21–32)
CREAT SERPL-MCNC: 1.01 MG/DL (ref 0.8–1.5)
DIFFERENTIAL METHOD BLD: ABNORMAL
EOSINOPHIL # BLD: 0.1 K/UL (ref 0–0.8)
EOSINOPHIL NFR BLD: 1 % (ref 0.5–7.8)
ERYTHROCYTE [DISTWIDTH] IN BLOOD BY AUTOMATED COUNT: 17.1 % (ref 11.9–14.6)
ETHANOL SERPL-MCNC: 161 MG/DL
GLOBULIN SER CALC-MCNC: 3.6 G/DL (ref 2.3–3.5)
GLUCOSE BLD STRIP.AUTO-MCNC: 354 MG/DL (ref 65–100)
GLUCOSE SERPL-MCNC: 684 MG/DL (ref 65–100)
HCT VFR BLD AUTO: 32 % (ref 41.1–50.3)
HGB BLD-MCNC: 10.1 G/DL (ref 13.6–17.2)
IMM GRANULOCYTES # BLD AUTO: 0.1 K/UL (ref 0–0.5)
IMM GRANULOCYTES NFR BLD AUTO: 1 % (ref 0–5)
LIPASE SERPL-CCNC: 17 U/L (ref 73–393)
LYMPHOCYTES # BLD: 1.5 K/UL (ref 0.5–4.6)
LYMPHOCYTES NFR BLD: 15 % (ref 13–44)
MCH RBC QN AUTO: 26.1 PG (ref 26.1–32.9)
MCHC RBC AUTO-ENTMCNC: 31.6 G/DL (ref 31.4–35)
MCV RBC AUTO: 82.7 FL (ref 79.6–97.8)
MONOCYTES # BLD: 0.6 K/UL (ref 0.1–1.3)
MONOCYTES NFR BLD: 6 % (ref 4–12)
NEUTS SEG # BLD: 7.5 K/UL (ref 1.7–8.2)
NEUTS SEG NFR BLD: 77 % (ref 43–78)
NRBC # BLD: 0 K/UL (ref 0–0.2)
PLATELET # BLD AUTO: 268 K/UL (ref 150–450)
PMV BLD AUTO: 9.9 FL (ref 9.4–12.3)
POTASSIUM SERPL-SCNC: 3.9 MMOL/L (ref 3.5–5.1)
PROT SERPL-MCNC: 7 G/DL (ref 6.3–8.2)
RBC # BLD AUTO: 3.87 M/UL (ref 4.23–5.6)
SODIUM SERPL-SCNC: 134 MMOL/L (ref 136–145)
WBC # BLD AUTO: 9.8 K/UL (ref 4.3–11.1)

## 2020-07-18 PROCEDURE — 96361 HYDRATE IV INFUSION ADD-ON: CPT

## 2020-07-18 PROCEDURE — 81003 URINALYSIS AUTO W/O SCOPE: CPT

## 2020-07-18 PROCEDURE — 83690 ASSAY OF LIPASE: CPT

## 2020-07-18 PROCEDURE — 96374 THER/PROPH/DIAG INJ IV PUSH: CPT

## 2020-07-18 PROCEDURE — 80053 COMPREHEN METABOLIC PANEL: CPT

## 2020-07-18 PROCEDURE — 74022 RADEX COMPL AQT ABD SERIES: CPT

## 2020-07-18 PROCEDURE — 74011636637 HC RX REV CODE- 636/637: Performed by: EMERGENCY MEDICINE

## 2020-07-18 PROCEDURE — 85025 COMPLETE CBC W/AUTO DIFF WBC: CPT

## 2020-07-18 PROCEDURE — 82962 GLUCOSE BLOOD TEST: CPT

## 2020-07-18 PROCEDURE — 99284 EMERGENCY DEPT VISIT MOD MDM: CPT

## 2020-07-18 PROCEDURE — 74011250636 HC RX REV CODE- 250/636: Performed by: EMERGENCY MEDICINE

## 2020-07-18 PROCEDURE — 80307 DRUG TEST PRSMV CHEM ANLYZR: CPT

## 2020-07-18 RX ORDER — ONDANSETRON 2 MG/ML
4 INJECTION INTRAMUSCULAR; INTRAVENOUS
Status: DISCONTINUED | OUTPATIENT
Start: 2020-07-18 | End: 2020-07-19 | Stop reason: HOSPADM

## 2020-07-18 RX ORDER — ONDANSETRON 4 MG/1
4 TABLET, ORALLY DISINTEGRATING ORAL
Qty: 20 TAB | Refills: 0 | Status: SHIPPED | OUTPATIENT
Start: 2020-07-18 | End: 2020-09-23

## 2020-07-18 RX ADMIN — SODIUM CHLORIDE 1000 ML: 9 INJECTION, SOLUTION INTRAVENOUS at 20:34

## 2020-07-18 RX ADMIN — INSULIN HUMAN 10 UNITS: 100 INJECTION, SOLUTION PARENTERAL at 21:48

## 2020-07-18 NOTE — ED TRIAGE NOTES
Patient arrives to ED via EMS. Patient called out for high blood sugar. Patient was here a few days ago and prescribed metformin and insulin. Patient states, \"I got my medication filled this morning but I have not opened it yet\". Patient was recently admitted to the hospital for DKA. Patient states, \"I feel the same way I did last time this happened\". Patient also complains of upper abdominal pain. Patient states, \"I have chronic pancreatitis\". ETOH on board.        VSS  BGL read high on monitor

## 2020-07-19 NOTE — DISCHARGE INSTRUCTIONS
Patient Education        Acute Alcohol Intoxication: Care Instructions  Your Care Instructions     You have had treatment to help your body rid itself of alcohol. Too much alcohol upsets the body's fluid balance. Your doctor may have given you fluids and vitamins. For some people, drinking too much alcohol is a one-time event. For others, it is an ongoing problem. In either case, it is serious. It can be life-threatening. Follow-up care is a key part of your treatment and safety. Be sure to make and go to all appointments, and call your doctor if you are having problems. It's also a good idea to know your test results and keep a list of the medicines you take. How can you care for yourself at home? · Do not drink and drive. · Be safe with medicines. Take your medicines exactly as prescribed. Call your doctor if you think you are having a problem with your medicine. · Your doctor may have prescribed disulfiram (Antabuse). Do not drink any alcohol while you are taking this medicine. You may have severe or even life-threatening side effects from even small amounts of alcohol. · If you were given medicine to prevent nausea, be sure to take it exactly as prescribed. · Before you take any medicine, tell your doctor if:  ? You have had a bad reaction to any medicines in the past.  ? You are taking other medicines, including over-the-counter ones, or have other health problems. ? You are or could be pregnant. · Be prepared to have some symptoms of withdrawal in the next few days. · Drink plenty of liquids in the next few days. · Seek help if you need it to stop drinking. Getting counseling and joining a support group can help you stay sober. Try a support group such as Alcoholics Anonymous. · Avoid alcohol when you take medicines. It can react with many medicines and cause serious problems. When should you call for help? [id number] anytime you think you may need emergency care.  For example, call if:  · You feel confused and are seeing things that are not there. · You are thinking about killing yourself or hurting others. · You have a seizure. · You vomit blood or what looks like coffee grounds. Call your doctor now or seek immediate medical care if:  · You have trembling, restlessness, sweating, and other withdrawal symptoms that are new or that get worse. · Your withdrawal symptoms come back after not bothering you for days or weeks. · You can't stop vomiting. Watch closely for changes in your health, and be sure to contact your doctor if:  · You need help to stop drinking. Where can you learn more? Go to http://pedrito-kandi.info/  Enter T102 in the search box to learn more about \"Acute Alcohol Intoxication: Care Instructions. \"  Current as of: August 22, 2019               Content Version: 12.5  © 0781-9692 Healthwise, Incorporated. Care instructions adapted under license by [employer] (which disclaims liability or warranty for this information). If you have questions about a medical condition or this instruction, always ask your healthcare professional. [address] any warranty or liability for your use of this information. Patient Education        Learning About High Blood Sugar  What is high blood sugar? Your body turns the food you eat into glucose (sugar), which it uses for energy. But if your body isn't able to use the sugar right away, it can build up in your blood and lead to high blood sugar. When the amount of sugar in your blood stays too high for too much of the time, you may have diabetes. Diabetes is a disease that can cause serious health problems. The good news is that lifestyle changes may help you get your blood sugar back to normal and avoid or delay diabetes. What causes high blood sugar? Sugar (glucose) can build up in your blood if you:  · Are overweight. · Have a family history of diabetes.   · Take certain medicines, such as steroids. What are the symptoms? Having high blood sugar may not cause any symptoms at all. Or it may make you feel very thirsty or very hungry. You may also urinate more often than usual, have blurry vision, or lose weight without trying. How is high blood sugar treated? You can take steps to lower your blood sugar level if you understand what makes it get higher. Your doctor may want you to learn how to test your blood sugar level at home. Then you can see how illness, stress, or different kinds of food or medicine raise or lower your blood sugar level. Other tests may be needed to see if you have diabetes. How can you prevent high blood sugar? · Watch your weight. If you're overweight, losing just a small amount of weight may help. Reducing fat around your waist is most important. · Limit the amount of calories, sweets, and unhealthy fat you eat. Ask your doctor if a dietitian can help you. A registered dietitian can help you create meal plans that fit your lifestyle. · Get at least 30 minutes of exercise on most days of the week. Exercise helps control your blood sugar. It also helps you maintain a healthy weight. Walking is a good choice. You also may want to do other activities, such as running, swimming, cycling, or playing tennis or team sports. · If your doctor prescribed medicines, take them exactly as prescribed. Call your doctor if you think you are having a problem with your medicine. You will get more details on the specific medicines your doctor prescribes. Follow-up care is a key part of your treatment and safety. Be sure to make and go to all appointments, and call your doctor if you are having problems. It's also a good idea to know your test results and keep a list of the medicines you take. Where can you learn more? Go to http://pedrito-kandi.info/  Enter O108 in the search box to learn more about \"Learning About High Blood Sugar. \"  Current as of: December 20, 2019               Content Version: 12.5  © 9762-3393 Healthwise, Incorporated. Care instructions adapted under license by [employer] (which disclaims liability or warranty for this information). If you have questions about a medical condition or this instruction, always ask your healthcare professional. [address] any warranty or liability for your use of this information.

## 2020-07-19 NOTE — ED NOTES
Pt requesting water even with nausea,  notified. zofran offered to pt. Pt states \"dont bother, Im not going to be here long the Dr Alejandro Peer give me pain medication. If I get nauseous ill just throw up on the floor. \" This RN attempt to reassure pt that if he is in DKA its important that he be here for treatment. Pt states \"id rather die than be stuck here without pain medication. \"

## 2020-07-19 NOTE — ED NOTES
I have reviewed discharge instructions with the patient. The patient verbalized understanding. Patient left ED via Discharge Method: ambulatory to Home with self. Opportunity for questions and clarification provided. Patient given 1 scripts. To continue your aftercare when you leave the hospital, you may receive an automated call from our care team to check in on how you are doing. This is a free service and part of our promise to provide the best care and service to meet your aftercare needs.  If you have questions, or wish to unsubscribe from this service please call [phone]. Thank you for Choosing our [facility].

## 2020-07-19 NOTE — ED PROVIDER NOTES
Patient with history of diabetes and chronic pancreatitis. Is a chronic alcoholic who comes to the ER frequently. Was just at [facility] admitted from July 14-17. Returns today with epigastric pain and feeling like his blood sugar is elevated. Also with nausea and vomiting. The history is provided by the patient. No  was used. High Blood Sugar    This is a new problem. The current episode started 6 to 12 hours ago. The problem occurs constantly. The problem has not changed since onset. The pain is associated with an unknown factor. The pain is located in the epigastric region. The quality of the pain is aching and sharp. The pain is moderate. Associated symptoms include nausea and vomiting. Pertinent negatives include no fever, no diarrhea, no melena, no constipation, no dysuria, no hematuria, no headaches, no chest pain and no back pain. Nothing worsens the pain. The pain is relieved by nothing. His past medical history is significant for pancreatitis and DM. Past Medical History:   Diagnosis Date    Bipolar 1 disorder ([facility]     Depression     Diabetes ([facility]     PTSD (post-traumatic stress disorder)        No past surgical history on file. No family history on file. Social History     Socioeconomic History    Marital status: SINGLE     Spouse name: Not on file    Number of children: Not on file    Years of education: Not on file    Highest education level: Not on file   Occupational History    Not on file   Social Needs    Financial resource strain: Not on file    Food insecurity     Worry: Not on file     Inability: Not on file    Transportation needs     Medical: Not on file     Non-medical: Not on file   Tobacco Use    Smoking status: Current Every Day Smoker     Packs/day: 1.00    Smokeless tobacco: Never Used   Substance and Sexual Activity    Alcohol use:  Yes    Drug use: Not Currently    Sexual activity: Not on file   Lifestyle    Physical activity Days per week: Not on file     Minutes per session: Not on file    Stress: Not on file   Relationships    Social connections     Talks on phone: Not on file     Gets together: Not on file     Attends Jehovah's witness service: Not on file     Active member of club or organization: Not on file     Attends meetings of clubs or organizations: Not on file     Relationship status: Not on file    Intimate partner violence     Fear of current or ex partner: Not on file     Emotionally abused: Not on file     Physically abused: Not on file     Forced sexual activity: Not on file   Other Topics Concern    Not on file   Social History Narrative    Not on file         ALLERGIES: Toradol [ketorolac]    Review of Systems   Constitutional: Negative for chills and fever. HENT: Negative for rhinorrhea and sore throat. Eyes: Negative for pain and redness. Respiratory: Negative for chest tightness, shortness of breath and wheezing. Cardiovascular: Negative for chest pain and leg swelling. Gastrointestinal: Positive for nausea and vomiting. Negative for abdominal pain, constipation, diarrhea and melena. Genitourinary: Negative for dysuria and hematuria. Musculoskeletal: Negative for back pain, gait problem, neck pain and neck stiffness. Skin: Negative for color change and rash. Neurological: Negative for weakness, numbness and headaches. Vitals:    07/18/20 1940   BP: 102/59   Pulse: (!) 105   Resp: 16   Temp: 98.1 °F (36.7 °C)   SpO2: 96%   Weight: 61.2 kg (135 lb)   Height: 5' 8\" (1.727 m)            Physical Exam  Constitutional:       Appearance: Normal appearance. He is well-developed. HENT:      Head: Normocephalic and atraumatic. Neck:      Musculoskeletal: Normal range of motion and neck supple. Cardiovascular:      Rate and Rhythm: Normal rate and regular rhythm. Pulmonary:      Effort: Pulmonary effort is normal. No respiratory distress. Breath sounds: Normal breath sounds. No wheezing. Abdominal:      General: Bowel sounds are normal.      Palpations: Abdomen is soft. Tenderness: There is abdominal tenderness (epigastric). Musculoskeletal: Normal range of motion. General: No swelling. Skin:     General: Skin is warm and dry. Neurological:      General: No focal deficit present. Mental Status: He is alert and oriented to person, place, and time. MDM  Number of Diagnoses or Management Options  Diagnosis management comments: Patient with hyperglycemia. No DKA at this time. Alcohol on board. Will discharge. Amount and/or Complexity of Data Reviewed  Clinical lab tests: ordered and reviewed  Tests in the radiology section of CPT®: ordered and reviewed  Tests in the medicine section of CPT®: ordered and reviewed    Patient Progress  Patient progress: stable         Procedures      Results Include:    Recent Results (from the past 24 hour(s))   CBC WITH AUTOMATED DIFF    Collection Time: 07/18/20  8:29 PM   Result Value Ref Range    WBC 9.8 4.3 - 11.1 K/uL    RBC 3.87 (L) 4.23 - 5.6 M/uL    HGB 10.1 (L) 13.6 - 17.2 g/dL    HCT 32.0 (L) 41.1 - 50.3 %    MCV 82.7 79.6 - 97.8 FL    MCH 26.1 26.1 - 32.9 PG    MCHC 31.6 31.4 - 35.0 g/dL    RDW 17.1 (H) 11.9 - 14.6 %    PLATELET 249 932 - 711 K/uL    MPV 9.9 9.4 - 12.3 FL    ABSOLUTE NRBC 0.00 0.0 - 0.2 K/uL    DF AUTOMATED      NEUTROPHILS 77 43 - 78 %    LYMPHOCYTES 15 13 - 44 %    MONOCYTES 6 4.0 - 12.0 %    EOSINOPHILS 1 0.5 - 7.8 %    BASOPHILS 0 0.0 - 2.0 %    IMMATURE GRANULOCYTES 1 0.0 - 5.0 %    ABS. NEUTROPHILS 7.5 1.7 - 8.2 K/UL    ABS. LYMPHOCYTES 1.5 0.5 - 4.6 K/UL    ABS. MONOCYTES 0.6 0.1 - 1.3 K/UL    ABS. EOSINOPHILS 0.1 0.0 - 0.8 K/UL    ABS. BASOPHILS 0.0 0.0 - 0.2 K/UL    ABS. IMM.  GRANS. 0.1 0.0 - 0.5 K/UL   METABOLIC PANEL, COMPREHENSIVE    Collection Time: 07/18/20  8:29 PM   Result Value Ref Range    Sodium 134 (L) 136 - 145 mmol/L    Potassium 3.9 3.5 - 5.1 mmol/L    Chloride 96 (L) 98 - 107 mmol/L    CO2 26 21 - 32 mmol/L    Anion gap 12 7 - 16 mmol/L    Glucose 684 (HH) 65 - 100 mg/dL    BUN 14 6 - 23 MG/DL    Creatinine 1.01 0.8 - 1.5 MG/DL    GFR est AA >60 >60 ml/min/1.73m2    GFR est non-AA >60 >60 ml/min/1.73m2    Calcium 8.4 8.3 - 10.4 MG/DL    Bilirubin, total 0.2 0.2 - 1.1 MG/DL    ALT (SGPT) 22 12 - 65 U/L    AST (SGOT) 15 15 - 37 U/L    Alk. phosphatase 93 50 - 136 U/L    Protein, total 7.0 6.3 - 8.2 g/dL    Albumin 3.4 (L) 3.5 - 5.0 g/dL    Globulin 3.6 (H) 2.3 - 3.5 g/dL    A-G Ratio 0.9 (L) 1.2 - 3.5     ETHYL ALCOHOL    Collection Time: 07/18/20  8:29 PM   Result Value Ref Range    ALCOHOL(ETHYL),SERUM 161 MG/DL   LIPASE    Collection Time: 07/18/20  8:29 PM   Result Value Ref Range    Lipase 17 (L) 73 - 393 U/L         XR ABD ACUTE W 1 V CHEST (Final result)   Result time 07/18/20 20:42:35   Final result by Dez Martinez DO (07/18/20 20:42:35)                 Impression:     IMPRESSION: Increased stool volume. Narrative:     Acute abdominal series:     HISTORY: abd pain     An acute abdominal series was performed. COMPARISON: None     FINDINGS: The intestinal gas pattern shows no evidence of obstruction. There is   no free intraperitoneal air. No radiopaque densities resembling calculi are   identified. There is a moderate amount of stool throughout much of the colon. A single view the chest was obtained. Comparison was made to the prior exam   06/11/2020. The cardiac and mediastinal silhouettes are normal in size and   configuration. The lungs and pleural spaces are clear. The pulmonary vasculature   is within normal limits.

## 2020-07-20 ENCOUNTER — PATIENT OUTREACH (OUTPATIENT)
Dept: CASE MANAGEMENT | Age: 45
End: 2020-07-20

## 2020-07-20 LAB — GLUCOSE BLD STRIP.AUTO-MCNC: >600 MG/DL (ref 65–100)

## 2020-07-27 ENCOUNTER — HOSPITAL ENCOUNTER (EMERGENCY)
Age: 45
Discharge: HOME | End: 2020-07-28
Attending: EMERGENCY MEDICINE
Payer: SUBSIDIZED

## 2020-07-27 ENCOUNTER — APPOINTMENT (OUTPATIENT)
Dept: GENERAL RADIOLOGY | Age: 45
End: 2020-07-27
Attending: EMERGENCY MEDICINE
Payer: SUBSIDIZED

## 2020-07-27 DIAGNOSIS — R73.9 HYPERGLYCEMIA: Primary | ICD-10-CM

## 2020-07-27 LAB
ALBUMIN SERPL-MCNC: 3.3 G/DL (ref 3.5–5)
ALBUMIN/GLOB SERPL: 0.8 {RATIO} (ref 1.2–3.5)
ALP SERPL-CCNC: 103 U/L (ref 50–136)
ALT SERPL-CCNC: 15 U/L (ref 12–65)
ANION GAP SERPL CALC-SCNC: 11 MMOL/L (ref 7–16)
AST SERPL-CCNC: 9 U/L (ref 15–37)
BASOPHILS # BLD: 0 K/UL (ref 0–0.2)
BASOPHILS NFR BLD: 0 % (ref 0–2)
BILIRUB SERPL-MCNC: 0.2 MG/DL (ref 0.2–1.1)
BUN SERPL-MCNC: 7 MG/DL (ref 6–23)
CALCIUM SERPL-MCNC: 9 MG/DL (ref 8.3–10.4)
CHLORIDE SERPL-SCNC: 100 MMOL/L (ref 98–107)
CO2 SERPL-SCNC: 26 MMOL/L (ref 21–32)
CREAT SERPL-MCNC: 0.76 MG/DL (ref 0.8–1.5)
DIFFERENTIAL METHOD BLD: ABNORMAL
EOSINOPHIL # BLD: 0.1 K/UL (ref 0–0.8)
EOSINOPHIL NFR BLD: 1 % (ref 0.5–7.8)
ERYTHROCYTE [DISTWIDTH] IN BLOOD BY AUTOMATED COUNT: 16.3 % (ref 11.9–14.6)
ETHANOL SERPL-MCNC: 49 MG/DL
GLOBULIN SER CALC-MCNC: 4 G/DL (ref 2.3–3.5)
GLUCOSE BLD STRIP.AUTO-MCNC: 338 MG/DL (ref 65–100)
GLUCOSE SERPL-MCNC: 338 MG/DL (ref 65–100)
HCT VFR BLD AUTO: 33.8 % (ref 41.1–50.3)
HGB BLD-MCNC: 10.6 G/DL (ref 13.6–17.2)
IMM GRANULOCYTES # BLD AUTO: 0 K/UL (ref 0–0.5)
IMM GRANULOCYTES NFR BLD AUTO: 0 % (ref 0–5)
LIPASE SERPL-CCNC: 18 U/L (ref 73–393)
LYMPHOCYTES # BLD: 2.4 K/UL (ref 0.5–4.6)
LYMPHOCYTES NFR BLD: 32 % (ref 13–44)
MCH RBC QN AUTO: 25.1 PG (ref 26.1–32.9)
MCHC RBC AUTO-ENTMCNC: 31.4 G/DL (ref 31.4–35)
MCV RBC AUTO: 80.1 FL (ref 79.6–97.8)
MONOCYTES # BLD: 0.6 K/UL (ref 0.1–1.3)
MONOCYTES NFR BLD: 8 % (ref 4–12)
NEUTS SEG # BLD: 4.3 K/UL (ref 1.7–8.2)
NEUTS SEG NFR BLD: 58 % (ref 43–78)
NRBC # BLD: 0 K/UL (ref 0–0.2)
PLATELET # BLD AUTO: 376 K/UL (ref 150–450)
PMV BLD AUTO: 10.1 FL (ref 9.4–12.3)
POTASSIUM SERPL-SCNC: 3.4 MMOL/L (ref 3.5–5.1)
PROT SERPL-MCNC: 7.3 G/DL (ref 6.3–8.2)
RBC # BLD AUTO: 4.22 M/UL (ref 4.23–5.6)
SODIUM SERPL-SCNC: 137 MMOL/L (ref 136–145)
WBC # BLD AUTO: 7.5 K/UL (ref 4.3–11.1)

## 2020-07-27 PROCEDURE — 80307 DRUG TEST PRSMV CHEM ANLYZR: CPT

## 2020-07-27 PROCEDURE — 74011636637 HC RX REV CODE- 636/637: Performed by: EMERGENCY MEDICINE

## 2020-07-27 PROCEDURE — 74011250636 HC RX REV CODE- 250/636: Performed by: EMERGENCY MEDICINE

## 2020-07-27 PROCEDURE — 80053 COMPREHEN METABOLIC PANEL: CPT

## 2020-07-27 PROCEDURE — 71046 X-RAY EXAM CHEST 2 VIEWS: CPT

## 2020-07-27 PROCEDURE — 99285 EMERGENCY DEPT VISIT HI MDM: CPT

## 2020-07-27 PROCEDURE — 74011000250 HC RX REV CODE- 250: Performed by: EMERGENCY MEDICINE

## 2020-07-27 PROCEDURE — 83690 ASSAY OF LIPASE: CPT

## 2020-07-27 PROCEDURE — 96374 THER/PROPH/DIAG INJ IV PUSH: CPT

## 2020-07-27 PROCEDURE — 96375 TX/PRO/DX INJ NEW DRUG ADDON: CPT

## 2020-07-27 PROCEDURE — 85025 COMPLETE CBC W/AUTO DIFF WBC: CPT

## 2020-07-27 PROCEDURE — 82962 GLUCOSE BLOOD TEST: CPT

## 2020-07-27 RX ORDER — SODIUM CHLORIDE, SODIUM LACTATE, POTASSIUM CHLORIDE, CALCIUM CHLORIDE 600; 310; 30; 20 MG/100ML; MG/100ML; MG/100ML; MG/100ML
1000 INJECTION, SOLUTION INTRAVENOUS CONTINUOUS
Status: DISCONTINUED | OUTPATIENT
Start: 2020-07-27 | End: 2020-07-28 | Stop reason: HOSPADM

## 2020-07-27 RX ORDER — ONDANSETRON 2 MG/ML
4 INJECTION INTRAMUSCULAR; INTRAVENOUS
Status: COMPLETED | OUTPATIENT
Start: 2020-07-27 | End: 2020-07-27

## 2020-07-27 RX ADMIN — INSULIN HUMAN 10 UNITS: 100 INJECTION, SOLUTION PARENTERAL at 23:07

## 2020-07-27 RX ADMIN — ONDANSETRON 4 MG: 2 INJECTION INTRAMUSCULAR; INTRAVENOUS at 23:07

## 2020-07-27 RX ADMIN — FAMOTIDINE 20 MG: 10 INJECTION INTRAVENOUS at 23:07

## 2020-07-27 RX ADMIN — SODIUM CHLORIDE, SODIUM LACTATE, POTASSIUM CHLORIDE, AND CALCIUM CHLORIDE 1000 ML: 600; 310; 30; 20 INJECTION, SOLUTION INTRAVENOUS at 23:07

## 2020-07-28 VITALS
RESPIRATION RATE: 16 BRPM | BODY MASS INDEX: 23.49 KG/M2 | DIASTOLIC BLOOD PRESSURE: 73 MMHG | WEIGHT: 155 LBS | OXYGEN SATURATION: 94 % | TEMPERATURE: 98.4 F | SYSTOLIC BLOOD PRESSURE: 112 MMHG | HEIGHT: 68 IN | HEART RATE: 107 BPM

## 2020-07-28 LAB — GLUCOSE BLD STRIP.AUTO-MCNC: 300 MG/DL (ref 65–100)

## 2020-07-28 PROCEDURE — 82962 GLUCOSE BLOOD TEST: CPT

## 2020-07-28 RX ORDER — LANCETS
EACH MISCELLANEOUS
Qty: 1 EACH | Refills: 0 | Status: SHIPPED | OUTPATIENT
Start: 2020-07-28

## 2020-07-28 RX ORDER — INSULIN LISPRO 100 [IU]/ML
INJECTION, SOLUTION INTRAVENOUS; SUBCUTANEOUS
Qty: 1 VIAL | Refills: 0 | Status: ON HOLD | OUTPATIENT
Start: 2020-07-28 | End: 2020-09-28 | Stop reason: SDUPTHER

## 2020-07-28 RX ORDER — METFORMIN HYDROCHLORIDE 1000 MG/1
1000 TABLET ORAL 2 TIMES DAILY WITH MEALS
Qty: 60 TAB | Refills: 0 | Status: SHIPPED | OUTPATIENT
Start: 2020-07-28 | End: 2020-09-14

## 2020-07-28 NOTE — ED PROVIDER NOTES
73-year-old homeless male with a history of alcohol abuse, chronic pancreatitis, diabetes, bipolar, PTSD presents with abdominal pain, nausea, vomiting, diarrhea, and generalized weakness. He has been seen almost daily in various emergency departments for chronic abdominal pain, homelessness, and hyperglycemia. He was last seen July 24 and given multiple refills on prescriptions, which he filled yesterday. He states he left them in a gas station and when he went to retrieve them they were gone. No documented fevers, new cough or shortness of breath. Abdominal Pain    Associated symptoms include diarrhea, nausea and vomiting. Pertinent negatives include no fever, no dysuria, no headaches, no chest pain and no back pain. Past Medical History:   Diagnosis Date    Bipolar 1 disorder ([facility] [address] [facility]     Depression     Diabetes ([facility]     PTSD (post-traumatic stress disorder)        No past surgical history on file. No family history on file. Social History     Socioeconomic History    Marital status: SINGLE     Spouse name: Not on file    Number of children: Not on file    Years of education: Not on file    Highest education level: Not on file   Occupational History    Not on file   Social Needs    Financial resource strain: Not on file    Food insecurity     Worry: Not on file     Inability: Not on file    Transportation needs     Medical: Not on file     Non-medical: Not on file   Tobacco Use    Smoking status: Current Every Day Smoker     Packs/day: 1.00    Smokeless tobacco: Never Used   Substance and Sexual Activity    Alcohol use:  Yes    Drug use: Not Currently    Sexual activity: Not on file   Lifestyle    Physical activity     Days per week: Not on file     Minutes per session: Not on file    Stress: Not on file   Relationships    Social connections     Talks on phone: Not on file     Gets together: Not on file     Attends Rastafari service: Not on file     Active member of club or organization: Not on file     Attends meetings of clubs or organizations: Not on file     Relationship status: Not on file    Intimate partner violence     Fear of current or ex partner: Not on file     Emotionally abused: Not on file     Physically abused: Not on file     Forced sexual activity: Not on file   Other Topics Concern    Not on file   Social History Narrative    Not on file         ALLERGIES: Toradol [ketorolac]    Review of Systems   Constitutional: Positive for fatigue. Negative for chills and fever. HENT: Negative for hearing loss. Eyes: Negative for visual disturbance. Respiratory: Positive for cough. Negative for shortness of breath. Cardiovascular: Negative for chest pain and palpitations. Gastrointestinal: Positive for abdominal pain, diarrhea, nausea and vomiting. Genitourinary: Negative for dysuria. Musculoskeletal: Negative for back pain. Skin: Negative for rash. Neurological: Negative for weakness and headaches. Psychiatric/Behavioral: Negative for confusion. Vitals:    07/27/20 2114 07/27/20 2227   BP: 92/66 111/73   Pulse: (!) 107    Resp: 16    Temp: 98.4 °F (36.9 °C)    SpO2: 94%    Weight: 70.3 kg (155 lb)    Height: 5' 8\" (1.727 m)             Physical Exam  Vitals signs and nursing note reviewed. Constitutional:       Appearance: He is well-developed. Comments: Poorly groomed   HENT:      Head: Normocephalic and atraumatic. Eyes:      Pupils: Pupils are equal, round, and reactive to light. Neck:      Musculoskeletal: Normal range of motion and neck supple. Cardiovascular:      Rate and Rhythm: Regular rhythm. Tachycardia present. Heart sounds: Normal heart sounds. Pulmonary:      Effort: Pulmonary effort is normal.      Breath sounds: Normal breath sounds. Abdominal:      Palpations: Abdomen is soft. Tenderness: There is abdominal tenderness in the epigastric area. Musculoskeletal: Normal range of motion.    Skin: General: Skin is warm and dry. Neurological:      Mental Status: He is alert. Psychiatric:         Behavior: Behavior normal.          MDM  Number of Diagnoses or Management Options  Diagnosis management comments: Parts of this document were created using dragon voice recognition software. The chart has been reviewed but errors may still be present. I wore appropriate PPE throughout this patient's ED visit. Krupa High MD, 11:02 PM    12:21 AM  Not in DKA. Given fluids and insulin. Advised to dw SW in AM for further medication assistance     I discussed the results of all labs, procedures, radiographs, and treatments with the patient and available family. Treatment plan is agreed upon and the patient is ready for discharge. Questions about treatment in the ED and differential diagnosis of presenting condition were answered. Patient was given verbal discharge instructions including, but not limited to, importance of returning to the emergency department for any concern of worsening or continued symptoms. Instructions were given to follow up with a primary care provider or specialist within 1-2 days. Adverse effects of medications, if prescribed, were discussed and patient was advised to refrain from significant physical activity until followed up by primary care physician and to not drive or operate heavy machinery after taking any sedating substances.                Amount and/or Complexity of Data Reviewed  Clinical lab tests: ordered and reviewed (Results for orders placed or performed during the hospital encounter of 07/27/20  -CBC WITH AUTOMATED DIFF       Result                      Value             Ref Range           WBC                         7.5               4.3 - 11.1 K*       RBC                         4.22 (L)          4.23 - 5.6 M*       HGB                         10.6 (L)          13.6 - 17.2 *       HCT                         33.8 (L)          41.1 - 50.3 %       MCV 80.1              79.6 - 97.8 *       MCH                         25.1 (L)          26.1 - 32.9 *       MCHC                        31.4              31.4 - 35.0 *       RDW                         16.3 (H)          11.9 - 14.6 %       PLATELET                    376               150 - 450 K/*       MPV                         10.1              9.4 - 12.3 FL       ABSOLUTE NRBC               0.00              0.0 - 0.2 K/*       DF                          AUTOMATED                             NEUTROPHILS                 58                43 - 78 %           LYMPHOCYTES                 32                13 - 44 %           MONOCYTES                   8                 4.0 - 12.0 %        EOSINOPHILS                 1                 0.5 - 7.8 %         BASOPHILS                   0                 0.0 - 2.0 %         IMMATURE GRANULOCYTES       0                 0.0 - 5.0 %         ABS. NEUTROPHILS            4.3               1.7 - 8.2 K/*       ABS. LYMPHOCYTES            2.4               0.5 - 4.6 K/*       ABS. MONOCYTES              0.6               0.1 - 1.3 K/*       ABS. EOSINOPHILS            0.1               0.0 - 0.8 K/*       ABS. BASOPHILS              0.0               0.0 - 0.2 K/*       ABS. IMM.  GRANS.            0.0               0.0 - 0.5 K/*  -METABOLIC PANEL, COMPREHENSIVE       Result                      Value             Ref Range           Sodium                      137               136 - 145 mm*       Potassium                   3.4 (L)           3.5 - 5.1 mm*       Chloride                    100               98 - 107 mmo*       CO2                         26                21 - 32 mmol*       Anion gap                   11                7 - 16 mmol/L       Glucose                     338 (H)           65 - 100 mg/*       BUN                         7                 6 - 23 MG/DL        Creatinine                  0.76 (L)          0.8 - 1.5 MG*       GFR est AA >60               >60 ml/min/1*       GFR est non-AA              >60               >60 ml/min/1*       Calcium                     9.0               8.3 - 10.4 M*       Bilirubin, total            0.2               0.2 - 1.1 MG*       ALT (SGPT)                  15                12 - 65 U/L         AST (SGOT)                  9 (L)             15 - 37 U/L         Alk.  phosphatase            103               50 - 136 U/L        Protein, total              7.3               6.3 - 8.2 g/*       Albumin                     3.3 (L)           3.5 - 5.0 g/*       Globulin                    4.0 (H)           2.3 - 3.5 g/*       A-G Ratio                   0.8 (L)           1.2 - 3.5      -LIPASE       Result                      Value             Ref Range           Lipase                      18 (L)            73 - 393 U/L   -ETHYL ALCOHOL       Result                      Value             Ref Range           ALCOHOL(ETHYL),SERUM        49                MG/DL          -GLUCOSE, POC       Result                      Value             Ref Range           Glucose (POC)               338 (H)           65 - 100 mg/*  )  Tests in the medicine section of CPT®: reviewed and ordered           Procedures

## 2020-07-28 NOTE — ED NOTES
I have reviewed discharge instructions with the patient. The patient verbalized understanding. Patient left ED via Discharge Method: ambulatory to Home with self. Opportunity for questions and clarification provided. Patient given 5 scripts. To continue your aftercare when you leave the hospital, you may receive an automated call from our care team to check in on how you are doing. This is a free service and part of our promise to provide the best care and service to meet your aftercare needs.  If you have questions, or wish to unsubscribe from this service please call [phone]. Thank you for Choosing our [facility] Emergency Department.
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
## 2020-08-13 ENCOUNTER — HOSPITAL ENCOUNTER (EMERGENCY)
Age: 45
Discharge: HOME | End: 2020-08-14
Attending: EMERGENCY MEDICINE
Payer: SUBSIDIZED

## 2020-08-13 ENCOUNTER — APPOINTMENT (OUTPATIENT)
Dept: GENERAL RADIOLOGY | Age: 45
End: 2020-08-13
Attending: EMERGENCY MEDICINE
Payer: SUBSIDIZED

## 2020-08-13 DIAGNOSIS — E11.65 UNCONTROLLED TYPE 2 DIABETES MELLITUS WITH HYPERGLYCEMIA (HCC): Primary | ICD-10-CM

## 2020-08-13 DIAGNOSIS — F10.10 ETOH ABUSE: ICD-10-CM

## 2020-08-13 DIAGNOSIS — Z59.00 HOMELESSNESS: ICD-10-CM

## 2020-08-13 LAB
ALBUMIN SERPL-MCNC: 3.8 G/DL (ref 3.5–5)
ALBUMIN/GLOB SERPL: 0.9 {RATIO} (ref 1.2–3.5)
ALP SERPL-CCNC: 130 U/L (ref 50–136)
ALT SERPL-CCNC: 20 U/L (ref 12–65)
ANION GAP SERPL CALC-SCNC: 13 MMOL/L (ref 7–16)
ARTERIAL PATENCY WRIST A: YES
AST SERPL-CCNC: 13 U/L (ref 15–37)
BASE DEFICIT BLD-SCNC: 6 MMOL/L
BASOPHILS # BLD: 0 K/UL (ref 0–0.2)
BASOPHILS NFR BLD: 0 % (ref 0–2)
BDY SITE: ABNORMAL
BILIRUB SERPL-MCNC: 0.2 MG/DL (ref 0.2–1.1)
BUN SERPL-MCNC: 19 MG/DL (ref 6–23)
CALCIUM SERPL-MCNC: 9.2 MG/DL (ref 8.3–10.4)
CHLORIDE SERPL-SCNC: 97 MMOL/L (ref 98–107)
CO2 BLD-SCNC: 20 MMOL/L
CO2 SERPL-SCNC: 21 MMOL/L (ref 21–32)
COLLECT TIME,HTIME: 2215
CREAT SERPL-MCNC: 1.34 MG/DL (ref 0.8–1.5)
DIFFERENTIAL METHOD BLD: ABNORMAL
EOSINOPHIL # BLD: 0.1 K/UL (ref 0–0.8)
EOSINOPHIL NFR BLD: 1 % (ref 0.5–7.8)
ERYTHROCYTE [DISTWIDTH] IN BLOOD BY AUTOMATED COUNT: 16.9 % (ref 11.9–14.6)
GAS FLOW.O2 O2 DELIVERY SYS: ABNORMAL L/MIN
GLOBULIN SER CALC-MCNC: 4.2 G/DL (ref 2.3–3.5)
GLUCOSE SERPL-MCNC: 648 MG/DL (ref 65–100)
HCO3 BLD-SCNC: 19 MMOL/L (ref 22–26)
HCT VFR BLD AUTO: 33.3 % (ref 41.1–50.3)
HGB BLD-MCNC: 10.6 G/DL (ref 13.6–17.2)
IMM GRANULOCYTES # BLD AUTO: 0.1 K/UL (ref 0–0.5)
IMM GRANULOCYTES NFR BLD AUTO: 1 % (ref 0–5)
LIPASE SERPL-CCNC: 33 U/L (ref 73–393)
LYMPHOCYTES # BLD: 1.6 K/UL (ref 0.5–4.6)
LYMPHOCYTES NFR BLD: 16 % (ref 13–44)
MCH RBC QN AUTO: 26.1 PG (ref 26.1–32.9)
MCHC RBC AUTO-ENTMCNC: 31.8 G/DL (ref 31.4–35)
MCV RBC AUTO: 82 FL (ref 79.6–97.8)
MONOCYTES # BLD: 0.5 K/UL (ref 0.1–1.3)
MONOCYTES NFR BLD: 5 % (ref 4–12)
NEUTS SEG # BLD: 7.8 K/UL (ref 1.7–8.2)
NEUTS SEG NFR BLD: 78 % (ref 43–78)
NRBC # BLD: 0 K/UL (ref 0–0.2)
PCO2 BLD: 34 MMHG (ref 35–45)
PH BLD: 7.36 [PH] (ref 7.35–7.45)
PLATELET # BLD AUTO: 337 K/UL (ref 150–450)
PMV BLD AUTO: 10 FL (ref 9.4–12.3)
PO2 BLD: 88 MMHG (ref 75–100)
POTASSIUM SERPL-SCNC: 4.5 MMOL/L (ref 3.5–5.1)
PROT SERPL-MCNC: 8 G/DL (ref 6.3–8.2)
RBC # BLD AUTO: 4.06 M/UL (ref 4.23–5.6)
SAO2 % BLD: 96 % (ref 95–98)
SERVICE CMNT-IMP: ABNORMAL
SERVICE CMNT-IMP: ABNORMAL
SODIUM SERPL-SCNC: 131 MMOL/L (ref 136–145)
SPECIMEN TYPE: ABNORMAL
WBC # BLD AUTO: 10 K/UL (ref 4.3–11.1)

## 2020-08-13 PROCEDURE — 96376 TX/PRO/DX INJ SAME DRUG ADON: CPT

## 2020-08-13 PROCEDURE — 96374 THER/PROPH/DIAG INJ IV PUSH: CPT

## 2020-08-13 PROCEDURE — 81003 URINALYSIS AUTO W/O SCOPE: CPT

## 2020-08-13 PROCEDURE — 82803 BLOOD GASES ANY COMBINATION: CPT

## 2020-08-13 PROCEDURE — 74011636637 HC RX REV CODE- 636/637: Performed by: EMERGENCY MEDICINE

## 2020-08-13 PROCEDURE — 72220 X-RAY EXAM SACRUM TAILBONE: CPT

## 2020-08-13 PROCEDURE — 74011250636 HC RX REV CODE- 250/636: Performed by: EMERGENCY MEDICINE

## 2020-08-13 PROCEDURE — 85025 COMPLETE CBC W/AUTO DIFF WBC: CPT

## 2020-08-13 PROCEDURE — 83690 ASSAY OF LIPASE: CPT

## 2020-08-13 PROCEDURE — 80053 COMPREHEN METABOLIC PANEL: CPT

## 2020-08-13 PROCEDURE — 93005 ELECTROCARDIOGRAM TRACING: CPT | Performed by: EMERGENCY MEDICINE

## 2020-08-13 PROCEDURE — 36600 WITHDRAWAL OF ARTERIAL BLOOD: CPT

## 2020-08-13 PROCEDURE — 96375 TX/PRO/DX INJ NEW DRUG ADDON: CPT

## 2020-08-13 PROCEDURE — 72110 X-RAY EXAM L-2 SPINE 4/>VWS: CPT

## 2020-08-13 PROCEDURE — 74011250637 HC RX REV CODE- 250/637: Performed by: EMERGENCY MEDICINE

## 2020-08-13 PROCEDURE — 99285 EMERGENCY DEPT VISIT HI MDM: CPT

## 2020-08-13 RX ORDER — ONDANSETRON 2 MG/ML
4 INJECTION INTRAMUSCULAR; INTRAVENOUS
Status: COMPLETED | OUTPATIENT
Start: 2020-08-13 | End: 2020-08-13

## 2020-08-13 RX ORDER — FENTANYL CITRATE 50 UG/ML
50 INJECTION, SOLUTION INTRAMUSCULAR; INTRAVENOUS
Status: COMPLETED | OUTPATIENT
Start: 2020-08-13 | End: 2020-08-13

## 2020-08-13 RX ORDER — SODIUM CHLORIDE 9 MG/ML
1000 INJECTION, SOLUTION INTRAVENOUS ONCE
Status: COMPLETED | OUTPATIENT
Start: 2020-08-13 | End: 2020-08-14

## 2020-08-13 RX ORDER — TRAMADOL HYDROCHLORIDE 50 MG/1
50 TABLET ORAL
Status: COMPLETED | OUTPATIENT
Start: 2020-08-13 | End: 2020-08-13

## 2020-08-13 RX ADMIN — INSULIN HUMAN 10 UNITS: 100 INJECTION, SOLUTION PARENTERAL at 22:25

## 2020-08-13 RX ADMIN — SODIUM CHLORIDE 1000 ML/HR: 900 INJECTION, SOLUTION INTRAVENOUS at 23:34

## 2020-08-13 RX ADMIN — TRAMADOL HYDROCHLORIDE 50 MG: 50 TABLET ORAL at 23:28

## 2020-08-13 RX ADMIN — FENTANYL CITRATE 50 MCG: 50 INJECTION, SOLUTION INTRAMUSCULAR; INTRAVENOUS at 21:56

## 2020-08-13 RX ADMIN — INSULIN HUMAN 10 UNITS: 100 INJECTION, SOLUTION PARENTERAL at 23:33

## 2020-08-13 RX ADMIN — SODIUM CHLORIDE 1000 ML: 900 INJECTION, SOLUTION INTRAVENOUS at 21:46

## 2020-08-13 RX ADMIN — ONDANSETRON 4 MG: 2 INJECTION INTRAMUSCULAR; INTRAVENOUS at 21:56

## 2020-08-13 SDOH — ECONOMIC STABILITY - HOUSING INSECURITY: HOMELESSNESS UNSPECIFIED: Z59.00

## 2020-08-14 VITALS
WEIGHT: 135 LBS | RESPIRATION RATE: 20 BRPM | DIASTOLIC BLOOD PRESSURE: 54 MMHG | OXYGEN SATURATION: 97 % | BODY MASS INDEX: 20.46 KG/M2 | HEART RATE: 98 BPM | TEMPERATURE: 98.7 F | SYSTOLIC BLOOD PRESSURE: 93 MMHG | HEIGHT: 68 IN

## 2020-08-14 LAB
ATRIAL RATE: 127 BPM
CALCULATED P AXIS, ECG09: 70 DEGREES
CALCULATED R AXIS, ECG10: 110 DEGREES
CALCULATED T AXIS, ECG11: 42 DEGREES
DIAGNOSIS, 93000: NORMAL
GLUCOSE BLD STRIP.AUTO-MCNC: 554 MG/DL (ref 65–100)
GLUCOSE BLD STRIP.AUTO-MCNC: 560 MG/DL (ref 65–100)
GLUCOSE BLD STRIP.AUTO-MCNC: 588 MG/DL (ref 65–100)
GLUCOSE BLD STRIP.AUTO-MCNC: >600 MG/DL (ref 65–100)
GLUCOSE BLD STRIP.AUTO-MCNC: >600 MG/DL (ref 65–100)
P-R INTERVAL, ECG05: 140 MS
Q-T INTERVAL, ECG07: 332 MS
QRS DURATION, ECG06: 122 MS
QTC CALCULATION (BEZET), ECG08: 482 MS
VENTRICULAR RATE, ECG03: 127 BPM

## 2020-08-14 PROCEDURE — 96376 TX/PRO/DX INJ SAME DRUG ADON: CPT

## 2020-08-14 PROCEDURE — 74011636637 HC RX REV CODE- 636/637: Performed by: EMERGENCY MEDICINE

## 2020-08-14 PROCEDURE — 82962 GLUCOSE BLOOD TEST: CPT

## 2020-08-14 RX ADMIN — INSULIN HUMAN 5 UNITS: 100 INJECTION, SOLUTION PARENTERAL at 00:38

## 2020-08-14 NOTE — DISCHARGE INSTRUCTIONS
Patient Education        Diabetes and Alcohol: Care Instructions  Your Care Instructions     People who have diabetes need to be more careful with alcohol. Before you drink, consider a few things: Is your diabetes well controlled? Do you know how drinking alcohol can affect you? Do you have high blood pressure, nerve damage, or eye problems from your diabetes? If you take insulin or another medicine for diabetes, drinking alcohol may cause low blood sugar. This could cause dangerous low blood sugar levels. Too much alcohol can also affect your ability to know your blood sugar is low and to treat it. Drinking alcohol can make you lightheaded at first and drowsy as you drink more, both of which may be similar to the symptoms of low blood sugar. Drinking a lot of alcohol over a long period of time can damage your liver (cirrhosis). If this happens, your body may lose its natural response to protect itself from low blood sugar. If you are controlling your diabetes and do not have other health issues, it may be okay to have a drink once in a while. Learning how alcohol affects your body can help you make the right choices. Follow-up care is a key part of your treatment and safety. Be sure to make and go to all appointments, and call your doctor if you are having problems. It's also a good idea to know your test results and keep a list of the medicines you take. How can you care for yourself at home? If you drink  · Work with your doctor or other diabetes expert to find what is best for you. Make sure you know whether it is safe to drink if you are taking insulin or another medicine for diabetes. · In general, limit alcohol to 1 drink a day with a meal if you are a woman. If you are a man, limit alcohol to 2 drinks a day with a meal. The following is considered a standard drink:  ? One 12-ounce bottle of beer or wine cooler  ? One 5-ounce glass of wine  ?  One mixed drink with 1.5 ounces of 80-proof hard liquor, such as gin, whiskey, or rum  · Choose alcoholic drinks wisely. With hard alcohol, use sugar-free mixers, such as diet tonic, water, or club soda. Pick drinks that have less alcohol, including light beer or dry wine. Or add club soda to wine to dilute it. Also remember that most alcoholic drinks have a lot of calories. · When you drink, check your blood sugar before you go to bed. Have a snack before bed so your blood sugar does not drop while you sleep. When not to drink  · Never drink on an empty stomach. If you do drink alcohol, drink it only with a meal or snack. Having as little as 2 drinks on an empty stomach could lead to low blood sugar. · Do not drink alcohol if you have problems recognizing the signs of low blood sugar until they become severe. · Do not drink alcohol after you exercise. The exercise itself lowers blood sugar. · Do not drink if you have nerve damage. Drinking can make it worse and increase the pain, numbness, and other symptoms. · Do not drink if you have high blood pressure. · Do not drink if you have diabetic eye disease. · Do not drink if you have high triglycerides, a type of fat in your blood. Drinking can raise triglycerides. · Do not drink if you are trying to lose weight. Alcohol provides empty calories that do not give you any nutrients. · Do not drink and drive. The effects of alcohol are greater if you have low blood sugar. When should you call for help? [id number] anytime you think you may need emergency care. For example, call if:  · You passed out (lost consciousness). · You are confused or cannot think clearly. · Your blood sugar is very high or very low. Watch closely for changes in your health, and be sure to contact your doctor if:  · Your blood sugar stays outside the level your doctor set for you. · You have any problems. Where can you learn more?   Go to http://pedrito-kandi.info/  Enter T236 in the search box to learn more about \"Diabetes and Alcohol: Care Instructions. \"  Current as of: December 20, 2019               Content Version: 12.5  © 1128-1448 [employer]. Care instructions adapted under license by [employer] (which disclaims liability or warranty for this information). If you have questions about a medical condition or this instruction, always ask your healthcare professional. [address] any warranty or liability for your use of this information. Patient Education        Diabetes Blood Sugar Emergencies: Your Action Plan  How can you prevent a blood sugar emergency? An important part of living with diabetes is keeping your blood sugar in your target range. You'll need to know what to do if it's too high or too low. Managing your blood sugar levels helps you avoid emergencies. This care sheet will teach you about the signs of high and low blood sugar. It will help you make an action plan with your doctor for when these signs occur. Low blood sugar is more likely to happen if you take certain medicines for diabetes. It can also happen if you skip a meal, drink alcohol, or exercise more than usual.  You may get high blood sugar if you eat differently than you normally do. One example is eating more carbohydrate than usual. Having a cold, the flu, or other sudden illness can also cause high blood sugar levels. Levels can also rise if you miss a dose of medicine. Any change in how you take your medicine may affect your blood sugar level. So it's important to work with your doctor before you make any changes. Check your blood sugar  Work with your doctor to fill in the blank spaces below that apply to you. Track your levels, know your target range, and write down ways you can get your blood sugar back in your target range. A log book can help you track your levels. Take the book to all of your medical appointments.   · Check your blood sugar _____ times a day, at these times:________________________________________________. (For example: Before meals, at bedtime, before exercise, during exercise, other.)  · Your blood sugar target range before a meal is ___________________. Your blood sugar target range after a meal is _______________________. · Do this--___________________________________________________--to get your blood sugar back within your safe range if your blood sugar results are _________________________________________. (For example: Less than 70 or above 250 mg/dL.)  Call your doctor when your blood sugar results are ___________________________________. (For example: Less than 70 or above 250 mg/dL.)  What are the symptoms of low and high blood sugar? Common symptoms of low blood sugar are sweating and feeling shaky, weak, hungry, or confused. Symptoms can start quickly. Common symptoms of high blood sugar are feeling very thirsty or very hungry. You may also pass urine more often than usual. You may have blurry vision and may lose weight without trying. But some people may have high or low blood sugar without having any symptoms. That's a good reason to check your blood sugar on a regular schedule. What should you do if you have symptoms? Work with your doctor to fill in the blank spaces below that apply to you. Low blood sugar  If you have symptoms of low blood sugar, check your blood sugar. If it's below _____ ( for example, below 70), eat or drink a quick-sugar food that has about 15 grams of carbohydrate. Your goal is to get your level back to your safe range. Check your blood sugar again 15 minutes later. If it's still not in your target range, take another 15 grams of carbohydrate and check your blood sugar again in 15 minutes. Repeat this until you reach your target. Then go back to your regular testing schedule. Children usually need less than 15 grams of carbohydrate.  Check with your doctor or diabetes educator for the amount that is right for your child.  When you have low blood sugar, it's best to stop or reduce any physical activity until your blood sugar is back in your target range and is stable. If you must stay active, eat or drink 30 grams of carbohydrate. Then check your blood sugar again in 15 minutes. If it's not in your target range, take another 30 grams of carbohydrates. Check your blood sugar again in 15 minutes. Keep doing this until you reach your target. You can then go back to your regular testing schedule. If your symptoms or blood sugar levels are getting worse or have not improved after 15 minutes, seek medical care right away. Here are some examples of quick-sugar foods with 15 grams of carbohydrate:  · 3 or 4 glucose tablets  · 1 tablespoon (3 teaspoons) table sugar  · ½ cup to ¾ cup (4 to 6 ounces) of fruit juice or regular (not diet) soda  · Hard candy (such as 6 Life Savers)  High blood sugar  If you have symptoms of high blood sugar, check your blood sugar. Your goal is to get your level back to your target range. If it's above ______ ( for example, above 250), follow these steps:  · If you missed a dose of your diabetes medicine, take it now. Take only the amount of medicine that you have been prescribed. Do not take more or less medicine. · Give yourself insulin if your doctor has prescribed it for high blood sugar. · Test for ketones, if the doctor told you to do so. If the results of the ketone test show a moderate-to-large amount of ketones, call the doctor for advice. · Wait 30 minutes after you take the extra insulin or the missed medicine. Check your blood sugar again. If your symptoms or blood sugar levels are getting worse or have not improved after taking these steps, seek medical care right away. Follow-up care is a key part of your treatment and safety. Be sure to make and go to all appointments, and call your doctor if you are having problems.  It's also a good idea to know your test results and keep a list of the medicines you take. Where can you learn more? Go to http://pedrito-kandi.info/  Enter F412 in the search box to learn more about \"Diabetes Blood Sugar Emergencies: Your Action Plan. \"  Current as of: December 20, 2019               Content Version: 12.5  © 9194-8675 [employer]. Care instructions adapted under license by [employer] (which disclaims liability or warranty for this information). If you have questions about a medical condition or this instruction, always ask your healthcare professional. [address] any warranty or liability for your use of this information.

## 2020-08-14 NOTE — ED PROVIDER NOTES
Presents with complaint of pain in his tailbone from a fall a week ago. Patient has been seen at [facility].  Patient is well-known to department and has multiple visits secondary to uncontrolled diabetes. Patient is homeless and has poor access to insulin. Patient states he does not remember falling because he was drinking. He would like help with his drinking. He had a pelvis x-ray at [facility] recently. The history is provided by the patient. Back Pain    This is a new problem. The current episode started more than 1 week ago. The problem has been gradually worsening. The problem occurs constantly. Patient reports not work related injury. The pain is associated with a fall. The pain is present in the lumbar spine and sacro-iliac joint. The quality of the pain is described as similar to previous episodes. The pain does not radiate. The pain is moderate. Pertinent negatives include no fever. High Blood Sugar    Associated symptoms include back pain. Pertinent negatives include no fever, no nausea and no vomiting. Past Medical History:   Diagnosis Date    Bipolar 1 disorder ([facility]     Depression     Diabetes ([facility]     PTSD (post-traumatic stress disorder)        No past surgical history on file. No family history on file. Social History     Socioeconomic History    Marital status: SINGLE     Spouse name: Not on file    Number of children: Not on file    Years of education: Not on file    Highest education level: Not on file   Occupational History    Not on file   Social Needs    Financial resource strain: Not on file    Food insecurity     Worry: Not on file     Inability: Not on file    Transportation needs     Medical: Not on file     Non-medical: Not on file   Tobacco Use    Smoking status: Current Every Day Smoker     Packs/day: 1.00    Smokeless tobacco: Never Used   Substance and Sexual Activity    Alcohol use:  Yes    Drug use: Not Currently    Sexual activity: Not on file   Lifestyle    Physical activity     Days per week: Not on file     Minutes per session: Not on file    Stress: Not on file   Relationships    Social connections     Talks on phone: Not on file     Gets together: Not on file     Attends Muslim service: Not on file     Active member of club or organization: Not on file     Attends meetings of clubs or organizations: Not on file     Relationship status: Not on file    Intimate partner violence     Fear of current or ex partner: Not on file     Emotionally abused: Not on file     Physically abused: Not on file     Forced sexual activity: Not on file   Other Topics Concern    Not on file   Social History Narrative    Not on file         ALLERGIES: Toradol [ketorolac]    Review of Systems   Constitutional: Negative for chills and fever. Gastrointestinal: Negative for nausea and vomiting. Musculoskeletal: Positive for back pain. Skin: Negative for rash and wound. All other systems reviewed and are negative. Vitals:    08/13/20 2052 08/13/20 2157   BP: 111/65    Pulse: (!) 133 (!) 109   Resp: 20    Temp: 98.7 °F (37.1 °C)    SpO2: 97% 97%   Weight: 61.2 kg (135 lb)    Height: 5' 8\" (1.727 m)             Physical Exam  Vitals signs and nursing note reviewed. Constitutional:       General: He is not in acute distress. Appearance: Normal appearance. He is not ill-appearing. HENT:      Head: Normocephalic and atraumatic. Cardiovascular:      Rate and Rhythm: Normal rate and regular rhythm. Abdominal:      Palpations: Abdomen is soft. Tenderness: There is no abdominal tenderness. There is no guarding. Skin:     General: Skin is warm and dry. Neurological:      General: No focal deficit present. Mental Status: He is alert and oriented to person, place, and time.    Psychiatric:         Mood and Affect: Mood normal.         Behavior: Behavior normal.          MDM  Number of Diagnoses or Management Options  Diagnosis management comments: Pt becoming disruptive, taking off own fluids, stopping multiple people in the halls. Not in DKA. Given to fluid boluses and 15 units total of insulin and blood sugar is somewhat improved.   Attempt to call [facility]       Amount and/or Complexity of Data Reviewed  Clinical lab tests: ordered and reviewed  Review and summarize past medical records: yes    Risk of Complications, Morbidity, and/or Mortality  Presenting problems: high  Diagnostic procedures: moderate  Management options: moderate    Patient Progress  Patient progress: stable         Procedures

## 2020-08-14 NOTE — ED NOTES
I have reviewed discharge instructions with the patient. The patient verbalized understanding. Patient left ED via Discharge Method: ambulatory to Home with self. Opportunity for questions and clarification provided. Patient given 0 scripts. To continue your aftercare when you leave the hospital, you may receive an automated call from our care team to check in on how you are doing. This is a free service and part of our promise to provide the best care and service to meet your aftercare needs.  If you have questions, or wish to unsubscribe from this service please call [phone]. Thank you for Choosing our [facility] Emergency Department.

## 2020-08-14 NOTE — ED TRIAGE NOTES
Pt ambulatory to triage not wearing a mask. Pt is given a mask to wear. Pt is wearing visibly dirty blue paper scrubs. Pt was last seen at [facility] and d/c to the shelter on 08/09/20. Pt reports he was not allowed back in the [facility]. Pt reports he has been sleeping in the empty field across the street from the shelter. Pt c/o lower back pain, buttock pain, and bilateral leg pain. Pt reports repeated falls from EtOH abuse. Pt reports he has consumed \"half a liter of vodka today. \" Pt reports he walked here from the [facility]. Pt states he \"wants help to stop drinking. \" Pt's  in triage. Dr. Cara Angelo informed. BGL in triage reads \"Hi. \"

## 2020-08-28 ENCOUNTER — HOSPITAL ENCOUNTER (EMERGENCY)
Age: 45
Discharge: LEFT BEFORE DISCHARGE | End: 2020-08-28
Attending: EMERGENCY MEDICINE
Payer: SUBSIDIZED

## 2020-08-28 VITALS
TEMPERATURE: 98 F | HEIGHT: 68 IN | OXYGEN SATURATION: 97 % | BODY MASS INDEX: 20.46 KG/M2 | DIASTOLIC BLOOD PRESSURE: 72 MMHG | RESPIRATION RATE: 16 BRPM | WEIGHT: 135 LBS | HEART RATE: 91 BPM | SYSTOLIC BLOOD PRESSURE: 115 MMHG

## 2020-08-28 DIAGNOSIS — E10.10 DIABETIC KETOACIDOSIS WITHOUT COMA ASSOCIATED WITH TYPE 1 DIABETES MELLITUS (HCC): Primary | ICD-10-CM

## 2020-08-28 DIAGNOSIS — F10.20 ALCOHOLISM (HCC): ICD-10-CM

## 2020-08-28 LAB
ALBUMIN SERPL-MCNC: 3.2 G/DL (ref 3.5–5)
ALBUMIN/GLOB SERPL: 0.8 {RATIO} (ref 1.2–3.5)
ALP SERPL-CCNC: 143 U/L (ref 50–136)
ALT SERPL-CCNC: 16 U/L (ref 12–65)
ANION GAP SERPL CALC-SCNC: 15 MMOL/L (ref 7–16)
AST SERPL-CCNC: 17 U/L (ref 15–37)
BASOPHILS # BLD: 0.1 K/UL (ref 0–0.2)
BASOPHILS NFR BLD: 1 % (ref 0–2)
BILIRUB SERPL-MCNC: 0.1 MG/DL (ref 0.2–1.1)
BUN SERPL-MCNC: 9 MG/DL (ref 6–23)
CALCIUM SERPL-MCNC: 8.9 MG/DL (ref 8.3–10.4)
CHLORIDE SERPL-SCNC: 102 MMOL/L (ref 98–107)
CO2 SERPL-SCNC: 16 MMOL/L (ref 21–32)
CREAT SERPL-MCNC: 1.45 MG/DL (ref 0.8–1.5)
DIFFERENTIAL METHOD BLD: ABNORMAL
EOSINOPHIL # BLD: 0 K/UL (ref 0–0.8)
EOSINOPHIL NFR BLD: 1 % (ref 0.5–7.8)
ERYTHROCYTE [DISTWIDTH] IN BLOOD BY AUTOMATED COUNT: 18.6 % (ref 11.9–14.6)
GLOBULIN SER CALC-MCNC: 3.9 G/DL (ref 2.3–3.5)
GLUCOSE SERPL-MCNC: 739 MG/DL (ref 65–100)
HCT VFR BLD AUTO: 34.4 % (ref 41.1–50.3)
HGB BLD-MCNC: 10.1 G/DL (ref 13.6–17.2)
IMM GRANULOCYTES # BLD AUTO: 0.1 K/UL (ref 0–0.5)
IMM GRANULOCYTES NFR BLD AUTO: 1 % (ref 0–5)
LYMPHOCYTES # BLD: 1.7 K/UL (ref 0.5–4.6)
LYMPHOCYTES NFR BLD: 32 % (ref 13–44)
MCH RBC QN AUTO: 25 PG (ref 26.1–32.9)
MCHC RBC AUTO-ENTMCNC: 29.4 G/DL (ref 31.4–35)
MCV RBC AUTO: 85.1 FL (ref 79.6–97.8)
MONOCYTES # BLD: 0.2 K/UL (ref 0.1–1.3)
MONOCYTES NFR BLD: 5 % (ref 4–12)
NEUTS SEG # BLD: 3.1 K/UL (ref 1.7–8.2)
NEUTS SEG NFR BLD: 60 % (ref 43–78)
NRBC # BLD: 0 K/UL (ref 0–0.2)
PLATELET # BLD AUTO: 376 K/UL (ref 150–450)
PMV BLD AUTO: 10.1 FL (ref 9.4–12.3)
POTASSIUM SERPL-SCNC: 4.9 MMOL/L (ref 3.5–5.1)
PROT SERPL-MCNC: 7.1 G/DL (ref 6.3–8.2)
RBC # BLD AUTO: 4.04 M/UL (ref 4.23–5.6)
SODIUM SERPL-SCNC: 133 MMOL/L (ref 136–145)
WBC # BLD AUTO: 5.1 K/UL (ref 4.3–11.1)

## 2020-08-28 PROCEDURE — 74011250636 HC RX REV CODE- 250/636: Performed by: EMERGENCY MEDICINE

## 2020-08-28 PROCEDURE — 80053 COMPREHEN METABOLIC PANEL: CPT

## 2020-08-28 PROCEDURE — 85025 COMPLETE CBC W/AUTO DIFF WBC: CPT

## 2020-08-28 PROCEDURE — 82962 GLUCOSE BLOOD TEST: CPT

## 2020-08-28 PROCEDURE — 96360 HYDRATION IV INFUSION INIT: CPT

## 2020-08-28 PROCEDURE — 99284 EMERGENCY DEPT VISIT MOD MDM: CPT

## 2020-08-28 RX ADMIN — SODIUM CHLORIDE 1000 ML: 900 INJECTION, SOLUTION INTRAVENOUS at 21:23

## 2020-08-28 NOTE — ED TRIAGE NOTES
Patient arrives via EMS for hyperglycemia. States BGL read high for them and patient was getting 600. Patient is homeless. States he was seen at [facility] this morning but they did not treat his pain so he left AMA. Patient states he wants help to stop drinking.

## 2020-08-29 ENCOUNTER — HOSPITAL ENCOUNTER (EMERGENCY)
Age: 45
Discharge: HOME | End: 2020-08-29
Attending: EMERGENCY MEDICINE
Payer: SUBSIDIZED

## 2020-08-29 VITALS
HEART RATE: 74 BPM | SYSTOLIC BLOOD PRESSURE: 133 MMHG | WEIGHT: 140 LBS | RESPIRATION RATE: 16 BRPM | OXYGEN SATURATION: 99 % | TEMPERATURE: 98.5 F | DIASTOLIC BLOOD PRESSURE: 76 MMHG | HEIGHT: 69 IN | BODY MASS INDEX: 20.73 KG/M2

## 2020-08-29 DIAGNOSIS — R73.9 HYPERGLYCEMIA: ICD-10-CM

## 2020-08-29 DIAGNOSIS — F10.10 CHRONIC ALCOHOL ABUSE: Primary | ICD-10-CM

## 2020-08-29 DIAGNOSIS — Z91.14 NONCOMPLIANCE WITH MEDICATIONS: ICD-10-CM

## 2020-08-29 LAB
ALBUMIN SERPL-MCNC: 3.3 G/DL (ref 3.5–5)
ALBUMIN/GLOB SERPL: 0.9 {RATIO} (ref 1.2–3.5)
ALP SERPL-CCNC: 128 U/L (ref 50–136)
ALT SERPL-CCNC: 16 U/L (ref 12–65)
AMPHET UR QL SCN: NEGATIVE
ANION GAP SERPL CALC-SCNC: 9 MMOL/L (ref 7–16)
AST SERPL-CCNC: 16 U/L (ref 15–37)
BARBITURATES UR QL SCN: NEGATIVE
BASOPHILS # BLD: 0 K/UL (ref 0–0.2)
BASOPHILS NFR BLD: 1 % (ref 0–2)
BENZODIAZ UR QL: NEGATIVE
BILIRUB SERPL-MCNC: 0.2 MG/DL (ref 0.2–1.1)
BUN SERPL-MCNC: 5 MG/DL (ref 6–23)
CALCIUM SERPL-MCNC: 8.3 MG/DL (ref 8.3–10.4)
CANNABINOIDS UR QL SCN: NEGATIVE
CHLORIDE SERPL-SCNC: 106 MMOL/L (ref 98–107)
CO2 SERPL-SCNC: 22 MMOL/L (ref 21–32)
COCAINE UR QL SCN: NEGATIVE
CREAT SERPL-MCNC: 0.68 MG/DL (ref 0.8–1.5)
DIFFERENTIAL METHOD BLD: ABNORMAL
EOSINOPHIL # BLD: 0.1 K/UL (ref 0–0.8)
EOSINOPHIL NFR BLD: 1 % (ref 0.5–7.8)
ERYTHROCYTE [DISTWIDTH] IN BLOOD BY AUTOMATED COUNT: 17.6 % (ref 11.9–14.6)
ETHANOL SERPL-MCNC: 136 MG/DL
GLOBULIN SER CALC-MCNC: 3.6 G/DL (ref 2.3–3.5)
GLUCOSE SERPL-MCNC: 277 MG/DL (ref 65–100)
HCT VFR BLD AUTO: 32.2 % (ref 41.1–50.3)
HGB BLD-MCNC: 10.1 G/DL (ref 13.6–17.2)
IMM GRANULOCYTES # BLD AUTO: 0.1 K/UL (ref 0–0.5)
IMM GRANULOCYTES NFR BLD AUTO: 1 % (ref 0–5)
LIPASE SERPL-CCNC: 21 U/L (ref 73–393)
LYMPHOCYTES # BLD: 1.8 K/UL (ref 0.5–4.6)
LYMPHOCYTES NFR BLD: 35 % (ref 13–44)
MCH RBC QN AUTO: 25.8 PG (ref 26.1–32.9)
MCHC RBC AUTO-ENTMCNC: 31.4 G/DL (ref 31.4–35)
MCV RBC AUTO: 82.4 FL (ref 79.6–97.8)
METHADONE UR QL: NEGATIVE
MONOCYTES # BLD: 0.4 K/UL (ref 0.1–1.3)
MONOCYTES NFR BLD: 7 % (ref 4–12)
NEUTS SEG # BLD: 2.7 K/UL (ref 1.7–8.2)
NEUTS SEG NFR BLD: 54 % (ref 43–78)
NRBC # BLD: 0 K/UL (ref 0–0.2)
OPIATES UR QL: NEGATIVE
PCP UR QL: NEGATIVE
PLATELET # BLD AUTO: 340 K/UL (ref 150–450)
PMV BLD AUTO: 9.4 FL (ref 9.4–12.3)
POTASSIUM SERPL-SCNC: 3.8 MMOL/L (ref 3.5–5.1)
PROT SERPL-MCNC: 6.9 G/DL (ref 6.3–8.2)
RBC # BLD AUTO: 3.91 M/UL (ref 4.23–5.6)
SODIUM SERPL-SCNC: 137 MMOL/L (ref 136–145)
WBC # BLD AUTO: 5 K/UL (ref 4.3–11.1)

## 2020-08-29 PROCEDURE — 96361 HYDRATE IV INFUSION ADD-ON: CPT

## 2020-08-29 PROCEDURE — 99285 EMERGENCY DEPT VISIT HI MDM: CPT

## 2020-08-29 PROCEDURE — 74011250636 HC RX REV CODE- 250/636: Performed by: EMERGENCY MEDICINE

## 2020-08-29 PROCEDURE — 96375 TX/PRO/DX INJ NEW DRUG ADDON: CPT

## 2020-08-29 PROCEDURE — 80053 COMPREHEN METABOLIC PANEL: CPT

## 2020-08-29 PROCEDURE — 80307 DRUG TEST PRSMV CHEM ANLYZR: CPT

## 2020-08-29 PROCEDURE — 96374 THER/PROPH/DIAG INJ IV PUSH: CPT

## 2020-08-29 PROCEDURE — 85025 COMPLETE CBC W/AUTO DIFF WBC: CPT

## 2020-08-29 PROCEDURE — 83690 ASSAY OF LIPASE: CPT

## 2020-08-29 PROCEDURE — 74011250637 HC RX REV CODE- 250/637: Performed by: EMERGENCY MEDICINE

## 2020-08-29 PROCEDURE — 74011636637 HC RX REV CODE- 636/637: Performed by: EMERGENCY MEDICINE

## 2020-08-29 RX ORDER — INSULIN LISPRO 100 [IU]/ML
6 INJECTION, SOLUTION INTRAVENOUS; SUBCUTANEOUS
Status: DISCONTINUED | OUTPATIENT
Start: 2020-08-29 | End: 2020-08-30 | Stop reason: HOSPADM

## 2020-08-29 RX ORDER — CHLORDIAZEPOXIDE HYDROCHLORIDE 25 MG/1
50 CAPSULE, GELATIN COATED ORAL
Status: DISCONTINUED | OUTPATIENT
Start: 2020-08-29 | End: 2020-08-30 | Stop reason: HOSPADM

## 2020-08-29 RX ORDER — SUCRALFATE 1 G/1
1 TABLET ORAL ONCE
Status: COMPLETED | OUTPATIENT
Start: 2020-08-29 | End: 2020-08-29

## 2020-08-29 RX ORDER — METOCLOPRAMIDE HYDROCHLORIDE 5 MG/ML
10 INJECTION INTRAMUSCULAR; INTRAVENOUS
Status: COMPLETED | OUTPATIENT
Start: 2020-08-29 | End: 2020-08-29

## 2020-08-29 RX ORDER — FAMOTIDINE 10 MG/ML
20 INJECTION INTRAVENOUS
Status: COMPLETED | OUTPATIENT
Start: 2020-08-29 | End: 2020-08-29

## 2020-08-29 RX ORDER — ONDANSETRON 2 MG/ML
8 INJECTION INTRAMUSCULAR; INTRAVENOUS
Status: COMPLETED | OUTPATIENT
Start: 2020-08-29 | End: 2020-08-29

## 2020-08-29 RX ORDER — INSULIN GLARGINE 100 [IU]/ML
10 INJECTION, SOLUTION SUBCUTANEOUS DAILY
Status: DISCONTINUED | OUTPATIENT
Start: 2020-08-29 | End: 2020-08-30 | Stop reason: HOSPADM

## 2020-08-29 RX ADMIN — METOCLOPRAMIDE 10 MG: 5 INJECTION, SOLUTION INTRAMUSCULAR; INTRAVENOUS at 17:50

## 2020-08-29 RX ADMIN — SODIUM CHLORIDE 1000 ML: 900 INJECTION, SOLUTION INTRAVENOUS at 14:30

## 2020-08-29 RX ADMIN — FAMOTIDINE 20 MG: 10 INJECTION INTRAVENOUS at 14:30

## 2020-08-29 RX ADMIN — CHLORDIAZEPOXIDE HYDROCHLORIDE 50 MG: 25 CAPSULE ORAL at 20:19

## 2020-08-29 RX ADMIN — ONDANSETRON 8 MG: 2 INJECTION INTRAMUSCULAR; INTRAVENOUS at 14:30

## 2020-08-29 RX ADMIN — INSULIN LISPRO 6 UNITS: 100 INJECTION, SOLUTION INTRAVENOUS; SUBCUTANEOUS at 21:09

## 2020-08-29 RX ADMIN — INSULIN GLARGINE 10 UNITS: 100 INJECTION, SOLUTION SUBCUTANEOUS at 21:10

## 2020-08-29 RX ADMIN — SUCRALFATE 1 G: 1 TABLET ORAL at 17:50

## 2020-08-29 NOTE — DISCHARGE INSTRUCTIONS
Continue to take all your prescription medications  Follow-up on Monday with primary care  Follow-up on Monday with the [facility]  Stop drinking alcohol; it is making many of your other medical symptoms much worse  Return to ER for any worsening symptoms or new problems which may arise

## 2020-08-29 NOTE — ED PROVIDER NOTES
61-year-old diabetic male alcoholic  Patient returns to the ER with complaints of exacerbation of his abdominal pain and concerns over very high blood sugars    This is the patient's 16th ER visit this month related to high blood sugars and alcohol    Patient reports nausea but is not vomiting    Patient does not really have any new complaints today Robgarret Arambula complains of chronic    The history is provided by the patient and the EMS personnel. High Blood Sugar    This is a chronic problem. The current episode started more than 1 week ago. The problem occurs constantly. The problem has not changed since onset. The pain is associated with ETOH use. The pain is located in the epigastric region. The quality of the pain is aching and cramping. The pain is moderate. Associated symptoms include nausea, arthralgias, myalgias and back pain. Pertinent negatives include no fever, no diarrhea, no vomiting, no constipation, no dysuria, no headaches and no chest pain. The pain is worsened by drinking alcohol. The pain is relieved by nothing. The patient's surgical history non-contributory. Past Medical History:   Diagnosis Date    Bipolar 1 disorder ([facility]     Depression     [facility]     PTSD (post-traumatic stress disorder)        History reviewed. No pertinent surgical history. History reviewed. No pertinent family history.     Social History     Socioeconomic History    Marital status: SINGLE     Spouse name: Not on file    Number of children: Not on file    Years of education: Not on file    Highest education level: Not on file   Occupational History    Not on file   Social Needs    Financial resource strain: Not on file    Food insecurity     Worry: Not on file     Inability: Not on file    Transportation needs     Medical: Not on file     Non-medical: Not on file   Tobacco Use    Smoking status: Current Every Day Smoker     Packs/day: 1.00    Smokeless tobacco: Never Used   Substance and Sexual Activity    Alcohol use: Yes    Drug use: Not Currently    Sexual activity: Not on file   Lifestyle    Physical activity     Days per week: Not on file     Minutes per session: Not on file    Stress: Not on file   Relationships    Social connections     Talks on phone: Not on file     Gets together: Not on file     Attends Buddhist service: Not on file     Active member of club or organization: Not on file     Attends meetings of clubs or organizations: Not on file     Relationship status: Not on file    Intimate partner violence     Fear of current or ex partner: Not on file     Emotionally abused: Not on file     Physically abused: Not on file     Forced sexual activity: Not on file   Other Topics Concern    Not on file   Social History Narrative    Not on file         ALLERGIES: Toradol [ketorolac]    Review of Systems   Constitutional: Negative for activity change, chills, diaphoresis and fever. HENT: Negative for dental problem, hearing loss, nosebleeds, rhinorrhea and sore throat. Eyes: Negative for pain, discharge, redness and visual disturbance. Respiratory: Negative for cough, chest tightness and shortness of breath. Cardiovascular: Negative for chest pain, palpitations and leg swelling. Gastrointestinal: Positive for abdominal pain and nausea. Negative for constipation, diarrhea and vomiting. Endocrine: Negative for cold intolerance, heat intolerance, polydipsia and polyuria. Genitourinary: Negative for dysuria and flank pain. Musculoskeletal: Positive for arthralgias, back pain and myalgias. Negative for joint swelling and neck pain. Skin: Negative for pallor and rash. Allergic/Immunologic: Negative for environmental allergies and food allergies. Neurological: Negative for dizziness, tremors, light-headedness, numbness and headaches. Hematological: Negative for adenopathy. Does not bruise/bleed easily.    Psychiatric/Behavioral: Positive for decreased concentration, dysphoric mood and sleep disturbance. Negative for confusion. The patient is nervous/anxious. The patient is not hyperactive. All other systems reviewed and are negative. Vitals:    08/29/20 1330 08/29/20 1430 08/29/20 1600 08/29/20 1726   BP:  131/74 128/68 133/76   Pulse:  78 72 74   Resp:  16 16 16   Temp:       SpO2: 97% 99% 99% 99%   Weight:       Height:                Physical Exam  Vitals signs and nursing note reviewed. Constitutional:       General: He is in acute distress. Appearance: Normal appearance. He is well-developed and normal weight. HENT:      Head: Normocephalic and atraumatic. Right Ear: External ear normal.      Left Ear: External ear normal.      Mouth/Throat:      Pharynx: No oropharyngeal exudate. Eyes:      General: No scleral icterus. Conjunctiva/sclera: Conjunctivae normal.      Pupils: Pupils are equal, round, and reactive to light. Neck:      Musculoskeletal: Normal range of motion and neck supple. Thyroid: No thyromegaly. Vascular: No JVD. Cardiovascular:      Rate and Rhythm: Normal rate and regular rhythm. Pulses: Normal pulses. Heart sounds: Normal heart sounds. No murmur. No friction rub. No gallop. Pulmonary:      Effort: Pulmonary effort is normal. No respiratory distress. Breath sounds: Normal breath sounds. No wheezing. Abdominal:      General: Bowel sounds are normal. There is no distension. Palpations: Abdomen is soft. Tenderness: There is abdominal tenderness in the epigastric area. There is no guarding or rebound. Musculoskeletal: Normal range of motion. General: No tenderness or deformity. Skin:     General: Skin is warm and dry. Capillary Refill: Capillary refill takes less than 2 seconds. Findings: No rash. Neurological:      General: No focal deficit present. Mental Status: He is alert and oriented to person, place, and time.       Cranial Nerves: No cranial nerve deficit. Sensory: No sensory deficit. Motor: No abnormal muscle tone. Coordination: Coordination normal.   Psychiatric:         Mood and Affect: Mood is anxious. Speech: Speech is rapid and pressured. Behavior: Behavior is hyperactive. Judgment: Judgment is impulsive. MDM  Number of Diagnoses or Management Options  Chronic alcohol abuse: established and worsening  Hyperglycemia: established and worsening  Malingering: established and worsening  Noncompliance with medications: established and worsening  Diagnosis management comments: Patient seen by case management and given additional resources and referrals    Care will be endorsed to the oncoming doctor at 3 PM  Labs IV fluids and medications pending    Will anticipate discharge based on the chronicity of patient's presentation and symptomatology       Amount and/or Complexity of Data Reviewed  Clinical lab tests: ordered  Tests in the medicine section of CPT®: ordered  Decide to obtain previous medical records or to obtain history from someone other than the patient: yes  Review and summarize past medical records: yes    Risk of Complications, Morbidity, and/or Mortality  Presenting problems: moderate  Diagnostic procedures: moderate  Management options: low  General comments: Elements of this note have been dictated via voice recognition software. Text and phrases may be limited by the accuracy of the software. The chart has been reviewed, but errors may still be present. ED Course as of Aug 29 2020   Sat Aug 29, 2020   1846 Call to the [facility] CHEMICAL [facility] - its been > 30 days. And he is eligible. [GH]      ED Course User Index  [GH] Elisa Smith MD       Procedures    Handoff Documentation     Patient care Handed off from [facility] to Renetta Galvez @ 4:27 PM      Discussed the patient's complaint, presentation, vitals and differential diagnosis.    Discussed the patient's current status, and response to treatments   Reviewed critical lab values, allergies, and other factors.  Issues or problems that are still being evaluated are: lab evaluation    EXAM-  Visit Vitals  /76   Pulse 74   Temp 98.5 °F (36.9 °C)   Resp 16   Ht 5' 9\" (1.753 m)   Wt 63.5 kg (140 lb)   SpO2 99%   BMI 20.67 kg/m²    pt resting; Awake, alert, answering question  resp are easy and non-labored  CV- regular  abd- diffusely tender to gentle palpation    Assessment/Plan- contact favor [phone]     4:27 PM, 2020, Princess Moncada MD     3/19 4/01 5/09 6/05 - A 7/01 8/02   3/20 4/01 5/12 - A  - A   3/26 4/03 5/16 6/09 7/04 8/06   3/27 4/04 5/17 6/10 7/06 8/08   3/31 4/04 5/23 6/11 - A  - A 8/09   3/31 4/05 5/24 6/15  7/10 8/10 - A     - A  - A 6/17 7/11 8/15 - A     - A   - A  - A    4/12  6/19 7/18 8/19     4/15  6/21 - A  - A   - A     - A     - A   - A  - A  - [facility] Admission  -- left AMA @ 1215 on  - [facility] ED @  -- became abusive and demanding pain meds. Left AMA @ 31 75 62   [facility] ED @ 0300 -- labs done, d/c in cab to [facility] @6151   [facility] ED @  .89.85 -- went to [facility] - [facility]ERR[facility] - drank some more, EMS called     Patient states he is from [place]. Moved to [place] 20 years ago. States he was clean for several years after moving. 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 Was living in [place] with mother and brother -- he was caregiver for both. Mom  about 1 year ago, brother was 'taken away', and the house was foreclosed. Has been homeless since then about 1 year. Not sure how he got to [place], he thinks the hospital in [place] put him on a bus. He has a 15 yo daughter in [place]- he has not seen or spoken to her in 3 + years.    sts he needs 'a place to stay, help getting off alcohol, and friends.'    Recent Results (from the past 8 hour(s))   CBC WITH AUTOMATED DIFF    Collection Time: 08/29/20  2:38 PM   Result Value Ref Range    WBC 5.0 4.3 - 11.1 K/uL    RBC 3.91 (L) 4.23 - 5.6 M/uL    HGB 10.1 (L) 13.6 - 17.2 g/dL    HCT 32.2 (L) 41.1 - 50.3 %    MCV 82.4 79.6 - 97.8 FL    MCH 25.8 (L) 26.1 - 32.9 PG    MCHC 31.4 31.4 - 35.0 g/dL    RDW 17.6 (H) 11.9 - 14.6 %    PLATELET 090 366 - 548 K/uL    MPV 9.4 9.4 - 12.3 FL    ABSOLUTE NRBC 0.00 0.0 - 0.2 K/uL    DF AUTOMATED      NEUTROPHILS 54 43 - 78 %    LYMPHOCYTES 35 13 - 44 %    MONOCYTES 7 4.0 - 12.0 %    EOSINOPHILS 1 0.5 - 7.8 %    BASOPHILS 1 0.0 - 2.0 %    IMMATURE GRANULOCYTES 1 0.0 - 5.0 %    ABS. NEUTROPHILS 2.7 1.7 - 8.2 K/UL    ABS. LYMPHOCYTES 1.8 0.5 - 4.6 K/UL    ABS. MONOCYTES 0.4 0.1 - 1.3 K/UL    ABS. EOSINOPHILS 0.1 0.0 - 0.8 K/UL    ABS. BASOPHILS 0.0 0.0 - 0.2 K/UL    ABS. IMM. GRANS. 0.1 0.0 - 0.5 K/UL   METABOLIC PANEL, COMPREHENSIVE    Collection Time: 08/29/20  2:38 PM   Result Value Ref Range    Sodium 137 136 - 145 mmol/L    Potassium 3.8 3.5 - 5.1 mmol/L    Chloride 106 98 - 107 mmol/L    CO2 22 21 - 32 mmol/L    Anion gap 9 7 - 16 mmol/L    Glucose 277 (H) 65 - 100 mg/dL    BUN 5 (L) 6 - 23 MG/DL    Creatinine 0.68 (L) 0.8 - 1.5 MG/DL    GFR est AA >60 >60 ml/min/1.73m2    GFR est non-AA >60 >60 ml/min/1.73m2    Calcium 8.3 8.3 - 10.4 MG/DL    Bilirubin, total 0.2 0.2 - 1.1 MG/DL    ALT (SGPT) 16 12 - 65 U/L    AST (SGOT) 16 15 - 37 U/L    Alk.  phosphatase 128 50 - 136 U/L    Protein, total 6.9 6.3 - 8.2 g/dL    Albumin 3.3 (L) 3.5 - 5.0 g/dL    Globulin 3.6 (H) 2.3 - 3.5 g/dL    A-G Ratio 0.9 (L) 1.2 - 3.5     LIPASE    Collection Time: 08/29/20  2:38 PM   Result Value Ref Range    Lipase 21 (L) 73 - 393 U/L   ETHYL ALCOHOL    Collection Time: 08/29/20  2:38 PM   Result Value Ref Range    ALCOHOL(ETHYL),SERUM 136 MG/DL   DRUG SCREEN, URINE    Collection Time: 08/29/20  7:13 PM   Result Value Ref Range    PCP(PHENCYCLIDINE) Negative      BENZODIAZEPINES Negative      COCAINE Negative      AMPHETAMINES Negative      METHADONE Negative      THC (TH-CANNABINOL) Negative      OPIATES Negative      BARBITURATES Negative       Labs are reviewed, -- not in DKA. Vitals are reviewed. He is not hypotensive or tachycardic    He has chronic abdominal pain. But I am not going to give him any opiate pain medications I think nonsteroidals are likely to worsen his gastritis. Review of charts for the last 6 months shows [de-identified] ED visits. 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 It was later found and he was kicked out. So he cannot go back. Cannot go to the [facility] CHEMICAL DEPENDENCY [facility]    Call to [facility] -  IV[facility].    -----  insulin glargine 100 unit/mL (3 mL) pen injection  Commonly known as: Lantus Solostar U-100 Insulin  Inject 12 Units under the skin every evening      insulin lispro 100 unit/mL pen injection  Commonly known as: Admelog SoloStar U-100 Insulin  Inject 6 Units under the skin 3 (three) times a day with meals Indications: type 2 diabetes mellitus        Impression and Disposition:      Impression:     ICD-10-CM ICD-9-CM   1. Chronic alcohol abuse  F10.10 305.00   2. Noncompliance with medications  Z91.14 V15.81   3.  Hyperglycemia  R73.9 790.29        Condition at disposition: stable    Disposition: to [facility] DEPENDENCY [facility]    Follow-up:   Follow-up Information     Follow up With Specialties Details Why Contact Info    [address] [place] [id number] [phone]    [facility]    [address] [facility] [address]  [address] [address] [address] [facility] [address]  [phone]    [facility] [address]    [facility] [address] [address] [phone]           Discharge Medications:   Current Discharge Medication List           Lizandro Fung MD; 8/29/2020 @5:46 PM

## 2020-08-29 NOTE — ED NOTES
Report received from Karlene Stroud, [facility]. Will resume pt care at this time.

## 2020-08-29 NOTE — ED NOTES
Pt requested some wipes to clean up with due to being homeless and he was given some then went to the restroom to clean up some. Pt is back in room at this time and attempting IV and blood work.

## 2020-08-29 NOTE — PROGRESS NOTES
SW met with patient who is reportedly homeless with a history of addictions. Patient provided with resources including a list of area shelters, [employer], [employer], [employer], [employer], [employer], and [employer]. As SW reviewed resources with him he told SW that most of the shelters or residential recovery options have denied him re-entry due to poor decisions and behaviors. Patient encouraged to contact [facility] for outpatient case management assistance. Patient aware of the [facility] homeless program and provided education on benefits of services.     Natalie Sin, [address] [facility]    [address] [facility]    * [email or web address]    ( [phone]

## 2020-08-29 NOTE — ED PROVIDER NOTES
Patient has a history of alcoholism, bipolar disorder, depression, PTSD and type 1 diabetes complaining of nausea and vomiting and hyperglycemia for several days. He states he ran out of his insulin for a while but \"I am taking it now\". He also has abdominal pain. He states he is able to keep liquids down but cannot eat anything. He has had similar symptoms in the past.  He drinks alcohol daily as well. He was at [address] earlier today, states he left AMA after being admitted. He states he is currently homeless and his symptoms worsened thus he came here for evaluation. Past Medical History:   Diagnosis Date    Bipolar 1 disorder ([facility] [address]     Depression     Diabetes ([facility]     PTSD (post-traumatic stress disorder)        No past surgical history on file. No family history on file. Social History     Socioeconomic History    Marital status: SINGLE     Spouse name: Not on file    Number of children: Not on file    Years of education: Not on file    Highest education level: Not on file   Occupational History    Not on file   Social Needs    Financial resource strain: Not on file    Food insecurity     Worry: Not on file     Inability: Not on file    Transportation needs     Medical: Not on file     Non-medical: Not on file   Tobacco Use    Smoking status: Current Every Day Smoker     Packs/day: 1.00    Smokeless tobacco: Never Used   Substance and Sexual Activity    Alcohol use:  Yes    Drug use: Not Currently    Sexual activity: Not on file   Lifestyle    Physical activity     Days per week: Not on file     Minutes per session: Not on file    Stress: Not on file   Relationships    Social connections     Talks on phone: Not on file     Gets together: Not on file     Attends Zoroastrianism service: Not on file     Active member of club or organization: Not on file     Attends meetings of clubs or organizations: Not on file     Relationship status: Not on file    Intimate partner violence     Fear of current or ex partner: Not on file     Emotionally abused: Not on file     Physically abused: Not on file     Forced sexual activity: Not on file   Other Topics Concern    Not on file   Social History Narrative    Not on file         ALLERGIES: Toradol [ketorolac]    Review of Systems   Constitutional: Negative for chills and fever. Gastrointestinal: Positive for abdominal pain, nausea and vomiting. All other systems reviewed and are negative. Vitals:    08/28/20 1956 08/28/20 2106 08/28/20 2119   BP: 106/65 113/70 117/70   Pulse: (!) 105     Resp: 16     Temp: 98 °F (36.7 °C)     SpO2: 97% 96%    Weight: 61.2 kg (135 lb)     Height: 5' 8\" (1.727 m)              Physical Exam  Vitals signs and nursing note reviewed. Constitutional:       Appearance: He is well-developed and normal weight. Comments: Disheveled white male who appears in no distress   HENT:      Head: Normocephalic and atraumatic. Eyes:      Conjunctiva/sclera: Conjunctivae normal.      Pupils: Pupils are equal, round, and reactive to light. Neck:      Musculoskeletal: Normal range of motion and neck supple. Cardiovascular:      Rate and Rhythm: Regular rhythm. Tachycardia present. Pulmonary:      Effort: Pulmonary effort is normal. No respiratory distress. Breath sounds: Normal breath sounds. Musculoskeletal: Normal range of motion. Skin:     General: Skin is warm and dry. Neurological:      Mental Status: He is alert and oriented to person, place, and time. MDM  Number of Diagnoses or Management Options  Alcoholism ([facility] [address]):   Diabetic ketoacidosis without coma associated with type 1 diabetes mellitus [facility]): new and requires workup  Diagnosis management comments: 9:44 PM  Patient is in DKA, will start IV fluids and an insulin drip. The hospitalist has been paged for admission. I discussed the results with patient, need for admission. He states he is agreeable.     10:25 PM  Patient became argumentative and verbally abusive towards the nurse. He was cursing and yelling, stating he wanted pain medication. He said if he did not get pain medication he would leave. The charge nurse spoke with him, and he continued to escalate and then left. He walked out of the department before I had a chance to speak with him.        Amount and/or Complexity of Data Reviewed  Clinical lab tests: ordered and reviewed    Risk of Complications, Morbidity, and/or Mortality  Presenting problems: high  Diagnostic procedures: high  Management options: high    Patient Progress  Patient progress: improved         Procedures

## 2020-08-29 NOTE — ED NOTES
Pt did phone screen w/ [facility] DEPENDENCY [facility] for detox and is eligible for 11pm appointment tonight.

## 2020-08-29 NOTE — ED NOTES
Upon entering patient's room the patient began screaming at this RN stating, \"Unhook me so I can go out fucking side! \". This RN explained that he needed fluids and Insulin IV administered at this time. Pt continued to scream and use profanity and then demanded his IV be removed so that he could leave. Pt educated on the need for him to stay and receive treatment and encouraged to come back at any time. . MD aware.

## 2020-08-29 NOTE — ED TRIAGE NOTES
Pt to ER with EMS from [facility] stating his blood sugar is high and he wants help with drinking ETOH. Pt has been to [facility] yesterday but they did not help him so he called EMS again. Pt does have some insulin and states his doctor decreased the amount he is supposed to take.

## 2020-08-30 NOTE — ED NOTES
I have reviewed discharge instructions with the patient. The patient verbalized understanding. Patient left ED via Discharge Method: ambulatory to [facility] CHEM[facility] DEPENDENCY [facility] with taxi for alcohol detox. Opportunity for questions and clarification provided. Patient given 0 scripts. To continue your aftercare when you leave the hospital, you may receive an automated call from our care team to check in on how you are doing. This is a free service and part of our promise to provide the best care and service to meet your aftercare needs.  If you have questions, or wish to unsubscribe from this service please call [phone]. Thank you for Choosing our [employer] Emergency Department.

## 2020-08-30 NOTE — PROGRESS NOTES
Call from [facility] CHEM[facility] DEPENDENCY [facility] requesting records for the patient. Records faxed.       Hermelinda Waters, [address] [facility]    [address] [facility]    * [email or web address]    ( [phone]

## 2020-08-31 LAB — GLUCOSE BLD STRIP.AUTO-MCNC: >600 MG/DL (ref 65–100)

## 2020-09-10 ENCOUNTER — HOSPITAL ENCOUNTER (INPATIENT)
Age: 45
LOS: 3 days | Discharge: HOME | DRG: 638 | End: 2020-09-14
Attending: EMERGENCY MEDICINE | Admitting: INTERNAL MEDICINE
Payer: SUBSIDIZED

## 2020-09-10 DIAGNOSIS — E10.10 DIABETIC KETOACIDOSIS WITHOUT COMA ASSOCIATED WITH TYPE 1 DIABETES MELLITUS (HCC): Primary | ICD-10-CM

## 2020-09-10 LAB
ALBUMIN SERPL-MCNC: 3.7 G/DL (ref 3.5–5)
ALBUMIN/GLOB SERPL: 0.8 {RATIO} (ref 1.2–3.5)
ALP SERPL-CCNC: 147 U/L (ref 50–136)
ALT SERPL-CCNC: 26 U/L (ref 12–65)
ANION GAP SERPL CALC-SCNC: 23 MMOL/L (ref 7–16)
AST SERPL-CCNC: 49 U/L (ref 15–37)
BILIRUB SERPL-MCNC: 0.6 MG/DL (ref 0.2–1.1)
BUN SERPL-MCNC: 8 MG/DL (ref 6–23)
CALCIUM SERPL-MCNC: 9.1 MG/DL (ref 8.3–10.4)
CHLORIDE SERPL-SCNC: 87 MMOL/L (ref 98–107)
CO2 SERPL-SCNC: 18 MMOL/L (ref 21–32)
CREAT SERPL-MCNC: 1.04 MG/DL (ref 0.8–1.5)
ETHANOL SERPL-MCNC: 17 MG/DL
ETHANOL SERPL-MCNC: 24 MG/DL
GLOBULIN SER CALC-MCNC: 4.4 G/DL (ref 2.3–3.5)
GLUCOSE BLD STRIP.AUTO-MCNC: 371 MG/DL (ref 65–100)
GLUCOSE SERPL-MCNC: 436 MG/DL (ref 65–100)
LIPASE SERPL-CCNC: 11 U/L (ref 73–393)
MAGNESIUM SERPL-MCNC: 1.6 MG/DL (ref 1.8–2.4)
POTASSIUM SERPL-SCNC: 5.2 MMOL/L (ref 3.5–5.1)
PROT SERPL-MCNC: 8.1 G/DL (ref 6.3–8.2)
SODIUM SERPL-SCNC: 128 MMOL/L (ref 136–145)

## 2020-09-10 PROCEDURE — 82962 GLUCOSE BLOOD TEST: CPT

## 2020-09-10 PROCEDURE — 80307 DRUG TEST PRSMV CHEM ANLYZR: CPT

## 2020-09-10 PROCEDURE — 74011250637 HC RX REV CODE- 250/637: Performed by: EMERGENCY MEDICINE

## 2020-09-10 PROCEDURE — 94760 N-INVAS EAR/PLS OXIMETRY 1: CPT

## 2020-09-10 PROCEDURE — 80053 COMPREHEN METABOLIC PANEL: CPT

## 2020-09-10 PROCEDURE — 83735 ASSAY OF MAGNESIUM: CPT

## 2020-09-10 PROCEDURE — 83690 ASSAY OF LIPASE: CPT

## 2020-09-10 PROCEDURE — 99285 EMERGENCY DEPT VISIT HI MDM: CPT

## 2020-09-10 RX ORDER — HYOSCYAMINE SULFATE 0.12 MG/1
0.25 TABLET SUBLINGUAL
Status: COMPLETED | OUTPATIENT
Start: 2020-09-10 | End: 2020-09-10

## 2020-09-10 RX ORDER — ONDANSETRON 2 MG/ML
8 INJECTION INTRAMUSCULAR; INTRAVENOUS
Status: ACTIVE | OUTPATIENT
Start: 2020-09-10 | End: 2020-09-11

## 2020-09-10 RX ORDER — SUCRALFATE 1 G/1
1 TABLET ORAL
Status: COMPLETED | OUTPATIENT
Start: 2020-09-10 | End: 2020-09-10

## 2020-09-10 RX ORDER — ONDANSETRON 8 MG/1
8 TABLET, ORALLY DISINTEGRATING ORAL
Status: COMPLETED | OUTPATIENT
Start: 2020-09-10 | End: 2020-09-10

## 2020-09-10 RX ORDER — FAMOTIDINE 10 MG/ML
20 INJECTION INTRAVENOUS
Status: ACTIVE | OUTPATIENT
Start: 2020-09-10 | End: 2020-09-11

## 2020-09-10 RX ADMIN — SUCRALFATE 1 G: 1 TABLET ORAL at 23:22

## 2020-09-10 RX ADMIN — ONDANSETRON 8 MG: 8 TABLET, ORALLY DISINTEGRATING ORAL at 23:21

## 2020-09-10 RX ADMIN — HYOSCYAMINE SULFATE 0.25 MG: 0.12 TABLET ORAL; SUBLINGUAL at 23:21

## 2020-09-10 NOTE — ED TRIAGE NOTES
Arrives with face mask on chin. Per dayshift RN pt arrived by EMS with reports high blood sugar. dayshift RN reports normal BGL with EMS however not documented. Reports going outside to smoke after arrival via EMS so unavailable for initial triage. Reports high blood sugar. Reports abdominal pain and n/v. Onset of vomiting on arrival to ED. +ETOH, daily. Well known to this facility as well as [facility].   in triage

## 2020-09-11 PROBLEM — F11.20 NARCOTIC DEPENDENCE (HCC): Status: ACTIVE | Noted: 2020-09-11

## 2020-09-11 PROBLEM — K59.09 CHRONIC CONSTIPATION: Status: ACTIVE | Noted: 2020-09-11

## 2020-09-11 PROBLEM — G89.29 CHRONIC ABDOMINAL PAIN: Status: ACTIVE | Noted: 2020-06-02

## 2020-09-11 PROBLEM — Z87.19 HX OF CHRONIC PANCREATITIS: Status: ACTIVE | Noted: 2020-09-11

## 2020-09-11 PROBLEM — E87.5 HYPERKALEMIA: Status: ACTIVE | Noted: 2020-09-11

## 2020-09-11 LAB
ADMINISTERED INITIALS, ADMINIT: NORMAL
ANION GAP SERPL CALC-SCNC: 18 MMOL/L (ref 7–16)
ANION GAP SERPL CALC-SCNC: 21 MMOL/L (ref 7–16)
ANION GAP SERPL CALC-SCNC: 7 MMOL/L (ref 7–16)
ATRIAL RATE: 86 BPM
BASOPHILS # BLD: 0 K/UL (ref 0–0.2)
BASOPHILS NFR BLD: 1 % (ref 0–2)
BUN SERPL-MCNC: 7 MG/DL (ref 6–23)
BUN SERPL-MCNC: 8 MG/DL (ref 6–23)
BUN SERPL-MCNC: 9 MG/DL (ref 6–23)
CALCIUM SERPL-MCNC: 7.7 MG/DL (ref 8.3–10.4)
CALCIUM SERPL-MCNC: 8.3 MG/DL (ref 8.3–10.4)
CALCIUM SERPL-MCNC: 8.7 MG/DL (ref 8.3–10.4)
CALCULATED P AXIS, ECG09: 67 DEGREES
CALCULATED R AXIS, ECG10: 106 DEGREES
CALCULATED T AXIS, ECG11: 35 DEGREES
CHLORIDE SERPL-SCNC: 100 MMOL/L (ref 98–107)
CHLORIDE SERPL-SCNC: 87 MMOL/L (ref 98–107)
CHLORIDE SERPL-SCNC: 96 MMOL/L (ref 98–107)
CO2 SERPL-SCNC: 19 MMOL/L (ref 21–32)
CO2 SERPL-SCNC: 22 MMOL/L (ref 21–32)
CO2 SERPL-SCNC: 29 MMOL/L (ref 21–32)
CREAT SERPL-MCNC: 0.7 MG/DL (ref 0.8–1.5)
CREAT SERPL-MCNC: 0.72 MG/DL (ref 0.8–1.5)
CREAT SERPL-MCNC: 0.84 MG/DL (ref 0.8–1.5)
D50 ADMINISTERED, D50ADM: 0 ML
D50 ORDER, D50ORD: 0 ML
DIAGNOSIS, 93000: NORMAL
DIFFERENTIAL METHOD BLD: ABNORMAL
EOSINOPHIL # BLD: 0.1 K/UL (ref 0–0.8)
EOSINOPHIL NFR BLD: 1 % (ref 0.5–7.8)
ERYTHROCYTE [DISTWIDTH] IN BLOOD BY AUTOMATED COUNT: 18.8 % (ref 11.9–14.6)
EST. AVERAGE GLUCOSE BLD GHB EST-MCNC: 246 MG/DL
EST. AVERAGE GLUCOSE BLD GHB EST-MCNC: 249 MG/DL
GLSCOM COMMENTS: NORMAL
GLUCOSE BLD STRIP.AUTO-MCNC: 116 MG/DL (ref 65–100)
GLUCOSE BLD STRIP.AUTO-MCNC: 127 MG/DL (ref 65–100)
GLUCOSE BLD STRIP.AUTO-MCNC: 146 MG/DL (ref 65–100)
GLUCOSE BLD STRIP.AUTO-MCNC: 147 MG/DL (ref 65–100)
GLUCOSE BLD STRIP.AUTO-MCNC: 161 MG/DL (ref 65–100)
GLUCOSE BLD STRIP.AUTO-MCNC: 192 MG/DL (ref 65–100)
GLUCOSE BLD STRIP.AUTO-MCNC: 229 MG/DL (ref 65–100)
GLUCOSE BLD STRIP.AUTO-MCNC: 250 MG/DL (ref 65–100)
GLUCOSE BLD STRIP.AUTO-MCNC: 308 MG/DL (ref 65–100)
GLUCOSE BLD STRIP.AUTO-MCNC: 388 MG/DL (ref 65–100)
GLUCOSE BLD STRIP.AUTO-MCNC: 99 MG/DL (ref 65–100)
GLUCOSE SERPL-MCNC: 100 MG/DL (ref 65–100)
GLUCOSE SERPL-MCNC: 280 MG/DL (ref 65–100)
GLUCOSE SERPL-MCNC: 411 MG/DL (ref 65–100)
GLUCOSE, GLC: 127 MG/DL
GLUCOSE, GLC: 146 MG/DL
GLUCOSE, GLC: 147 MG/DL
GLUCOSE, GLC: 161 MG/DL
GLUCOSE, GLC: 192 MG/DL
GLUCOSE, GLC: 250 MG/DL
GLUCOSE, GLC: 388 MG/DL
GLUCOSE, GLC: 99 MG/DL
HBA1C MFR BLD: 10.2 % (ref 4.8–6)
HBA1C MFR BLD: 10.3 % (ref 4.8–6)
HCT VFR BLD AUTO: 40.6 % (ref 41.1–50.3)
HGB BLD-MCNC: 12.8 G/DL (ref 13.6–17.2)
HIGH TARGET, HITG: 180 MG/DL
HIGH TARGET, HITG: 250 MG/DL
IMM GRANULOCYTES # BLD AUTO: 0 K/UL (ref 0–0.5)
IMM GRANULOCYTES NFR BLD AUTO: 1 % (ref 0–5)
INSULIN ADMINSTERED, INSADM: 0.4 UNITS/HOUR
INSULIN ADMINSTERED, INSADM: 1.3 UNITS/HOUR
INSULIN ADMINSTERED, INSADM: 1.7 UNITS/HOUR
INSULIN ADMINSTERED, INSADM: 2.6 UNITS/HOUR
INSULIN ADMINSTERED, INSADM: 3 UNITS/HOUR
INSULIN ADMINSTERED, INSADM: 3.8 UNITS/HOUR
INSULIN ADMINSTERED, INSADM: 4 UNITS/HOUR
INSULIN ADMINSTERED, INSADM: 6.6 UNITS/HOUR
INSULIN ORDER, INSORD: 0.4 UNITS/HOUR
INSULIN ORDER, INSORD: 1.3 UNITS/HOUR
INSULIN ORDER, INSORD: 1.7 UNITS/HOUR
INSULIN ORDER, INSORD: 2.6 UNITS/HOUR
INSULIN ORDER, INSORD: 3 UNITS/HOUR
INSULIN ORDER, INSORD: 3.8 UNITS/HOUR
INSULIN ORDER, INSORD: 4 UNITS/HOUR
INSULIN ORDER, INSORD: 6.6 UNITS/HOUR
LOW TARGET, LOT: 140 MG/DL
LOW TARGET, LOT: 150 MG/DL
LYMPHOCYTES # BLD: 1.4 K/UL (ref 0.5–4.6)
LYMPHOCYTES NFR BLD: 24 % (ref 13–44)
MAGNESIUM SERPL-MCNC: 1.7 MG/DL (ref 1.8–2.4)
MAGNESIUM SERPL-MCNC: 1.9 MG/DL (ref 1.8–2.4)
MAGNESIUM SERPL-MCNC: 2.3 MG/DL (ref 1.8–2.4)
MCH RBC QN AUTO: 25.9 PG (ref 26.1–32.9)
MCHC RBC AUTO-ENTMCNC: 31.5 G/DL (ref 31.4–35)
MCV RBC AUTO: 82 FL (ref 79.6–97.8)
MINUTES UNTIL NEXT BG, NBG: 60 MIN
MONOCYTES # BLD: 0.4 K/UL (ref 0.1–1.3)
MONOCYTES NFR BLD: 6 % (ref 4–12)
MULTIPLIER, MUL: 0.01
MULTIPLIER, MUL: 0.02
MULTIPLIER, MUL: 0.03
NEUTS SEG # BLD: 4 K/UL (ref 1.7–8.2)
NEUTS SEG NFR BLD: 68 % (ref 43–78)
NRBC # BLD: 0 K/UL (ref 0–0.2)
ORDER INITIALS, ORDINIT: NORMAL
P-R INTERVAL, ECG05: 154 MS
PHOSPHATE SERPL-MCNC: 3.4 MG/DL (ref 2.5–4.5)
PLATELET # BLD AUTO: 316 K/UL (ref 150–450)
PMV BLD AUTO: 9.2 FL (ref 9.4–12.3)
POTASSIUM SERPL-SCNC: 2.9 MMOL/L (ref 3.5–5.1)
POTASSIUM SERPL-SCNC: 3.4 MMOL/L (ref 3.5–5.1)
POTASSIUM SERPL-SCNC: 3.4 MMOL/L (ref 3.5–5.1)
Q-T INTERVAL, ECG07: 450 MS
QRS DURATION, ECG06: 136 MS
QTC CALCULATION (BEZET), ECG08: 538 MS
RBC # BLD AUTO: 4.95 M/UL (ref 4.23–5.6)
SODIUM SERPL-SCNC: 130 MMOL/L (ref 136–145)
SODIUM SERPL-SCNC: 133 MMOL/L (ref 136–145)
SODIUM SERPL-SCNC: 136 MMOL/L (ref 136–145)
VENTRICULAR RATE, ECG03: 86 BPM
WBC # BLD AUTO: 5.9 K/UL (ref 4.3–11.1)

## 2020-09-11 PROCEDURE — 74011000258 HC RX REV CODE- 258: Performed by: INTERNAL MEDICINE

## 2020-09-11 PROCEDURE — 74011250637 HC RX REV CODE- 250/637: Performed by: INTERNAL MEDICINE

## 2020-09-11 PROCEDURE — 85025 COMPLETE CBC W/AUTO DIFF WBC: CPT

## 2020-09-11 PROCEDURE — 36415 COLL VENOUS BLD VENIPUNCTURE: CPT

## 2020-09-11 PROCEDURE — 74011250636 HC RX REV CODE- 250/636: Performed by: INTERNAL MEDICINE

## 2020-09-11 PROCEDURE — 74011250636 HC RX REV CODE- 250/636: Performed by: EMERGENCY MEDICINE

## 2020-09-11 PROCEDURE — 80048 BASIC METABOLIC PNL TOTAL CA: CPT

## 2020-09-11 PROCEDURE — 74011000258 HC RX REV CODE- 258: Performed by: EMERGENCY MEDICINE

## 2020-09-11 PROCEDURE — 83735 ASSAY OF MAGNESIUM: CPT

## 2020-09-11 PROCEDURE — 77030020253 HC SOL INJ D545NS .05 DEX .45 SAL

## 2020-09-11 PROCEDURE — 93005 ELECTROCARDIOGRAM TRACING: CPT | Performed by: INTERNAL MEDICINE

## 2020-09-11 PROCEDURE — 74011636637 HC RX REV CODE- 636/637: Performed by: INTERNAL MEDICINE

## 2020-09-11 PROCEDURE — 65660000000 HC RM CCU STEPDOWN

## 2020-09-11 PROCEDURE — 74011636637 HC RX REV CODE- 636/637: Performed by: EMERGENCY MEDICINE

## 2020-09-11 PROCEDURE — 83036 HEMOGLOBIN GLYCOSYLATED A1C: CPT

## 2020-09-11 PROCEDURE — 84100 ASSAY OF PHOSPHORUS: CPT

## 2020-09-11 PROCEDURE — 82962 GLUCOSE BLOOD TEST: CPT

## 2020-09-11 RX ORDER — DEXTROSE 40 %
15 GEL (GRAM) ORAL AS NEEDED
Status: DISCONTINUED | OUTPATIENT
Start: 2020-09-11 | End: 2020-09-11

## 2020-09-11 RX ORDER — INSULIN LISPRO 100 [IU]/ML
INJECTION, SOLUTION INTRAVENOUS; SUBCUTANEOUS
Status: DISCONTINUED | OUTPATIENT
Start: 2020-09-11 | End: 2020-09-14 | Stop reason: HOSPADM

## 2020-09-11 RX ORDER — LORAZEPAM 2 MG/ML
1 INJECTION INTRAMUSCULAR
Status: DISCONTINUED | OUTPATIENT
Start: 2020-09-11 | End: 2020-09-11

## 2020-09-11 RX ORDER — SUCRALFATE 1 G/1
1 TABLET ORAL
Status: DISCONTINUED | OUTPATIENT
Start: 2020-09-11 | End: 2020-09-14 | Stop reason: HOSPADM

## 2020-09-11 RX ORDER — PROMETHAZINE HYDROCHLORIDE 25 MG/1
12.5 TABLET ORAL
Status: DISCONTINUED | OUTPATIENT
Start: 2020-09-11 | End: 2020-09-12

## 2020-09-11 RX ORDER — TRAMADOL HYDROCHLORIDE 50 MG/1
100 TABLET ORAL
Status: DISCONTINUED | OUTPATIENT
Start: 2020-09-11 | End: 2020-09-12

## 2020-09-11 RX ORDER — DEXTROSE 50 % IN WATER (D50W) INTRAVENOUS SYRINGE
25-50 AS NEEDED
Status: DISCONTINUED | OUTPATIENT
Start: 2020-09-11 | End: 2020-09-11

## 2020-09-11 RX ORDER — ONDANSETRON 2 MG/ML
4 INJECTION INTRAMUSCULAR; INTRAVENOUS
Status: DISCONTINUED | OUTPATIENT
Start: 2020-09-11 | End: 2020-09-13

## 2020-09-11 RX ORDER — FAMOTIDINE 20 MG/1
20 TABLET, FILM COATED ORAL 2 TIMES DAILY
Status: DISCONTINUED | OUTPATIENT
Start: 2020-09-11 | End: 2020-09-13

## 2020-09-11 RX ORDER — TRAMADOL HYDROCHLORIDE 50 MG/1
50 TABLET ORAL
Status: DISCONTINUED | OUTPATIENT
Start: 2020-09-11 | End: 2020-09-11

## 2020-09-11 RX ORDER — ENOXAPARIN SODIUM 100 MG/ML
40 INJECTION SUBCUTANEOUS DAILY
Status: DISCONTINUED | OUTPATIENT
Start: 2020-09-11 | End: 2020-09-14 | Stop reason: HOSPADM

## 2020-09-11 RX ORDER — ACETAMINOPHEN 650 MG/1
650 SUPPOSITORY RECTAL
Status: DISCONTINUED | OUTPATIENT
Start: 2020-09-11 | End: 2020-09-13

## 2020-09-11 RX ORDER — SODIUM CHLORIDE 0.9 % (FLUSH) 0.9 %
5-40 SYRINGE (ML) INJECTION EVERY 8 HOURS
Status: DISCONTINUED | OUTPATIENT
Start: 2020-09-11 | End: 2020-09-14 | Stop reason: HOSPADM

## 2020-09-11 RX ORDER — MAGNESIUM SULFATE HEPTAHYDRATE 40 MG/ML
4 INJECTION, SOLUTION INTRAVENOUS ONCE
Status: COMPLETED | OUTPATIENT
Start: 2020-09-11 | End: 2020-09-11

## 2020-09-11 RX ORDER — SODIUM CHLORIDE 0.9 % (FLUSH) 0.9 %
5-40 SYRINGE (ML) INJECTION AS NEEDED
Status: DISCONTINUED | OUTPATIENT
Start: 2020-09-11 | End: 2020-09-14 | Stop reason: HOSPADM

## 2020-09-11 RX ORDER — POLYETHYLENE GLYCOL 3350 17 G/17G
17 POWDER, FOR SOLUTION ORAL 2 TIMES DAILY
Status: DISCONTINUED | OUTPATIENT
Start: 2020-09-11 | End: 2020-09-12

## 2020-09-11 RX ORDER — SODIUM CHLORIDE 9 MG/ML
200 INJECTION, SOLUTION INTRAVENOUS CONTINUOUS
Status: DISCONTINUED | OUTPATIENT
Start: 2020-09-11 | End: 2020-09-11

## 2020-09-11 RX ORDER — ACETAMINOPHEN 325 MG/1
650 TABLET ORAL
Status: DISCONTINUED | OUTPATIENT
Start: 2020-09-11 | End: 2020-09-13

## 2020-09-11 RX ORDER — IBUPROFEN 200 MG
1 TABLET ORAL DAILY
Status: DISCONTINUED | OUTPATIENT
Start: 2020-09-11 | End: 2020-09-14 | Stop reason: HOSPADM

## 2020-09-11 RX ORDER — INSULIN GLARGINE 100 [IU]/ML
10 INJECTION, SOLUTION SUBCUTANEOUS DAILY
Status: DISCONTINUED | OUTPATIENT
Start: 2020-09-11 | End: 2020-09-13

## 2020-09-11 RX ORDER — SODIUM CHLORIDE 9 MG/ML
125 INJECTION, SOLUTION INTRAVENOUS CONTINUOUS
Status: DISCONTINUED | OUTPATIENT
Start: 2020-09-11 | End: 2020-09-11

## 2020-09-11 RX ORDER — DEXTROSE MONOHYDRATE AND SODIUM CHLORIDE 5; .45 G/100ML; G/100ML
100 INJECTION, SOLUTION INTRAVENOUS CONTINUOUS
Status: DISCONTINUED | OUTPATIENT
Start: 2020-09-11 | End: 2020-09-12

## 2020-09-11 RX ORDER — POTASSIUM CHLORIDE 14.9 MG/ML
20 INJECTION INTRAVENOUS
Status: COMPLETED | OUTPATIENT
Start: 2020-09-11 | End: 2020-09-12

## 2020-09-11 RX ORDER — LORAZEPAM 2 MG/ML
1 INJECTION INTRAMUSCULAR
Status: DISCONTINUED | OUTPATIENT
Start: 2020-09-11 | End: 2020-09-12

## 2020-09-11 RX ADMIN — TRAMADOL HYDROCHLORIDE 100 MG: 50 TABLET, FILM COATED ORAL at 19:21

## 2020-09-11 RX ADMIN — SODIUM CHLORIDE 6.6 UNITS/HR: 900 INJECTION, SOLUTION INTRAVENOUS at 02:11

## 2020-09-11 RX ADMIN — FAMOTIDINE 20 MG: 20 TABLET, FILM COATED ORAL at 08:15

## 2020-09-11 RX ADMIN — LORAZEPAM 1 MG: 2 INJECTION INTRAMUSCULAR; INTRAVENOUS at 19:21

## 2020-09-11 RX ADMIN — ACETAMINOPHEN 650 MG: 325 TABLET, FILM COATED ORAL at 12:05

## 2020-09-11 RX ADMIN — INSULIN GLARGINE 10 UNITS: 100 INJECTION, SOLUTION SUBCUTANEOUS at 10:28

## 2020-09-11 RX ADMIN — DEXTROSE MONOHYDRATE AND SODIUM CHLORIDE 200 ML/HR: 5; .45 INJECTION, SOLUTION INTRAVENOUS at 03:44

## 2020-09-11 RX ADMIN — Medication 10 ML: at 05:58

## 2020-09-11 RX ADMIN — POTASSIUM CHLORIDE 20 MEQ: 200 INJECTION, SOLUTION INTRAVENOUS at 10:00

## 2020-09-11 RX ADMIN — ENOXAPARIN SODIUM 40 MG: 40 INJECTION SUBCUTANEOUS at 08:15

## 2020-09-11 RX ADMIN — FAMOTIDINE 20 MG: 20 TABLET, FILM COATED ORAL at 18:03

## 2020-09-11 RX ADMIN — SODIUM CHLORIDE 1000 ML: 900 INJECTION, SOLUTION INTRAVENOUS at 00:45

## 2020-09-11 RX ADMIN — ONDANSETRON 4 MG: 2 INJECTION INTRAMUSCULAR; INTRAVENOUS at 11:25

## 2020-09-11 RX ADMIN — LORAZEPAM 1 MG: 2 INJECTION INTRAMUSCULAR; INTRAVENOUS at 22:26

## 2020-09-11 RX ADMIN — INSULIN LISPRO 4 UNITS: 100 INJECTION, SOLUTION INTRAVENOUS; SUBCUTANEOUS at 22:44

## 2020-09-11 RX ADMIN — LORAZEPAM 1 MG: 2 INJECTION INTRAMUSCULAR; INTRAVENOUS at 01:43

## 2020-09-11 RX ADMIN — SUCRALFATE 1 G: 1 TABLET ORAL at 18:03

## 2020-09-11 RX ADMIN — SUCRALFATE 1 G: 1 TABLET ORAL at 08:17

## 2020-09-11 RX ADMIN — SUCRALFATE 1 G: 1 TABLET ORAL at 22:25

## 2020-09-11 RX ADMIN — TRAMADOL HYDROCHLORIDE 50 MG: 50 TABLET, FILM COATED ORAL at 14:00

## 2020-09-11 RX ADMIN — Medication 10 ML: at 02:29

## 2020-09-11 RX ADMIN — MAGNESIUM SULFATE HEPTAHYDRATE 4 G: 40 INJECTION, SOLUTION INTRAVENOUS at 02:30

## 2020-09-11 RX ADMIN — SODIUM CHLORIDE 200 ML/HR: 900 INJECTION, SOLUTION INTRAVENOUS at 01:44

## 2020-09-11 RX ADMIN — LORAZEPAM 1 MG: 2 INJECTION INTRAMUSCULAR; INTRAVENOUS at 03:47

## 2020-09-11 RX ADMIN — LORAZEPAM 1 MG: 2 INJECTION INTRAMUSCULAR; INTRAVENOUS at 13:36

## 2020-09-11 RX ADMIN — POLYETHYLENE GLYCOL 3350 17 G: 17 POWDER, FOR SOLUTION ORAL at 18:03

## 2020-09-11 RX ADMIN — Medication 5 ML: at 22:26

## 2020-09-11 RX ADMIN — Medication 5 ML: at 22:25

## 2020-09-11 RX ADMIN — POTASSIUM CHLORIDE 20 MEQ: 200 INJECTION, SOLUTION INTRAVENOUS at 05:47

## 2020-09-11 RX ADMIN — Medication 10 ML: at 16:17

## 2020-09-11 RX ADMIN — ONDANSETRON 4 MG: 2 INJECTION INTRAMUSCULAR; INTRAVENOUS at 14:50

## 2020-09-11 RX ADMIN — SUCRALFATE 1 G: 1 TABLET ORAL at 10:30

## 2020-09-11 RX ADMIN — LORAZEPAM 1 MG: 2 INJECTION INTRAMUSCULAR; INTRAVENOUS at 08:15

## 2020-09-11 RX ADMIN — POTASSIUM CHLORIDE 20 MEQ: 200 INJECTION, SOLUTION INTRAVENOUS at 08:15

## 2020-09-11 RX ADMIN — INSULIN LISPRO 8 UNITS: 100 INJECTION, SOLUTION INTRAVENOUS; SUBCUTANEOUS at 18:03

## 2020-09-11 RX ADMIN — LORAZEPAM 1 MG: 2 INJECTION INTRAMUSCULAR; INTRAVENOUS at 16:17

## 2020-09-11 RX ADMIN — THIAMINE HYDROCHLORIDE 100 MG: 100 INJECTION, SOLUTION INTRAMUSCULAR; INTRAVENOUS at 10:00

## 2020-09-11 RX ADMIN — POTASSIUM CHLORIDE 20 MEQ: 200 INJECTION, SOLUTION INTRAVENOUS at 04:09

## 2020-09-11 NOTE — ED NOTES
Pt medicated per orders, tolerating PO fluids at this time. Pt is observed asleep on stretcher, in no distress at this time.

## 2020-09-11 NOTE — ED NOTES
After multiple IV attempts by other RNs, I attempted IV access, and while I was able to obtain samples for labs, the IV site did not thread. Updating Dr. Jessica Liang for meds to be given IM if possible.

## 2020-09-11 NOTE — PROGRESS NOTES
Reviewed notes prior to visit  Noted patient is homeless and has alcohol issues.   Will follow    Cesia Sullivan staff

## 2020-09-11 NOTE — ED PROVIDER NOTES
47-year-old male returns to the ER with complaints of abdominal pain with nausea vomiting  Patient is concerned that he is in DKA again    Patient has multiple prior visits mainly related to his chronic alcohol abuse    Denies fever    The history is provided by the patient. High Blood Sugar    This is a recurrent problem. The current episode started 2 days ago. The problem occurs constantly. The problem has been gradually worsening. The pain is associated with vomiting. The pain is located in the epigastric region. The pain is moderate. Associated symptoms include nausea and vomiting. Pertinent negatives include no fever, no diarrhea, no hematochezia, no constipation, no dysuria, no hematuria, no headaches, no arthralgias, no myalgias, no trauma, no chest pain and no back pain. The pain is worsened by eating and drinking alcohol. The pain is relieved by nothing. His past medical history is significant for DM. The patient's surgical history non-contributory. Past Medical History:   Diagnosis Date    Bipolar 1 disorder ([facility]     Depression     Diabetes ([facility]     PTSD (post-traumatic stress disorder)        No past surgical history on file. No family history on file. Social History     Socioeconomic History    Marital status: SINGLE     Spouse name: Not on file    Number of children: Not on file    Years of education: Not on file    Highest education level: Not on file   Occupational History    Not on file   Social Needs    Financial resource strain: Not on file    Food insecurity     Worry: Not on file     Inability: Not on file    Transportation needs     Medical: Not on file     Non-medical: Not on file   Tobacco Use    Smoking status: Current Every Day Smoker     Packs/day: 1.00    Smokeless tobacco: Never Used   Substance and Sexual Activity    Alcohol use:  Yes    Drug use: Not Currently    Sexual activity: Not on file   Lifestyle    Physical activity     Days per week: Not on file     Minutes per session: Not on file    Stress: Not on file   Relationships    Social connections     Talks on phone: Not on file     Gets together: Not on file     Attends Anglican service: Not on file     Active member of club or organization: Not on file     Attends meetings of clubs or organizations: Not on file     Relationship status: Not on file    Intimate partner violence     Fear of current or ex partner: Not on file     Emotionally abused: Not on file     Physically abused: Not on file     Forced sexual activity: Not on file   Other Topics Concern    Not on file   Social History Narrative    Not on file         ALLERGIES: Toradol [ketorolac]    Review of Systems   Constitutional: Negative for activity change, chills, diaphoresis and fever. HENT: Negative for dental problem, hearing loss, nosebleeds, rhinorrhea and sore throat. Eyes: Negative for pain, discharge, redness and visual disturbance. Respiratory: Negative for cough, chest tightness and shortness of breath. Cardiovascular: Negative for chest pain, palpitations and leg swelling. Gastrointestinal: Positive for nausea and vomiting. Negative for abdominal pain, constipation, diarrhea and hematochezia. Endocrine: Negative for cold intolerance, heat intolerance, polydipsia and polyuria. Genitourinary: Negative for dysuria, flank pain and hematuria. Musculoskeletal: Negative for arthralgias, back pain, joint swelling, myalgias and neck pain. Skin: Negative for pallor and rash. Allergic/Immunologic: Negative for environmental allergies and food allergies. Neurological: Negative for dizziness, tremors, light-headedness, numbness and headaches. Hematological: Negative for adenopathy. Does not bruise/bleed easily. Psychiatric/Behavioral: Negative for confusion and dysphoric mood. The patient is not nervous/anxious and is not hyperactive. All other systems reviewed and are negative.       Vitals:    09/10/20 1912 BP: 118/74   Pulse: (!) 115   Resp: 18   Temp: 98.1 °F (36.7 °C)   SpO2: 98%   Weight: 65.8 kg (145 lb)   Height: 5' 8\" (1.727 m)            Physical Exam  Vitals signs and nursing note reviewed. Constitutional:       General: He is in acute distress. Appearance: Normal appearance. He is well-developed and normal weight. HENT:      Head: Normocephalic and atraumatic. Right Ear: External ear normal.      Left Ear: External ear normal.      Nose: Nose normal.      Mouth/Throat:      Mouth: Mucous membranes are moist.      Pharynx: Oropharynx is clear. No oropharyngeal exudate. Eyes:      General: No scleral icterus. Extraocular Movements: Extraocular movements intact. Conjunctiva/sclera: Conjunctivae normal.      Pupils: Pupils are equal, round, and reactive to light. Neck:      Musculoskeletal: Normal range of motion and neck supple. Thyroid: No thyromegaly. Vascular: No JVD. Cardiovascular:      Rate and Rhythm: Normal rate and regular rhythm. Pulses: Normal pulses. Heart sounds: Normal heart sounds. No murmur. No friction rub. No gallop. Pulmonary:      Effort: Pulmonary effort is normal. No respiratory distress. Breath sounds: Normal breath sounds. No wheezing. Abdominal:      General: Bowel sounds are normal. There is no distension. Palpations: Abdomen is soft. Tenderness: There is no abdominal tenderness. Musculoskeletal: Normal range of motion. General: No tenderness or deformity. Skin:     General: Skin is warm and dry. Capillary Refill: Capillary refill takes less than 2 seconds. Findings: No rash. Neurological:      General: No focal deficit present. Mental Status: He is alert and oriented to person, place, and time. Cranial Nerves: No cranial nerve deficit. Sensory: No sensory deficit. Motor: No abnormal muscle tone.       Coordination: Coordination normal.   Psychiatric:         Mood and Affect: Mood is anxious. Behavior: Behavior normal.         Thought Content: Thought content normal.         Judgment: Judgment normal.          MDM  Number of Diagnoses or Management Options  Diabetic ketoacidosis without coma associated with type 1 diabetes mellitus ([facility] [address]): established and worsening  Diagnosis management comments: 82-year-old male presents for the sixth time in this calendar month for elevated blood sugar levels. He is concerned for DKA  Continues to drink alcohol  Patient states that he has an appointment with an outpatient service for \"help with my drinking. \"    1:00 AM  Patient's anion gap found to be 23 with a blood sugar of 560    Case reviewed with the hospitalist service  Patient will be admitted           Amount and/or Complexity of Data Reviewed  Clinical lab tests: ordered and reviewed  Tests in the medicine section of CPT®: ordered and reviewed  Decide to obtain previous medical records or to obtain history from someone other than the patient: yes  Review and summarize past medical records: yes  Discuss the patient with other providers: yes  Independent visualization of images, tracings, or specimens: yes    Risk of Complications, Morbidity, and/or Mortality  Presenting problems: high  Diagnostic procedures: moderate  Management options: moderate  General comments: Elements of this note have been dictated via voice recognition software. Text and phrases may be limited by the accuracy of the software. The chart has been reviewed, but errors may still be present.       Patient Progress  Patient progress: improved         Procedures

## 2020-09-11 NOTE — PROGRESS NOTES
TRANSFER - OUT REPORT:    Verbal report given to BRO MERINO (name) on Kaur Urban  being transferred to [address] [place] [address] (unit) for routine progression of care       Report consisted of patients Situation, Background, Assessment and   Recommendations(SBAR). Information from the following report(s) SBAR, Kardex, ED Summary, Intake/Output, MAR, Recent Results, Med Rec Status, Cardiac Rhythm nsr, Alarm Parameters  and Quality Measures was reviewed with the receiving nurse. Lines:   Peripheral IV 09/11/20 Left Antecubital (Active)   Site Assessment Clean, dry, & intact 09/11/20 0701   Phlebitis Assessment 0 09/11/20 0701   Infiltration Assessment 0 09/11/20 0701   Dressing Status Clean, dry, & intact 09/11/20 0701   Dressing Type Tape;Transparent 09/11/20 0701   Hub Color/Line Status Pink;Flushed;Patent 09/11/20 0701   Alcohol Cap Used No 09/11/20 0701       Peripheral IV 09/11/20 Distal;Right;Upper (Active)   Site Assessment Clean, dry, & intact 09/11/20 0701   Phlebitis Assessment 0 09/11/20 0701   Infiltration Assessment 0 09/11/20 0701   Dressing Status Clean, dry, & intact 09/11/20 0701   Dressing Type Tape;Transparent 09/11/20 0701   Hub Color/Line Status Pink;Flushed;Patent 09/11/20 0701   Alcohol Cap Used No 09/11/20 0701        Opportunity for questions and clarification was provided.       Patient transported with:   Monitor  Registered Nurse

## 2020-09-11 NOTE — PROGRESS NOTES
TRANSFER - IN REPORT:    Verbal report received from ANGELIQUE Ramirez(name) on Krishna Carter  being received from ICU(unit) for routine progression of care      Report consisted of patients Situation, Background, Assessment and   Recommendations(SBAR). Information from the following report(s) Kardex, MAR and Recent Results was reviewed with the receiving nurse. Opportunity for questions and clarification was provided. Assessment completed upon patients arrival to unit and care assumed.

## 2020-09-11 NOTE — H&P
Hospitalist H&P Note     Admit Date:  9/10/2020  6:35 PM   Name:  Vincenzo Jacobson   Age:  39 y.o.  :  1975   MRN:  [id number]   PCP:  None  Treatment Team: Attending Provider: Artemio Mckeon DO; Primary Nurse: Sabrina Denny    HPI:   Chief complaint: Abdominal pain      [id number]-XFH alcoholic male with chronic pancreatitis and chronic abdominal pain likely in part due to alcohol gastritis is also diabetic requiring insulin. He is homeless. He admits that he recently gave up and is tired of being homeless and tired of being an alcoholic and presents with worsened chronic abdominal pain with nausea vomiting and evidence of diabetic ketoacidosis again. He has magnesium level 1.6 serum sodium 128 with a serum glucose of 436, serum potassium 5.2 creatinine is 1.04. He has a positive serum alcohol level and admits to drinking bourbon. He has chronic constipation. He reports that his stools have had significant amounts of oil floating and the past several days have been orange. Suspect he may have chronic pancreatic insufficiency. Most recent discharge insulin dosage was NPH 6 units twice daily and sliding scale prandial Humalog coverage as well as metformin. He admits to noncompliance at least several days all these medications because he is tired of being homeless and tired of being an alcoholic. Denies suicidal thoughts or ideations. Does smoke cigarettes                  10 systems reviewed and negative except as noted in HPI. Past Medical History:   Diagnosis Date    Bipolar 1 disorder ([facility] [address]     Depression     Diabetes ([facility]     PTSD (post-traumatic stress disorder)       No past surgical history on file.    Allergies   Allergen Reactions    Toradol [Ketorolac] Nausea and Vomiting      Social History     Tobacco Use    Smoking status: Current Every Day Smoker     Packs/day: 1.00    Smokeless tobacco: Never Used   Substance Use Topics    Alcohol use: Yes      No family history on file.     There is no immunization history on file for this patient. PTA Medications:  Prior to Admission Medications   Prescriptions Last Dose Informant Patient Reported? Taking? Syringe, Disposable, 1 mL syrg   No No   Sig: Insulin syringe   acetaminophen (TYLENOL) 500 mg tablet   No No   Sig: Take 1 Tab by mouth every four (4) hours as needed for Pain.   glucose blood VI test strips (ASCENSIA AUTODISC VI, ONE TOUCH ULTRA TEST VI) strip   No No   Sig: As directed   insulin NPH (NOVOLIN N, HUMULIN N) 100 unit/mL injection   No No   Si Units by SubCUTAneous route Before breakfast and dinner. insulin lispro (HUMALOG) 100 unit/mL injection   No No   Sig:   GRL RX SLIDING SCALE SF30    For Blood Sugar of:  150-174 = 2 Units; 175-204 = 3 Units  205-234 = 4 Units; 235-264 = 5 Units  265-294 = 6 Units; 295-324 = 7 Units  325-354 = 8 Units; 355 or > = Call MD    This drug may increase the risk of patient falls Fast Acting - Administer Immediately - or within 15 minutes of start of meal, if mealtime coverage. lancets misc   No No   Sig: As directed   metFORMIN (GLUCOPHAGE) 1,000 mg tablet   No No   Sig: Take 1 Tab by mouth two (2) times daily (with meals). ondansetron (Zofran ODT) 4 mg disintegrating tablet   No No   Sig: Take 1 Tab by mouth every eight (8) hours as needed for Nausea. simethicone (MYLICON) 80 mg chewable tablet   No No   Sig: Take 1 Tab by mouth four (4) times daily as needed for GI Pain.       Facility-Administered Medications: None       Objective:     Patient Vitals for the past 24 hrs:   Temp Pulse Resp BP SpO2   20  96 15 (!) 143/92 98 %   20 0057  91 13 138/86 99 %   09/10/20 1912 98.1 °F (36.7 °C) (!) 115 18 118/74 98 %     Oxygen Therapy  O2 Sat (%): 98 % (20)  Pulse via Oximetry: 96 beats per minute (20)  O2 Device: Room air (09/10/20 2305)  No intake or output data in the 24 hours ending 20    Physical Exam:  General:    Anxious complaining of abdominal pain  Eyes:   Normal sclera. Extraocular movements intact. ENT:  Normocephalic, atraumatic. Moist mucous membranesdentition is very poor with multiple caries  Neck:  Supple, no lymphadenopathy or JVD  CV:   RRR. No m/r/g. Peripheral pulses 2+. Capillary refill <2s. Lungs:  CTAB. No wheezing, rhonchi, or rales. Abdomen: Soft, diffusely tender poorly localizednondistended. Extremities: Warm and dry. No cyanosis or edema. Neurologic: CN II-XII grossly intact. Sensation intact. Skin:     No rashes or jaundice. Normal coloration  Psych:  Normal mood and affect. I reviewed the labs, imaging, EKGs, telemetry, and other studies done this admission. Data Review:   Recent Results (from the past 24 hour(s))   GLUCOSE, POC    Collection Time: 09/10/20  7:20 PM   Result Value Ref Range    Glucose (POC) 371 (H) 65 - 708 mg/dL   METABOLIC PANEL, COMPREHENSIVE    Collection Time: 09/10/20 11:06 PM   Result Value Ref Range    Sodium 128 (L) 136 - 145 mmol/L    Potassium 5.2 (H) 3.5 - 5.1 mmol/L    Chloride 87 (L) 98 - 107 mmol/L    CO2 18 (L) 21 - 32 mmol/L    Anion gap 23 (H) 7 - 16 mmol/L    Glucose 436 (H) 65 - 100 mg/dL    BUN 8 6 - 23 MG/DL    Creatinine 1.04 0.8 - 1.5 MG/DL    GFR est AA >60 >60 ml/min/1.73m2    GFR est non-AA >60 >60 ml/min/1.73m2    Calcium 9.1 8.3 - 10.4 MG/DL    Bilirubin, total 0.6 0.2 - 1.1 MG/DL    ALT (SGPT) 26 12 - 65 U/L    AST (SGOT) 49 (H) 15 - 37 U/L    Alk.  phosphatase 147 (H) 50 - 136 U/L    Protein, total 8.1 6.3 - 8.2 g/dL    Albumin 3.7 3.5 - 5.0 g/dL    Globulin 4.4 (H) 2.3 - 3.5 g/dL    A-G Ratio 0.8 (L) 1.2 - 3.5     LIPASE    Collection Time: 09/10/20 11:06 PM   Result Value Ref Range    Lipase 11 (L) 73 - 393 U/L   MAGNESIUM    Collection Time: 09/10/20 11:06 PM   Result Value Ref Range    Magnesium 1.6 (L) 1.8 - 2.4 mg/dL   ETHYL ALCOHOL    Collection Time: 09/10/20 11:06 PM   Result Value Ref Range    ALCOHOL(ETHYL),SERUM 17 MG/DL   ETHYL ALCOHOL    Collection Time: 09/10/20 11:06 PM   Result Value Ref Range    ALCOHOL(ETHYL),SERUM 24 MG/DL   CBC WITH AUTOMATED DIFF    Collection Time: 09/11/20 12:11 AM   Result Value Ref Range    WBC 5.9 4.3 - 11.1 K/uL    RBC 4.95 4.23 - 5.6 M/uL    HGB 12.8 (L) 13.6 - 17.2 g/dL    HCT 40.6 (L) 41.1 - 50.3 %    MCV 82.0 79.6 - 97.8 FL    MCH 25.9 (L) 26.1 - 32.9 PG    MCHC 31.5 31.4 - 35.0 g/dL    RDW 18.8 (H) 11.9 - 14.6 %    PLATELET 785 922 - 449 K/uL    MPV 9.2 (L) 9.4 - 12.3 FL    ABSOLUTE NRBC 0.00 0.0 - 0.2 K/uL    DF AUTOMATED      NEUTROPHILS 68 43 - 78 %    LYMPHOCYTES 24 13 - 44 %    MONOCYTES 6 4.0 - 12.0 %    EOSINOPHILS 1 0.5 - 7.8 %    BASOPHILS 1 0.0 - 2.0 %    IMMATURE GRANULOCYTES 1 0.0 - 5.0 %    ABS. NEUTROPHILS 4.0 1.7 - 8.2 K/UL    ABS. LYMPHOCYTES 1.4 0.5 - 4.6 K/UL    ABS. MONOCYTES 0.4 0.1 - 1.3 K/UL    ABS. EOSINOPHILS 0.1 0.0 - 0.8 K/UL    ABS. BASOPHILS 0.0 0.0 - 0.2 K/UL    ABS. IMM. GRANS. 0.0 0.0 - 0.5 K/UL   METABOLIC PANEL, BASIC    Collection Time: 09/11/20 12:11 AM   Result Value Ref Range    Sodium 130 (L) 136 - 145 mmol/L    Potassium 3.4 (L) 3.5 - 5.1 mmol/L    Chloride 87 (L) 98 - 107 mmol/L    CO2 22 21 - 32 mmol/L    Anion gap 21 (H) 7 - 16 mmol/L    Glucose 411 (H) 65 - 100 mg/dL    BUN 9 6 - 23 MG/DL    Creatinine 0.84 0.8 - 1.5 MG/DL    GFR est AA >60 >60 ml/min/1.73m2    GFR est non-AA >60 >60 ml/min/1.73m2    Calcium 8.7 8.3 - 10.4 MG/DL   MAGNESIUM    Collection Time: 09/11/20 12:11 AM   Result Value Ref Range    Magnesium 1.7 (L) 1.8 - 2.4 mg/dL   PHOSPHORUS    Collection Time: 09/11/20 12:11 AM   Result Value Ref Range    Phosphorus 3.4 2.5 - 4.5 MG/DL   HEMOGLOBIN A1C WITH EAG    Collection Time: 09/11/20 12:11 AM   Result Value Ref Range    Hemoglobin A1c 10.2 (H) 4.8 - 6.0 %    Est. average glucose 246 mg/dL       All Micro Results     None          Other Studies:  No results found.     Assessment and Plan:     Hospital Problems as of 9/11/2020 Never Reviewed Codes Class Noted - Resolved POA    Hyperkalemia ICD-10-CM: E87.5  ICD-9-CM: 276.7  9/11/2020 - Present Unknown        Narcotic dependence ([place] [address] ICD-10-CM: F11.20  ICD-9-CM: 304.90  9/11/2020 - Present Unknown        Chronic constipation ICD-10-CM: K59.09  ICD-9-CM: 564.00  9/11/2020 - Present Unknown        Hx of chronic pancreatitis ICD-10-CM: Z87.19  ICD-9-CM: V12.79  9/11/2020 - Present Unknown        ETOH abuse ICD-10-CM: F10.10  ICD-9-CM: 305.00  6/11/2020 - Present Yes        Chronic abdominal pain ICD-10-CM: R10.9, G89.29  ICD-9-CM: 789.00, 338.29  6/2/2020 - Present Unknown        Hypomagnesemia ICD-10-CM: E83.42  ICD-9-CM: 275.2  5/31/2020 - Present Yes        * (Principal) DKA (diabetic ketoacidoses) ([place] [address] ICD-10-CM: E11.10  ICD-9-CM: 250.12  5/30/2020 - Present Yes        Bipolar disorder ([place] [address].) ICD-10-CM: F31.9  ICD-9-CM: 296.80  5/30/2020 - Present Yes        HTN (hypertension) ICD-10-CM: I10  ICD-9-CM: 401.9  5/30/2020 - Present Yes        Alcohol dependence ([place] [address].) ICD-10-CM: F10.20  ICD-9-CM: 303.90  5/30/2020 - Present Yes              PLAN:  --DKA insulin drip protocolwhen sugars approached 200 will change normal saline to D5 half and consider start subcutaneous coverage based on anion gap. With positive serum alcohol level there may be a component of alcohol ketosis contributing. Home dosage of insulin at time of discharge several months ago was NPH 6 units twice daily and sliding scale Humalog coverage with metformin oral therapy    --Chronic abdominal painacutely worsened with DKA but suspect due to alcohol gastritis and some component chronic pancreatitis possible. Pepcid, Carafate. Check stool for fecal fat and if positive consider pancreatic enzyme replacement therapy. Tramadol as needed when vomiting resolved    --Mild hyponatremia when corrected for serum glucose follow with aggressive IV saline hydration.     --Hyperkalemiano interventionmild and likely will develop hypokalemia with correction of DKAmonitor serial basic metabolic panelsDKA protocol    --Chronic constipationMiraLAX twice daily    --Acute alcohol intoxication and chronic alcoholism thiamine daily, as needed Ativan    --Hypomagnesemiavery likely related to alcohol abuse IV magnesium sulfatewhen tolerating orals recommend mag oxide daily.     Discharge planning: Shelter  DVT ppx: Lovenox    Code status: Full code  Decision Maker: [id number] [id number] ([facility]) emergency contacts      Admit status: Inpatient           Estimated LOS: Greater than 2 midnights  Risk:  high    Signed:  Ye Kong DO

## 2020-09-11 NOTE — PROGRESS NOTES
Patient arrived from ED with RN at bedside. Patient placed on ICU monitors, all VSS. Patient bathed with CHG wipes and a dual skin assessment performed with Adelita Elias. Patient with scar to abdomen, no other skin abnormalities at this time. An allevyn placed for protection.

## 2020-09-11 NOTE — PROGRESS NOTES
Hospitalist Note     Admit Date:  9/10/2020  6:35 PM   Name:  Jacob Fuentes   Age:  39 y.o.  :  1975   MRN:  [id number]   PCP:  None  Treatment Team: Attending Provider: Maxim Paredes MD; Care Manager: Barbara Burns RN    HPI/Subjective:   Mr. Aidan Gomez is a 38 y/o homeless male with a h/o etoh abuse, DM with insulin non-compliance resulting in frequent admissions for DKA, chronic pancreatitis, pain seeking behavior who was admitted to us on  with DKA. Started on insulin drip and admitted to ICU.    : Sleeping very comfortably, no distress. Upon wakening immediately says that his right arm \"hurts like hell\". Mild erythema upper arm but IV doesn't appear to be grossly infiltrated. Most recent BMP still with elevated AG.      No other complaints  Objective:     Patient Vitals for the past 24 hrs:   Temp Pulse Resp BP SpO2   20 0902  86 14 147/71 99 %   20 0900  91 16  97 %   20 0831  79 17 120/63 95 %   20 0830  78 17  95 %   20 0801  75  115/62 96 %   20 0800  72 8  95 %   20 0731  85 25 116/73 96 %   20 0730  78 20  95 %   20 0701 98 °F (36.7 °C) 81 19 121/66 95 %   20 0700  82 17  93 %   20 0601  83 15 136/79 94 %   20 0531  78 15 137/78 98 %   20 0501  90 20 138/89 97 %   20 0431  90 19 (!) 142/91 95 %   20 0401  86 9 144/80 97 %   20 0333   14     20 0331  84  (!) 157/93 97 %   20 0301 98.2 °F (36.8 °C) 82 15 (!) 148/93 99 %   20 0246  85 17  99 %   20 0230  85  (!) 158/91    20 0130  86 17 144/90 100 %   20 0100  96 15 (!) 143/92 98 %   20 0057  91 13 138/86 99 %   09/10/20 1912 98.1 °F (36.7 °C) (!) 115 18 118/74 98 %     Oxygen Therapy  O2 Sat (%): 99 % (20)  Pulse via Oximetry: 86 beats per minute (20)  O2 Device: Room air (20 07)    Estimated body mass index is 23.11 kg/m² as calculated from the following:    Height as of this encounter: 5' 8\" (1.727 m). Weight as of this encounter: 68.9 kg (152 lb). Intake/Output Summary (Last 24 hours) at 9/11/2020 0928  Last data filed at 9/11/2020 0701  Gross per 24 hour   Intake 3218.7 ml   Output 750 ml   Net 2468.7 ml       *Note that automatically entered I/Os may not be accurate; dependent on patient compliance with collection and accurate  by techs. General:    thin. Alert. CV:   RRR. No murmur, rub, or gallop. Lungs:   CTAB. No wheezing, rhonchi, or rales. Abdomen:   Soft, nontender, nondistended. Extremities: Warm and dry. No cyanosis or edema. Skin:     No rashes or jaundice. IV right upper extremity mild erythema proximally, no cording, not obviously infiltrated. Neuro:  No gross focal deficits    Data Reviewed:  I have reviewed all labs, meds, and studies from the last 24 hours:  Recent Results (from the past 24 hour(s))   GLUCOSE, POC    Collection Time: 09/10/20  7:20 PM   Result Value Ref Range    Glucose (POC) 371 (H) 65 - 897 mg/dL   METABOLIC PANEL, COMPREHENSIVE    Collection Time: 09/10/20 11:06 PM   Result Value Ref Range    Sodium 128 (L) 136 - 145 mmol/L    Potassium 5.2 (H) 3.5 - 5.1 mmol/L    Chloride 87 (L) 98 - 107 mmol/L    CO2 18 (L) 21 - 32 mmol/L    Anion gap 23 (H) 7 - 16 mmol/L    Glucose 436 (H) 65 - 100 mg/dL    BUN 8 6 - 23 MG/DL    Creatinine 1.04 0.8 - 1.5 MG/DL    GFR est AA >60 >60 ml/min/1.73m2    GFR est non-AA >60 >60 ml/min/1.73m2    Calcium 9.1 8.3 - 10.4 MG/DL    Bilirubin, total 0.6 0.2 - 1.1 MG/DL    ALT (SGPT) 26 12 - 65 U/L    AST (SGOT) 49 (H) 15 - 37 U/L    Alk.  phosphatase 147 (H) 50 - 136 U/L    Protein, total 8.1 6.3 - 8.2 g/dL    Albumin 3.7 3.5 - 5.0 g/dL    Globulin 4.4 (H) 2.3 - 3.5 g/dL    A-G Ratio 0.8 (L) 1.2 - 3.5     LIPASE    Collection Time: 09/10/20 11:06 PM   Result Value Ref Range    Lipase 11 (L) 73 - 393 U/L   MAGNESIUM    Collection Time: 09/10/20 11:06 PM Result Value Ref Range    Magnesium 1.6 (L) 1.8 - 2.4 mg/dL   ETHYL ALCOHOL    Collection Time: 09/10/20 11:06 PM   Result Value Ref Range    ALCOHOL(ETHYL),SERUM 17 MG/DL   ETHYL ALCOHOL    Collection Time: 09/10/20 11:06 PM   Result Value Ref Range    ALCOHOL(ETHYL),SERUM 24 MG/DL   CBC WITH AUTOMATED DIFF    Collection Time: 09/11/20 12:11 AM   Result Value Ref Range    WBC 5.9 4.3 - 11.1 K/uL    RBC 4.95 4.23 - 5.6 M/uL    HGB 12.8 (L) 13.6 - 17.2 g/dL    HCT 40.6 (L) 41.1 - 50.3 %    MCV 82.0 79.6 - 97.8 FL    MCH 25.9 (L) 26.1 - 32.9 PG    MCHC 31.5 31.4 - 35.0 g/dL    RDW 18.8 (H) 11.9 - 14.6 %    PLATELET 360 073 - 273 K/uL    MPV 9.2 (L) 9.4 - 12.3 FL    ABSOLUTE NRBC 0.00 0.0 - 0.2 K/uL    DF AUTOMATED      NEUTROPHILS 68 43 - 78 %    LYMPHOCYTES 24 13 - 44 %    MONOCYTES 6 4.0 - 12.0 %    EOSINOPHILS 1 0.5 - 7.8 %    BASOPHILS 1 0.0 - 2.0 %    IMMATURE GRANULOCYTES 1 0.0 - 5.0 %    ABS. NEUTROPHILS 4.0 1.7 - 8.2 K/UL    ABS. LYMPHOCYTES 1.4 0.5 - 4.6 K/UL    ABS. MONOCYTES 0.4 0.1 - 1.3 K/UL    ABS. EOSINOPHILS 0.1 0.0 - 0.8 K/UL    ABS. BASOPHILS 0.0 0.0 - 0.2 K/UL    ABS. IMM.  GRANS. 0.0 0.0 - 0.5 K/UL   METABOLIC PANEL, BASIC    Collection Time: 09/11/20 12:11 AM   Result Value Ref Range    Sodium 130 (L) 136 - 145 mmol/L    Potassium 3.4 (L) 3.5 - 5.1 mmol/L    Chloride 87 (L) 98 - 107 mmol/L    CO2 22 21 - 32 mmol/L    Anion gap 21 (H) 7 - 16 mmol/L    Glucose 411 (H) 65 - 100 mg/dL    BUN 9 6 - 23 MG/DL    Creatinine 0.84 0.8 - 1.5 MG/DL    GFR est AA >60 >60 ml/min/1.73m2    GFR est non-AA >60 >60 ml/min/1.73m2    Calcium 8.7 8.3 - 10.4 MG/DL   MAGNESIUM    Collection Time: 09/11/20 12:11 AM   Result Value Ref Range    Magnesium 1.7 (L) 1.8 - 2.4 mg/dL   PHOSPHORUS    Collection Time: 09/11/20 12:11 AM   Result Value Ref Range    Phosphorus 3.4 2.5 - 4.5 MG/DL   HEMOGLOBIN A1C WITH EAG    Collection Time: 09/11/20 12:11 AM   Result Value Ref Range    Hemoglobin A1c 10.2 (H) 4.8 - 6.0 %    Est. average glucose 246 mg/dL   GLUCOSE, POC    Collection Time: 09/11/20  2:09 AM   Result Value Ref Range    Glucose (POC) 388 (H) 65 - 100 mg/dL   GLUCOSTABILIZER    Collection Time: 09/11/20  2:11 AM   Result Value Ref Range    Glucose 388 mg/dL    Insulin order 6.6 units/hour    Insulin adminstered 6.6 units/hour    Multiplier 0.020     Low target 150 mg/dL    High target 250 mg/dL    D50 order 0.0 ml    D50 administered 0.00 ml    Minutes until next BG 60 min    Order initials jhb     Administered initials cej     GLSCOM Comments     METABOLIC PANEL, BASIC    Collection Time: 09/11/20  3:10 AM   Result Value Ref Range    Sodium 133 (L) 136 - 145 mmol/L    Potassium 2.9 (LL) 3.5 - 5.1 mmol/L    Chloride 96 (L) 98 - 107 mmol/L    CO2 19 (L) 21 - 32 mmol/L    Anion gap 18 (H) 7 - 16 mmol/L    Glucose 280 (H) 65 - 100 mg/dL    BUN 8 6 - 23 MG/DL    Creatinine 0.70 (L) 0.8 - 1.5 MG/DL    GFR est AA >60 >60 ml/min/1.73m2    GFR est non-AA >60 >60 ml/min/1.73m2    Calcium 7.7 (L) 8.3 - 10.4 MG/DL   MAGNESIUM    Collection Time: 09/11/20  3:10 AM   Result Value Ref Range    Magnesium 1.9 1.8 - 2.4 mg/dL   HEMOGLOBIN A1C WITH EAG    Collection Time: 09/11/20  3:10 AM   Result Value Ref Range    Hemoglobin A1c 10.3 (H) 4.8 - 6.0 %    Est. average glucose 249 mg/dL   GLUCOSE, POC    Collection Time: 09/11/20  3:21 AM   Result Value Ref Range    Glucose (POC) 250 (H) 65 - 100 mg/dL   GLUCOSTABILIZER    Collection Time: 09/11/20  3:24 AM   Result Value Ref Range    Glucose 250 mg/dL    Insulin order 3.8 units/hour    Insulin adminstered 3.8 units/hour    Multiplier 0.020     Low target 140 mg/dL    High target 180 mg/dL    D50 order 0.0 ml    D50 administered 0.00 ml    Minutes until next BG 60 min    Order initials RH     Administered initials RH     GLSCOM Comments     GLUCOSE, POC    Collection Time: 09/11/20  4:27 AM   Result Value Ref Range    Glucose (POC) 192 (H) 65 - 100 mg/dL   GLUCOSTABILIZER    Collection Time: 09/11/20  4:27 AM   Result Value Ref Range    Glucose 192 mg/dL    Insulin order 4.0 units/hour    Insulin adminstered 4.0 units/hour    Multiplier 0.030     Low target 140 mg/dL    High target 180 mg/dL    D50 order 0.0 ml    D50 administered 0.00 ml    Minutes until next BG 60 min    Order initials RH     Administered initials RH     GLSCOM Comments     GLUCOSE, POC    Collection Time: 09/11/20  5:46 AM   Result Value Ref Range    Glucose (POC) 161 (H) 65 - 100 mg/dL   GLUCOSTABILIZER    Collection Time: 09/11/20  5:46 AM   Result Value Ref Range    Glucose 161 mg/dL    Insulin order 3.0 units/hour    Insulin adminstered 3.0 units/hour    Multiplier 0.030     Low target 140 mg/dL    High target 180 mg/dL    D50 order 0.0 ml    D50 administered 0.00 ml    Minutes until next BG 60 min    Order initials RH     Administered initials RH     GLSCOM Comments     GLUCOSE, POC    Collection Time: 09/11/20  6:30 AM   Result Value Ref Range    Glucose (POC) 147 (H) 65 - 100 mg/dL   GLUCOSTABILIZER    Collection Time: 09/11/20  6:30 AM   Result Value Ref Range    Glucose 147 mg/dL    Insulin order 2.6 units/hour    Insulin adminstered 2.6 units/hour    Multiplier 0.030     Low target 140 mg/dL    High target 180 mg/dL    D50 order 0.0 ml    D50 administered 0.00 ml    Minutes until next BG 60 min    Order initials RH     Administered initials RH     GLSCOM Comments     GLUCOSE, POC    Collection Time: 09/11/20  7:32 AM   Result Value Ref Range    Glucose (POC) 127 (H) 65 - 100 mg/dL   GLUCOSTABILIZER    Collection Time: 09/11/20  7:32 AM   Result Value Ref Range    Glucose 127 mg/dL    Insulin order 1.3 units/hour    Insulin adminstered 1.3 units/hour    Multiplier 0.020     Low target 140 mg/dL    High target 180 mg/dL    D50 order 0.0 ml    D50 administered 0.00 ml    Minutes until next BG 60 min    Order initials MW     Administered initials MW     GLSCOM Comments     GLUCOSE, POC    Collection Time: 09/11/20  8:28 AM Result Value Ref Range    Glucose (POC) 146 (H) 65 - 100 mg/dL   GLUCOSTABILIZER    Collection Time: 09/11/20  8:29 AM   Result Value Ref Range    Glucose 146 mg/dL    Insulin order 1.7 units/hour    Insulin adminstered 1.7 units/hour    Multiplier 0.020     Low target 140 mg/dL    High target 180 mg/dL    D50 order 0.0 ml    D50 administered 0.00 ml    Minutes until next BG 60 min    Order initials MW     Administered initials MW     GLSCOM Comments          Current Meds:  Current Facility-Administered Medications   Medication Dose Route Frequency    insulin regular (NOVOLIN R, HUMULIN R) 100 Units in 0.9% sodium chloride 100 mL infusion  0-50 Units/hr IntraVENous TITRATE    dextrose 40% (GLUTOSE) oral gel 1 Tube  15 g Oral PRN    glucagon (GLUCAGEN) injection 1 mg  1 mg IntraMUSCular PRN    dextrose (D50W) injection syrg 12.5-25 g  25-50 mL IntraVENous PRN    thiamine (B-1) 100 mg in 0.9% sodium chloride 50 mL IVPB  100 mg IntraVENous DAILY    LORazepam (ATIVAN) injection 1 mg  1 mg IntraVENous Q4H PRN    sodium chloride (NS) flush 5-40 mL  5-40 mL IntraVENous Q8H    sodium chloride (NS) flush 5-40 mL  5-40 mL IntraVENous PRN    acetaminophen (TYLENOL) tablet 650 mg  650 mg Oral Q6H PRN    Or    acetaminophen (TYLENOL) suppository 650 mg  650 mg Rectal Q6H PRN    polyethylene glycol (MIRALAX) packet 17 g  17 g Oral BID    promethazine (PHENERGAN) tablet 12.5 mg  12.5 mg Oral Q6H PRN    Or    ondansetron (ZOFRAN) injection 4 mg  4 mg IntraVENous Q6H PRN    enoxaparin (LOVENOX) injection 40 mg  40 mg SubCUTAneous DAILY    sucralfate (CARAFATE) tablet 1 g  1 g Oral AC&HS    famotidine (PEPCID) tablet 20 mg  20 mg Oral BID    nicotine (NICODERM CQ) 21 mg/24 hr patch 1 Patch  1 Patch TransDERmal DAILY    sodium chloride (NS) flush 5-40 mL  5-40 mL IntraVENous Q8H    NUTRITIONAL SUPPORT ELECTROLYTE PRN ORDERS   Does Not Apply PRN    dextrose 5 % - 0.45% NaCl infusion  200 mL/hr IntraVENous CONTINUOUS   • potassium chloride 20 mEq in 100 ml IVPB  20 mEq IntraVENous Q2H   • sodium chloride 0.9 % bolus infusion 1,000 mL  1,000 mL IntraVENous ONCE   • ondansetron (ZOFRAN) injection 8 mg  8 mg IntraVENous NOW   • famotidine (PF) (PEPCID) injection 20 mg  20 mg IntraVENous NOW       Other Studies:  No results found for this visit on 09/10/20.    No results found.    All Micro Results     None          SARS-CoV-2 Lab Results  \"Novel Coronavirus\" Test: No results found for: COV2NT   \"Emergent Disease\" Test: No results found for: EDPR  \"SARS-COV-2\" Test: No results found for: XGCOVT  \"Precision Labs\" Test: No results found for: RSLT  Rapid Test: No results found for: COVR         Assessment and Plan:     Hospital Problems as of 9/11/2020 Never Reviewed          Codes Class Noted - Resolved POA    Hyperkalemia ICD-10-CM: E87.5  ICD-9-CM: 276.7  9/11/2020 - Present Unknown        Narcotic dependence (HCC) ICD-10-CM: F11.20  ICD-9-CM: 304.90  9/11/2020 - Present Unknown        Chronic constipation ICD-10-CM: K59.09  ICD-9-CM: 564.00  9/11/2020 - Present Unknown        Hx of chronic pancreatitis ICD-10-CM: Z87.19  ICD-9-CM: V12.79  9/11/2020 - Present Unknown        ETOH abuse ICD-10-CM: F10.10  ICD-9-CM: 305.00  6/11/2020 - Present Yes        Chronic abdominal pain ICD-10-CM: R10.9, G89.29  ICD-9-CM: 789.00, 338.29  6/2/2020 - Present Unknown        Hypomagnesemia ICD-10-CM: E83.42  ICD-9-CM: 275.2  5/31/2020 - Present Yes        * (Principal) DKA (diabetic ketoacidoses) (HCC) ICD-10-CM: E11.10  ICD-9-CM: 250.12  5/30/2020 - Present Yes        Bipolar disorder (HCC) ICD-10-CM: F31.9  ICD-9-CM: 296.80  5/30/2020 - Present Yes        HTN (hypertension) ICD-10-CM: I10  ICD-9-CM: 401.9  5/30/2020 - Present Yes        Alcohol dependence (HCC) ICD-10-CM: F10.20  ICD-9-CM: 303.90  5/30/2020 - Present Yes              Plan:  # DKA   - 2/2 medication non-compliance   - AG still elevated. Con't q4 labs, insulin drip.  Transition to SQ when gap closed. Replace lytes as needed. D5 1/2 IVFs. - DM uncontrolled. A1C 10.3. # HypoMg/K   - Mg better. Replacing K now. # HypoNa   - 2/2 glucose. # Chronic abdominal pain/pancreatitis   - H/o drug seeking behavior. Tylenol prn. Also component of DKA, constipation, alcoholic gastritis. # EtOH abuse   - Ativan prn. Vitamins. # Chronic constipation   - Avoid narcs. Miralax BID. Add hemal if needed. DC planning/Dispo: Homeless.   Diet:  DIET NPO  DIET NPO  DVT ppx: Lovenox    Signed:  Vernetta Moritz, MD

## 2020-09-11 NOTE — PROGRESS NOTES
[facility] [address] CRITICAL CARE OUTREACH NURSE PROGRESS REPORT      SUBJECTIVE: Assessed patient secondary to outreach protocol. MEWS Score: 1 (09/11/20 0701)  Vitals:    09/11/20 1130 09/11/20 1154 09/11/20 1301 09/11/20 1502   BP:  119/75 122/80 130/73   Pulse: 84 90 81 (!) 108   Resp: 18  17 16   Temp: 97.8 °F (36.6 °C)  98.2 °F (36.8 °C) 98.1 °F (36.7 °C)   SpO2: 99% 98% 97% 97%   Weight:       Height:              LAB DATA:    Recent Labs     09/11/20  0911 09/11/20  0310 09/11/20  0011 09/10/20  2306    133* 130* 128*   K 3.4* 2.9* 3.4* 5.2*    96* 87* 87*   CO2 29 19* 22 18*   AGAP 7 18* 21* 23*    280* 411* 436*   BUN 7 8 9 8   CREA 0.72* 0.70* 0.84 1.04   GFRAA >60 >60 >60 >60   GFRNA >60 >60 >60 >60   CA 8.3 7.7* 8.7 9.1   MG 2.3 1.9 1.7* 1.6*   PHOS  --   --  3.4  --    ALB  --   --   --  3.7   TP  --   --   --  8.1   GLOB  --   --   --  4.4*   AGRAT  --   --   --  0.8*   ALT  --   --   --  26        Recent Labs     09/11/20  0011   WBC 5.9   HGB 12.8*   HCT 40.6*             OBJECTIVE: On arrival to room, I found patient to be resting. Pain Assessment  Pain Intensity 1: 5 (09/11/20 1400)  Pain Location 1: Abdomen  Pain Intervention(s) 1: Medication (see MAR)  Patient Stated Pain Goal: 2                                 ASSESSMENT:  Pt resting, on RA, sats acceptable, no complaints. Glucose currently stable, latest anion gaps WNL. VSS, chart reviewed, no immediate concerns. PLAN:        Will continue to follow up per outreach protocol.     Signed By:   Constantino Bennett RN    September 11, 2020 3:57 PM

## 2020-09-11 NOTE — PROGRESS NOTES
TRANSFER - IN REPORT:    Verbal report received from [facility] [employer] [facility] (name) on Britany Pace  being received from ED (unit) for routine progression of care      Report consisted of patients Situation, Background, Assessment and   Recommendations(SBAR). Information from the following report(s) SBAR, Kardex, ED Summary, Intake/Output, MAR, Accordion, Recent Results, Med Rec Status, Cardiac Rhythm NSR and Alarm Parameters  was reviewed with the receiving nurse. Opportunity for questions and clarification was provided. Assessment to be completed upon patients arrival to unit and care assumed.

## 2020-09-12 LAB
ANION GAP SERPL CALC-SCNC: 5 MMOL/L (ref 7–16)
BUN SERPL-MCNC: 6 MG/DL (ref 6–23)
CALCIUM SERPL-MCNC: 8.2 MG/DL (ref 8.3–10.4)
CHLORIDE SERPL-SCNC: 102 MMOL/L (ref 98–107)
CO2 SERPL-SCNC: 29 MMOL/L (ref 21–32)
CREAT SERPL-MCNC: 0.61 MG/DL (ref 0.8–1.5)
ERYTHROCYTE [DISTWIDTH] IN BLOOD BY AUTOMATED COUNT: 18.4 % (ref 11.9–14.6)
GLUCOSE BLD STRIP.AUTO-MCNC: 182 MG/DL (ref 65–100)
GLUCOSE BLD STRIP.AUTO-MCNC: 210 MG/DL (ref 65–100)
GLUCOSE BLD STRIP.AUTO-MCNC: 275 MG/DL (ref 65–100)
GLUCOSE BLD STRIP.AUTO-MCNC: 361 MG/DL (ref 65–100)
GLUCOSE SERPL-MCNC: 182 MG/DL (ref 65–100)
HCT VFR BLD AUTO: 34.1 % (ref 41.1–50.3)
HGB BLD-MCNC: 10.5 G/DL (ref 13.6–17.2)
MAGNESIUM SERPL-MCNC: 1.7 MG/DL (ref 1.8–2.4)
MCH RBC QN AUTO: 25.7 PG (ref 26.1–32.9)
MCHC RBC AUTO-ENTMCNC: 30.8 G/DL (ref 31.4–35)
MCV RBC AUTO: 83.6 FL (ref 79.6–97.8)
NRBC # BLD: 0 K/UL (ref 0–0.2)
PHOSPHATE SERPL-MCNC: 2.3 MG/DL (ref 2.5–4.5)
PLATELET # BLD AUTO: 235 K/UL (ref 150–450)
PMV BLD AUTO: 9.5 FL (ref 9.4–12.3)
POTASSIUM SERPL-SCNC: 3.3 MMOL/L (ref 3.5–5.1)
RBC # BLD AUTO: 4.08 M/UL (ref 4.23–5.6)
SODIUM SERPL-SCNC: 136 MMOL/L (ref 136–145)
WBC # BLD AUTO: 5.4 K/UL (ref 4.3–11.1)

## 2020-09-12 PROCEDURE — 74011250637 HC RX REV CODE- 250/637: Performed by: HOSPITALIST

## 2020-09-12 PROCEDURE — 83735 ASSAY OF MAGNESIUM: CPT

## 2020-09-12 PROCEDURE — 80048 BASIC METABOLIC PNL TOTAL CA: CPT

## 2020-09-12 PROCEDURE — 36415 COLL VENOUS BLD VENIPUNCTURE: CPT

## 2020-09-12 PROCEDURE — 74011000258 HC RX REV CODE- 258: Performed by: INTERNAL MEDICINE

## 2020-09-12 PROCEDURE — 74011250636 HC RX REV CODE- 250/636: Performed by: INTERNAL MEDICINE

## 2020-09-12 PROCEDURE — 82962 GLUCOSE BLOOD TEST: CPT

## 2020-09-12 PROCEDURE — 74011636637 HC RX REV CODE- 636/637: Performed by: INTERNAL MEDICINE

## 2020-09-12 PROCEDURE — 85027 COMPLETE CBC AUTOMATED: CPT

## 2020-09-12 PROCEDURE — 74011250637 HC RX REV CODE- 250/637: Performed by: INTERNAL MEDICINE

## 2020-09-12 PROCEDURE — 84100 ASSAY OF PHOSPHORUS: CPT

## 2020-09-12 PROCEDURE — 65660000000 HC RM CCU STEPDOWN

## 2020-09-12 RX ORDER — SENNOSIDES 8.6 MG/1
2 TABLET ORAL 2 TIMES DAILY
Status: DISPENSED | OUTPATIENT
Start: 2020-09-12 | End: 2020-09-14

## 2020-09-12 RX ORDER — LANOLIN ALCOHOL/MO/W.PET/CERES
100 CREAM (GRAM) TOPICAL DAILY
Status: DISCONTINUED | OUTPATIENT
Start: 2020-09-12 | End: 2020-09-14 | Stop reason: HOSPADM

## 2020-09-12 RX ORDER — LORAZEPAM 1 MG/1
1 TABLET ORAL
Status: DISCONTINUED | OUTPATIENT
Start: 2020-09-12 | End: 2020-09-14 | Stop reason: HOSPADM

## 2020-09-12 RX ORDER — POLYETHYLENE GLYCOL 3350 17 G/17G
17 POWDER, FOR SOLUTION ORAL DAILY
Status: DISCONTINUED | OUTPATIENT
Start: 2020-09-12 | End: 2020-09-13

## 2020-09-12 RX ORDER — POLYETHYLENE GLYCOL 3350 17 G/17G
17 POWDER, FOR SOLUTION ORAL
Status: DISCONTINUED | OUTPATIENT
Start: 2020-09-12 | End: 2020-09-12

## 2020-09-12 RX ORDER — POTASSIUM CHLORIDE 20 MEQ/1
40 TABLET, EXTENDED RELEASE ORAL
Status: COMPLETED | OUTPATIENT
Start: 2020-09-12 | End: 2020-09-12

## 2020-09-12 RX ORDER — LORAZEPAM 2 MG/ML
1 INJECTION INTRAMUSCULAR
Status: DISCONTINUED | OUTPATIENT
Start: 2020-09-12 | End: 2020-09-12

## 2020-09-12 RX ORDER — LANOLIN ALCOHOL/MO/W.PET/CERES
400 CREAM (GRAM) TOPICAL 2 TIMES DAILY
Status: DISCONTINUED | OUTPATIENT
Start: 2020-09-12 | End: 2020-09-14 | Stop reason: HOSPADM

## 2020-09-12 RX ADMIN — POLYETHYLENE GLYCOL 3350 17 G: 17 POWDER, FOR SOLUTION ORAL at 08:57

## 2020-09-12 RX ADMIN — POTASSIUM CHLORIDE 40 MEQ: 20 TABLET, EXTENDED RELEASE ORAL at 08:55

## 2020-09-12 RX ADMIN — LORAZEPAM 1 MG: 1 TABLET ORAL at 22:11

## 2020-09-12 RX ADMIN — INSULIN LISPRO 6 UNITS: 100 INJECTION, SOLUTION INTRAVENOUS; SUBCUTANEOUS at 08:56

## 2020-09-12 RX ADMIN — INSULIN LISPRO 4 UNITS: 100 INJECTION, SOLUTION INTRAVENOUS; SUBCUTANEOUS at 11:21

## 2020-09-12 RX ADMIN — LORAZEPAM 1 MG: 2 INJECTION INTRAMUSCULAR; INTRAVENOUS at 11:21

## 2020-09-12 RX ADMIN — FAMOTIDINE 20 MG: 20 TABLET, FILM COATED ORAL at 08:55

## 2020-09-12 RX ADMIN — INSULIN LISPRO 10 UNITS: 100 INJECTION, SOLUTION INTRAVENOUS; SUBCUTANEOUS at 22:11

## 2020-09-12 RX ADMIN — Medication 10 ML: at 06:13

## 2020-09-12 RX ADMIN — SENNOSIDES 17.2 MG: 8.6 TABLET, FILM COATED ORAL at 08:55

## 2020-09-12 RX ADMIN — LORAZEPAM 1 MG: 2 INJECTION INTRAMUSCULAR; INTRAVENOUS at 03:23

## 2020-09-12 RX ADMIN — SUCRALFATE 1 G: 1 TABLET ORAL at 16:57

## 2020-09-12 RX ADMIN — Medication 400 MG: at 08:55

## 2020-09-12 RX ADMIN — Medication 100 MG: at 08:56

## 2020-09-12 RX ADMIN — FAMOTIDINE 20 MG: 20 TABLET, FILM COATED ORAL at 16:57

## 2020-09-12 RX ADMIN — LORAZEPAM 1 MG: 1 TABLET ORAL at 17:00

## 2020-09-12 RX ADMIN — INSULIN LISPRO 2 UNITS: 100 INJECTION, SOLUTION INTRAVENOUS; SUBCUTANEOUS at 16:56

## 2020-09-12 RX ADMIN — SUCRALFATE 1 G: 1 TABLET ORAL at 11:21

## 2020-09-12 RX ADMIN — ENOXAPARIN SODIUM 40 MG: 40 INJECTION SUBCUTANEOUS at 08:57

## 2020-09-12 RX ADMIN — ONDANSETRON 4 MG: 2 INJECTION INTRAMUSCULAR; INTRAVENOUS at 08:56

## 2020-09-12 RX ADMIN — DEXTROSE MONOHYDRATE AND SODIUM CHLORIDE 100 ML/HR: 5; .45 INJECTION, SOLUTION INTRAVENOUS at 06:15

## 2020-09-12 RX ADMIN — LORAZEPAM 1 MG: 2 INJECTION INTRAMUSCULAR; INTRAVENOUS at 06:11

## 2020-09-12 RX ADMIN — ACETAMINOPHEN 650 MG: 325 TABLET, FILM COATED ORAL at 08:56

## 2020-09-12 RX ADMIN — SUCRALFATE 1 G: 1 TABLET ORAL at 06:12

## 2020-09-12 RX ADMIN — INSULIN GLARGINE 10 UNITS: 100 INJECTION, SOLUTION SUBCUTANEOUS at 08:56

## 2020-09-12 RX ADMIN — Medication 400 MG: at 16:57

## 2020-09-12 NOTE — PROGRESS NOTES
I was giving this patient some medicine and telling him we were going to discharge him. He was very anxious so I gave him some IV Ativan to try to help that. I told him I would be back to discharge him in a little bit. He was asking if he was going to be given Ativan to go home with and I explained that [facility]. He stated if we didn't send him somewhere from here there was a possibility he would go out and want to drink and drink himself to death. I asked if he was suicidal he said no. TIERRA is working on getting a place called [facility] to come talk to him before discharge. We went ahead and took the IVs out because he stated that he was going to sneak out and go smoke because he wanted too. I explained that he has a nicotine patch he refused this morning and if he leaves then he cant come back to this floor so if he wanted the help he needed to stay. MD was made aware.

## 2020-09-12 NOTE — PROGRESS NOTES
Date of Outreach Update:  Marilyn Todd was seen and assessed. Previous Outreach assessment has been reviewed. There have been no significant clinical changes since the completion of the last dated Outreach assessment. Patient resting, no visible signs of distress. Breathing even and unlabored. Will continue to follow up per outreach protocol.     Signed By:   Jeffry Hernandez    September 12, 2020 4:48 PM

## 2020-09-12 NOTE — PROGRESS NOTES
Problem: Diabetes Self-Management  Goal: *Disease process and treatment process  Description: Define diabetes and identify own type of diabetes; list 3 options for treating diabetes. Outcome: Progressing Towards Goal  Goal: *Incorporating nutritional management into lifestyle  Description: Describe effect of type, amount and timing of food on blood glucose; list 3 methods for planning meals. Outcome: Progressing Towards Goal  Goal: *Incorporating physical activity into lifestyle  Description: State effect of exercise on blood glucose levels. Outcome: Progressing Towards Goal  Goal: *Developing strategies to promote health/change behavior  Description: Define the ABC's of diabetes; identify appropriate screenings, schedule and personal plan for screenings. Outcome: Progressing Towards Goal  Goal: *Using medications safely  Description: State effect of diabetes medications on diabetes; name diabetes medication taking, action and side effects. Outcome: Progressing Towards Goal  Goal: *Monitoring blood glucose, interpreting and using results  Description: Identify recommended blood glucose targets  and personal targets. Outcome: Progressing Towards Goal  Goal: *Prevention, detection, treatment of acute complications  Description: List symptoms of hyper- and hypoglycemia; describe how to treat low blood sugar and actions for lowering  high blood glucose level. Outcome: Progressing Towards Goal  Goal: *Prevention, detection and treatment of chronic complications  Description: Define the natural course of diabetes and describe the relationship of blood glucose levels to long term complications of diabetes.   Outcome: Progressing Towards Goal  Goal: *Developing strategies to address psychosocial issues  Description: Describe feelings about living with diabetes; identify support needed and support network  Outcome: Progressing Towards Goal  Goal: *Insulin pump training  Outcome: Progressing Towards Goal  Goal: *Sick day guidelines  Outcome: Progressing Towards Goal  Goal: *Patient Specific Goal (EDIT GOAL, INSERT TEXT)  Outcome: Progressing Towards Goal     Problem: Patient Education: Go to Patient Education Activity  Goal: Patient/Family Education  Outcome: Progressing Towards Goal     Problem: Falls - Risk of  Goal: *Absence of Falls  Description: Document Peggy Choi Fall Risk and appropriate interventions in the flowsheet. Outcome: Progressing Towards Goal  Note: Fall Risk Interventions:  Mobility Interventions: Bed/chair exit alarm, Communicate number of staff needed for ambulation/transfer, Patient to call before getting OOB         Medication Interventions: Bed/chair exit alarm, Patient to call before getting OOB, Teach patient to arise slowly    Elimination Interventions: Bed/chair exit alarm, Call light in reach, Patient to call for help with toileting needs, Stay With Me (per policy), Toilet paper/wipes in reach, Toileting schedule/hourly rounds              Problem: Patient Education: Go to Patient Education Activity  Goal: Patient/Family Education  Outcome: Progressing Towards Goal     Problem: Pressure Injury - Risk of  Goal: *Prevention of pressure injury  Description: Document Delta Scale and appropriate interventions in the flowsheet.   Outcome: Progressing Towards Goal  Note: Pressure Injury Interventions:  Sensory Interventions: Assess changes in LOC, Avoid rigorous massage over bony prominences    Moisture Interventions: Absorbent underpads, Check for incontinence Q2 hours and as needed    Activity Interventions: Increase time out of bed, Pressure redistribution bed/mattress(bed type), PT/OT evaluation    Mobility Interventions: HOB 30 degrees or less, PT/OT evaluation, Pressure redistribution bed/mattress (bed type)    Nutrition Interventions: Document food/fluid/supplement intake, Offer support with meals,snacks and hydration    Friction and Shear Interventions: HOB 30 degrees or less                Problem: Patient Education: Go to Patient Education Activity  Goal: Patient/Family Education  Outcome: Progressing Towards Goal

## 2020-09-12 NOTE — PROGRESS NOTES
Date of Outreach Update:  Madalyn Sargent was seen and assessed. MEWS Score: 1 (09/11/20 2320)    Vitals:    09/11/20 1502 09/11/20 2038 09/11/20 2227 09/11/20 2320   BP: 130/73 119/75 127/78 113/81   Pulse: (!) 108 91 79 74   Resp: 16 20  20   Temp: 98.1 °F (36.7 °C) 98.1 °F (36.7 °C)  98.1 °F (36.7 °C)   SpO2: 97% 96%  96%   Weight:       Height:              Previous Outreach assessment has been reviewed. There have been no significant clinical changes since the completion of the last dated Outreach assessment. Will continue to follow up per outreach protocol.     Signed By:   Pepper Jose RN    September 12, 2020 2:55 AM

## 2020-09-12 NOTE — PROGRESS NOTES
Critical Care Outreach Nurse Progress Report:    Subjective: In to assess pt secondary to transfer from ICU. MEWS Score: 2 (09/11/20 1502)    Vitals:    09/11/20 1301 09/11/20 1502 09/11/20 2038 09/11/20 2227   BP: 122/80 130/73 119/75 127/78   Pulse: 81 (!) 108 91 79   Resp: 17 16 20    Temp: 98.2 °F (36.8 °C) 98.1 °F (36.7 °C) 98.1 °F (36.7 °C)    SpO2: 97% 97% 96%    Weight:       Height:              Objective: Pt resting in bed. RN and PCT at bedside. Assessment: Pt is A&O x4 and appears to be in NAD at this time. Lungs CTA bilaterally, O2 sat 96% on RA. Pt denies any pain. Plan: Will continue to follow per outreach program protocol. Juanito Beckham RN.

## 2020-09-12 NOTE — DISCHARGE SUMMARY
Physician Discharge Summary     Patient ID:  Brittany Ruiz  [id number]  34 y.o.  1975    Admit date: 9/10/2020    Discharge date: 9-    Diagnosis:  1- DKA due to severe non-compliance, hx of IDDM,  treated with protocol, resolved. 2- Bipolar disorder, stable. 3- Hypertension, controlled. 4- Alcoholism, patient threat to drink himself to death, to transfer to inpatient detox    1- DKA due to severe non-compliance, hx of IDDM,  treated with protocol, resolved. 2- Bipolar disorder, see #5  3- Hypertension, controlled. 4- Alcoholism, started some withdrawal symptoms, controlled with Benzodiazepines, thiamine and alcoholic beverages. 5- Suicidal ideation/ threat vs Malingering. Not acutely suicidal per my conversation with Psychiatry who evaluated the patient and patient can be discharged. Hospital course:   40 y/o homeless male with a h/o etoh abuse, DM with insulin non-compliance resulting in frequent admissions for DKA, chronic pancreatitis, pain seeking behavior who was admitted to us on 9/11 with DKA. Started on insulin drip and admitted to ICU. Improved and transferred to floor later. When asked why he is not taking his insulin, he states that he just don't want to and he has insulin available. Patient admitted and treated as above. On day of discharge, patient exam showed normal abdominal exam.    PCP: None    Patient Instructions:   Current Discharge Medication List      CONTINUE these medications which have NOT CHANGED    Details   insulin lispro (HUMALOG) 100 unit/mL injection   GRL RX SLIDING SCALE SF30    For Blood Sugar of:  150-174 = 2 Units; 175-204 = 3 Units  205-234 = 4 Units; 235-264 = 5 Units  265-294 = 6 Units; 295-324 = 7 Units  325-354 = 8 Units; 355 or > = Call MD    This drug may increase the risk of patient falls Fast Acting - Administer Immediately - or within 15 minutes of start of meal, if mealtime coverage.   Qty: 1 Vial, Refills: 0      insulin NPH (NOVOLIN N, HUMULIN N) 100 unit/mL injection 6 Units by SubCUTAneous route Before breakfast and dinner. Qty: 1 Vial, Refills: 2      lancets misc As directed  Qty: 1 Each, Refills: 0      Syringe, Disposable, 1 mL syrg Insulin syringe  Qty: 100 Syringe, Refills: 1      ondansetron (Zofran ODT) 4 mg disintegrating tablet Take 1 Tab by mouth every eight (8) hours as needed for Nausea. Qty: 20 Tab, Refills: 0      simethicone (MYLICON) 80 mg chewable tablet Take 1 Tab by mouth four (4) times daily as needed for GI Pain. Qty: 30 Tab, Refills: 0      acetaminophen (TYLENOL) 500 mg tablet Take 1 Tab by mouth every four (4) hours as needed for Pain. Qty: 20 Tab, Refills: 0      glucose blood VI test strips (ASCENSIA AUTODISC VI, ONE TOUCH ULTRA TEST VI) strip As directed  Qty: 100 Strip, Refills: 1         STOP taking these medications       metFORMIN (GLUCOPHAGE) 1,000 mg tablet Comments:   Reason for Stopping:               Instructions:     Diabetic diet, activity as tolerated. No alcohol    Condition: Stable  Follow-up with primary physician in 1 week. Time 20 min    Please send copy to primary physician.     Signed:  Prakash Hermosillo MD  9/12/2020  9:25 AM

## 2020-09-12 NOTE — DISCHARGE INSTRUCTIONS
Acute Alcohol Intoxication: Care Instructions  Your Care Instructions     You have had treatment to help your body rid itself of alcohol. Too much alcohol upsets the body's fluid balance. Your doctor may have given you fluids and vitamins. For some people, drinking too much alcohol is a one-time event. For others, it is an ongoing problem. In either case, it is serious. It can be life-threatening. Follow-up care is a key part of your treatment and safety. Be sure to make and go to all appointments, and call your doctor if you are having problems. It's also a good idea to know your test results and keep a list of the medicines you take. How can you care for yourself at home? · Do not drink and drive. · Be safe with medicines. Take your medicines exactly as prescribed. Call your doctor if you think you are having a problem with your medicine. · Your doctor may have prescribed disulfiram (Antabuse). Do not drink any alcohol while you are taking this medicine. You may have severe or even life-threatening side effects from even small amounts of alcohol. · If you were given medicine to prevent nausea, be sure to take it exactly as prescribed. · Before you take any medicine, tell your doctor if:  ? You have had a bad reaction to any medicines in the past.  ? You are taking other medicines, including over-the-counter ones, or have other health problems. ? You are or could be pregnant. · Be prepared to have some symptoms of withdrawal in the next few days. · Drink plenty of liquids in the next few days. · Seek help if you need it to stop drinking. Getting counseling and joining a support group can help you stay sober. Try a support group such as Alcoholics Anonymous. · Avoid alcohol when you take medicines. It can react with many medicines and cause serious problems. When should you call for help? Call 911 anytime you think you may need emergency care.  For example, call if:    · You feel confused and are seeing things that are not there.     · You are thinking about killing yourself or hurting others.     · You have a seizure.     · You vomit blood or what looks like coffee grounds. Call your doctor now or seek immediate medical care if:    · You have trembling, restlessness, sweating, and other withdrawal symptoms that are new or that get worse.     · Your withdrawal symptoms come back after not bothering you for days or weeks.     · You can't stop vomiting. Watch closely for changes in your health, and be sure to contact your doctor if:    · You need help to stop drinking. Where can you learn more? Go to http://www.gray.com/  Enter T102 in the search box to learn more about \"Acute Alcohol Intoxication: Care Instructions. \"  Current as of: June 29, 2020               Content Version: 12.6  © 8436-3516 [employer]. Care instructions adapted under license by [employer] (which disclaims liability or warranty for this information). If you have questions about a medical condition or this instruction, always ask your healthcare professional. [place] [address] any warranty or liability for your use of this information. Patient Education        Alcohol Detoxification and Withdrawal: Care Instructions  Your Care Instructions     If you drink alcohol regularly and then suddenly stop, you may go through some physical and emotional problems while the alcohol clears out of your system. Clearing the alcohol from your body is called detoxification, or detox. Physical and emotional problems that may happen during detox are called withdrawal.  Symptoms of withdrawal can be scary and dangerous. Mild symptoms include nausea and vomiting, sweating, shakiness, and intense worry. Severe symptoms include being confused and irritable, feeling things on your body that are not there, seeing or hearing things that are not there, and trembling.  You may even have seizures. If your symptoms become severe you must see a doctor. People who drink large amounts of alcohol should not try to detox at home. A person can die of severe alcohol withdrawal.  Symptoms of alcohol withdrawal may begin from 4 to 12 hours after you stop drinking. But they may not start for several days after the last drink. They can last a few days. It is hard to stop drinking. But when you have cleared the alcohol from your system, you will be able to start the next part of your life, free from the burden of being dependent. Follow-up care is a key part of your treatment and safety. Be sure to make and go to all appointments, and call your doctor if you are having problems. It's also a good idea to know your test results and keep a list of the medicines you take. How can you care for yourself at home? · Before you stop drinking, talk to your doctor about how you plan to stop. Be sure to be completely honest with him or her about how much you have been drinking. Your doctor will figure out whether you need to detox in a supervised medical center. · Take your medicines exactly as prescribed. Call your doctor if you think you are having a problem with your medicine. · Make sure someone you trust is with you the whole time. Have friends and family members take turns staying with you until you are done with detox. · Put a list of emergency numbers near the phone. This should include your doctor, the police, the nearest hospital and emergency room, and neighbors who can help if needed. · Make sure all alcohol is removed from the house before you start. This includes beverages as well as medicines, rubbing alcohol, and certain flavorings like vanilla extract. · Keep \"drinking buddies\" away during the time you are going through detox. · Make your surroundings calm. Soft lights, soft music, and a comfortable place to sit or lie down can help make the process easier.   · Drink lots of fluids and eat snacks such as fruit, cheese and crackers, and pretzels. Foods high in carbohydrate may help reduce the craving for alcohol. · Understand that detox is going to be hard. · Keep in mind that the people watching over you during detox are there to help. Explain to them before you start that you may not act like yourself until detox is finished. · Consider joining a support group such as Alcoholics Anonymous. Sharing your experiences with other people who face similar challenges may help you feel less overwhelmed. · Keep the numbers for these national suicide hotlines: 9-757-721-TALK (0-727.807.1119) and 7-063-FJPVRNX (3-100.399.6835). If you or someone you know talks about suicide or feeling hopeless, get help right away. When should you call for help? Call 911 anytime you think you may need emergency care. For example, call if:    · You feel you cannot stop from hurting yourself or someone else.     · You vomit many times and cannot stop.     · You vomit blood or what looks like coffee grounds.     · You have trouble breathing or are breathing very fast.     · Your heart beats more than 120 times a minute and will not slow down.     · You have chest pain.     · You have a seizure.     · You see or feel things that are not there (hallucinate). If you are caring for someone who is going through detox, call if:    · The person passes out (loses consciousness).     · The person sees or feels things that are not there and sees or hears the same things many times.     · The person is very agitated and will not calm down.     · The person becomes violent or threatens to be violent.     · The person has a seizure. Call your doctor now or seek immediate medical care if:    · You have a high fever.     · You have severe belly pain.     · You are very shaky. Watch closely for changes in your health, and be sure to contact your doctor if:    · You do not get better as expected. Where can you learn more?   Go to http://www.gray.com/  Enter D051 in the search box to learn more about \"Alcohol Detoxification and Withdrawal: Care Instructions. \"  Current as of: June 29, 2020               Content Version: 12.6  © 2657-2457 [employer] (deja mi). Care instructions adapted under license by [employer] (which disclaims liability or warranty for this information). If you have questions about a medical condition or this instruction, always ask your healthcare professional. [address] any warranty or liability for your use of this information. Diabetes Blood Sugar Emergencies: Your Action Plan  How can you prevent a blood sugar emergency? An important part of living with diabetes is keeping your blood sugar in your target range. You'll need to know what to do if it's too high or too low. Managing your blood sugar levels helps you avoid emergencies. This care sheet will teach you about the signs of high and low blood sugar. It will help you make an action plan with your doctor for when these signs occur. Low blood sugar is more likely to happen if you take certain medicines for diabetes. It can also happen if you skip a meal, drink alcohol, or exercise more than usual.  You may get high blood sugar if you eat differently than you normally do. One example is eating more carbohydrate than usual. Having a cold, the flu, or other sudden illness can also cause high blood sugar levels. Levels can also rise if you miss a dose of medicine. Any change in how you take your medicine may affect your blood sugar level. So it's important to work with your doctor before you make any changes. Check your blood sugar  Work with your doctor to fill in the blank spaces below that apply to you. Track your levels, know your target range, and write down ways you can get your blood sugar back in your target range. A log book can help you track your levels.  Take the book to all of your medical appointments. · Check your blood sugar _____ times a day, at these times:________________________________________________. (For example: Before meals, at bedtime, before exercise, during exercise, other.)  · Your blood sugar target range before a meal is ___________________. Your blood sugar target range after a meal is _______________________. · Do this--___________________________________________________--to get your blood sugar back within your safe range if your blood sugar results are _________________________________________. (For example: Less than 70 or above 250 mg/dL.)  Call your doctor when your blood sugar results are ___________________________________. (For example: Less than 70 or above 250 mg/dL.)  What are the symptoms of low and high blood sugar? Common symptoms of low blood sugar are sweating and feeling shaky, weak, hungry, or confused. Symptoms can start quickly. Common symptoms of high blood sugar are feeling very thirsty or very hungry. You may also pass urine more often than usual. You may have blurry vision and may lose weight without trying. But some people may have high or low blood sugar without having any symptoms. That's a good reason to check your blood sugar on a regular schedule. What should you do if you have symptoms? Work with your doctor to fill in the blank spaces below that apply to you. Low blood sugar  If you have symptoms of low blood sugar, check your blood sugar. If it's below _____ ( for example, below 70), eat or drink a quick-sugar food that has about 15 grams of carbohydrate. Your goal is to get your level back to your safe range. Check your blood sugar again 15 minutes later. If it's still not in your target range, take another 15 grams of carbohydrate and check your blood sugar again in 15 minutes. Repeat this until you reach your target. Then go back to your regular testing schedule. Children usually need less than 15 grams of carbohydrate. Check with your doctor or diabetes educator for the amount that is right for your child. When you have low blood sugar, it's best to stop or reduce any physical activity until your blood sugar is back in your target range and is stable. If you must stay active, eat or drink 30 grams of carbohydrate. Then check your blood sugar again in 15 minutes. If it's not in your target range, take another 30 grams of carbohydrates. Check your blood sugar again in 15 minutes. Keep doing this until you reach your target. You can then go back to your regular testing schedule. If your symptoms or blood sugar levels are getting worse or have not improved after 15 minutes, seek medical care right away. Here are some examples of quick-sugar foods with 15 grams of carbohydrate:  · 3 or 4 glucose tablets  · 1 tablespoon (3 teaspoons) table sugar  · ½ cup to ¾ cup (4 to 6 ounces) of fruit juice or regular (not diet) soda  · Hard candy (such as 6 Life Savers)  High blood sugar  If you have symptoms of high blood sugar, check your blood sugar. Your goal is to get your level back to your target range. If it's above ______ ( for example, above 250), follow these steps:  · If you missed a dose of your diabetes medicine, take it now. Take only the amount of medicine that you have been prescribed. Do not take more or less medicine. · Give yourself insulin if your doctor has prescribed it for high blood sugar. · Test for ketones, if the doctor told you to do so. If the results of the ketone test show a moderate-to-large amount of ketones, call the doctor for advice. · Wait 30 minutes after you take the extra insulin or the missed medicine. Check your blood sugar again. If your symptoms or blood sugar levels are getting worse or have not improved after taking these steps, seek medical care right away. Follow-up care is a key part of your treatment and safety.  Be sure to make and go to all appointments, and call your doctor if you are having problems. It's also a good idea to know your test results and keep a list of the medicines you take. Where can you learn more? Go to http://www.gray.com/  Enter X779 in the search box to learn more about \"Diabetes Blood Sugar Emergencies: Your Action Plan. \"  Current as of: December 20, 2019               Content Version: 12.6  © 2472-6482 [employer]Things. Care instructions adapted under license by [employer] (which disclaims liability or warranty for this information). If you have questions about a medical condition or this instruction, always ask your healthcare professional. [place] [address] any warranty or liability for your use of this information. Patient Education        Learning About Diabetes Food Guidelines  Your Care Instructions     Meal planning is important to manage diabetes. It helps keep your blood sugar at a target level (which you set with your doctor). You don't have to eat special foods. You can eat what your family eats, including sweets once in a while. But you do have to pay attention to how often you eat and how much you eat of certain foods. You may want to work with a dietitian or a certified diabetes educator (CDE) to help you plan meals and snacks. A dietitian or CDE can also help you lose weight if that is one of your goals. What should you know about eating carbs? Managing the amount of carbohydrate (carbs) you eat is an important part of healthy meals when you have diabetes. Carbohydrate is found in many foods. · Learn which foods have carbs. And learn the amounts of carbs in different foods. ? Bread, cereal, pasta, and rice have about 15 grams of carbs in a serving. A serving is 1 slice of bread (1 ounce), ½ cup of cooked cereal, or 1/3 cup of cooked pasta or rice. ? Fruits have 15 grams of carbs in a serving.  A serving is 1 small fresh fruit, such as an apple or orange; ½ of a banana; ½ cup of cooked or canned fruit; ½ cup of fruit juice; 1 cup of melon or raspberries; or 2 tablespoons of dried fruit. ? Milk and no-sugar-added yogurt have 15 grams of carbs in a serving. A serving is 1 cup of milk or 2/3 cup of no-sugar-added yogurt. ? Starchy vegetables have 15 grams of carbs in a serving. A serving is ½ cup of mashed potatoes or sweet potato; 1 cup winter squash; ½ of a small baked potato; ½ cup of cooked beans; or ½ cup cooked corn or green peas. · Learn how much carbs to eat each day and at each meal. A dietitian or CDE can teach you how to keep track of the amount of carbs you eat. This is called carbohydrate counting. · If you are not sure how to count carbohydrate grams, use the Plate Method to plan meals. It is a good, quick way to make sure that you have a balanced meal. It also helps you spread carbs throughout the day. ? Divide your plate by types of foods. Put non-starchy vegetables on half the plate, meat or other protein food on one-quarter of the plate, and a grain or starchy vegetable in the final quarter of the plate. To this you can add a small piece of fruit and 1 cup of milk or yogurt, depending on how many carbs you are supposed to eat at a meal.  · Try to eat about the same amount of carbs at each meal. Do not \"save up\" your daily allowance of carbs to eat at one meal.  · Proteins have very little or no carbs per serving. Examples of proteins are beef, chicken, turkey, fish, eggs, tofu, cheese, cottage cheese, and peanut butter. A serving size of meat is 3 ounces, which is about the size of a deck of cards. Examples of meat substitute serving sizes (equal to 1 ounce of meat) are 1/4 cup of cottage cheese, 1 egg, 1 tablespoon of peanut butter, and ½ cup of tofu. How can you eat out and still eat healthy? · Learn to estimate the serving sizes of foods that have carbohydrate.  If you measure food at home, it will be easier to estimate the amount in a serving of restaurant food.  · If the meal you order has too much carbohydrate (such as potatoes, corn, or baked beans), ask to have a low-carbohydrate food instead. Ask for a salad or green vegetables. · If you use insulin, check your blood sugar before and after eating out to help you plan how much to eat in the future. · If you eat more carbohydrate at a meal than you had planned, take a walk or do other exercise. This will help lower your blood sugar. What else should you know? · Limit saturated fat, such as the fat from meat and dairy products. This is a healthy choice because people who have diabetes are at higher risk of heart disease. So choose lean cuts of meat and nonfat or low-fat dairy products. Use olive or canola oil instead of butter or shortening when cooking. · Don't skip meals. Your blood sugar may drop too low if you skip meals and take insulin or certain medicines for diabetes. · Check with your doctor before you drink alcohol. Alcohol can cause your blood sugar to drop too low. Alcohol can also cause a bad reaction if you take certain diabetes medicines. Follow-up care is a key part of your treatment and safety. Be sure to make and go to all appointments, and call your doctor if you are having problems. It's also a good idea to know your test results and keep a list of the medicines you take. Where can you learn more? Go to http://pedrito-kandi.info/  Enter I147 in the search box to learn more about \"Learning About Diabetes Food Guidelines. \"  Current as of: December 20, 2019               Content Version: 12.6  © 8013-5294 [employer]. Care instructions adapted under license by [employer] (which disclaims liability or warranty for this information). If you have questions about a medical condition or this instruction, always ask your healthcare professional. [address] any warranty or liability for your use of this information.

## 2020-09-12 NOTE — PROGRESS NOTES
Pt is known to CM from previous care. Per chart review he was last discharged to [facility] for detox but unfortunately he was admitted through the ED a few days ago with positive blood etoh level. He has asked for support to stop drinking. Called referral to [facility] and they will contact pt and hopefully be able to connect him to resources. [facility] rep will call LMSW back after she has addressed plan with pt.

## 2020-09-12 NOTE — PROGRESS NOTES
[facility] CRITICAL CARE OUTREACH NURSE PROGRESS REPORT      SUBJECTIVE: Called to assess patient secondary to transfer from ICU. MEWS Score: 1 (09/12/20 0334)  Vitals:    09/11/20 2227 09/11/20 2320 09/12/20 0334 09/12/20 0742   BP: 127/78 113/81 113/73 106/72   Pulse: 79 74 80 83   Resp:  20 20 18   Temp:  98.1 °F (36.7 °C) 97.8 °F (36.6 °C) 97.2 °F (36.2 °C)   SpO2:  96% 96% 97%   Weight:       Height:               LAB DATA:    Recent Labs     09/12/20 0447 09/11/20  0911 09/11/20 0310 09/11/20  0011 09/10/20  2306    136 133* 130* 128*   K 3.3* 3.4* 2.9* 3.4* 5.2*    100 96* 87* 87*   CO2 29 29 19* 22 18*   AGAP 5* 7 18* 21* 23*   * 100 280* 411* 436*   BUN 6 7 8 9 8   CREA 0.61* 0.72* 0.70* 0.84 1.04   GFRAA >60 >60 >60 >60 >60   GFRNA >60 >60 >60 >60 >60   CA 8.2* 8.3 7.7* 8.7 9.1   MG 1.7* 2.3 1.9 1.7* 1.6*   PHOS 2.3*  --   --  3.4  --    ALB  --   --   --   --  3.7   TP  --   --   --   --  8.1   GLOB  --   --   --   --  4.4*   AGRAT  --   --   --   --  0.8*   ALT  --   --   --   --  26        Recent Labs     09/12/20 0447 09/11/20  0011   WBC 5.4 5.9   HGB 10.5* 12.8*   HCT 34.1* 40.6*    316          OBJECTIVE: On arrival to room, I found patient to be resting in bed. Pain Assessment  Pain Intensity 1: 6 (09/11/20 1921)  Pain Location 1: Abdomen  Pain Intervention(s) 1: Medication (see MAR)  Patient Stated Pain Goal: 2      ASSESSMENT:  Patient resting, no visible signs of distress. Breathing even and unlabored on room air. Complains of abdominal pain/ nausea, primary RN notified. Chart and labs reviewed. PLAN:  Will continue to monitor per outreach protocol.

## 2020-09-13 LAB
GLUCOSE BLD STRIP.AUTO-MCNC: 193 MG/DL (ref 65–100)
GLUCOSE BLD STRIP.AUTO-MCNC: 315 MG/DL (ref 65–100)
GLUCOSE BLD STRIP.AUTO-MCNC: 337 MG/DL (ref 65–100)
GLUCOSE BLD STRIP.AUTO-MCNC: 408 MG/DL (ref 65–100)

## 2020-09-13 PROCEDURE — 74011250636 HC RX REV CODE- 250/636: Performed by: INTERNAL MEDICINE

## 2020-09-13 PROCEDURE — 74011636637 HC RX REV CODE- 636/637: Performed by: HOSPITALIST

## 2020-09-13 PROCEDURE — 74011250637 HC RX REV CODE- 250/637: Performed by: FAMILY MEDICINE

## 2020-09-13 PROCEDURE — 65660000000 HC RM CCU STEPDOWN

## 2020-09-13 PROCEDURE — 74011250637 HC RX REV CODE- 250/637: Performed by: INTERNAL MEDICINE

## 2020-09-13 PROCEDURE — 82962 GLUCOSE BLOOD TEST: CPT

## 2020-09-13 PROCEDURE — 74011250637 HC RX REV CODE- 250/637: Performed by: HOSPITALIST

## 2020-09-13 PROCEDURE — 74011636637 HC RX REV CODE- 636/637: Performed by: INTERNAL MEDICINE

## 2020-09-13 RX ORDER — INSULIN GLARGINE 100 [IU]/ML
15 INJECTION, SOLUTION SUBCUTANEOUS DAILY
Status: DISCONTINUED | OUTPATIENT
Start: 2020-09-13 | End: 2020-09-14

## 2020-09-13 RX ORDER — CHLORDIAZEPOXIDE HYDROCHLORIDE 10 MG/1
10 CAPSULE, GELATIN COATED ORAL 2 TIMES DAILY
Status: DISCONTINUED | OUTPATIENT
Start: 2020-09-13 | End: 2020-09-13

## 2020-09-13 RX ORDER — ACETAMINOPHEN 500 MG
500 TABLET ORAL
Status: DISCONTINUED | OUTPATIENT
Start: 2020-09-13 | End: 2020-09-14 | Stop reason: HOSPADM

## 2020-09-13 RX ORDER — LORAZEPAM 1 MG/1
2 TABLET ORAL ONCE
Status: COMPLETED | OUTPATIENT
Start: 2020-09-13 | End: 2020-09-13

## 2020-09-13 RX ORDER — INSULIN LISPRO 100 [IU]/ML
8 INJECTION, SOLUTION INTRAVENOUS; SUBCUTANEOUS
Status: DISCONTINUED | OUTPATIENT
Start: 2020-09-13 | End: 2020-09-14

## 2020-09-13 RX ORDER — POLYETHYLENE GLYCOL 3350 17 G/17G
17 POWDER, FOR SOLUTION ORAL
Status: DISCONTINUED | OUTPATIENT
Start: 2020-09-13 | End: 2020-09-14 | Stop reason: HOSPADM

## 2020-09-13 RX ORDER — DICYCLOMINE HYDROCHLORIDE 10 MG/1
10 CAPSULE ORAL
Status: DISCONTINUED | OUTPATIENT
Start: 2020-09-13 | End: 2020-09-14 | Stop reason: HOSPADM

## 2020-09-13 RX ORDER — ONDANSETRON 4 MG/1
4 TABLET, ORALLY DISINTEGRATING ORAL
Status: DISCONTINUED | OUTPATIENT
Start: 2020-09-13 | End: 2020-09-14 | Stop reason: HOSPADM

## 2020-09-13 RX ORDER — PROMETHAZINE HYDROCHLORIDE 25 MG/1
12.5 TABLET ORAL
Status: DISCONTINUED | OUTPATIENT
Start: 2020-09-13 | End: 2020-09-13

## 2020-09-13 RX ORDER — ACETAMINOPHEN 650 MG/1
325 SUPPOSITORY RECTAL
Status: DISCONTINUED | OUTPATIENT
Start: 2020-09-13 | End: 2020-09-14 | Stop reason: HOSPADM

## 2020-09-13 RX ADMIN — INSULIN LISPRO 8 UNITS: 100 INJECTION, SOLUTION INTRAVENOUS; SUBCUTANEOUS at 21:11

## 2020-09-13 RX ADMIN — INSULIN LISPRO 8 UNITS: 100 INJECTION, SOLUTION INTRAVENOUS; SUBCUTANEOUS at 08:35

## 2020-09-13 RX ADMIN — SENNOSIDES 17.2 MG: 8.6 TABLET, FILM COATED ORAL at 08:35

## 2020-09-13 RX ADMIN — CHLORDIAZEPOXIDE HYDROCHLORIDE 10 MG: 10 CAPSULE ORAL at 14:17

## 2020-09-13 RX ADMIN — Medication 400 MG: at 17:13

## 2020-09-13 RX ADMIN — INSULIN GLARGINE 15 UNITS: 100 INJECTION, SOLUTION SUBCUTANEOUS at 17:13

## 2020-09-13 RX ADMIN — LORAZEPAM 1 MG: 1 TABLET ORAL at 21:11

## 2020-09-13 RX ADMIN — SUCRALFATE 1 G: 1 TABLET ORAL at 12:21

## 2020-09-13 RX ADMIN — INSULIN LISPRO 8 UNITS: 100 INJECTION, SOLUTION INTRAVENOUS; SUBCUTANEOUS at 12:21

## 2020-09-13 RX ADMIN — INSULIN GLARGINE 10 UNITS: 100 INJECTION, SOLUTION SUBCUTANEOUS at 08:35

## 2020-09-13 RX ADMIN — LORAZEPAM 2 MG: 1 TABLET ORAL at 22:53

## 2020-09-13 RX ADMIN — ONDANSETRON 4 MG: 4 TABLET, ORALLY DISINTEGRATING ORAL at 13:56

## 2020-09-13 RX ADMIN — INSULIN LISPRO 8 UNITS: 100 INJECTION, SOLUTION INTRAVENOUS; SUBCUTANEOUS at 17:14

## 2020-09-13 RX ADMIN — Medication 400 MG: at 08:35

## 2020-09-13 RX ADMIN — LORAZEPAM 1 MG: 1 TABLET ORAL at 15:20

## 2020-09-13 RX ADMIN — PROMETHAZINE HYDROCHLORIDE 12.5 MG: 25 TABLET ORAL at 01:57

## 2020-09-13 RX ADMIN — LORAZEPAM 1 MG: 1 TABLET ORAL at 18:22

## 2020-09-13 RX ADMIN — SENNOSIDES 17.2 MG: 8.6 TABLET, FILM COATED ORAL at 17:13

## 2020-09-13 RX ADMIN — CHLORDIAZEPOXIDE HYDROCHLORIDE 25 MG: 10 CAPSULE ORAL at 21:11

## 2020-09-13 RX ADMIN — CHLORDIAZEPOXIDE HYDROCHLORIDE 25 MG: 10 CAPSULE ORAL at 17:12

## 2020-09-13 RX ADMIN — LORAZEPAM 1 MG: 1 TABLET ORAL at 12:21

## 2020-09-13 RX ADMIN — DICYCLOMINE HYDROCHLORIDE 10 MG: 10 CAPSULE ORAL at 14:17

## 2020-09-13 RX ADMIN — LORAZEPAM 1 MG: 1 TABLET ORAL at 08:35

## 2020-09-13 RX ADMIN — INSULIN LISPRO 2 UNITS: 100 INJECTION, SOLUTION INTRAVENOUS; SUBCUTANEOUS at 17:13

## 2020-09-13 RX ADMIN — Medication 100 MG: at 08:35

## 2020-09-13 RX ADMIN — SUCRALFATE 1 G: 1 TABLET ORAL at 21:10

## 2020-09-13 RX ADMIN — SUCRALFATE 1 G: 1 TABLET ORAL at 17:13

## 2020-09-13 RX ADMIN — ENOXAPARIN SODIUM 40 MG: 40 INJECTION SUBCUTANEOUS at 08:35

## 2020-09-13 RX ADMIN — LORAZEPAM 1 MG: 1 TABLET ORAL at 01:57

## 2020-09-13 NOTE — PROGRESS NOTES
Hospitalist     Admit Date:  9/10/2020  6:35 PM   Name:  Orlin Muhammad   Age:  39 y.o.  :  1975   MRN:  [id number]   PCP:  None  Treatment Team: Attending Provider: Cuco Cervantes MD; Utilization Review: Winter Tripathi RN; Charge Nurse: Gabriel Katherinemike     [id number] alcoholic male with chronic pancreatitis and chronic abdominal pain likely in part due to alcohol gastritis is also diabetic requiring insulin. He is homeless. He admits that he recently gave up and is tired of being homeless and tired of being an alcoholic and presents with worsened chronic abdominal pain with nausea vomiting and evidence of diabetic ketoacidosis again. He has magnesium level 1.6 serum sodium 128 with a serum glucose of 436, serum potassium 5.2 creatinine is 1.04. He has a positive serum alcohol level and admits to drinking bourbon. He has chronic constipation. He reports that his stools have had significant amounts of oil floating and the past several days have been orange. Suspect he may have chronic pancreatic insufficiency. Most recent discharge insulin dosage was NPH 6 units twice daily and sliding scale prandial Humalog coverage as well as metformin. He admits to noncompliance at least several days all these medications because he is tired of being homeless and tired of being an alcoholic. Denies suicidal thoughts or ideations. Does smoke cigarettes    Today, asymptomatic. Pt states that he would drink to death and talked about jumping off a bridge. No details given when asked to elaborate.     Objective:     Patient Vitals for the past 24 hrs:   Temp Pulse Resp BP SpO2   20 0743 98.2 °F (36.8 °C) 65 20 118/80 98 %   20 0415 98 °F (36.7 °C) 71 18 107/67 97 %   20 0004 97.7 °F (36.5 °C) 77 18 120/76 96 %   20 1958 98.4 °F (36.9 °C) 83 18 107/71 96 %   20 1658    102/60    20 1558 97 °F (36.1 °C) 78 18 94/61    20 1126 97.9 °F (36.6 °C) 100 18 113/77 98 %     Oxygen Therapy  O2 Sat (%): 98 % (09/13/20 0743)  Pulse via Oximetry: 90 beats per minute (09/11/20 1154)  O2 Device: Room air (09/11/20 1155)    Intake/Output Summary (Last 24 hours) at 9/13/2020 1103  Last data filed at 9/13/2020 0452  Gross per 24 hour   Intake 1184 ml   Output    Net 1184 ml       Physical Exam:  General:    AAO3, mild distress  CV:   RRR. No m/r/g. Lungs:  CTAB. No wheezing, rhonchi, or rales. Abdomen: Soft, diffusely tender poorly localizednondistended. Extremities: Warm and dry. No cyanosis or edema. Neurologic: grossly intact. No tremors or shakes  Skin:     No rashes or jaundice. Normal coloration  Psych:  Normal mood and affect. I reviewed the labs, imaging, EKGs, telemetry, and other studies done this admission. Data Review:   Recent Results (from the past 24 hour(s))   GLUCOSE, POC    Collection Time: 09/12/20 11:11 AM   Result Value Ref Range    Glucose (POC) 210 (H) 65 - 100 mg/dL   GLUCOSE, POC    Collection Time: 09/12/20  4:55 PM   Result Value Ref Range    Glucose (POC) 182 (H) 65 - 100 mg/dL   GLUCOSE, POC    Collection Time: 09/12/20  8:47 PM   Result Value Ref Range    Glucose (POC) 361 (H) 65 - 100 mg/dL   GLUCOSE, POC    Collection Time: 09/13/20  1:49 AM   Result Value Ref Range    Glucose (POC) 193 (H) 65 - 100 mg/dL   GLUCOSE, POC    Collection Time: 09/13/20  8:03 AM   Result Value Ref Range    Glucose (POC) 337 (H) 65 - 100 mg/dL       All Micro Results     None          Assessment and Plan:     1- DKA due to severe non-compliance, hx of IDDM,  treated with protocol, resolved. 2- Bipolar disorder, see #5  3- Hypertension, controlled. 4- Alcoholism, no withdrawal spx despite he states he drinks 1L of hard liquors dailyy, on alcohol withdrawal preventive measures. 5- Suicidal ideation/ threat vs Malingering. Not acutely suicidal per my conversation with Psychiatry and patient can be discharged to a shelter.  When I asked him if patient would decide to leave AMA as he did in the past, he cleared him to leave. .    Dispo: shelter when available.     Signed:  Mariah Merlos MD

## 2020-09-13 NOTE — PROGRESS NOTES
Patient attempting to leave the floor again. Stopped by this RN. States he just feels coupped up. Explained he could walk around the floor but couldn't leave and if he did, he wouldn't be allowed back. He is adamant that if he leaves the floor for just 10 minutes he would come back. I explained to him that isnt allowed but he could walk around the floor if he wanted. Patient stated I wasn't allowed back in his room and slammed the patients room door on this RN.

## 2020-09-13 NOTE — PROGRESS NOTES
Date of Outreach Update:  Jacob Fuentes was seen and assessed. Pt sleeping. No s/s of distress noted. No concerns from Primary RN. MEWS Score: 1 (09/12/20 1958)  Vitals:    09/12/20 1126 09/12/20 1558 09/12/20 1658 09/12/20 1958   BP: 113/77 94/61 102/60 107/71   Pulse: 100 78  83   Resp: 18 18  18   Temp: 97.9 °F (36.6 °C) 97 °F (36.1 °C)  98.4 °F (36.9 °C)   SpO2: 98%      Weight:       Height:             Pain Assessment  Pain Intensity 1: 0 (09/12/20 0950)  Pain Location 1: Abdomen  Pain Intervention(s) 1: Medication (see MAR)  Patient Stated Pain Goal: 2      Previous Outreach assessment has been reviewed. There have been no significant clinical changes since the completion of the last dated Outreach assessment. Will continue to follow up per outreach protocol.     Signed By:   Kiersten Mattson RN    September 12, 2020 9:31 PM

## 2020-09-13 NOTE — PROGRESS NOTES
This RN went to this patients room to do a [address] on this patient due to the patient saying that if he were to leave here without going somewhere to get help that he would possible drink himself to death. I asked him the [address] questions and he scored a high suicide risk. While talking to him, he stated that he doesn't want to be this way. He states that he drinks because he is sad and depressed about his life and the way it is now. He said that this all started when his mom passed away. He was living with her and taking care of his brother with MS. He stated that once she passed away, they ended up losing the house because they thought her insurance/pension was paying the mortgage from what he was told. He stated once the house was taken away, he lost his brother to [facility] because of no place to live. He states that around that time his ex wife took his daughter and wouldn't let him see her anymore. He tried to get a  and find a way to get to see his daughter. He was told sometime during all this that because he hadn't paid child support, he states he didn't get told he owed, that he couldn't have rights to see his daughter or talk to her. He said he has been homeless for a while. He is crying and telling me he just wants some help. He tells me multiple times that he only drinks because it helps him not feel as depressed and down about his life but he doesn't want this life anymore and he doesn't want to drink. He states he has tried to get help for his depression before and no one has wanted to go through with it helping him so he has relied on alcohol to self medicate. He states near his tent where he is living, there is a bridge and that he has wanted to and gone near the bridge to jump off and kill himself but then he thinks of his daughter who is 15years old and wants to get better so he can try and see her again but ends up drinking more and more so he doesn't feel sad and depressed.  While having this conversation with him, he was crying and begging me to please help him get somewhere so he can feel better and so he doesn't want to kill himself all the time. Sitting with him and talking he seems very sincere about what is going on.

## 2020-09-13 NOTE — PROGRESS NOTES
[facility] [place] up about care plan. Pt has now been assessed by tele psych but was not recommended for involuntary committment. Pt is uninsured so any voluntary psych placment is not an option at this time. Spoke with [facility] rep, Aidan Ez [phone] who has been in contact with pt by phone. Pt cannot return to the [facility] again until after 9/30/2020 as they require a 30 day period after last admission. Ashley Madisyn has made contact with [facility] based programs in the area and they have no current opening but are willing to work with pt about a placement in the future (next 2-3 weeks). Luis Mprisca Mars has made a plan with pt for him to go to the rescue mission then to follow up daily with Lena by phone as she continues to advocate for pt and provide pt with instructions on what he needs to do to gain admission to a residential rehab program as a bed becomes available. Pt is to be transported to the [facility] at 4am to get in line for a bed. Trip scheduled in round trip. Floor should receive a call from transport just prior to the arrival to [facility] so staff can take pt to  at the front hospital entrance. Pt has been updated to this proposed plan and is in agreement.

## 2020-09-13 NOTE — PROGRESS NOTES
Date of Outreach Update:  Deandre Bloom was seen and assessed. MEWS Score: 1 (09/13/20 0004)  Vitals:    09/12/20 1558 09/12/20 1658 09/12/20 1958 09/13/20 0004   BP: 94/61 102/60 107/71 120/76   Pulse: 78  83 77   Resp: 18  18 18   Temp: 97 °F (36.1 °C)  98.4 °F (36.9 °C) 97.7 °F (36.5 °C)   SpO2:       Weight:       Height:             Pain Assessment  Pain Intensity 1: 0 (09/12/20 1940)  Pain Location 1: Abdomen  Pain Intervention(s) 1: Medication (see MAR)  Patient Stated Pain Goal: 0      Previous Outreach assessment has been reviewed. There have been no significant clinical changes since the completion of the last dated Outreach assessment. Will continue to follow up per outreach protocol.     Signed By:   Mark Carmona RN    September 13, 2020 4:48 AM

## 2020-09-13 NOTE — PROGRESS NOTES
Problem: Diabetes Self-Management  Goal: *Disease process and treatment process  Description: Define diabetes and identify own type of diabetes; list 3 options for treating diabetes. Outcome: Progressing Towards Goal  Goal: *Incorporating nutritional management into lifestyle  Description: Describe effect of type, amount and timing of food on blood glucose; list 3 methods for planning meals. Outcome: Progressing Towards Goal  Goal: *Incorporating physical activity into lifestyle  Description: State effect of exercise on blood glucose levels. Outcome: Progressing Towards Goal  Goal: *Developing strategies to promote health/change behavior  Description: Define the ABC's of diabetes; identify appropriate screenings, schedule and personal plan for screenings. Outcome: Progressing Towards Goal  Goal: *Using medications safely  Description: State effect of diabetes medications on diabetes; name diabetes medication taking, action and side effects. Outcome: Progressing Towards Goal  Goal: *Monitoring blood glucose, interpreting and using results  Description: Identify recommended blood glucose targets  and personal targets. Outcome: Progressing Towards Goal  Goal: *Prevention, detection, treatment of acute complications  Description: List symptoms of hyper- and hypoglycemia; describe how to treat low blood sugar and actions for lowering  high blood glucose level. Outcome: Progressing Towards Goal  Goal: *Prevention, detection and treatment of chronic complications  Description: Define the natural course of diabetes and describe the relationship of blood glucose levels to long term complications of diabetes.   Outcome: Progressing Towards Goal  Goal: *Developing strategies to address psychosocial issues  Description: Describe feelings about living with diabetes; identify support needed and support network  Outcome: Progressing Towards Goal  Goal: *Insulin pump training  Outcome: Progressing Towards Goal  Goal: *Sick day guidelines  Outcome: Progressing Towards Goal  Goal: *Patient Specific Goal (EDIT GOAL, INSERT TEXT)  Outcome: Progressing Towards Goal     Problem: Patient Education: Go to Patient Education Activity  Goal: Patient/Family Education  Outcome: Progressing Towards Goal     Problem: Falls - Risk of  Goal: *Absence of Falls  Description: Document Malik Led Fall Risk and appropriate interventions in the flowsheet. Outcome: Progressing Towards Goal  Note: Fall Risk Interventions:  Mobility Interventions: Bed/chair exit alarm         Medication Interventions: Bed/chair exit alarm, Patient to call before getting OOB, Teach patient to arise slowly    Elimination Interventions: Bed/chair exit alarm, Call light in reach, Patient to call for help with toileting needs, Stay With Me (per policy), Toilet paper/wipes in reach, Toileting schedule/hourly rounds              Problem: Patient Education: Go to Patient Education Activity  Goal: Patient/Family Education  Outcome: Progressing Towards Goal     Problem: Pressure Injury - Risk of  Goal: *Prevention of pressure injury  Description: Document Delta Scale and appropriate interventions in the flowsheet.   Outcome: Progressing Towards Goal  Note: Pressure Injury Interventions:  Sensory Interventions: Assess changes in LOC, Avoid rigorous massage over bony prominences    Moisture Interventions: Absorbent underpads, Check for incontinence Q2 hours and as needed    Activity Interventions: Increase time out of bed, Pressure redistribution bed/mattress(bed type), PT/OT evaluation    Mobility Interventions: HOB 30 degrees or less, Pressure redistribution bed/mattress (bed type)    Nutrition Interventions: Document food/fluid/supplement intake, Offer support with meals,snacks and hydration    Friction and Shear Interventions: HOB 30 degrees or less                Problem: Patient Education: Go to Patient Education Activity  Goal: Patient/Family Education  Outcome: Progressing Towards Goal     Problem: Suicide  Goal: *STG: Remains safe in hospital  Outcome: Progressing Towards Goal  Goal: *STG: Seeks staff when feelings of self harm or harm towards others arise  Outcome: Progressing Towards Goal  Goal: *STG: Attends activities and groups  Outcome: Progressing Towards Goal  Goal: *STG:  Verbalizes alternative ways of dealing with maladaptive feelings/behaviors  Outcome: Progressing Towards Goal  Goal: *STG/LTG: Complies with medication therapy  Outcome: Progressing Towards Goal  Goal: *STG/LTG: No longer expresses self destructive or suicidal thoughts  Outcome: Progressing Towards Goal  Goal: *LTG:  Identifies available community resources  Outcome: Progressing Towards Goal  Goal: *LTG:  Develops proactive suicide prevention plan  Outcome: Progressing Towards Goal  Goal: Interventions  Outcome: Progressing Towards Goal     Problem: Patient Education: Go to Patient Education Activity  Goal: Patient/Family Education  Outcome: Progressing Towards Goal

## 2020-09-13 NOTE — PROGRESS NOTES
While this RN was in another room giving meds, this patient left the floor. He was found by other staff outside near the ER construction zone heading towards the road. He was brought back to our floor by PT that seen him. He told the PT several times on the way back to the floor that he was suicidal and depressed. Once patient was brought back to the floor, I was informed he had left and what happened. I called the MD and let him know what happened with the patient. I was informed that the patient is going to be discharged at 0400 on Monday September 13, 2020 to the [facility], but if the patient wants to leave before then he can leave AMA. The patient has been made aware of this and is in agreement to stay until tomorrow at 0400 to go to a shelter so he can get the help he is requesting.

## 2020-09-13 NOTE — PROGRESS NOTES
Patient is showing SSx of alcohol withdrawal later this afternoon. O/E mild tremors, anxious. Agreed to have alcoholic beverages.   Benzo atc added

## 2020-09-13 NOTE — PROGRESS NOTES
Problem: Diabetes Self-Management  Goal: *Disease process and treatment process  Description: Define diabetes and identify own type of diabetes; list 3 options for treating diabetes. Outcome: Progressing Towards Goal  Goal: *Incorporating nutritional management into lifestyle  Description: Describe effect of type, amount and timing of food on blood glucose; list 3 methods for planning meals. Outcome: Progressing Towards Goal  Goal: *Incorporating physical activity into lifestyle  Description: State effect of exercise on blood glucose levels. Outcome: Progressing Towards Goal  Goal: *Developing strategies to promote health/change behavior  Description: Define the ABC's of diabetes; identify appropriate screenings, schedule and personal plan for screenings. Outcome: Progressing Towards Goal  Goal: *Using medications safely  Description: State effect of diabetes medications on diabetes; name diabetes medication taking, action and side effects. Outcome: Progressing Towards Goal  Goal: *Monitoring blood glucose, interpreting and using results  Description: Identify recommended blood glucose targets  and personal targets. Outcome: Progressing Towards Goal  Goal: *Prevention, detection, treatment of acute complications  Description: List symptoms of hyper- and hypoglycemia; describe how to treat low blood sugar and actions for lowering  high blood glucose level. Outcome: Progressing Towards Goal  Goal: *Prevention, detection and treatment of chronic complications  Description: Define the natural course of diabetes and describe the relationship of blood glucose levels to long term complications of diabetes.   Outcome: Progressing Towards Goal  Goal: *Developing strategies to address psychosocial issues  Description: Describe feelings about living with diabetes; identify support needed and support network  Outcome: Progressing Towards Goal     Problem: Patient Education: Go to Patient Education Activity  Goal: Patient/Family Education  Outcome: Progressing Towards Goal     Problem: Falls - Risk of  Goal: *Absence of Falls  Description: Document Adilene Powers Fall Risk and appropriate interventions in the flowsheet. Outcome: Progressing Towards Goal  Note: Fall Risk Interventions:  Mobility Interventions: Bed/chair exit alarm         Medication Interventions: Bed/chair exit alarm    Elimination Interventions: Call light in reach              Problem: Patient Education: Go to Patient Education Activity  Goal: Patient/Family Education  Outcome: Progressing Towards Goal     Problem: Pressure Injury - Risk of  Goal: *Prevention of pressure injury  Description: Document Delta Scale and appropriate interventions in the flowsheet.   Outcome: Progressing Towards Goal  Note: Pressure Injury Interventions:  Sensory Interventions: Assess changes in LOC, Avoid rigorous massage over bony prominences    Moisture Interventions: Absorbent underpads, Check for incontinence Q2 hours and as needed    Activity Interventions: Increase time out of bed    Mobility Interventions: HOB 30 degrees or less, PT/OT evaluation, Pressure redistribution bed/mattress (bed type)    Nutrition Interventions: Document food/fluid/supplement intake, Offer support with meals,snacks and hydration    Friction and Shear Interventions: HOB 30 degrees or less                Problem: Patient Education: Go to Patient Education Activity  Goal: Patient/Family Education  Outcome: Progressing Towards Goal

## 2020-09-14 ENCOUNTER — HOSPITAL ENCOUNTER (EMERGENCY)
Age: 45
Discharge: HOME | End: 2020-09-15
Attending: EMERGENCY MEDICINE
Payer: SUBSIDIZED

## 2020-09-14 VITALS
HEIGHT: 68 IN | TEMPERATURE: 98.6 F | DIASTOLIC BLOOD PRESSURE: 68 MMHG | OXYGEN SATURATION: 99 % | SYSTOLIC BLOOD PRESSURE: 104 MMHG | WEIGHT: 145 LBS | RESPIRATION RATE: 18 BRPM | BODY MASS INDEX: 21.98 KG/M2 | HEART RATE: 75 BPM

## 2020-09-14 VITALS
BODY MASS INDEX: 23.04 KG/M2 | HEIGHT: 68 IN | DIASTOLIC BLOOD PRESSURE: 73 MMHG | WEIGHT: 152 LBS | OXYGEN SATURATION: 99 % | TEMPERATURE: 97.7 F | SYSTOLIC BLOOD PRESSURE: 105 MMHG | HEART RATE: 79 BPM | RESPIRATION RATE: 19 BRPM

## 2020-09-14 DIAGNOSIS — E86.0 DEHYDRATION: Primary | ICD-10-CM

## 2020-09-14 DIAGNOSIS — R73.9 HYPERGLYCEMIA: ICD-10-CM

## 2020-09-14 DIAGNOSIS — F10.920 ALCOHOLIC INTOXICATION WITHOUT COMPLICATION (HCC): ICD-10-CM

## 2020-09-14 LAB
ALBUMIN SERPL-MCNC: 3.3 G/DL (ref 3.5–5)
ALBUMIN/GLOB SERPL: 1.1 {RATIO} (ref 1.2–3.5)
ALP SERPL-CCNC: 87 U/L (ref 50–136)
ALT SERPL-CCNC: 16 U/L (ref 12–65)
ANION GAP SERPL CALC-SCNC: 12 MMOL/L (ref 7–16)
AST SERPL-CCNC: 9 U/L (ref 15–37)
BASOPHILS # BLD: 0 K/UL (ref 0–0.2)
BASOPHILS NFR BLD: 0 % (ref 0–2)
BILIRUB SERPL-MCNC: 0.1 MG/DL (ref 0.2–1.1)
BUN SERPL-MCNC: 13 MG/DL (ref 6–23)
CALCIUM SERPL-MCNC: 8.9 MG/DL (ref 8.3–10.4)
CHLORIDE SERPL-SCNC: 96 MMOL/L (ref 98–107)
CO2 SERPL-SCNC: 22 MMOL/L (ref 21–32)
CREAT SERPL-MCNC: 0.95 MG/DL (ref 0.8–1.5)
DIFFERENTIAL METHOD BLD: ABNORMAL
EOSINOPHIL # BLD: 0.1 K/UL (ref 0–0.8)
EOSINOPHIL NFR BLD: 1 % (ref 0.5–7.8)
ERYTHROCYTE [DISTWIDTH] IN BLOOD BY AUTOMATED COUNT: 19.1 % (ref 11.9–14.6)
ETHANOL SERPL-MCNC: 258 MG/DL
GLOBULIN SER CALC-MCNC: 2.9 G/DL (ref 2.3–3.5)
GLUCOSE BLD STRIP.AUTO-MCNC: 265 MG/DL (ref 65–100)
GLUCOSE BLD STRIP.AUTO-MCNC: 415 MG/DL (ref 65–100)
GLUCOSE BLD STRIP.AUTO-MCNC: 468 MG/DL (ref 65–100)
GLUCOSE SERPL-MCNC: 417 MG/DL (ref 65–100)
HCT VFR BLD AUTO: 36.2 % (ref 41.1–50.3)
HGB BLD-MCNC: 11.5 G/DL (ref 13.6–17.2)
IMM GRANULOCYTES # BLD AUTO: 0.1 K/UL (ref 0–0.5)
IMM GRANULOCYTES NFR BLD AUTO: 1 % (ref 0–5)
LACTATE SERPL-SCNC: 2.4 MMOL/L (ref 0.4–2)
LYMPHOCYTES # BLD: 1.6 K/UL (ref 0.5–4.6)
LYMPHOCYTES NFR BLD: 22 % (ref 13–44)
MCH RBC QN AUTO: 26.4 PG (ref 26.1–32.9)
MCHC RBC AUTO-ENTMCNC: 31.8 G/DL (ref 31.4–35)
MCV RBC AUTO: 83.2 FL (ref 79.6–97.8)
MONOCYTES # BLD: 0.5 K/UL (ref 0.1–1.3)
MONOCYTES NFR BLD: 7 % (ref 4–12)
NEUTS SEG # BLD: 5.1 K/UL (ref 1.7–8.2)
NEUTS SEG NFR BLD: 69 % (ref 43–78)
NRBC # BLD: 0 K/UL (ref 0–0.2)
PLATELET # BLD AUTO: 225 K/UL (ref 150–450)
PMV BLD AUTO: 10 FL (ref 9.4–12.3)
POTASSIUM SERPL-SCNC: 3.6 MMOL/L (ref 3.5–5.1)
PROT SERPL-MCNC: 6.2 G/DL (ref 6.3–8.2)
RBC # BLD AUTO: 4.35 M/UL (ref 4.23–5.6)
SODIUM SERPL-SCNC: 130 MMOL/L (ref 136–145)
WBC # BLD AUTO: 7.4 K/UL (ref 4.3–11.1)

## 2020-09-14 PROCEDURE — 80053 COMPREHEN METABOLIC PANEL: CPT

## 2020-09-14 PROCEDURE — 85025 COMPLETE CBC W/AUTO DIFF WBC: CPT

## 2020-09-14 PROCEDURE — 96374 THER/PROPH/DIAG INJ IV PUSH: CPT

## 2020-09-14 PROCEDURE — 74011250636 HC RX REV CODE- 250/636: Performed by: INTERNAL MEDICINE

## 2020-09-14 PROCEDURE — 74011250636 HC RX REV CODE- 250/636: Performed by: HOSPITALIST

## 2020-09-14 PROCEDURE — 90686 IIV4 VACC NO PRSV 0.5 ML IM: CPT | Performed by: HOSPITALIST

## 2020-09-14 PROCEDURE — 80307 DRUG TEST PRSMV CHEM ANLYZR: CPT

## 2020-09-14 PROCEDURE — 83605 ASSAY OF LACTIC ACID: CPT

## 2020-09-14 PROCEDURE — 74011636637 HC RX REV CODE- 636/637: Performed by: INTERNAL MEDICINE

## 2020-09-14 PROCEDURE — 96361 HYDRATE IV INFUSION ADD-ON: CPT

## 2020-09-14 PROCEDURE — 74011636637 HC RX REV CODE- 636/637: Performed by: EMERGENCY MEDICINE

## 2020-09-14 PROCEDURE — 74011250636 HC RX REV CODE- 250/636: Performed by: EMERGENCY MEDICINE

## 2020-09-14 PROCEDURE — 90471 IMMUNIZATION ADMIN: CPT

## 2020-09-14 PROCEDURE — 74011250637 HC RX REV CODE- 250/637: Performed by: HOSPITALIST

## 2020-09-14 PROCEDURE — 74011250637 HC RX REV CODE- 250/637: Performed by: INTERNAL MEDICINE

## 2020-09-14 PROCEDURE — 99285 EMERGENCY DEPT VISIT HI MDM: CPT

## 2020-09-14 PROCEDURE — 74011636637 HC RX REV CODE- 636/637: Performed by: HOSPITALIST

## 2020-09-14 PROCEDURE — 82962 GLUCOSE BLOOD TEST: CPT

## 2020-09-14 PROCEDURE — 81003 URINALYSIS AUTO W/O SCOPE: CPT

## 2020-09-14 RX ORDER — INSULIN GLARGINE 100 [IU]/ML
25 INJECTION, SOLUTION SUBCUTANEOUS DAILY
Status: DISCONTINUED | OUTPATIENT
Start: 2020-09-14 | End: 2020-09-14 | Stop reason: HOSPADM

## 2020-09-14 RX ORDER — INSULIN LISPRO 100 [IU]/ML
12 INJECTION, SOLUTION INTRAVENOUS; SUBCUTANEOUS
Status: DISCONTINUED | OUTPATIENT
Start: 2020-09-14 | End: 2020-09-14 | Stop reason: HOSPADM

## 2020-09-14 RX ADMIN — Medication 400 MG: at 09:02

## 2020-09-14 RX ADMIN — SODIUM CHLORIDE 1000 ML: 900 INJECTION, SOLUTION INTRAVENOUS at 18:18

## 2020-09-14 RX ADMIN — CHLORDIAZEPOXIDE HYDROCHLORIDE 25 MG: 10 CAPSULE ORAL at 09:07

## 2020-09-14 RX ADMIN — SODIUM CHLORIDE 1000 ML: 900 INJECTION, SOLUTION INTRAVENOUS at 19:31

## 2020-09-14 RX ADMIN — SUCRALFATE 1 G: 1 TABLET ORAL at 05:57

## 2020-09-14 RX ADMIN — INSULIN LISPRO 15 UNITS: 100 INJECTION, SOLUTION INTRAVENOUS; SUBCUTANEOUS at 09:03

## 2020-09-14 RX ADMIN — INSULIN HUMAN 10 UNITS: 100 INJECTION, SOLUTION PARENTERAL at 19:30

## 2020-09-14 RX ADMIN — LORAZEPAM 1 MG: 1 TABLET ORAL at 05:57

## 2020-09-14 RX ADMIN — Medication 100 MG: at 09:02

## 2020-09-14 RX ADMIN — INFLUENZA VIRUS VACCINE 0.5 ML: 15; 15; 15; 15 SUSPENSION INTRAMUSCULAR at 03:42

## 2020-09-14 RX ADMIN — ENOXAPARIN SODIUM 40 MG: 40 INJECTION SUBCUTANEOUS at 09:04

## 2020-09-14 RX ADMIN — INSULIN GLARGINE 25 UNITS: 100 INJECTION, SOLUTION SUBCUTANEOUS at 09:03

## 2020-09-14 RX ADMIN — SODIUM CHLORIDE, SODIUM LACTATE, POTASSIUM CHLORIDE, AND CALCIUM CHLORIDE 1000 ML: 600; 310; 30; 20 INJECTION, SOLUTION INTRAVENOUS at 21:57

## 2020-09-14 NOTE — PROGRESS NOTES
Problem: Diabetes Self-Management  Goal: *Disease process and treatment process  Description: Define diabetes and identify own type of diabetes; list 3 options for treating diabetes. 9/14/2020 0349 by Haydee Domingo RN  Outcome: Resolved/Met  9/14/2020 0321 by Haydee Domingo RN  Outcome: Progressing Towards Goal  Goal: *Incorporating nutritional management into lifestyle  Description: Describe effect of type, amount and timing of food on blood glucose; list 3 methods for planning meals. 9/14/2020 0349 by Haydee Domingo RN  Outcome: Resolved/Met  9/14/2020 0321 by Haydee Domingo RN  Outcome: Progressing Towards Goal  Goal: *Incorporating physical activity into lifestyle  Description: State effect of exercise on blood glucose levels. 9/14/2020 0349 by Haydee Domingo RN  Outcome: Resolved/Met  9/14/2020 0321 by Haydee Domingo RN  Outcome: Progressing Towards Goal  Goal: *Developing strategies to promote health/change behavior  Description: Define the ABC's of diabetes; identify appropriate screenings, schedule and personal plan for screenings. 9/14/2020 0349 by Haydee Domingo RN  Outcome: Resolved/Met  9/14/2020 0321 by Haydee Doimngo RN  Outcome: Progressing Towards Goal  Goal: *Using medications safely  Description: State effect of diabetes medications on diabetes; name diabetes medication taking, action and side effects. 9/14/2020 0349 by Haydee Domingo RN  Outcome: Resolved/Met  9/14/2020 0321 by Haydee Domingo RN  Outcome: Progressing Towards Goal  Goal: *Monitoring blood glucose, interpreting and using results  Description: Identify recommended blood glucose targets  and personal targets.   9/14/2020 0349 by Haydee Domingo RN  Outcome: Resolved/Met  9/14/2020 0321 by Haydee Domingo RN  Outcome: Progressing Towards Goal  Goal: *Prevention, detection, treatment of acute complications  Description: List symptoms of hyper- and hypoglycemia; describe how to treat low blood sugar and actions for lowering  high blood glucose level. 9/14/2020 0349 by Sidra Domingo RN  Outcome: Resolved/Met  9/14/2020 0321 by Sidra Domingo RN  Outcome: Progressing Towards Goal  Goal: *Prevention, detection and treatment of chronic complications  Description: Define the natural course of diabetes and describe the relationship of blood glucose levels to long term complications of diabetes. 9/14/2020 0349 by Sidra Domingo RN  Outcome: Resolved/Met  9/14/2020 0321 by Sidra Domingo RN  Outcome: Progressing Towards Goal  Goal: *Developing strategies to address psychosocial issues  Description: Describe feelings about living with diabetes; identify support needed and support network  9/14/2020 0349 by Sidra Domingo RN  Outcome: Resolved/Met  9/14/2020 0321 by Sidra Domingo RN  Outcome: Progressing Towards Goal  Goal: *Insulin pump training  Outcome: Resolved/Met  Goal: *Sick day guidelines  Outcome: Resolved/Met  Goal: *Patient Specific Goal (EDIT GOAL, INSERT TEXT)  Outcome: Resolved/Met     Problem: Patient Education: Go to Patient Education Activity  Goal: Patient/Family Education  9/14/2020 0349 by Kieran Vega RN  Outcome: Resolved/Met  9/14/2020 0321 by Sidra Domingo RN  Outcome: Progressing Towards Goal     Problem: Falls - Risk of  Goal: *Absence of Falls  Description: Document Suzy Kelly Fall Risk and appropriate interventions in the flowsheet.   9/14/2020 0349 by Sidra Domingo RN  Outcome: Resolved/Met  9/14/2020 0321 by Sidra Domingo RN  Outcome: Progressing Towards Goal  Note: Fall Risk Interventions:  Mobility Interventions: Bed/chair exit alarm         Medication Interventions: Bed/chair exit alarm    Elimination Interventions: Bed/chair exit alarm              Problem: Patient Education: Go to Patient Education Activity  Goal: Patient/Family Education  9/14/2020 0349 by Kieran Vega RN  Outcome: Resolved/Met  9/14/2020 0321 by Sidra Domingo RN  Outcome: Progressing Towards Goal     Problem: Pressure Injury - Risk of  Goal: *Prevention of pressure injury  Description: Document Delta Scale and appropriate interventions in the flowsheet.   9/14/2020 0349 by Mary Domingo RN  Outcome: Resolved/Met  9/14/2020 0321 by Mary Domingo RN  Outcome: Progressing Towards Goal  Note: Pressure Injury Interventions:  Sensory Interventions: Assess changes in LOC    Moisture Interventions: Absorbent underpads, Check for incontinence Q2 hours and as needed    Activity Interventions: Increase time out of bed    Mobility Interventions: HOB 30 degrees or less, Pressure redistribution bed/mattress (bed type)    Nutrition Interventions: Document food/fluid/supplement intake, Offer support with meals,snacks and hydration    Friction and Shear Interventions: HOB 30 degrees or less                Problem: Patient Education: Go to Patient Education Activity  Goal: Patient/Family Education  9/14/2020 0349 by Abram Pringle RN  Outcome: Resolved/Met  9/14/2020 0321 by Mary Domingo RN  Outcome: Progressing Towards Goal     Problem: Suicide  Goal: *STG: Remains safe in hospital  9/14/2020 0349 by Mary Domingo RN  Outcome: Resolved/Met  9/14/2020 0321 by Mary Domingo RN  Outcome: Progressing Towards Goal  Goal: *STG: Seeks staff when feelings of self harm or harm towards others arise  9/14/2020 0349 by Mary Domingo RN  Outcome: Resolved/Met  9/14/2020 0321 by Mary Domingo RN  Outcome: Progressing Towards Goal  Goal: *STG: Attends activities and groups  9/14/2020 0349 by Mary Domingo RN  Outcome: Resolved/Met  9/14/2020 0321 by Mary Domingo RN  Outcome: Progressing Towards Goal  Goal: *STG:  Verbalizes alternative ways of dealing with maladaptive feelings/behaviors  9/14/2020 0349 by Mary Domingo RN  Outcome: Resolved/Met  9/14/2020 0321 by Mary Domingo RN  Outcome: Progressing Towards Goal  Goal: *STG/LTG: Complies with medication therapy  9/14/2020 0349 by Genevieve Domingo RN  Outcome: Resolved/Met  9/14/2020 0321 by Genevieve Domingo RN  Outcome: Progressing Towards Goal  Goal: *STG/LTG: No longer expresses self destructive or suicidal thoughts  9/14/2020 0349 by Genevieve Domingo RN  Outcome: Resolved/Met  9/14/2020 0321 by Genevieve Domingo RN  Outcome: Progressing Towards Goal  Goal: *LTG:  Identifies available community resources  9/14/2020 0349 by Genevieve Domingo RN  Outcome: Resolved/Met  9/14/2020 0321 by Genevieve Domingo RN  Outcome: Progressing Towards Goal  Goal: *LTG:  Develops proactive suicide prevention plan  9/14/2020 0349 by Genevieve Domingo RN  Outcome: Resolved/Met  9/14/2020 0321 by Genevieve Domingo RN  Outcome: Progressing Towards Goal  Goal: Interventions  9/14/2020 0349 by Genevieve Domingo RN  Outcome: Resolved/Met  9/14/2020 0321 by Genevieve Domingo RN  Outcome: Progressing Towards Goal     Problem: Patient Education: Go to Patient Education Activity  Goal: Patient/Family Education  9/14/2020 0349 by Maye Meade RN  Outcome: Resolved/Met  9/14/2020 0321 by Genevieve Domingo RN  Outcome: Progressing Towards Goal

## 2020-09-14 NOTE — ED PROVIDER NOTES
Mask was worn during the entire patient examination. Corinne Valdes is a 39 y.o. male who presents to the ED with a chief complaint of alcohol intoxication and high glucose. Patient has history of frequent admissions for DKA. He was just discharged today for this and went out and drank alcohol did not take insulin and feels very poorly as a result. He denies any recent vomiting. He states he is very hungry would like to be fed and would like some Librium. He has a long history of noncompliance with his diabetes and does not routinely take his insulin. No fever or chills. Past Medical History:   Diagnosis Date    Bipolar 1 disorder ([facility] [address]     Depression     Diabetes ([facility]     PTSD (post-traumatic stress disorder)        No past surgical history on file. No family history on file. Social History     Socioeconomic History    Marital status: SINGLE     Spouse name: Not on file    Number of children: Not on file    Years of education: Not on file    Highest education level: Not on file   Occupational History    Not on file   Social Needs    Financial resource strain: Not on file    Food insecurity     Worry: Not on file     Inability: Not on file    Transportation needs     Medical: Not on file     Non-medical: Not on file   Tobacco Use    Smoking status: Current Every Day Smoker     Packs/day: 1.00    Smokeless tobacco: Never Used   Substance and Sexual Activity    Alcohol use:  Yes    Drug use: Not Currently    Sexual activity: Not on file   Lifestyle    Physical activity     Days per week: Not on file     Minutes per session: Not on file    Stress: Not on file   Relationships    Social connections     Talks on phone: Not on file     Gets together: Not on file     Attends Zoroastrian service: Not on file     Active member of club or organization: Not on file     Attends meetings of clubs or organizations: Not on file     Relationship status: Not on file    Intimate partner violence     Fear of current or ex partner: Not on file     Emotionally abused: Not on file     Physically abused: Not on file     Forced sexual activity: Not on file   Other Topics Concern    Not on file   Social History Narrative    Not on file         ALLERGIES: Toradol [ketorolac]    Review of Systems   Constitutional: Positive for fatigue. Negative for appetite change, chills and fever. Respiratory: Negative for cough, chest tightness, shortness of breath, wheezing and stridor. Cardiovascular: Negative for chest pain and palpitations. Gastrointestinal: Positive for nausea. Negative for abdominal pain, diarrhea and vomiting. Genitourinary: Negative for difficulty urinating and dysuria. Musculoskeletal: Negative for arthralgias, joint swelling, myalgias, neck pain and neck stiffness. Skin: Negative for color change and wound. Neurological: Negative for dizziness and weakness. Psychiatric/Behavioral: Negative for agitation and confusion. The patient is nervous/anxious. All other systems reviewed and are negative. Vitals:    09/14/20 1810 09/14/20 1851 09/14/20 1853   BP: (!) 82/55 (!) 81/53 (!) 80/55   Pulse: 97 86 87   Resp: 16  18   Temp: 98.6 °F (37 °C)     SpO2: 98% 99% 98%   Weight: 65.8 kg (145 lb)     Height: 5' 8\" (1.727 m)              Physical Exam  Vitals signs and nursing note reviewed. Constitutional:       General: He is not in acute distress. Appearance: Normal appearance. He is well-developed. He is not ill-appearing, toxic-appearing or diaphoretic. HENT:      Head: Normocephalic and atraumatic. Eyes:      General: No scleral icterus. Conjunctiva/sclera: Conjunctivae normal.   Neck:      Trachea: No tracheal deviation. Cardiovascular:      Rate and Rhythm: Normal rate and regular rhythm. Pulses: Normal pulses. Heart sounds: No murmur. No friction rub. No gallop. Pulmonary:      Effort: Pulmonary effort is normal. No respiratory distress. Breath sounds: No stridor. No wheezing, rhonchi or rales. Abdominal:      General: Abdomen is flat. There is no distension. Palpations: There is no mass. Tenderness: There is no abdominal tenderness. There is no guarding or rebound. Hernia: No hernia is present. Skin:     General: Skin is warm. Capillary Refill: Capillary refill takes less than 2 seconds. Coloration: Skin is not jaundiced or pale. Findings: No bruising or rash. Neurological:      General: No focal deficit present. Mental Status: He is alert and oriented to person, place, and time. Mental status is at baseline. Cranial Nerves: No cranial nerve deficit. Sensory: No sensory deficit. Motor: No weakness. Coordination: Coordination normal.   Psychiatric:         Mood and Affect: Mood normal.         Behavior: Behavior normal.          MDM  Number of Diagnoses or Management Options  Diagnosis management comments: Patient currently has bicarb 22 sugar has improved he is no sign of DKA right now. He did go out and drink alcohol and get himself dehydrated this is likely the reason he was hypotensive. I have given him fluids and his vital signs have normalized. Normal temp and white blood cell count. I will refer to favor and patient instructed that he must start taking his meds and stop drinking alcohol. Sarah Knowles MD; 9/14/2020 @11:18 PM Voice dictation software was used during the making of this note. This software is not perfect and grammatical and other typographical errors may be present.   This note has not been proofread for errors.  ===================================================================          Amount and/or Complexity of Data Reviewed  Clinical lab tests: ordered and reviewed (Results for orders placed or performed during the hospital encounter of 09/14/20  -CBC WITH AUTOMATED DIFF       Result                      Value             Ref Range           WBC 7.4               4.3 - 11.1 K*       RBC                         4.35              4.23 - 5.6 M*       HGB                         11.5 (L)          13.6 - 17.2 *       HCT                         36.2 (L)          41.1 - 50.3 %       MCV                         83.2              79.6 - 97.8 *       MCH                         26.4              26.1 - 32.9 *       MCHC                        31.8              31.4 - 35.0 *       RDW                         19.1 (H)          11.9 - 14.6 %       PLATELET                    225               150 - 450 K/*       MPV                         10.0              9.4 - 12.3 FL       ABSOLUTE NRBC               0.00              0.0 - 0.2 K/*       DF                          AUTOMATED                             NEUTROPHILS                 69                43 - 78 %           LYMPHOCYTES                 22                13 - 44 %           MONOCYTES                   7                 4.0 - 12.0 %        EOSINOPHILS                 1                 0.5 - 7.8 %         BASOPHILS                   0                 0.0 - 2.0 %         IMMATURE GRANULOCYTES       1                 0.0 - 5.0 %         ABS. NEUTROPHILS            5.1               1.7 - 8.2 K/*       ABS. LYMPHOCYTES            1.6               0.5 - 4.6 K/*       ABS. MONOCYTES              0.5               0.1 - 1.3 K/*       ABS. EOSINOPHILS            0.1               0.0 - 0.8 K/*       ABS. BASOPHILS              0.0               0.0 - 0.2 K/*       ABS. IMM.  GRANS.            0.1               0.0 - 0.5 K/*  -METABOLIC PANEL, COMPREHENSIVE       Result                      Value             Ref Range           Sodium                      130 (L)           136 - 145 mm*       Potassium                   3.6               3.5 - 5.1 mm*       Chloride                    96 (L)            98 - 107 mmo*       CO2                         22                21 - 32 mmol*       Anion gap 12                7 - 16 mmol/L       Glucose                     417 (H)           65 - 100 mg/*       BUN                         13                6 - 23 MG/DL        Creatinine                  0.95              0.8 - 1.5 MG*       GFR est AA                  >60               >60 ml/min/1*       GFR est non-AA              >60               >60 ml/min/1*       Calcium                     8.9               8.3 - 10.4 M*       Bilirubin, total            PENDING           MG/DL               ALT (SGPT)                  16                12 - 65 U/L         AST (SGOT)                  9 (L)             15 - 37 U/L         Alk.  phosphatase            PENDING           U/L                 Protein, total              PENDING           g/dL                Albumin                     3.3 (L)           3.5 - 5.0 g/*       Globulin                    PENDING           g/dL                A-G Ratio                   PENDING                          -ETHYL ALCOHOL       Result                      Value             Ref Range           ALCOHOL(ETHYL),SERUM        258               MG/DL          -GLUCOSE, POC       Result                      Value             Ref Range           Glucose (POC)               415 (H)           65 - 100 mg/* )  Tests in the radiology section of CPT®: ordered and reviewed           Procedures

## 2020-09-14 NOTE — PROGRESS NOTES
New RideShare created for 11:00 discharge with Linda. Patient wants to discharge to the area where he reside which he states is behind [place] on [address]. Reiterated the importance of contacting his [address] liaison. He states that he does have the number to contact her. Care Management Interventions  PCP Verified by CM: (free clnic referral)  Mode of Transport at Discharge: Other (see comment)(Lyft [facility])  Hospital Transport Time of Discharge: 1100  Transition of Care Consult (CM Consult): Discharge Planning(Pt is pending for medicaid. Gets medications through [employer].  )  Discharge Durable Medical Equipment: No  Physical Therapy Consult: No  Occupational Therapy Consult: No  Speech Therapy Consult: No  Current Support Network: Other, Shelter(Pt is homeless.   He has accessed shelters in the past. but currently stays outdoors near shelter.  )  Confirm Follow Up Transport: (walks or public transport when able)  Name of the Patient Representative Who was Provided with a Choice of Provider and Agrees with the Discharge Plan: pt  [address] Provided?: No  Discharge Location  Discharge Placement: Home

## 2020-09-14 NOTE — PROGRESS NOTES
Roundtrip was not scheduled properly and patient missed out on getting to the rescue mission at the appropriate time. Charge nurse scheduled a roundtrip this morning after his anticipated leave time passed. The rescue mission was called and said if the patient wasn't already there he would not be able to get in today. Roundtrip was then cancelled. Hospitalist notified. Patient's belongings are packed, discharge paperwork has been signed and flu shot was given.

## 2020-09-14 NOTE — PROGRESS NOTES
Per attending and nursing note it appears Ride Share was not set up appropriately and patient missed transport to rescue mission. Call placed to [facility] -  Alisha Vazquez at [phone] in order to attempt to arrange new placement. Primary RN updated.

## 2020-09-14 NOTE — PROGRESS NOTES
Problem: Diabetes Self-Management  Goal: *Disease process and treatment process  Description: Define diabetes and identify own type of diabetes; list 3 options for treating diabetes. Outcome: Progressing Towards Goal  Goal: *Incorporating nutritional management into lifestyle  Description: Describe effect of type, amount and timing of food on blood glucose; list 3 methods for planning meals. Outcome: Progressing Towards Goal  Goal: *Incorporating physical activity into lifestyle  Description: State effect of exercise on blood glucose levels. Outcome: Progressing Towards Goal  Goal: *Developing strategies to promote health/change behavior  Description: Define the ABC's of diabetes; identify appropriate screenings, schedule and personal plan for screenings. Outcome: Progressing Towards Goal  Goal: *Using medications safely  Description: State effect of diabetes medications on diabetes; name diabetes medication taking, action and side effects. Outcome: Progressing Towards Goal  Goal: *Monitoring blood glucose, interpreting and using results  Description: Identify recommended blood glucose targets  and personal targets. Outcome: Progressing Towards Goal  Goal: *Prevention, detection, treatment of acute complications  Description: List symptoms of hyper- and hypoglycemia; describe how to treat low blood sugar and actions for lowering  high blood glucose level. Outcome: Progressing Towards Goal  Goal: *Prevention, detection and treatment of chronic complications  Description: Define the natural course of diabetes and describe the relationship of blood glucose levels to long term complications of diabetes.   Outcome: Progressing Towards Goal  Goal: *Developing strategies to address psychosocial issues  Description: Describe feelings about living with diabetes; identify support needed and support network  Outcome: Progressing Towards Goal     Problem: Patient Education: Go to Patient Education Activity  Goal: Patient/Family Education  Outcome: Progressing Towards Goal     Problem: Falls - Risk of  Goal: *Absence of Falls  Description: Document Meng Mckeon Fall Risk and appropriate interventions in the flowsheet. Outcome: Progressing Towards Goal  Note: Fall Risk Interventions:  Mobility Interventions: Bed/chair exit alarm         Medication Interventions: Bed/chair exit alarm    Elimination Interventions: Bed/chair exit alarm              Problem: Patient Education: Go to Patient Education Activity  Goal: Patient/Family Education  Outcome: Progressing Towards Goal     Problem: Pressure Injury - Risk of  Goal: *Prevention of pressure injury  Description: Document Delta Scale and appropriate interventions in the flowsheet.   Outcome: Progressing Towards Goal  Note: Pressure Injury Interventions:  Sensory Interventions: Assess changes in LOC    Moisture Interventions: Absorbent underpads, Check for incontinence Q2 hours and as needed    Activity Interventions: Increase time out of bed    Mobility Interventions: HOB 30 degrees or less, Pressure redistribution bed/mattress (bed type)    Nutrition Interventions: Document food/fluid/supplement intake, Offer support with meals,snacks and hydration    Friction and Shear Interventions: HOB 30 degrees or less                Problem: Patient Education: Go to Patient Education Activity  Goal: Patient/Family Education  Outcome: Progressing Towards Goal     Problem: Suicide  Goal: *STG: Remains safe in hospital  Outcome: Progressing Towards Goal  Goal: *STG: Seeks staff when feelings of self harm or harm towards others arise  Outcome: Progressing Towards Goal  Goal: *STG: Attends activities and groups  Outcome: Progressing Towards Goal  Goal: *STG:  Verbalizes alternative ways of dealing with maladaptive feelings/behaviors  Outcome: Progressing Towards Goal  Goal: *STG/LTG: Complies with medication therapy  Outcome: Progressing Towards Goal  Goal: *STG/LTG: No longer expresses self destructive or suicidal thoughts  Outcome: Progressing Towards Goal  Goal: *LTG:  Identifies available community resources  Outcome: Progressing Towards Goal  Goal: *LTG:  Develops proactive suicide prevention plan  Outcome: Progressing Towards Goal  Goal: Interventions  Outcome: Progressing Towards Goal     Problem: Patient Education: Go to Patient Education Activity  Goal: Patient/Family Education  Outcome: Progressing Towards Goal

## 2020-09-14 NOTE — ED TRIAGE NOTES
Pt arrives via EMS. Sugar 575, pt drank a liter of alcohol and was screaming out for pain behind the [place]. Patient is very well known to this ER.  Pt also complaining of shoulder pain apparently due to sleeping on that shoulder on the ground

## 2020-09-15 NOTE — ED NOTES
I have reviewed discharge instructions with the patient. The patient verbalized understanding. Patient left ED via Discharge Method: ambulatory to Home. Opportunity for questions and clarification provided. Patient given 0 scripts. Given patient sugar free snack pack and sugar free josias mist as patient complained he was hungry. He asked for a ride however unable to provide an address. Escorted via security off property. To continue your aftercare when you leave the hospital, you may receive an automated call from our care team to check in on how you are doing. This is a free service and part of our promise to provide the best care and service to meet your aftercare needs.  If you have questions, or wish to unsubscribe from this service please call [phone]. Thank you for Choosing our [facility] Emergency Department.

## 2020-09-15 NOTE — DISCHARGE INSTRUCTIONS
Berlin Hannah   [phone]   They have multiple resources to help you. Please call.  [email or web address]     Other local resources that are available are: The [address] [email or web address]. org for inpatient and outpatient substance abuse issues. Jay [phone]  Medication assisted treatment    [facility]  [phone]     Suicide Hotline   1-466-KOFZAGA     Narcotics Anonymous   www.na. org  Alcoholics Anonymous  www.aa.org

## 2020-09-18 ENCOUNTER — APPOINTMENT (OUTPATIENT)
Dept: GENERAL RADIOLOGY | Age: 45
End: 2020-09-18
Attending: EMERGENCY MEDICINE
Payer: SUBSIDIZED

## 2020-09-18 ENCOUNTER — HOSPITAL ENCOUNTER (EMERGENCY)
Age: 45
Discharge: HOME | End: 2020-09-18
Attending: EMERGENCY MEDICINE
Payer: SUBSIDIZED

## 2020-09-18 VITALS
HEIGHT: 68 IN | WEIGHT: 145 LBS | OXYGEN SATURATION: 98 % | TEMPERATURE: 98.1 F | HEART RATE: 87 BPM | RESPIRATION RATE: 17 BRPM | DIASTOLIC BLOOD PRESSURE: 55 MMHG | SYSTOLIC BLOOD PRESSURE: 97 MMHG | BODY MASS INDEX: 21.98 KG/M2

## 2020-09-18 DIAGNOSIS — F10.220 ALCOHOL DEPENDENCE WITH UNCOMPLICATED INTOXICATION (HCC): Primary | ICD-10-CM

## 2020-09-18 DIAGNOSIS — R73.9 HYPERGLYCEMIA: ICD-10-CM

## 2020-09-18 DIAGNOSIS — K86.0 ALCOHOL-INDUCED CHRONIC PANCREATITIS (HCC): ICD-10-CM

## 2020-09-18 LAB
ALBUMIN SERPL-MCNC: 3.3 G/DL (ref 3.5–5)
ALBUMIN/GLOB SERPL: 1.1 {RATIO} (ref 1.2–3.5)
ALP SERPL-CCNC: 94 U/L (ref 50–136)
ALT SERPL-CCNC: 17 U/L (ref 12–65)
ANION GAP SERPL CALC-SCNC: 14 MMOL/L (ref 7–16)
AST SERPL-CCNC: 13 U/L (ref 15–37)
BILIRUB SERPL-MCNC: 0.4 MG/DL (ref 0.2–1.1)
BUN SERPL-MCNC: 7 MG/DL (ref 6–23)
CALCIUM SERPL-MCNC: 8.5 MG/DL (ref 8.3–10.4)
CHLORIDE SERPL-SCNC: 92 MMOL/L (ref 98–107)
CO2 SERPL-SCNC: 23 MMOL/L (ref 21–32)
CREAT SERPL-MCNC: 1.01 MG/DL (ref 0.8–1.5)
ERYTHROCYTE [DISTWIDTH] IN BLOOD BY AUTOMATED COUNT: 19.1 % (ref 11.9–14.6)
ETHANOL SERPL-MCNC: 283 MG/DL
GLOBULIN SER CALC-MCNC: 3.1 G/DL (ref 2.3–3.5)
GLUCOSE BLD STRIP.AUTO-MCNC: 277 MG/DL (ref 65–100)
GLUCOSE BLD STRIP.AUTO-MCNC: 474 MG/DL (ref 65–100)
GLUCOSE BLD STRIP.AUTO-MCNC: 536 MG/DL (ref 65–100)
GLUCOSE SERPL-MCNC: 592 MG/DL (ref 65–100)
HCT VFR BLD AUTO: 35.3 % (ref 41.1–50.3)
HGB BLD-MCNC: 10.9 G/DL (ref 13.6–17.2)
LIPASE SERPL-CCNC: 22 U/L (ref 73–393)
MCH RBC QN AUTO: 25.9 PG (ref 26.1–32.9)
MCHC RBC AUTO-ENTMCNC: 30.9 G/DL (ref 31.4–35)
MCV RBC AUTO: 83.8 FL (ref 79.6–97.8)
NRBC # BLD: 0 K/UL (ref 0–0.2)
PLATELET # BLD AUTO: 236 K/UL (ref 150–450)
PMV BLD AUTO: 10.5 FL (ref 9.4–12.3)
POTASSIUM SERPL-SCNC: 3.5 MMOL/L (ref 3.5–5.1)
PROT SERPL-MCNC: 6.4 G/DL (ref 6.3–8.2)
RBC # BLD AUTO: 4.21 M/UL (ref 4.23–5.6)
SODIUM SERPL-SCNC: 129 MMOL/L (ref 136–145)
WBC # BLD AUTO: 5.6 K/UL (ref 4.3–11.1)

## 2020-09-18 PROCEDURE — 80053 COMPREHEN METABOLIC PANEL: CPT

## 2020-09-18 PROCEDURE — 96376 TX/PRO/DX INJ SAME DRUG ADON: CPT

## 2020-09-18 PROCEDURE — 80307 DRUG TEST PRSMV CHEM ANLYZR: CPT

## 2020-09-18 PROCEDURE — 81003 URINALYSIS AUTO W/O SCOPE: CPT

## 2020-09-18 PROCEDURE — 96361 HYDRATE IV INFUSION ADD-ON: CPT

## 2020-09-18 PROCEDURE — 74022 RADEX COMPL AQT ABD SERIES: CPT

## 2020-09-18 PROCEDURE — 74011636637 HC RX REV CODE- 636/637: Performed by: EMERGENCY MEDICINE

## 2020-09-18 PROCEDURE — 74011250636 HC RX REV CODE- 250/636: Performed by: EMERGENCY MEDICINE

## 2020-09-18 PROCEDURE — 74011250637 HC RX REV CODE- 250/637: Performed by: EMERGENCY MEDICINE

## 2020-09-18 PROCEDURE — 82962 GLUCOSE BLOOD TEST: CPT

## 2020-09-18 PROCEDURE — 85027 COMPLETE CBC AUTOMATED: CPT

## 2020-09-18 PROCEDURE — 99285 EMERGENCY DEPT VISIT HI MDM: CPT

## 2020-09-18 PROCEDURE — 83690 ASSAY OF LIPASE: CPT

## 2020-09-18 PROCEDURE — 96374 THER/PROPH/DIAG INJ IV PUSH: CPT

## 2020-09-18 RX ORDER — SODIUM CHLORIDE 9 MG/ML
1000 INJECTION, SOLUTION INTRAVENOUS ONCE
Status: DISCONTINUED | OUTPATIENT
Start: 2020-09-18 | End: 2020-09-18 | Stop reason: HOSPADM

## 2020-09-18 RX ORDER — KETOROLAC TROMETHAMINE 15 MG/ML
10 INJECTION, SOLUTION INTRAMUSCULAR; INTRAVENOUS
Status: DISCONTINUED | OUTPATIENT
Start: 2020-09-18 | End: 2020-09-18

## 2020-09-18 RX ORDER — MAG HYDROX/ALUMINUM HYD/SIMETH 200-200-20
30 SUSPENSION, ORAL (FINAL DOSE FORM) ORAL
Status: COMPLETED | OUTPATIENT
Start: 2020-09-18 | End: 2020-09-18

## 2020-09-18 RX ORDER — FAMOTIDINE 20 MG/1
20 TABLET, FILM COATED ORAL
Status: COMPLETED | OUTPATIENT
Start: 2020-09-18 | End: 2020-09-18

## 2020-09-18 RX ORDER — SODIUM CHLORIDE 9 MG/ML
1000 INJECTION, SOLUTION INTRAVENOUS ONCE
Status: COMPLETED | OUTPATIENT
Start: 2020-09-18 | End: 2020-09-18

## 2020-09-18 RX ORDER — ACETAMINOPHEN 325 MG/1
650 TABLET ORAL
Status: COMPLETED | OUTPATIENT
Start: 2020-09-18 | End: 2020-09-18

## 2020-09-18 RX ADMIN — INSULIN HUMAN 10 UNITS: 100 INJECTION, SOLUTION PARENTERAL at 14:26

## 2020-09-18 RX ADMIN — ALUMINUM HYDROXIDE, MAGNESIUM HYDROXIDE, AND SIMETHICONE 30 ML: 200; 200; 20 SUSPENSION ORAL at 13:39

## 2020-09-18 RX ADMIN — FAMOTIDINE 20 MG: 20 TABLET, FILM COATED ORAL at 12:49

## 2020-09-18 RX ADMIN — SODIUM CHLORIDE 1000 ML: 900 INJECTION, SOLUTION INTRAVENOUS at 12:45

## 2020-09-18 RX ADMIN — INSULIN HUMAN 10 UNITS: 100 INJECTION, SOLUTION PARENTERAL at 12:45

## 2020-09-18 RX ADMIN — ACETAMINOPHEN 650 MG: 325 TABLET, FILM COATED ORAL at 13:39

## 2020-09-18 NOTE — ED PROVIDER NOTES
Chronic alcoholic who presents to the emergency department every few days for the last several months presents with c/o high blood sugar and etoh intoxication. Drank 1 Liter of Vodka today. Non compliant with insulin and homeless. The history is provided by the patient. High Blood Sugar    This is a recurrent problem. The current episode started yesterday. The problem occurs constantly. The problem has not changed since onset. Associated with: alcohol intoxication. The pain is located in the epigastric region. The quality of the pain is aching. The pain is severe. Pertinent negatives include no fever, no diarrhea, no melena, no nausea, no vomiting, no dysuria, no headaches, no myalgias, no chest pain and no back pain. Nothing worsens the pain. The pain is relieved by nothing. Past Medical History:   Diagnosis Date    [facility] ([facility]     Depression     Diabetes ([facility]     PTSD (post-traumatic stress disorder)        No past surgical history on file. No family history on file. Social History     Socioeconomic History    Marital status: SINGLE     Spouse name: Not on file    Number of children: Not on file    Years of education: Not on file    Highest education level: Not on file   Occupational History    Not on file   Social Needs    Financial resource strain: Not on file    Food insecurity     Worry: Not on file     Inability: Not on file    Transportation needs     Medical: Not on file     Non-medical: Not on file   Tobacco Use    Smoking status: Current Every Day Smoker     Packs/day: 1.00    Smokeless tobacco: Never Used   Substance and Sexual Activity    Alcohol use:  Yes    Drug use: Not Currently    Sexual activity: Not on file   Lifestyle    Physical activity     Days per week: Not on file     Minutes per session: Not on file    Stress: Not on file   Relationships    Social connections     Talks on phone: Not on file     Gets together: Not on file     Attends Voodoo service: Not on file     Active member of club or organization: Not on file     Attends meetings of clubs or organizations: Not on file     Relationship status: Not on file    Intimate partner violence     Fear of current or ex partner: Not on file     Emotionally abused: Not on file     Physically abused: Not on file     Forced sexual activity: Not on file   Other Topics Concern    Not on file   Social History Narrative    Not on file         ALLERGIES: Toradol [ketorolac]    Review of Systems   Constitutional: Negative for chills and fever. HENT: Negative for congestion, rhinorrhea and sore throat. Eyes: Negative for photophobia and redness. Respiratory: Negative for cough and shortness of breath. Cardiovascular: Negative for chest pain and leg swelling. Gastrointestinal: Positive for abdominal pain. Negative for diarrhea, melena, nausea and vomiting. Endocrine: Positive for polyuria. Negative for polydipsia. Genitourinary: Negative for dysuria. Musculoskeletal: Negative for back pain and myalgias. Neurological: Negative for weakness, numbness and headaches. Vitals:    09/18/20 1208   BP: (!) 94/53   Pulse: 89   Resp: 18   Temp: 98 °F (36.7 °C)   SpO2: 98%   Weight: 65.8 kg (145 lb)   Height: 5' 8\" (1.727 m)            Physical Exam  Vitals signs and nursing note reviewed. Constitutional:       Appearance: He is well-developed. HENT:      Mouth/Throat:      Mouth: Mucous membranes are moist.      Pharynx: No oropharyngeal exudate. Eyes:      Conjunctiva/sclera: Conjunctivae normal.      Pupils: Pupils are equal, round, and reactive to light. Neck:      Musculoskeletal: Neck supple. Cardiovascular:      Rate and Rhythm: Normal rate and regular rhythm. Heart sounds: Normal heart sounds. Pulmonary:      Effort: Pulmonary effort is normal.      Breath sounds: Normal breath sounds. Abdominal:      General: Bowel sounds are normal. There is no distension. Palpations: Abdomen is soft. Tenderness: There is abdominal tenderness (diffuse). There is no guarding or rebound. Musculoskeletal: Normal range of motion. General: No tenderness. Lymphadenopathy:      Cervical: No cervical adenopathy. Skin:     General: Skin is warm and dry. Neurological:      Mental Status: He is alert and oriented to person, place, and time. MDM  Number of Diagnoses or Management Options  Diagnosis management comments: Evaluate for and hopefully rule out DKA. Hydrate. Insulin provided. Exclude acute pancreatitis. Doubt other significant abdominal pathology. Chronic alcoholic with chronic alcoholic and diabetic complications with noncompliance and personality disorder. Patient presents to the emergency department either here or at [facility] nearly every day. 4:28 PM  Blood sugar improved. Patient has burned all his bridges had not followed up as directed multiple times in the past.  No further efforts today with respect to homelessness or alcoholism.        Amount and/or Complexity of Data Reviewed  Clinical lab tests: ordered and reviewed (Results for orders placed or performed during the hospital encounter of 09/18/20  -CBC W/O DIFF       Result                      Value             Ref Range           WBC                         5.6               4.3 - 11.1 K*       RBC                         4.21 (L)          4.23 - 5.6 M*       HGB                         10.9 (L)          13.6 - 17.2 *       HCT                         35.3 (L)          41.1 - 50.3 %       MCV                         83.8              79.6 - 97.8 *       MCH                         25.9 (L)          26.1 - 32.9 *       MCHC                        30.9 (L)          31.4 - 35.0 *       RDW                         19.1 (H)          11.9 - 14.6 %       PLATELET                    236               150 - 450 K/*       MPV                         10.5              9.4 - 12.3 FL       ABSOLUTE NRBC               0.00              0.0 - 0.2 K/*  -METABOLIC PANEL, COMPREHENSIVE       Result                      Value             Ref Range           Sodium                      129 (L)           136 - 145 mm*       Potassium                   3.5               3.5 - 5.1 mm*       Chloride                    92 (L)            98 - 107 mmo*       CO2                         23                21 - 32 mmol*       Anion gap                   14                7 - 16 mmol/L       Glucose                     592 (HH)          65 - 100 mg/*       BUN                         7                 6 - 23 MG/DL        Creatinine                  1.01              0.8 - 1.5 MG*       GFR est AA                  >60               >60 ml/min/1*       GFR est non-AA              >60               >60 ml/min/1*       Calcium                     8.5               8.3 - 10.4 M*       Bilirubin, total            0.4               0.2 - 1.1 MG*       ALT (SGPT)                  17                12 - 65 U/L         AST (SGOT)                  13 (L)            15 - 37 U/L         Alk.  phosphatase            94                50 - 136 U/L        Protein, total              6.4               6.3 - 8.2 g/*       Albumin                     3.3 (L)           3.5 - 5.0 g/*       Globulin                    3.1               2.3 - 3.5 g/*       A-G Ratio                   1.1 (L)           1.2 - 3.5      -ETHYL ALCOHOL       Result                      Value             Ref Range           ALCOHOL(ETHYL),SERUM        283               MG/DL          -LIPASE       Result                      Value             Ref Range           Lipase                      22 (L)            73 - 393 U/L   -GLUCOSE, POC       Result                      Value             Ref Range           Glucose (POC)               536 (HH)          65 - 100 mg/*  -GLUCOSE, POC       Result                      Value             Ref Range           Glucose (POC) 474 (HH)          65 - 100 mg/*  -GLUCOSE, POC       Result                      Value             Ref Range           Glucose (POC)               277 (H)           65 - 100 mg/*  )  Tests in the radiology section of CPT®: ordered and reviewed (Xr Abd Acute W 1 V Chest    Result Date: 9/18/2020  Clinical History: The Male patient is 39years old  presenting with symptoms of abdominal pain. Comparison:  Two-view chest 7/27/2020 and abdominal series 7/18/2020 Findings: The lungs are free of acute infiltrate. There is no pleural effusion or pneumothorax. The cardiomediastinal silhouette is within normal limits. There is no acute osseous abnormality. A remote posterior left sixth rib fractures demonstrated The bowel gas pattern is nonspecific. There is no soft tissue mass or organomegaly. No abnormal calcifications are identified. There is no free intraperitoneal air. No acute osseous abnormality is demonstrated. Impression: 1. No acute cardiopulmonary disease. 2.   No free air or bowel obstruction.  CPT code(s) 21458,69729     )           Procedures

## 2020-09-18 NOTE — ED NOTES
I have reviewed discharge instructions with the patient. The patient verbalized understanding. Patient left ED via Discharge Method: ambulatory to Home with (insert name of family/friend, self, transport SELF). Opportunity for questions and clarification provided. Patient given 0 scripts. To continue your aftercare when you leave the hospital, you may receive an automated call from our care team to check in on how you are doing.  This is a free service and part of our promise to provide the best care and service to meet your aftercare needs. \" If you have questions, or wish to unsubscribe from this service please call [phone].  Thank you for Choosing our [facility] Emergency Department.

## 2020-09-18 NOTE — ED TRIAGE NOTES
Pt arrives via EMS today for hyperglycemia. Pt states he drank 1L of vodka before calling EMS today. BGL was 597 with EMS.

## 2020-09-18 NOTE — DISCHARGE INSTRUCTIONS
Patient Education   Gradually reduce your alcohol consumption over 1 to 2 weeks then stop. Be compliant with your diabetic medication. Follow-up with Natalie Delay as previously directed. Learning About Alcohol Use Disorder  What is alcohol use disorder? Alcohol use disorder means that a person drinks alcohol even though it causes harm to themselves or others. It can range from mild to severe. The more signs of this disorder you have, the more severe it may be. Moderate to severe alcohol use disorder is sometimes called addiction. People who have it may find it hard to control their use of alcohol. People who have this disorder may argue with others about how much they're drinking. Their job may be affected because of drinking. They may drink when it's dangerous or illegal, such as when they drive. They also may have a strong need, or craving, to drink. They may feel like they must drink just to get by. Their drinking may increase their risk of getting hurt or being in a car crash. Over time, drinking too much alcohol may cause health problems. These may include high blood pressure, liver problems, or problems with digestion. What are the signs? Maybe you've wondered about your alcohol habits, or how to tell if your drinking is becoming a problem. Here are some of the signs of alcohol use disorder. You may have it if you have two or more of the following signs:  · You drink larger amounts of alcohol than you ever meant to. Or you've been drinking for a longer time than you ever meant to. · You can't cut down or control your use. Or you constantly wish you could cut down. · You spend a lot of time getting or drinking alcohol or recovering from its effects. · You have strong cravings for alcohol. · You can no longer do your main jobs at work, at school, or at home. · You keep drinking alcohol, even though your use hurts your relationships.   · You have stopped doing important activities because of your alcohol use. · You drink alcohol in situations where doing so is dangerous. · You keep drinking alcohol even though you know it's causing health problems. · You need more and more alcohol to get the same effect, or you get less effect from the same amount over time. This is called tolerance. · You have uncomfortable symptoms when you stop drinking alcohol or use less. This is called withdrawal.  Alcohol use disorder can range from mild to severe. The more signs you have, the more severe the disorder may be. Moderate to severe alcohol use disorder is sometimes called addiction. You might not realize that your drinking is a problem. You might not drink large amounts when you drink. Or you might go for days or weeks between drinking episodes. But even if you don't drink very often, your drinking could still be harmful and put you at risk. How is alcohol use disorder treated? Getting help is up to you. But you don't have to do it alone. There are many people and kinds of treatments that can help. Treatment for alcohol use disorder can include:  · Group therapy, one or more types of counseling, and alcohol education. · Medicines that help to:  ? Reduce withdrawal symptoms and help you safely stop drinking. ? Reduce cravings for alcohol. · Support groups. These groups include Alcoholics Anonymous and [employer] (Self-Management and Recovery Training). Some people are able to stop or cut back on drinking with help from a counselor. People who have moderate to severe alcohol use disorder may need medical treatment. They may need to stay in a hospital or treatment center. You may have a treatment team to help you. This team may include a psychologist or psychiatrist, counselors, doctors, social workers, nurses, and a . A  helps plan and manage your treatment. Follow-up care is a key part of your treatment and safety.  Be sure to make and go to all appointments, and call your doctor if you are having problems. It's also a good idea to know your test results and keep a list of the medicines you take. Where can you learn more? Go to http://pedrito-kandi.info/  Enter H758 in the search box to learn more about \"Learning About Alcohol Use Disorder. \"  Current as of: June 29, 2020               Content Version: 12.6  © 7241-7017 [employer], [facility]. Care instructions adapted under license by [employer] (which disclaims liability or warranty for this information). If you have questions about a medical condition or this instruction, always ask your healthcare professional. [place] [address] any warranty or liability for your use of this information.

## 2020-09-19 ENCOUNTER — HOSPITAL ENCOUNTER (EMERGENCY)
Age: 45
Discharge: HOME | End: 2020-09-19
Attending: EMERGENCY MEDICINE
Payer: SUBSIDIZED

## 2020-09-19 ENCOUNTER — HOSPITAL ENCOUNTER (EMERGENCY)
Age: 45
Discharge: HOME | End: 2020-09-20
Attending: EMERGENCY MEDICINE
Payer: SUBSIDIZED

## 2020-09-19 VITALS
RESPIRATION RATE: 16 BRPM | SYSTOLIC BLOOD PRESSURE: 102 MMHG | DIASTOLIC BLOOD PRESSURE: 59 MMHG | OXYGEN SATURATION: 98 % | HEART RATE: 108 BPM | BODY MASS INDEX: 21.98 KG/M2 | WEIGHT: 145 LBS | TEMPERATURE: 98.3 F | HEIGHT: 68 IN

## 2020-09-19 VITALS
DIASTOLIC BLOOD PRESSURE: 91 MMHG | RESPIRATION RATE: 19 BRPM | SYSTOLIC BLOOD PRESSURE: 151 MMHG | HEART RATE: 88 BPM | TEMPERATURE: 97.9 F | OXYGEN SATURATION: 98 %

## 2020-09-19 DIAGNOSIS — T68.XXXA HYPOTHERMIA, INITIAL ENCOUNTER: ICD-10-CM

## 2020-09-19 DIAGNOSIS — R73.9 HYPERGLYCEMIA: Primary | ICD-10-CM

## 2020-09-19 DIAGNOSIS — F10.920 ALCOHOLIC INTOXICATION WITHOUT COMPLICATION (HCC): ICD-10-CM

## 2020-09-19 DIAGNOSIS — F10.10 ETOH ABUSE: ICD-10-CM

## 2020-09-19 LAB
ALBUMIN SERPL-MCNC: 3.6 G/DL (ref 3.5–5)
ALBUMIN/GLOB SERPL: 1 {RATIO} (ref 1.2–3.5)
ALP SERPL-CCNC: 100 U/L (ref 50–136)
ALT SERPL-CCNC: 20 U/L (ref 12–65)
AMPHET UR QL SCN: NEGATIVE
ANION GAP SERPL CALC-SCNC: 13 MMOL/L (ref 7–16)
ANION GAP SERPL CALC-SCNC: 15 MMOL/L (ref 7–16)
ANION GAP SERPL CALC-SCNC: 19 MMOL/L (ref 7–16)
AST SERPL-CCNC: 28 U/L (ref 15–37)
BARBITURATES UR QL SCN: NEGATIVE
BASOPHILS # BLD: 0 K/UL (ref 0–0.2)
BASOPHILS # BLD: 0.1 K/UL (ref 0–0.2)
BASOPHILS NFR BLD: 1 % (ref 0–2)
BASOPHILS NFR BLD: 1 % (ref 0–2)
BENZODIAZ UR QL: NEGATIVE
BILIRUB SERPL-MCNC: 0.3 MG/DL (ref 0.2–1.1)
BUN SERPL-MCNC: 4 MG/DL (ref 6–23)
BUN SERPL-MCNC: 5 MG/DL (ref 6–23)
BUN SERPL-MCNC: 7 MG/DL (ref 6–23)
CALCIUM SERPL-MCNC: 7.9 MG/DL (ref 8.3–10.4)
CALCIUM SERPL-MCNC: 8.8 MG/DL (ref 8.3–10.4)
CALCIUM SERPL-MCNC: 9.2 MG/DL (ref 8.3–10.4)
CANNABINOIDS UR QL SCN: NEGATIVE
CHLORIDE SERPL-SCNC: 100 MMOL/L (ref 98–107)
CHLORIDE SERPL-SCNC: 96 MMOL/L (ref 98–107)
CHLORIDE SERPL-SCNC: 98 MMOL/L (ref 98–107)
CO2 SERPL-SCNC: 20 MMOL/L (ref 21–32)
CO2 SERPL-SCNC: 23 MMOL/L (ref 21–32)
CO2 SERPL-SCNC: 23 MMOL/L (ref 21–32)
COCAINE UR QL SCN: NEGATIVE
CREAT SERPL-MCNC: 0.72 MG/DL (ref 0.8–1.5)
CREAT SERPL-MCNC: 0.82 MG/DL (ref 0.8–1.5)
CREAT SERPL-MCNC: 1.11 MG/DL (ref 0.8–1.5)
DIFFERENTIAL METHOD BLD: ABNORMAL
DIFFERENTIAL METHOD BLD: ABNORMAL
EOSINOPHIL # BLD: 0 K/UL (ref 0–0.8)
EOSINOPHIL # BLD: 0.1 K/UL (ref 0–0.8)
EOSINOPHIL NFR BLD: 1 % (ref 0.5–7.8)
EOSINOPHIL NFR BLD: 1 % (ref 0.5–7.8)
ERYTHROCYTE [DISTWIDTH] IN BLOOD BY AUTOMATED COUNT: 19 % (ref 11.9–14.6)
ERYTHROCYTE [DISTWIDTH] IN BLOOD BY AUTOMATED COUNT: 19.9 % (ref 11.9–14.6)
ETHANOL SERPL-MCNC: 180 MG/DL
ETHANOL SERPL-MCNC: 291 MG/DL
GLOBULIN SER CALC-MCNC: 3.7 G/DL (ref 2.3–3.5)
GLUCOSE BLD STRIP.AUTO-MCNC: 471 MG/DL (ref 65–100)
GLUCOSE SERPL-MCNC: 379 MG/DL (ref 65–100)
GLUCOSE SERPL-MCNC: 392 MG/DL (ref 65–100)
GLUCOSE SERPL-MCNC: 649 MG/DL (ref 65–100)
HCT VFR BLD AUTO: 39.6 % (ref 41.1–50.3)
HCT VFR BLD AUTO: 44 % (ref 41.1–50.3)
HGB BLD-MCNC: 12.3 G/DL (ref 13.6–17.2)
HGB BLD-MCNC: 13.5 G/DL (ref 13.6–17.2)
IMM GRANULOCYTES # BLD AUTO: 0 K/UL (ref 0–0.5)
IMM GRANULOCYTES # BLD AUTO: 0 K/UL (ref 0–0.5)
IMM GRANULOCYTES NFR BLD AUTO: 0 % (ref 0–5)
IMM GRANULOCYTES NFR BLD AUTO: 1 % (ref 0–5)
LIPASE SERPL-CCNC: 23 U/L (ref 73–393)
LYMPHOCYTES # BLD: 1.6 K/UL (ref 0.5–4.6)
LYMPHOCYTES # BLD: 2.4 K/UL (ref 0.5–4.6)
LYMPHOCYTES NFR BLD: 35 % (ref 13–44)
LYMPHOCYTES NFR BLD: 47 % (ref 13–44)
MAGNESIUM SERPL-MCNC: 2.2 MG/DL (ref 1.8–2.4)
MCH RBC QN AUTO: 26.1 PG (ref 26.1–32.9)
MCH RBC QN AUTO: 26.3 PG (ref 26.1–32.9)
MCHC RBC AUTO-ENTMCNC: 30.7 G/DL (ref 31.4–35)
MCHC RBC AUTO-ENTMCNC: 31.1 G/DL (ref 31.4–35)
MCV RBC AUTO: 83.9 FL (ref 79.6–97.8)
MCV RBC AUTO: 85.6 FL (ref 79.6–97.8)
METHADONE UR QL: NEGATIVE
MONOCYTES # BLD: 0.3 K/UL (ref 0.1–1.3)
MONOCYTES # BLD: 0.8 K/UL (ref 0.1–1.3)
MONOCYTES NFR BLD: 12 % (ref 4–12)
MONOCYTES NFR BLD: 8 % (ref 4–12)
NEUTS SEG # BLD: 1.4 K/UL (ref 1.7–8.2)
NEUTS SEG # BLD: 3.5 K/UL (ref 1.7–8.2)
NEUTS SEG NFR BLD: 42 % (ref 43–78)
NEUTS SEG NFR BLD: 51 % (ref 43–78)
NRBC # BLD: 0 K/UL (ref 0–0.2)
NRBC # BLD: 0 K/UL (ref 0–0.2)
OPIATES UR QL: NEGATIVE
PCP UR QL: NEGATIVE
PHOSPHATE SERPL-MCNC: 3.9 MG/DL (ref 2.5–4.5)
PLATELET # BLD AUTO: 281 K/UL (ref 150–450)
PLATELET # BLD AUTO: 284 K/UL (ref 150–450)
PMV BLD AUTO: 10 FL (ref 9.4–12.3)
PMV BLD AUTO: 10 FL (ref 9.4–12.3)
POTASSIUM SERPL-SCNC: 3.4 MMOL/L (ref 3.5–5.1)
POTASSIUM SERPL-SCNC: 3.8 MMOL/L (ref 3.5–5.1)
POTASSIUM SERPL-SCNC: 3.8 MMOL/L (ref 3.5–5.1)
PROT SERPL-MCNC: 7.3 G/DL (ref 6.3–8.2)
RBC # BLD AUTO: 4.72 M/UL (ref 4.23–5.6)
RBC # BLD AUTO: 5.14 M/UL (ref 4.23–5.6)
SODIUM SERPL-SCNC: 135 MMOL/L (ref 136–145)
SODIUM SERPL-SCNC: 136 MMOL/L (ref 136–145)
SODIUM SERPL-SCNC: 136 MMOL/L (ref 136–145)
WBC # BLD AUTO: 3.3 K/UL (ref 4.3–11.1)
WBC # BLD AUTO: 6.8 K/UL (ref 4.3–11.1)

## 2020-09-19 PROCEDURE — 74011250637 HC RX REV CODE- 250/637: Performed by: EMERGENCY MEDICINE

## 2020-09-19 PROCEDURE — 96361 HYDRATE IV INFUSION ADD-ON: CPT

## 2020-09-19 PROCEDURE — 80307 DRUG TEST PRSMV CHEM ANLYZR: CPT

## 2020-09-19 PROCEDURE — 99285 EMERGENCY DEPT VISIT HI MDM: CPT

## 2020-09-19 PROCEDURE — 82962 GLUCOSE BLOOD TEST: CPT

## 2020-09-19 PROCEDURE — 74011636637 HC RX REV CODE- 636/637: Performed by: EMERGENCY MEDICINE

## 2020-09-19 PROCEDURE — 83735 ASSAY OF MAGNESIUM: CPT

## 2020-09-19 PROCEDURE — 85025 COMPLETE CBC W/AUTO DIFF WBC: CPT

## 2020-09-19 PROCEDURE — 96374 THER/PROPH/DIAG INJ IV PUSH: CPT

## 2020-09-19 PROCEDURE — 83690 ASSAY OF LIPASE: CPT

## 2020-09-19 PROCEDURE — 84100 ASSAY OF PHOSPHORUS: CPT

## 2020-09-19 PROCEDURE — 74011250636 HC RX REV CODE- 250/636: Performed by: EMERGENCY MEDICINE

## 2020-09-19 PROCEDURE — 80053 COMPREHEN METABOLIC PANEL: CPT

## 2020-09-19 PROCEDURE — 80048 BASIC METABOLIC PNL TOTAL CA: CPT

## 2020-09-19 PROCEDURE — 96375 TX/PRO/DX INJ NEW DRUG ADDON: CPT

## 2020-09-19 PROCEDURE — 99284 EMERGENCY DEPT VISIT MOD MDM: CPT

## 2020-09-19 RX ORDER — LORAZEPAM 2 MG/ML
1 INJECTION INTRAMUSCULAR
Status: COMPLETED | OUTPATIENT
Start: 2020-09-19 | End: 2020-09-19

## 2020-09-19 RX ORDER — ONDANSETRON 2 MG/ML
4 INJECTION INTRAMUSCULAR; INTRAVENOUS
Status: COMPLETED | OUTPATIENT
Start: 2020-09-19 | End: 2020-09-19

## 2020-09-19 RX ORDER — ACETAMINOPHEN 500 MG
1000 TABLET ORAL
Status: COMPLETED | OUTPATIENT
Start: 2020-09-19 | End: 2020-09-19

## 2020-09-19 RX ORDER — METFORMIN HYDROCHLORIDE 500 MG/1
500 TABLET ORAL 2 TIMES DAILY WITH MEALS
Status: DISCONTINUED | OUTPATIENT
Start: 2020-09-19 | End: 2020-09-19 | Stop reason: HOSPADM

## 2020-09-19 RX ORDER — SODIUM CHLORIDE 9 MG/ML
150 INJECTION, SOLUTION INTRAVENOUS CONTINUOUS
Status: DISCONTINUED | OUTPATIENT
Start: 2020-09-19 | End: 2020-09-19 | Stop reason: HOSPADM

## 2020-09-19 RX ADMIN — SODIUM CHLORIDE 1000 ML: 900 INJECTION, SOLUTION INTRAVENOUS at 07:56

## 2020-09-19 RX ADMIN — SODIUM CHLORIDE 150 ML/HR: 900 INJECTION, SOLUTION INTRAVENOUS at 08:44

## 2020-09-19 RX ADMIN — ONDANSETRON 4 MG: 2 INJECTION INTRAMUSCULAR; INTRAVENOUS at 07:56

## 2020-09-19 RX ADMIN — LORAZEPAM 1 MG: 2 INJECTION INTRAMUSCULAR; INTRAVENOUS at 07:56

## 2020-09-19 RX ADMIN — SODIUM CHLORIDE 1000 ML: 900 INJECTION, SOLUTION INTRAVENOUS at 22:23

## 2020-09-19 RX ADMIN — INSULIN HUMAN 8 UNITS: 100 INJECTION, SOLUTION PARENTERAL at 22:24

## 2020-09-19 RX ADMIN — INSULIN HUMAN 10 UNITS: 100 INJECTION, SOLUTION PARENTERAL at 08:41

## 2020-09-19 RX ADMIN — INSULIN HUMAN 10 UNITS: 100 INJECTION, SOLUTION PARENTERAL at 23:50

## 2020-09-19 RX ADMIN — INSULIN HUMAN 6 UNITS: 100 INJECTION, SUSPENSION SUBCUTANEOUS at 12:46

## 2020-09-19 RX ADMIN — METFORMIN HYDROCHLORIDE 500 MG: 500 TABLET ORAL at 08:46

## 2020-09-19 RX ADMIN — ACETAMINOPHEN 1000 MG: 500 TABLET, FILM COATED ORAL at 08:46

## 2020-09-19 NOTE — ED TRIAGE NOTES
Pt arrives via EMS from "[employer]. Pt states \"about 5-6 hours ago some earlene told me they put ice on my cheeseburger at [employer]. , T 97.6 AX, 100% RA, bp 128/60, sinus tach 114 in route per EMS. Pt is non-compliant with insulin admin at home. Pt presents today tremorous. Pt wet at this time stating he was sleeping in a field.

## 2020-09-19 NOTE — ED NOTES
I have reviewed discharge instructions with the patient. The patient verbalized understanding. Patient left ED via Discharge Method: ambulatory to Home with self. Opportunity for questions and clarification provided. Patient given 0 scripts. No Esign         To continue your aftercare when you leave the hospital, you may receive an automated call from our care team to check in on how you are doing. This is a free service and part of our promise to provide the best care and service to meet your aftercare needs.  If you have questions, or wish to unsubscribe from this service please call [phone]. Thank you for Choosing our [facility] Emergency Department.

## 2020-09-19 NOTE — DISCHARGE INSTRUCTIONS
Patient Education        Learning About Alcohol Use Disorder  What is alcohol use disorder? Alcohol use disorder means that a person drinks alcohol even though it causes harm to themselves or others. It can range from mild to severe. The more signs of this disorder you have, the more severe it may be. Moderate to severe alcohol use disorder is sometimes called addiction. People who have it may find it hard to control their use of alcohol. People who have this disorder may argue with others about how much they're drinking. Their job may be affected because of drinking. They may drink when it's dangerous or illegal, such as when they drive. They also may have a strong need, or craving, to drink. They may feel like they must drink just to get by. Their drinking may increase their risk of getting hurt or being in a car crash. Over time, drinking too much alcohol may cause health problems. These may include high blood pressure, liver problems, or problems with digestion. What are the signs? Maybe you've wondered about your alcohol habits, or how to tell if your drinking is becoming a problem. Here are some of the signs of alcohol use disorder. You may have it if you have two or more of the following signs:  · You drink larger amounts of alcohol than you ever meant to. Or you've been drinking for a longer time than you ever meant to. · You can't cut down or control your use. Or you constantly wish you could cut down. · You spend a lot of time getting or drinking alcohol or recovering from its effects. · You have strong cravings for alcohol. · You can no longer do your main jobs at work, at school, or at home. · You keep drinking alcohol, even though your use hurts your relationships. · You have stopped doing important activities because of your alcohol use. · You drink alcohol in situations where doing so is dangerous. · You keep drinking alcohol even though you know it's causing health problems.   · You need more and more alcohol to get the same effect, or you get less effect from the same amount over time. This is called tolerance. · You have uncomfortable symptoms when you stop drinking alcohol or use less. This is called withdrawal.  Alcohol use disorder can range from mild to severe. The more signs you have, the more severe the disorder may be. Moderate to severe alcohol use disorder is sometimes called addiction. You might not realize that your drinking is a problem. You might not drink large amounts when you drink. Or you might go for days or weeks between drinking episodes. But even if you don't drink very often, your drinking could still be harmful and put you at risk. How is alcohol use disorder treated? Getting help is up to you. But you don't have to do it alone. There are many people and kinds of treatments that can help. Treatment for alcohol use disorder can include:  · Group therapy, one or more types of counseling, and alcohol education. · Medicines that help to:  ? Reduce withdrawal symptoms and help you safely stop drinking. ? Reduce cravings for alcohol. · Support groups. These groups include Alcoholics Anonymous and [employer] (Self-Management and Recovery Training). Some people are able to stop or cut back on drinking with help from a counselor. People who have moderate to severe alcohol use disorder may need medical treatment. They may need to stay in a hospital or treatment center. You may have a treatment team to help you. This team may include a psychologist or psychiatrist, counselors, doctors, social workers, nurses, and a . A  helps plan and manage your treatment. Follow-up care is a key part of your treatment and safety. Be sure to make and go to all appointments, and call your doctor if you are having problems. It's also a good idea to know your test results and keep a list of the medicines you take. Where can you learn more?   Go to http://www.gray.com/  Enter [id number] in the search box to learn more about \"Learning About Alcohol Use Disorder. \"  Current as of: June 29, 2020               Content Version: 12.6  © 2006-2020 [employer]. Care instructions adapted under license by [employer] (which disclaims liability or warranty for this information). If you have questions about a medical condition or this instruction, always ask your healthcare professional. [place] [address] any warranty or liability for your use of this information. Patient Education        Hypothermia: Care Instructions  Your Care Instructions  Hypothermia means that your body loses heat faster than it can make heat. You can get it if you spend time in cold air, water, wind, or rain. Most healthy people with mild to moderate hypothermia fully recover. And they don't have lasting problems. But babies and older or sick adults may be more at risk for hypothermia. This is because their bodies do not control temperature as well. Make sure to follow your doctor's instructions for how to get better. It's also important to learn how to protect yourself from hypothermia in the future. Follow-up care is a key part of your treatment and safety. Be sure to make and go to all appointments, and call your doctor if you are having problems. It's also a good idea to know your test results and keep a list of the medicines you take. How can you care for yourself at home? · Take your medicines exactly as prescribed. Call your doctor if you think you are having a problem with your medicine. · To prevent dehydration, drink plenty of fluids, enough so that your urine is light yellow or clear like water. Choose water and other caffeine-free clear liquids until you feel better.  If you have kidney, heart, or liver disease and have to limit fluids, talk with your doctor before you increase the amount of fluids you drink.  · Get a lot of rest at home, and stay warm. To prevent hypothermia  · Avoid illegal drugs and too much alcohol. They can make you more likely to get hypothermia. · Cover your head, hands, and feet in cold or wet weather. · Try not to sweat a lot if you are out in the cold. · Stay as dry as possible. · Wear layers of loose-fitting clothing. · Pack a kit in your car that has items you will need to stay warm. It may include fire-starting kits and a cigarette lighter, extra clothing, drinking water, and food. You also can bring a sleeping bag. Two people can warm up more easily by sharing the bag. If you see symptoms in someone who has been in cold weather, keep the person warm and dry and get help quickly. Symptoms include shivering, cold and pale skin, and slurred speech. When should you call for help? Call your doctor now or seek immediate medical care if:    · You are confused or have trouble thinking.     · You are shivering and cannot stop.     · You have signs of needing more fluids. You have sunken eyes and a dry mouth, and you pass only a little dark urine. Watch closely for changes in your health, and be sure to contact your doctor if:    · You do not get better as expected. Where can you learn more? Go to http://pedrito-kandi.info/  Enter Z011 in the search box to learn more about \"Hypothermia: Care Instructions. \"  Current as of: June 26, 2019               Content Version: 12.6  © 1558-4966 [employer]. Care instructions adapted under license by [employer] (which disclaims liability or warranty for this information). If you have questions about a medical condition or this instruction, always ask your healthcare professional. [place] [address] any warranty or liability for your use of this information.
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
## 2020-09-19 NOTE — ED TRIAGE NOTES
Arrives without face mask in place. Arrives via [facility] from [facility] with reports high blood sugar and vomiting. Pt well known to this facility as well as [facility]. Seen here earlier for same complaint.  with EMS. Admits to continued ETOH consumption after discharge earlier today. Arrives with spinx bag with multiple snacks.  in triage.

## 2020-09-20 ENCOUNTER — HOSPITAL ENCOUNTER (EMERGENCY)
Age: 45
Discharge: HOME | End: 2020-09-20
Attending: EMERGENCY MEDICINE

## 2020-09-20 VITALS
OXYGEN SATURATION: 96 % | TEMPERATURE: 97.7 F | DIASTOLIC BLOOD PRESSURE: 74 MMHG | RESPIRATION RATE: 16 BRPM | HEART RATE: 83 BPM | SYSTOLIC BLOOD PRESSURE: 122 MMHG

## 2020-09-20 DIAGNOSIS — Z91.14 NONCOMPLIANCE WITH MEDICATIONS: ICD-10-CM

## 2020-09-20 DIAGNOSIS — Z59.00 HOMELESSNESS: ICD-10-CM

## 2020-09-20 DIAGNOSIS — R73.9 HYPERGLYCEMIA: Primary | ICD-10-CM

## 2020-09-20 LAB
BASE EXCESS BLD CALC-SCNC: 3 MMOL/L
CO2 BLD-SCNC: 31 MMOL/L
GLUCOSE BLD STRIP.AUTO-MCNC: 133 MG/DL (ref 65–100)
HCO3 BLD-SCNC: 29.6 MMOL/L (ref 22–26)
PCO2 BLD: 49.9 MMHG (ref 35–45)
PH BLD: 7.38 [PH] (ref 7.35–7.45)
PO2 BLD: 24 MMHG (ref 75–100)
SAO2 % BLD: 40 % (ref 95–98)

## 2020-09-20 PROCEDURE — 99284 EMERGENCY DEPT VISIT MOD MDM: CPT

## 2020-09-20 PROCEDURE — 82803 BLOOD GASES ANY COMBINATION: CPT

## 2020-09-20 PROCEDURE — 96374 THER/PROPH/DIAG INJ IV PUSH: CPT

## 2020-09-20 PROCEDURE — 96361 HYDRATE IV INFUSION ADD-ON: CPT

## 2020-09-20 PROCEDURE — 82962 GLUCOSE BLOOD TEST: CPT

## 2020-09-20 PROCEDURE — 74011636637 HC RX REV CODE- 636/637: Performed by: EMERGENCY MEDICINE

## 2020-09-20 PROCEDURE — 96375 TX/PRO/DX INJ NEW DRUG ADDON: CPT

## 2020-09-20 PROCEDURE — 74011250636 HC RX REV CODE- 250/636: Performed by: EMERGENCY MEDICINE

## 2020-09-20 RX ORDER — ONDANSETRON 2 MG/ML
8 INJECTION INTRAMUSCULAR; INTRAVENOUS
Status: COMPLETED | OUTPATIENT
Start: 2020-09-20 | End: 2020-09-20

## 2020-09-20 RX ADMIN — SODIUM CHLORIDE 1000 ML: 900 INJECTION, SOLUTION INTRAVENOUS at 08:29

## 2020-09-20 RX ADMIN — HUMAN INSULIN 12 UNITS: 100 INJECTION, SOLUTION SUBCUTANEOUS at 08:29

## 2020-09-20 RX ADMIN — ONDANSETRON 8 MG: 2 INJECTION INTRAMUSCULAR; INTRAVENOUS at 08:29

## 2020-09-20 SDOH — ECONOMIC STABILITY - HOUSING INSECURITY: HOMELESSNESS UNSPECIFIED: Z59.00

## 2020-09-20 NOTE — ED NOTES
I have reviewed discharge instructions with the patient. The patient verbalized understanding. Patient left ED via Discharge Method: ambulatory to Home with self    Opportunity for questions and clarification provided. Patient given 0 scripts. To continue your aftercare when you leave the hospital, you may receive an automated call from our care team to check in on how you are doing. This is a free service and part of our promise to provide the best care and service to meet your aftercare needs.  If you have questions, or wish to unsubscribe from this service please call [phone]. Thank you for Choosing our [address] [place] [address] Emergency Department.

## 2020-09-20 NOTE — DISCHARGE INSTRUCTIONS
Patient Education        Acute Alcohol Intoxication: Care Instructions  Your Care Instructions     You have had treatment to help your body rid itself of alcohol. Too much alcohol upsets the body's fluid balance. Your doctor may have given you fluids and vitamins. For some people, drinking too much alcohol is a one-time event. For others, it is an ongoing problem. In either case, it is serious. It can be life-threatening. Follow-up care is a key part of your treatment and safety. Be sure to make and go to all appointments, and call your doctor if you are having problems. It's also a good idea to know your test results and keep a list of the medicines you take. How can you care for yourself at home? · Do not drink and drive. · Be safe with medicines. Take your medicines exactly as prescribed. Call your doctor if you think you are having a problem with your medicine. · Your doctor may have prescribed disulfiram (Antabuse). Do not drink any alcohol while you are taking this medicine. You may have severe or even life-threatening side effects from even small amounts of alcohol. · If you were given medicine to prevent nausea, be sure to take it exactly as prescribed. · Before you take any medicine, tell your doctor if:  ? You have had a bad reaction to any medicines in the past.  ? You are taking other medicines, including over-the-counter ones, or have other health problems. ? You are or could be pregnant. · Be prepared to have some symptoms of withdrawal in the next few days. · Drink plenty of liquids in the next few days. · Seek help if you need it to stop drinking. Getting counseling and joining a support group can help you stay sober. Try a support group such as Alcoholics Anonymous. · Avoid alcohol when you take medicines. It can react with many medicines and cause serious problems. When should you call for help? Call 911 anytime you think you may need emergency care.  For example, call if:    · You feel confused and are seeing things that are not there.     · You are thinking about killing yourself or hurting others.     · You have a seizure.     · You vomit blood or what looks like coffee grounds. Call your doctor now or seek immediate medical care if:    · You have trembling, restlessness, sweating, and other withdrawal symptoms that are new or that get worse.     · Your withdrawal symptoms come back after not bothering you for days or weeks.     · You can't stop vomiting. Watch closely for changes in your health, and be sure to contact your doctor if:    · You need help to stop drinking. Where can you learn more? Go to http://pedritoYouca.stkandi.info/  Enter T102 in the search box to learn more about \"Acute Alcohol Intoxication: Care Instructions. \"  Current as of: June 29, 2020               Content Version: 12.6  © 4068-0893 [employer]. Care instructions adapted under license by [employer] (which disclaims liability or warranty for this information). If you have questions about a medical condition or this instruction, always ask your healthcare professional. [address] any warranty or liability for your use of this information. Patient Education        Learning About High Blood Sugar  What is high blood sugar? Your body turns the food you eat into glucose (sugar), which it uses for energy. But if your body isn't able to use the sugar right away, it can build up in your blood and lead to high blood sugar. When the amount of sugar in your blood stays too high for too much of the time, you may have diabetes. Diabetes is a disease that can cause serious health problems. The good news is that lifestyle changes may help you get your blood sugar back to normal and avoid or delay diabetes. What causes high blood sugar? Sugar (glucose) can build up in your blood if you:  · Are overweight.   · Have a family history of diabetes. · Take certain medicines, such as steroids. What are the symptoms? Having high blood sugar may not cause any symptoms at all. Or it may make you feel very thirsty or very hungry. You may also urinate more often than usual, have blurry vision, or lose weight without trying. How is high blood sugar treated? You can take steps to lower your blood sugar level if you understand what makes it get higher. Your doctor may want you to learn how to test your blood sugar level at home. Then you can see how illness, stress, or different kinds of food or medicine raise or lower your blood sugar level. Other tests may be needed to see if you have diabetes. How can you prevent high blood sugar? · Watch your weight. If you're overweight, losing just a small amount of weight may help. Reducing fat around your waist is most important. · Limit the amount of calories, sweets, and unhealthy fat you eat. Ask your doctor if a dietitian can help you. A registered dietitian can help you create meal plans that fit your lifestyle. · Get at least 30 minutes of exercise on most days of the week. Exercise helps control your blood sugar. It also helps you maintain a healthy weight. Walking is a good choice. You also may want to do other activities, such as running, swimming, cycling, or playing tennis or team sports. · If your doctor prescribed medicines, take them exactly as prescribed. Call your doctor if you think you are having a problem with your medicine. You will get more details on the specific medicines your doctor prescribes. Follow-up care is a key part of your treatment and safety. Be sure to make and go to all appointments, and call your doctor if you are having problems. It's also a good idea to know your test results and keep a list of the medicines you take. Where can you learn more?   Go to http://www.PharmaIN.com/  Enter O108 in the search box to learn more about \"Learning About High Blood Sugar. \"  Current as of: December 20, 2019               Content Version: 12.6  © 0842-3900 [employer]. Care instructions adapted under license by [employer] (which disclaims liability or warranty for this information). If you have questions about a medical condition or this instruction, always ask your healthcare professional. [place] [address] any warranty or liability for your use of this information.
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
## 2020-09-20 NOTE — ED PROVIDER NOTES
Viral Stephenson is a 39 y.o. male seen on 9/19/2020 at 10:28 PM in the [facility] EMERGENCY DEPT in room ERA/A.    CC: High blood sugar, alcohol intoxication. HPI: 72-year-old male with a history of alcohol abuse presents emergency department via EMS with a complaint of high blood sugar and possible intoxication. Patient has been seen either here at [facility] 9-10 times in the last 20 days for the same complaint and was admitted once for DKA. Patient is apologetic for having to come in again and says he just does not feel good. Patient denies any pain at this time. The history is provided by the patient. The history is limited by the condition of the patient. High Blood Sugar    This is a recurrent problem. The current episode started 1 to 2 hours ago. The problem occurs constantly. The problem has not changed since onset. The pain is associated with ETOH use. The patient is experiencing no pain. Pertinent negatives include no anorexia, no fever, no belching, no diarrhea, no flatus, no hematochezia, no melena, no nausea, no vomiting, no constipation, no dysuria, no frequency, no hematuria, no headaches, no arthralgias, no myalgias, no trauma, no chest pain, no testicular pain and no back pain. Nothing worsens the pain. The pain is relieved by nothing. His past medical history is significant for DM. His past medical history does not include PUD, gallstones, GERD, ulcerative colitis, Crohn's disease, irritable bowel syndrome, cancer, UTI, pancreatitis, diverticulitis, atrial fibrillation, kidney stones or small bowel obstruction. The patient's surgical history non-contributory. REVIEW OF SYSTEMS     Review of Systems   Constitutional: Positive for fatigue. Negative for chills and fever. Cardiovascular: Negative for chest pain.    Gastrointestinal: Negative for abdominal pain, anorexia, constipation, diarrhea, flatus, hematochezia, melena, nausea and vomiting. Genitourinary: Negative for dysuria, frequency, hematuria and testicular pain. Musculoskeletal: Negative for arthralgias, back pain and myalgias. Neurological: Negative for headaches. All other systems reviewed and are negative. PAST MEDICAL HISTORY     Past Medical History:   Diagnosis Date    [facility]     Depression     Diabetes ([facility]     PTSD (post-traumatic stress disorder)      No past surgical history on file. Social History     Socioeconomic History    Marital status: SINGLE     Spouse name: Not on file    Number of children: Not on file    Years of education: Not on file    Highest education level: Not on file   Tobacco Use    Smoking status: Current Every Day Smoker     Packs/day: 1.00    Smokeless tobacco: Never Used   Substance and Sexual Activity    Alcohol use: Yes    Drug use: Not Currently     Prior to Admission Medications   Prescriptions Last Dose Informant Patient Reported? Taking? Syringe, Disposable, 1 mL syrg   No No   Sig: Insulin syringe   acetaminophen (TYLENOL) 500 mg tablet   No No   Sig: Take 1 Tab by mouth every four (4) hours as needed for Pain.   glucose blood VI test strips (ASCENSIA AUTODISC VI, ONE TOUCH ULTRA TEST VI) strip   No No   Sig: As directed   insulin NPH (NOVOLIN N, HUMULIN N) 100 unit/mL injection   No No   Si Units by SubCUTAneous route Before breakfast and dinner. insulin lispro (HUMALOG) 100 unit/mL injection   No No   Sig:   GRL RX SLIDING SCALE SF30    For Blood Sugar of:  150-174 = 2 Units; 175-204 = 3 Units  205-234 = 4 Units; 235-264 = 5 Units  265-294 = 6 Units; 295-324 = 7 Units  325-354 = 8 Units; 355 or > = Call MD    This drug may increase the risk of patient falls Fast Acting - Administer Immediately - or within 15 minutes of start of meal, if mealtime coverage.    lancets misc   No No   Sig: As directed   ondansetron (Zofran ODT) 4 mg disintegrating tablet   No No   Sig: Take 1 Tab by mouth every eight (8) hours as needed for Nausea. simethicone (MYLICON) 80 mg chewable tablet   No No   Sig: Take 1 Tab by mouth four (4) times daily as needed for GI Pain. Facility-Administered Medications: None     Allergies   Allergen Reactions    Toradol [Ketorolac] Nausea and Vomiting        PHYSICAL EXAM       Vitals:    09/19/20 1944   BP: (!) 102/59   Pulse: (!) 108   Resp: 16   Temp: 98.3 °F (36.8 °C)   SpO2: 98%    Vital signs were reviewed. Physical Exam  Vitals signs and nursing note reviewed. Constitutional:       General: He is not in acute distress. Appearance: He is well-developed. HENT:      Head: Normocephalic and atraumatic. Right Ear: External ear normal.      Left Ear: External ear normal.   Eyes:      Conjunctiva/sclera: Conjunctivae normal.      Pupils: Pupils are equal, round, and reactive to light. Neck:      Musculoskeletal: Normal range of motion and neck supple. Cardiovascular:      Rate and Rhythm: Normal rate and regular rhythm. Heart sounds: Normal heart sounds. Pulmonary:      Effort: Pulmonary effort is normal.      Breath sounds: Normal breath sounds. Abdominal:      General: Bowel sounds are normal.      Palpations: Abdomen is soft. Tenderness: There is no abdominal tenderness. Musculoskeletal: Normal range of motion. Skin:     General: Skin is warm and dry. Capillary Refill: Capillary refill takes less than 2 seconds. Neurological:      Mental Status: He is oriented to person, place, and time. He is lethargic. Cranial Nerves: Cranial nerves are intact. Sensory: Sensation is intact. Motor: Motor function is intact. Psychiatric:         Mood and Affect: Mood is depressed. Speech: Speech is slurred. Behavior: Behavior is slowed.          Cognition and Memory: Cognition and memory normal.          MEDICAL DECISION MAKING     MDM  Number of Diagnoses or Management Options  Alcoholic intoxication without complication ([facility]): new, needed workup  Hyperglycemia: new, needed workup     Amount and/or Complexity of Data Reviewed  Clinical lab tests: ordered and reviewed  Review and summarize past medical records: yes    Risk of Complications, Morbidity, and/or Mortality  Presenting problems: moderate  Diagnostic procedures: minimal  Management options: moderate    Patient Progress  Patient progress: improved    Procedures    ED Course: The patient was observed in the ED. initial treatment with a 2 units of insulin and 500 mL's of normal saline, repeat blood sugar still well into the 300s. Patient was given 10 units of subcu insulin subsequent to this as he had pulled his IV line. Plan is to recheck his sugar approximately an hour after insulin administration and discharge patient when able to walk steadily and talk clearly. Care turned over to Dr. Susan Chow at shift change    Results Reviewed:      Recent Results (from the past 24 hour(s))   CBC WITH AUTOMATED DIFF    Collection Time: 09/19/20  7:52 AM   Result Value Ref Range    WBC 3.3 (L) 4.3 - 11.1 K/uL    RBC 5.14 4.23 - 5.6 M/uL    HGB 13.5 (L) 13.6 - 17.2 g/dL    HCT 44.0 41.1 - 50.3 %    MCV 85.6 79.6 - 97.8 FL    MCH 26.3 26.1 - 32.9 PG    MCHC 30.7 (L) 31.4 - 35.0 g/dL    RDW 19.9 (H) 11.9 - 14.6 %    PLATELET 139 668 - 098 K/uL    MPV 10.0 9.4 - 12.3 FL    ABSOLUTE NRBC 0.00 0.0 - 0.2 K/uL    DF AUTOMATED      NEUTROPHILS 42 (L) 43 - 78 %    LYMPHOCYTES 47 (H) 13 - 44 %    MONOCYTES 8 4.0 - 12.0 %    EOSINOPHILS 1 0.5 - 7.8 %    BASOPHILS 1 0.0 - 2.0 %    IMMATURE GRANULOCYTES 1 0.0 - 5.0 %    ABS. NEUTROPHILS 1.4 (L) 1.7 - 8.2 K/UL    ABS. LYMPHOCYTES 1.6 0.5 - 4.6 K/UL    ABS. MONOCYTES 0.3 0.1 - 1.3 K/UL    ABS. EOSINOPHILS 0.0 0.0 - 0.8 K/UL    ABS. BASOPHILS 0.0 0.0 - 0.2 K/UL    ABS. IMM.  GRANS. 0.0 0.0 - 0.5 K/UL   METABOLIC PANEL, BASIC    Collection Time: 09/19/20  7:52 AM   Result Value Ref Range    Sodium 135 (L) 136 - 145 mmol/L    Potassium 3.8 3.5 - 5.1 mmol/L    Chloride 96 (L) 98 - 107 mmol/L    CO2 20 (L) 21 - 32 mmol/L    Anion gap 19 (H) 7 - 16 mmol/L    Glucose 649 (HH) 65 - 100 mg/dL    BUN 7 6 - 23 MG/DL    Creatinine 1.11 0.8 - 1.5 MG/DL    GFR est AA >60 >60 ml/min/1.73m2    GFR est non-AA >60 >60 ml/min/1.73m2    Calcium 9.2 8.3 - 10.4 MG/DL   MAGNESIUM    Collection Time: 09/19/20  7:52 AM   Result Value Ref Range    Magnesium 2.2 1.8 - 2.4 mg/dL   PHOSPHORUS    Collection Time: 09/19/20  7:52 AM   Result Value Ref Range    Phosphorus 3.9 2.5 - 4.5 MG/DL   DRUG SCREEN, URINE    Collection Time: 09/19/20  7:52 AM   Result Value Ref Range    PCP(PHENCYCLIDINE) Negative      BENZODIAZEPINES Negative      COCAINE Negative      AMPHETAMINES Negative      METHADONE Negative      THC (TH-CANNABINOL) Negative      OPIATES Negative      BARBITURATES Negative     ETHYL ALCOHOL    Collection Time: 09/19/20  7:52 AM   Result Value Ref Range    ALCOHOL(ETHYL),SERUM 180 MG/DL   GLUCOSE, POC    Collection Time: 09/19/20  9:38 AM   Result Value Ref Range    Glucose (POC) 471 (HH) 65 - 308 mg/dL   METABOLIC PANEL, BASIC    Collection Time: 09/19/20 11:31 AM   Result Value Ref Range    Sodium 136 136 - 145 mmol/L    Potassium 3.4 (L) 3.5 - 5.1 mmol/L    Chloride 100 98 - 107 mmol/L    CO2 23 21 - 32 mmol/L    Anion gap 13 7 - 16 mmol/L    Glucose 379 (H) 65 - 100 mg/dL    BUN 5 (L) 6 - 23 MG/DL    Creatinine 0.72 (L) 0.8 - 1.5 MG/DL    GFR est AA >60 >60 ml/min/1.73m2    GFR est non-AA >60 >60 ml/min/1.73m2    Calcium 7.9 (L) 8.3 - 10.4 MG/DL   CBC WITH AUTOMATED DIFF    Collection Time: 09/19/20  7:53 PM   Result Value Ref Range    WBC 6.8 4.3 - 11.1 K/uL    RBC 4.72 4.23 - 5.6 M/uL    HGB 12.3 (L) 13.6 - 17.2 g/dL    HCT 39.6 (L) 41.1 - 50.3 %    MCV 83.9 79.6 - 97.8 FL    MCH 26.1 26.1 - 32.9 PG    MCHC 31.1 (L) 31.4 - 35.0 g/dL    RDW 19.0 (H) 11.9 - 14.6 %    PLATELET 766 725 - 082 K/uL    MPV 10.0 9.4 - 12.3 FL    ABSOLUTE NRBC 0.00 0.0 - 0.2 K/uL    DF AUTOMATED      NEUTROPHILS 51 43 - 78 %    LYMPHOCYTES 35 13 - 44 %    MONOCYTES 12 4.0 - 12.0 %    EOSINOPHILS 1 0.5 - 7.8 %    BASOPHILS 1 0.0 - 2.0 %    IMMATURE GRANULOCYTES 0 0.0 - 5.0 %    ABS. NEUTROPHILS 3.5 1.7 - 8.2 K/UL    ABS. LYMPHOCYTES 2.4 0.5 - 4.6 K/UL    ABS. MONOCYTES 0.8 0.1 - 1.3 K/UL    ABS. EOSINOPHILS 0.1 0.0 - 0.8 K/UL    ABS. BASOPHILS 0.1 0.0 - 0.2 K/UL    ABS. IMM. GRANS. 0.0 0.0 - 0.5 K/UL   METABOLIC PANEL, COMPREHENSIVE    Collection Time: 09/19/20  7:53 PM   Result Value Ref Range    Sodium 136 136 - 145 mmol/L    Potassium 3.8 3.5 - 5.1 mmol/L    Chloride 98 98 - 107 mmol/L    CO2 23 21 - 32 mmol/L    Anion gap 15 7 - 16 mmol/L    Glucose 392 (H) 65 - 100 mg/dL    BUN 4 (L) 6 - 23 MG/DL    Creatinine 0.82 0.8 - 1.5 MG/DL    GFR est AA >60 >60 ml/min/1.73m2    GFR est non-AA >60 >60 ml/min/1.73m2    Calcium 8.8 8.3 - 10.4 MG/DL    Bilirubin, total 0.3 0.2 - 1.1 MG/DL    ALT (SGPT) 20 12 - 65 U/L    AST (SGOT) 28 15 - 37 U/L    Alk. phosphatase 100 50 - 136 U/L    Protein, total 7.3 6.3 - 8.2 g/dL    Albumin 3.6 3.5 - 5.0 g/dL    Globulin 3.7 (H) 2.3 - 3.5 g/dL    A-G Ratio 1.0 (L) 1.2 - 3.5     LIPASE    Collection Time: 09/19/20  7:53 PM   Result Value Ref Range    Lipase 23 (L) 73 - 393 U/L   ETHYL ALCOHOL    Collection Time: 09/19/20  7:53 PM   Result Value Ref Range    ALCOHOL(ETHYL),SERUM 291 MG/DL         Disposition: Discharge  Diagnosis: Acute alcohol intoxication, hyperglycemia, noncompliance with medical treatment plans, alcohol dependence  ____________________________________________________________________  A portion of this note was generated using voice recognition dictation software. While the note has been reviewed for accuracy, please note certain words and phrases may not be transcribed as intended and some grammatical and/or typographical errors may be present.

## 2020-09-20 NOTE — ED NOTES

## 2020-09-20 NOTE — ED NOTES
Upon entering patient's room it is noticed he has accidentally pulled out his IV access. IV fluids half finished. Repeat . Patient has multiple snacks present in his room, reminded him not to have anything to eat until his BGL is controlled. MD updated- SQ insulin ordered then BGL recheck one hour later.

## 2020-09-21 ENCOUNTER — HOSPITAL ENCOUNTER (INPATIENT)
Age: 45
LOS: 2 days | Discharge: HOME | DRG: 638 | End: 2020-09-23
Attending: INTERNAL MEDICINE | Admitting: HOSPITALIST
Payer: SUBSIDIZED

## 2020-09-21 ENCOUNTER — APPOINTMENT (OUTPATIENT)
Dept: GENERAL RADIOLOGY | Age: 45
End: 2020-09-21
Attending: STUDENT IN AN ORGANIZED HEALTH CARE EDUCATION/TRAINING PROGRAM
Payer: SUBSIDIZED

## 2020-09-21 ENCOUNTER — HOSPITAL ENCOUNTER (EMERGENCY)
Age: 45
Discharge: STILL ADMITTED OR ADMINISTRATIVE | End: 2020-09-21
Attending: STUDENT IN AN ORGANIZED HEALTH CARE EDUCATION/TRAINING PROGRAM
Payer: SUBSIDIZED

## 2020-09-21 VITALS
OXYGEN SATURATION: 96 % | SYSTOLIC BLOOD PRESSURE: 130 MMHG | DIASTOLIC BLOOD PRESSURE: 72 MMHG | HEIGHT: 68 IN | WEIGHT: 140 LBS | HEART RATE: 101 BPM | BODY MASS INDEX: 21.22 KG/M2 | RESPIRATION RATE: 19 BRPM

## 2020-09-21 DIAGNOSIS — E13.10 DIABETIC KETOACIDOSIS WITHOUT COMA ASSOCIATED WITH OTHER SPECIFIED DIABETES MELLITUS (HCC): Primary | ICD-10-CM

## 2020-09-21 DIAGNOSIS — F10.10 ALCOHOL ABUSE: ICD-10-CM

## 2020-09-21 PROBLEM — E11.10 DKA, TYPE 2 (HCC): Status: ACTIVE | Noted: 2020-09-21

## 2020-09-21 LAB
ADMINISTERED INITIALS, ADMINIT: NORMAL
ALBUMIN SERPL-MCNC: 2.9 G/DL (ref 3.5–5)
ALBUMIN SERPL-MCNC: 3.2 G/DL (ref 3.5–5)
ALBUMIN/GLOB SERPL: 0.9 {RATIO} (ref 1.2–3.5)
ALBUMIN/GLOB SERPL: 1 {RATIO} (ref 1.2–3.5)
ALP SERPL-CCNC: 104 U/L (ref 50–136)
ALP SERPL-CCNC: 86 U/L (ref 50–136)
ALT SERPL-CCNC: 14 U/L (ref 12–65)
ALT SERPL-CCNC: 19 U/L (ref 12–65)
ANION GAP SERPL CALC-SCNC: 22 MMOL/L (ref 7–16)
ANION GAP SERPL CALC-SCNC: 6 MMOL/L (ref 7–16)
ANION GAP SERPL CALC-SCNC: 7 MMOL/L (ref 7–16)
ARTERIAL PATENCY WRIST A: NO
ARTERIAL PATENCY WRIST A: NORMAL
AST SERPL-CCNC: 13 U/L (ref 15–37)
AST SERPL-CCNC: 32 U/L (ref 15–37)
ATRIAL RATE: 94 BPM
BASE DEFICIT BLDV-SCNC: 7 MMOL/L
BASE EXCESS BLDV CALC-SCNC: 2 MMOL/L
BASOPHILS # BLD: 0.1 K/UL (ref 0–0.2)
BASOPHILS # BLD: 0.1 K/UL (ref 0–0.2)
BASOPHILS NFR BLD: 1 % (ref 0–2)
BASOPHILS NFR BLD: 1 % (ref 0–2)
BDY SITE: ABNORMAL
BDY SITE: NORMAL
BILIRUB SERPL-MCNC: 0.2 MG/DL (ref 0.2–1.1)
BILIRUB SERPL-MCNC: 0.3 MG/DL (ref 0.2–1.1)
BUN SERPL-MCNC: 8 MG/DL (ref 6–23)
BUN SERPL-MCNC: 8 MG/DL (ref 6–23)
BUN SERPL-MCNC: 9 MG/DL (ref 6–23)
CALCIUM SERPL-MCNC: 7.7 MG/DL (ref 8.3–10.4)
CALCIUM SERPL-MCNC: 7.7 MG/DL (ref 8.3–10.4)
CALCIUM SERPL-MCNC: 8.5 MG/DL (ref 8.3–10.4)
CALCULATED P AXIS, ECG09: 61 DEGREES
CALCULATED R AXIS, ECG10: 138 DEGREES
CALCULATED T AXIS, ECG11: 19 DEGREES
CHLORIDE SERPL-SCNC: 106 MMOL/L (ref 98–107)
CHLORIDE SERPL-SCNC: 107 MMOL/L (ref 98–107)
CHLORIDE SERPL-SCNC: 99 MMOL/L (ref 98–107)
CO2 BLD-SCNC: 20 MMOL/L
CO2 BLD-SCNC: 28 MMOL/L
CO2 SERPL-SCNC: 17 MMOL/L (ref 21–32)
CO2 SERPL-SCNC: 26 MMOL/L (ref 21–32)
CO2 SERPL-SCNC: 27 MMOL/L (ref 21–32)
COLLECT TIME,HTIME: 1245
COLLECT TIME,HTIME: 720
COLLECTION COMMENT, COLCM: NORMAL
CREAT SERPL-MCNC: 0.58 MG/DL (ref 0.8–1.5)
CREAT SERPL-MCNC: 0.72 MG/DL (ref 0.8–1.5)
CREAT SERPL-MCNC: 1.28 MG/DL (ref 0.8–1.5)
D50 ADMINISTERED, D50ADM: 0 ML
D50 ORDER, D50ORD: 0 ML
DIAGNOSIS, 93000: NORMAL
DIFFERENTIAL METHOD BLD: ABNORMAL
DIFFERENTIAL METHOD BLD: ABNORMAL
EOSINOPHIL # BLD: 0 K/UL (ref 0–0.8)
EOSINOPHIL # BLD: 0 K/UL (ref 0–0.8)
EOSINOPHIL NFR BLD: 0 % (ref 0.5–7.8)
EOSINOPHIL NFR BLD: 1 % (ref 0.5–7.8)
ERYTHROCYTE [DISTWIDTH] IN BLOOD BY AUTOMATED COUNT: 19.4 % (ref 11.9–14.6)
ERYTHROCYTE [DISTWIDTH] IN BLOOD BY AUTOMATED COUNT: 19.9 % (ref 11.9–14.6)
ETHANOL SERPL-MCNC: 179 MG/DL
GAS FLOW.O2 O2 DELIVERY SYS: ABNORMAL L/MIN
GAS FLOW.O2 O2 DELIVERY SYS: NORMAL L/MIN
GLOBULIN SER CALC-MCNC: 2.9 G/DL (ref 2.3–3.5)
GLOBULIN SER CALC-MCNC: 3.7 G/DL (ref 2.3–3.5)
GLSCOM COMMENTS: NORMAL
GLUCOSE BLD STRIP.AUTO-MCNC: 25 MG/DL (ref 65–100)
GLUCOSE BLD STRIP.AUTO-MCNC: 279 MG/DL (ref 65–100)
GLUCOSE BLD STRIP.AUTO-MCNC: 342 MG/DL (ref 65–100)
GLUCOSE BLD STRIP.AUTO-MCNC: 368 MG/DL (ref 65–100)
GLUCOSE BLD STRIP.AUTO-MCNC: 391 MG/DL (ref 65–100)
GLUCOSE BLD STRIP.AUTO-MCNC: 496 MG/DL (ref 65–100)
GLUCOSE BLD STRIP.AUTO-MCNC: 83 MG/DL (ref 65–100)
GLUCOSE BLD STRIP.AUTO-MCNC: 98 MG/DL (ref 65–100)
GLUCOSE BLD STRIP.AUTO-MCNC: 99 MG/DL (ref 65–100)
GLUCOSE SERPL-MCNC: 153 MG/DL (ref 65–100)
GLUCOSE SERPL-MCNC: 826 MG/DL (ref 65–100)
GLUCOSE SERPL-MCNC: 86 MG/DL (ref 65–100)
GLUCOSE, GLC: 342 MG/DL
GLUCOSE, GLC: 496 MG/DL
GLUCOSE, GLC: 601 MG/DL
HCO3 BLDV-SCNC: 18.9 MMOL/L (ref 23–28)
HCO3 BLDV-SCNC: 26.6 MMOL/L (ref 23–28)
HCT VFR BLD AUTO: 31.4 % (ref 41.1–50.3)
HCT VFR BLD AUTO: 32.4 % (ref 41.1–50.3)
HCT VFR BLD AUTO: 41.3 % (ref 41.1–50.3)
HGB BLD-MCNC: 11.5 G/DL (ref 13.6–17.2)
HGB BLD-MCNC: 9.8 G/DL (ref 13.6–17.2)
HGB BLD-MCNC: 9.9 G/DL (ref 13.6–17.2)
HIGH TARGET, HITG: 250 MG/DL
IMM GRANULOCYTES # BLD AUTO: 0 K/UL (ref 0–0.5)
IMM GRANULOCYTES # BLD AUTO: 0 K/UL (ref 0–0.5)
IMM GRANULOCYTES NFR BLD AUTO: 0 % (ref 0–5)
IMM GRANULOCYTES NFR BLD AUTO: 1 % (ref 0–5)
INSULIN ADMINSTERED, INSADM: 10.8 UNITS/HOUR
INSULIN ADMINSTERED, INSADM: 5.6 UNITS/HOUR
INSULIN ADMINSTERED, INSADM: 8.7 UNITS/HOUR
INSULIN ORDER, INSORD: 10.8 UNITS/HOUR
INSULIN ORDER, INSORD: 5.6 UNITS/HOUR
INSULIN ORDER, INSORD: 8.7 UNITS/HOUR
LACTATE SERPL-SCNC: 3.5 MMOL/L (ref 0.4–2)
LIPASE SERPL-CCNC: 18 U/L (ref 73–393)
LIPASE SERPL-CCNC: 30 U/L (ref 73–393)
LOW TARGET, LOT: 150 MG/DL
LYMPHOCYTES # BLD: 1.1 K/UL (ref 0.5–4.6)
LYMPHOCYTES # BLD: 2 K/UL (ref 0.5–4.6)
LYMPHOCYTES NFR BLD: 23 % (ref 13–44)
LYMPHOCYTES NFR BLD: 31 % (ref 13–44)
MAGNESIUM SERPL-MCNC: 2.1 MG/DL (ref 1.8–2.4)
MCH RBC QN AUTO: 25.6 PG (ref 26.1–32.9)
MCH RBC QN AUTO: 26.3 PG (ref 26.1–32.9)
MCHC RBC AUTO-ENTMCNC: 27.8 G/DL (ref 31.4–35)
MCHC RBC AUTO-ENTMCNC: 31.2 G/DL (ref 31.4–35)
MCV RBC AUTO: 84.4 FL (ref 79.6–97.8)
MCV RBC AUTO: 92 FL (ref 79.6–97.8)
MINUTES UNTIL NEXT BG, NBG: 60 MIN
MONOCYTES # BLD: 0.3 K/UL (ref 0.1–1.3)
MONOCYTES # BLD: 1 K/UL (ref 0.1–1.3)
MONOCYTES NFR BLD: 15 % (ref 4–12)
MONOCYTES NFR BLD: 6 % (ref 4–12)
MULTIPLIER, MUL: 0.02
NEUTS SEG # BLD: 3.2 K/UL (ref 1.7–8.2)
NEUTS SEG # BLD: 3.4 K/UL (ref 1.7–8.2)
NEUTS SEG NFR BLD: 53 % (ref 43–78)
NEUTS SEG NFR BLD: 69 % (ref 43–78)
NRBC # BLD: 0 K/UL (ref 0–0.2)
NRBC # BLD: 0 K/UL (ref 0–0.2)
ORDER INITIALS, ORDINIT: NORMAL
P-R INTERVAL, ECG05: 154 MS
PCO2 BLDV: 39.1 MMHG (ref 41–51)
PCO2 BLDV: 42.3 MMHG (ref 41–51)
PH BLDV: 7.29 [PH] (ref 7.32–7.42)
PH BLDV: 7.41 [PH] (ref 7.32–7.42)
PLATELET # BLD AUTO: 233 K/UL (ref 150–450)
PLATELET # BLD AUTO: 260 K/UL (ref 150–450)
PMV BLD AUTO: 11 FL (ref 9.4–12.3)
PMV BLD AUTO: 9.2 FL (ref 9.4–12.3)
PO2 BLDV: 44 MMHG
PO2 BLDV: 47 MMHG
POTASSIUM SERPL-SCNC: 3.4 MMOL/L (ref 3.5–5.1)
POTASSIUM SERPL-SCNC: 3.6 MMOL/L (ref 3.5–5.1)
POTASSIUM SERPL-SCNC: 4.1 MMOL/L (ref 3.5–5.1)
PROT SERPL-MCNC: 5.8 G/DL (ref 6.3–8.2)
PROT SERPL-MCNC: 6.9 G/DL (ref 6.3–8.2)
Q-T INTERVAL, ECG07: 400 MS
QRS DURATION, ECG06: 134 MS
QTC CALCULATION (BEZET), ECG08: 500 MS
RBC # BLD AUTO: 3.72 M/UL (ref 4.23–5.6)
RBC # BLD AUTO: 4.49 M/UL (ref 4.23–5.6)
SAO2 % BLDV: 78 % (ref 65–88)
SAO2 % BLDV: 80 % (ref 65–88)
SERVICE CMNT-IMP: ABNORMAL
SERVICE CMNT-IMP: ABNORMAL
SERVICE CMNT-IMP: NORMAL
SERVICE CMNT-IMP: NORMAL
SODIUM SERPL-SCNC: 138 MMOL/L (ref 136–145)
SODIUM SERPL-SCNC: 138 MMOL/L (ref 136–145)
SODIUM SERPL-SCNC: 141 MMOL/L (ref 136–145)
SPECIMEN TYPE: ABNORMAL
SPECIMEN TYPE: NORMAL
VENTRICULAR RATE, ECG03: 94 BPM
WBC # BLD AUTO: 4.6 K/UL (ref 4.3–11.1)
WBC # BLD AUTO: 6.4 K/UL (ref 4.3–11.1)

## 2020-09-21 PROCEDURE — 96366 THER/PROPH/DIAG IV INF ADDON: CPT

## 2020-09-21 PROCEDURE — 74011000258 HC RX REV CODE- 258: Performed by: INTERNAL MEDICINE

## 2020-09-21 PROCEDURE — 2709999900 HC NON-CHARGEABLE SUPPLY

## 2020-09-21 PROCEDURE — 74022 RADEX COMPL AQT ABD SERIES: CPT

## 2020-09-21 PROCEDURE — 65610000001 HC ROOM ICU GENERAL

## 2020-09-21 PROCEDURE — 96361 HYDRATE IV INFUSION ADD-ON: CPT

## 2020-09-21 PROCEDURE — 74011000250 HC RX REV CODE- 250: Performed by: STUDENT IN AN ORGANIZED HEALTH CARE EDUCATION/TRAINING PROGRAM

## 2020-09-21 PROCEDURE — 80053 COMPREHEN METABOLIC PANEL: CPT

## 2020-09-21 PROCEDURE — 96375 TX/PRO/DX INJ NEW DRUG ADDON: CPT

## 2020-09-21 PROCEDURE — 74011000250 HC RX REV CODE- 250: Performed by: HOSPITALIST

## 2020-09-21 PROCEDURE — 83690 ASSAY OF LIPASE: CPT

## 2020-09-21 PROCEDURE — 74011250637 HC RX REV CODE- 250/637: Performed by: HOSPITALIST

## 2020-09-21 PROCEDURE — 82803 BLOOD GASES ANY COMBINATION: CPT

## 2020-09-21 PROCEDURE — 74011636637 HC RX REV CODE- 636/637: Performed by: EMERGENCY MEDICINE

## 2020-09-21 PROCEDURE — 96368 THER/DIAG CONCURRENT INF: CPT

## 2020-09-21 PROCEDURE — 74011250636 HC RX REV CODE- 250/636: Performed by: INTERNAL MEDICINE

## 2020-09-21 PROCEDURE — 83735 ASSAY OF MAGNESIUM: CPT

## 2020-09-21 PROCEDURE — 74011000258 HC RX REV CODE- 258: Performed by: STUDENT IN AN ORGANIZED HEALTH CARE EDUCATION/TRAINING PROGRAM

## 2020-09-21 PROCEDURE — 82962 GLUCOSE BLOOD TEST: CPT

## 2020-09-21 PROCEDURE — 80307 DRUG TEST PRSMV CHEM ANLYZR: CPT

## 2020-09-21 PROCEDURE — 74011250636 HC RX REV CODE- 250/636: Performed by: HOSPITALIST

## 2020-09-21 PROCEDURE — 74011636637 HC RX REV CODE- 636/637: Performed by: STUDENT IN AN ORGANIZED HEALTH CARE EDUCATION/TRAINING PROGRAM

## 2020-09-21 PROCEDURE — 74011250636 HC RX REV CODE- 250/636: Performed by: EMERGENCY MEDICINE

## 2020-09-21 PROCEDURE — 74011250636 HC RX REV CODE- 250/636: Performed by: STUDENT IN AN ORGANIZED HEALTH CARE EDUCATION/TRAINING PROGRAM

## 2020-09-21 PROCEDURE — 74011636637 HC RX REV CODE- 636/637: Performed by: INTERNAL MEDICINE

## 2020-09-21 PROCEDURE — 96367 TX/PROPH/DG ADDL SEQ IV INF: CPT

## 2020-09-21 PROCEDURE — C9113 INJ PANTOPRAZOLE SODIUM, VIA: HCPCS | Performed by: HOSPITALIST

## 2020-09-21 PROCEDURE — C9113 INJ PANTOPRAZOLE SODIUM, VIA: HCPCS | Performed by: STUDENT IN AN ORGANIZED HEALTH CARE EDUCATION/TRAINING PROGRAM

## 2020-09-21 PROCEDURE — 74011250637 HC RX REV CODE- 250/637: Performed by: STUDENT IN AN ORGANIZED HEALTH CARE EDUCATION/TRAINING PROGRAM

## 2020-09-21 PROCEDURE — 85018 HEMOGLOBIN: CPT

## 2020-09-21 PROCEDURE — 85025 COMPLETE CBC W/AUTO DIFF WBC: CPT

## 2020-09-21 PROCEDURE — 99284 EMERGENCY DEPT VISIT MOD MDM: CPT

## 2020-09-21 PROCEDURE — 96365 THER/PROPH/DIAG IV INF INIT: CPT

## 2020-09-21 PROCEDURE — 36415 COLL VENOUS BLD VENIPUNCTURE: CPT

## 2020-09-21 PROCEDURE — 83605 ASSAY OF LACTIC ACID: CPT

## 2020-09-21 RX ORDER — LORAZEPAM 2 MG/ML
1 INJECTION INTRAMUSCULAR
Status: DISCONTINUED | OUTPATIENT
Start: 2020-09-21 | End: 2020-09-23 | Stop reason: HOSPADM

## 2020-09-21 RX ORDER — SODIUM CHLORIDE AND POTASSIUM CHLORIDE .9; .15 G/100ML; G/100ML
SOLUTION INTRAVENOUS CONTINUOUS
Status: DISCONTINUED | OUTPATIENT
Start: 2020-09-21 | End: 2020-09-21 | Stop reason: HOSPADM

## 2020-09-21 RX ORDER — DEXTROSE 50 % IN WATER (D50W) INTRAVENOUS SYRINGE
25-50 AS NEEDED
Status: DISCONTINUED | OUTPATIENT
Start: 2020-09-21 | End: 2020-09-21 | Stop reason: HOSPADM

## 2020-09-21 RX ORDER — LANOLIN ALCOHOL/MO/W.PET/CERES
100 CREAM (GRAM) TOPICAL DAILY
Status: DISCONTINUED | OUTPATIENT
Start: 2020-09-21 | End: 2020-09-23 | Stop reason: HOSPADM

## 2020-09-21 RX ORDER — MORPHINE SULFATE 2 MG/ML
2 INJECTION, SOLUTION INTRAMUSCULAR; INTRAVENOUS
Status: DISCONTINUED | OUTPATIENT
Start: 2020-09-21 | End: 2020-09-22

## 2020-09-21 RX ORDER — DEXTROSE 40 %
15 GEL (GRAM) ORAL AS NEEDED
Status: DISCONTINUED | OUTPATIENT
Start: 2020-09-21 | End: 2020-09-23 | Stop reason: HOSPADM

## 2020-09-21 RX ORDER — INSULIN GLARGINE 100 [IU]/ML
10 INJECTION, SOLUTION SUBCUTANEOUS
Status: DISCONTINUED | OUTPATIENT
Start: 2020-09-21 | End: 2020-09-21

## 2020-09-21 RX ORDER — FOLIC ACID 1 MG/1
1 TABLET ORAL DAILY
Status: DISCONTINUED | OUTPATIENT
Start: 2020-09-21 | End: 2020-09-23 | Stop reason: HOSPADM

## 2020-09-21 RX ORDER — ENOXAPARIN SODIUM 100 MG/ML
40 INJECTION SUBCUTANEOUS EVERY 24 HOURS
Status: DISCONTINUED | OUTPATIENT
Start: 2020-09-21 | End: 2020-09-21

## 2020-09-21 RX ORDER — MORPHINE SULFATE 2 MG/ML
2 INJECTION, SOLUTION INTRAMUSCULAR; INTRAVENOUS ONCE
Status: COMPLETED | OUTPATIENT
Start: 2020-09-21 | End: 2020-09-21

## 2020-09-21 RX ORDER — DEXTROSE 50 % IN WATER (D50W) INTRAVENOUS SYRINGE
25-50 AS NEEDED
Status: DISCONTINUED | OUTPATIENT
Start: 2020-09-21 | End: 2020-09-23 | Stop reason: HOSPADM

## 2020-09-21 RX ORDER — SODIUM CHLORIDE 0.9 % (FLUSH) 0.9 %
5-40 SYRINGE (ML) INJECTION AS NEEDED
Status: DISCONTINUED | OUTPATIENT
Start: 2020-09-21 | End: 2020-09-23 | Stop reason: HOSPADM

## 2020-09-21 RX ORDER — ONDANSETRON 2 MG/ML
4 INJECTION INTRAMUSCULAR; INTRAVENOUS
Status: COMPLETED | OUTPATIENT
Start: 2020-09-21 | End: 2020-09-21

## 2020-09-21 RX ORDER — DIPHENHYDRAMINE HYDROCHLORIDE 50 MG/ML
25 INJECTION, SOLUTION INTRAMUSCULAR; INTRAVENOUS
Status: COMPLETED | OUTPATIENT
Start: 2020-09-21 | End: 2020-09-21

## 2020-09-21 RX ORDER — OXYCODONE HYDROCHLORIDE 5 MG/1
5 TABLET ORAL
Status: DISCONTINUED | OUTPATIENT
Start: 2020-09-21 | End: 2020-09-23 | Stop reason: HOSPADM

## 2020-09-21 RX ORDER — DEXTROSE 50 % IN WATER (D50W) INTRAVENOUS SYRINGE
25-50 AS NEEDED
Status: DISCONTINUED | OUTPATIENT
Start: 2020-09-21 | End: 2020-09-21

## 2020-09-21 RX ORDER — POTASSIUM CHLORIDE 20 MEQ/1
40 TABLET, EXTENDED RELEASE ORAL
Status: COMPLETED | OUTPATIENT
Start: 2020-09-21 | End: 2020-09-21

## 2020-09-21 RX ORDER — SODIUM CHLORIDE 9 MG/ML
100 INJECTION, SOLUTION INTRAVENOUS CONTINUOUS
Status: DISCONTINUED | OUTPATIENT
Start: 2020-09-21 | End: 2020-09-22

## 2020-09-21 RX ORDER — INSULIN LISPRO 100 [IU]/ML
INJECTION, SOLUTION INTRAVENOUS; SUBCUTANEOUS EVERY 6 HOURS
Status: DISCONTINUED | OUTPATIENT
Start: 2020-09-21 | End: 2020-09-23 | Stop reason: HOSPADM

## 2020-09-21 RX ORDER — MORPHINE SULFATE 4 MG/ML
4 INJECTION INTRAVENOUS
Status: COMPLETED | OUTPATIENT
Start: 2020-09-21 | End: 2020-09-21

## 2020-09-21 RX ORDER — ONDANSETRON 2 MG/ML
4 INJECTION INTRAMUSCULAR; INTRAVENOUS
Status: DISCONTINUED | OUTPATIENT
Start: 2020-09-21 | End: 2020-09-23 | Stop reason: HOSPADM

## 2020-09-21 RX ORDER — POLYETHYLENE GLYCOL 3350 17 G/17G
17 POWDER, FOR SOLUTION ORAL DAILY PRN
Status: DISCONTINUED | OUTPATIENT
Start: 2020-09-21 | End: 2020-09-23 | Stop reason: HOSPADM

## 2020-09-21 RX ORDER — METOCLOPRAMIDE HYDROCHLORIDE 5 MG/ML
10 INJECTION INTRAMUSCULAR; INTRAVENOUS
Status: COMPLETED | OUTPATIENT
Start: 2020-09-21 | End: 2020-09-21

## 2020-09-21 RX ORDER — SODIUM CHLORIDE 0.9 % (FLUSH) 0.9 %
5-40 SYRINGE (ML) INJECTION EVERY 8 HOURS
Status: DISCONTINUED | OUTPATIENT
Start: 2020-09-21 | End: 2020-09-23 | Stop reason: HOSPADM

## 2020-09-21 RX ORDER — ACETAMINOPHEN 650 MG/1
650 SUPPOSITORY RECTAL
Status: DISCONTINUED | OUTPATIENT
Start: 2020-09-21 | End: 2020-09-23 | Stop reason: HOSPADM

## 2020-09-21 RX ORDER — DEXTROSE MONOHYDRATE AND SODIUM CHLORIDE 5; .45 G/100ML; G/100ML
100 INJECTION, SOLUTION INTRAVENOUS CONTINUOUS
Status: DISCONTINUED | OUTPATIENT
Start: 2020-09-21 | End: 2020-09-21

## 2020-09-21 RX ORDER — POTASSIUM CHLORIDE 14.9 MG/ML
20 INJECTION INTRAVENOUS
Status: COMPLETED | OUTPATIENT
Start: 2020-09-21 | End: 2020-09-21

## 2020-09-21 RX ORDER — PROMETHAZINE HYDROCHLORIDE 25 MG/1
12.5 TABLET ORAL
Status: DISCONTINUED | OUTPATIENT
Start: 2020-09-21 | End: 2020-09-23 | Stop reason: HOSPADM

## 2020-09-21 RX ORDER — LORAZEPAM 1 MG/1
1 TABLET ORAL
Status: DISCONTINUED | OUTPATIENT
Start: 2020-09-21 | End: 2020-09-23 | Stop reason: HOSPADM

## 2020-09-21 RX ORDER — DEXTROSE 40 %
15 GEL (GRAM) ORAL AS NEEDED
Status: DISCONTINUED | OUTPATIENT
Start: 2020-09-21 | End: 2020-09-21 | Stop reason: HOSPADM

## 2020-09-21 RX ORDER — DEXTROSE 40 %
15 GEL (GRAM) ORAL AS NEEDED
Status: DISCONTINUED | OUTPATIENT
Start: 2020-09-21 | End: 2020-09-21

## 2020-09-21 RX ORDER — SODIUM CHLORIDE AND POTASSIUM CHLORIDE .9; .15 G/100ML; G/100ML
SOLUTION INTRAVENOUS CONTINUOUS
Status: DISCONTINUED | OUTPATIENT
Start: 2020-09-21 | End: 2020-09-21

## 2020-09-21 RX ORDER — ACETAMINOPHEN 325 MG/1
650 TABLET ORAL
Status: DISCONTINUED | OUTPATIENT
Start: 2020-09-21 | End: 2020-09-23 | Stop reason: HOSPADM

## 2020-09-21 RX ORDER — INSULIN GLARGINE 100 [IU]/ML
8 INJECTION, SOLUTION SUBCUTANEOUS
Status: DISCONTINUED | OUTPATIENT
Start: 2020-09-21 | End: 2020-09-22

## 2020-09-21 RX ADMIN — INSULIN HUMAN 10 UNITS: 100 INJECTION, SOLUTION PARENTERAL at 06:11

## 2020-09-21 RX ADMIN — SODIUM CHLORIDE 80 MG: 9 INJECTION, SOLUTION INTRAMUSCULAR; INTRAVENOUS; SUBCUTANEOUS at 06:55

## 2020-09-21 RX ADMIN — DIPHENHYDRAMINE HYDROCHLORIDE 25 MG: 50 INJECTION, SOLUTION INTRAMUSCULAR; INTRAVENOUS at 07:48

## 2020-09-21 RX ADMIN — SODIUM CHLORIDE 1000 ML: 900 INJECTION, SOLUTION INTRAVENOUS at 06:11

## 2020-09-21 RX ADMIN — SODIUM CHLORIDE AND POTASSIUM CHLORIDE: 9; 1.49 INJECTION, SOLUTION INTRAVENOUS at 12:16

## 2020-09-21 RX ADMIN — POTASSIUM CHLORIDE 40 MEQ: 20 TABLET, EXTENDED RELEASE ORAL at 06:54

## 2020-09-21 RX ADMIN — LORAZEPAM 1 MG: 1 TABLET ORAL at 19:36

## 2020-09-21 RX ADMIN — MORPHINE SULFATE 2 MG: 2 INJECTION, SOLUTION INTRAMUSCULAR; INTRAVENOUS at 17:09

## 2020-09-21 RX ADMIN — MORPHINE SULFATE 4 MG: 4 INJECTION INTRAVENOUS at 06:55

## 2020-09-21 RX ADMIN — SODIUM CHLORIDE 100 ML/HR: 900 INJECTION, SOLUTION INTRAVENOUS at 19:43

## 2020-09-21 RX ADMIN — ENOXAPARIN SODIUM 40 MG: 40 INJECTION SUBCUTANEOUS at 15:19

## 2020-09-21 RX ADMIN — B-COMPLEX W/ C & FOLIC ACID TAB 1 TABLET: TAB at 15:18

## 2020-09-21 RX ADMIN — LORAZEPAM 1 MG: 1 TABLET ORAL at 12:28

## 2020-09-21 RX ADMIN — MORPHINE SULFATE 2 MG: 2 INJECTION, SOLUTION INTRAMUSCULAR; INTRAVENOUS at 12:51

## 2020-09-21 RX ADMIN — SODIUM CHLORIDE 1000 ML: 900 INJECTION, SOLUTION INTRAVENOUS at 06:56

## 2020-09-21 RX ADMIN — MORPHINE SULFATE 2 MG: 2 INJECTION, SOLUTION INTRAMUSCULAR; INTRAVENOUS at 19:52

## 2020-09-21 RX ADMIN — Medication 10 ML: at 22:49

## 2020-09-21 RX ADMIN — INSULIN GLARGINE 8 UNITS: 100 INJECTION, SOLUTION SUBCUTANEOUS at 15:20

## 2020-09-21 RX ADMIN — ONDANSETRON 4 MG: 2 INJECTION INTRAMUSCULAR; INTRAVENOUS at 19:25

## 2020-09-21 RX ADMIN — POTASSIUM CHLORIDE 20 MEQ: 14.9 INJECTION, SOLUTION INTRAVENOUS at 06:46

## 2020-09-21 RX ADMIN — SODIUM CHLORIDE AND POTASSIUM CHLORIDE: 9; 1.49 INJECTION, SOLUTION INTRAVENOUS at 09:25

## 2020-09-21 RX ADMIN — ONDANSETRON 4 MG: 2 INJECTION INTRAMUSCULAR; INTRAVENOUS at 06:55

## 2020-09-21 RX ADMIN — Medication 100 MG: at 15:19

## 2020-09-21 RX ADMIN — SODIUM CHLORIDE 40 MG: 9 INJECTION, SOLUTION INTRAMUSCULAR; INTRAVENOUS; SUBCUTANEOUS at 17:09

## 2020-09-21 RX ADMIN — FOLIC ACID 1 MG: 1 TABLET ORAL at 15:19

## 2020-09-21 RX ADMIN — ONDANSETRON 4 MG: 2 INJECTION INTRAMUSCULAR; INTRAVENOUS at 12:26

## 2020-09-21 RX ADMIN — SODIUM CHLORIDE 10.8 UNITS/HR: 900 INJECTION, SOLUTION INTRAVENOUS at 07:31

## 2020-09-21 RX ADMIN — METOCLOPRAMIDE HYDROCHLORIDE 10 MG: 5 INJECTION INTRAMUSCULAR; INTRAVENOUS at 07:48

## 2020-09-21 RX ADMIN — DEXTROSE MONOHYDRATE 25 G: 25 INJECTION, SOLUTION INTRAVENOUS at 12:14

## 2020-09-21 RX ADMIN — DEXTROSE MONOHYDRATE AND SODIUM CHLORIDE 75 ML/HR: 5; .45 INJECTION, SOLUTION INTRAVENOUS at 12:32

## 2020-09-21 NOTE — ED NOTES
TRANSFER - OUT REPORT:    Verbal report given to Nallely Ramirez RN(name) on Ham  being transferred to [facility](unit) for routine progression of care       Report consisted of patients Situation, Background, Assessment and   Recommendations(SBAR). Information from the following report(s) SBAR, Kardex, ED Summary, [facility] and Recent Results was reviewed with the receiving nurse. Lines:   Peripheral IV 09/21/20 Left External jugular (Active)   Site Assessment Clean, dry, & intact 09/21/20 0610   Phlebitis Assessment 0 09/21/20 0610   Infiltration Assessment 0 09/21/20 0610   Dressing Status Clean, dry, & intact 09/21/20 0610       Peripheral IV 09/21/20 Right;Upper Antecubital (Active)   Site Assessment Clean, dry, & intact 09/21/20 0725   Phlebitis Assessment 0 09/21/20 0725   Infiltration Assessment 0 09/21/20 0725   Dressing Status Clean, dry, & intact 09/21/20 0725        Opportunity for questions and clarification was provided.       Patient transported with:   [facility]

## 2020-09-21 NOTE — PROGRESS NOTES
Problem: Diabetes Self-Management  Goal: *Disease process and treatment process  Description: Define diabetes and identify own type of diabetes; list 3 options for treating diabetes. Outcome: Progressing Towards Goal  Goal: *Incorporating nutritional management into lifestyle  Description: Describe effect of type, amount and timing of food on blood glucose; list 3 methods for planning meals. Outcome: Progressing Towards Goal  Goal: *Incorporating physical activity into lifestyle  Description: State effect of exercise on blood glucose levels. Outcome: Progressing Towards Goal  Goal: *Developing strategies to promote health/change behavior  Description: Define the ABC's of diabetes; identify appropriate screenings, schedule and personal plan for screenings. Outcome: Progressing Towards Goal  Goal: *Using medications safely  Description: State effect of diabetes medications on diabetes; name diabetes medication taking, action and side effects. Outcome: Progressing Towards Goal  Goal: *Monitoring blood glucose, interpreting and using results  Description: Identify recommended blood glucose targets  and personal targets. Outcome: Progressing Towards Goal  Goal: *Prevention, detection, treatment of acute complications  Description: List symptoms of hyper- and hypoglycemia; describe how to treat low blood sugar and actions for lowering  high blood glucose level. Outcome: Progressing Towards Goal  Goal: *Prevention, detection and treatment of chronic complications  Description: Define the natural course of diabetes and describe the relationship of blood glucose levels to long term complications of diabetes.   Outcome: Progressing Towards Goal  Goal: *Developing strategies to address psychosocial issues  Description: Describe feelings about living with diabetes; identify support needed and support network  Outcome: Progressing Towards Goal  Goal: *Insulin pump training  Outcome: Progressing Towards Goal  Goal: *Sick day guidelines  Outcome: Progressing Towards Goal  Goal: *Patient Specific Goal (EDIT GOAL, INSERT TEXT)  Outcome: Progressing Towards Goal     Problem: Patient Education: Go to Patient Education Activity  Goal: Patient/Family Education  Outcome: Progressing Towards Goal     Problem: Falls - Risk of  Goal: *Absence of Falls  Description: Document Milly Sharanflako Fall Risk and appropriate interventions in the flowsheet. Outcome: Progressing Towards Goal  Note: Fall Risk Interventions:            Medication Interventions: Assess postural VS orthostatic hypotension, Evaluate medications/consider consulting pharmacy    Elimination Interventions: Call light in reach              Problem: Patient Education: Go to Patient Education Activity  Goal: Patient/Family Education  Outcome: Progressing Towards Goal     Problem: Pressure Injury - Risk of  Goal: *Prevention of pressure injury  Description: Document Delta Scale and appropriate interventions in the flowsheet.   Outcome: Progressing Towards Goal  Note: Pressure Injury Interventions:       Moisture Interventions: Apply protective barrier, creams and emollients    Activity Interventions: Assess need for specialty bed         Nutrition Interventions: Document food/fluid/supplement intake                     Problem: Patient Education: Go to Patient Education Activity  Goal: Patient/Family Education  Outcome: Progressing Towards Goal

## 2020-09-21 NOTE — H&P
Hospitalist Note     Admit Date:  2020 11:56 AM   Name:  Ydaira Mahmood   Age:  39 y.o.  :  1975   MRN:  [id number]   PCP:  None  Treatment Team: Attending Provider: Alvaro Ugarte MD    HPI/Subjective:   Chief complaint nausea vomiting abdominal pain    Patient is a 79-year-old male with past medical history significant for type 2 diabetes mellitus, medication noncompliance, alcohol dependence presented to the ER because of nausea vomiting and abdominal pain. Patient reports that he has been having nausea and vomiting for the last 2 days. Unable to keep anything down. He also reports vomiting bright red blood once. Reports diffuse abdominal pain. 10/10 in severity, sharp, nonradiating, no aggravating or alleviating factors. No fever no chills. No dysuria urgency or frequency of urination. Patient reports he has not been taking his insulin because he feels depressed and he does not want to live. He drinks about 1L [place] daily. He denies any chest pain. He reports palpitations. No cough no shortness of breath. 10 systems reviewed and negative except as noted in HPI. ER course  Afebrile, tachycardic,    CBC fairly unremarkable except for hemoglobin of 11.5. CMP remarkable for blood glucose of 826, creatinine of 1.28. Patient admitted for further evaluation management of diabetic ketoacidosis. No ICU beds available at [facility]. Patient is transferred to [facility] ICU for further evaluation management. Past Medical History:   Diagnosis Date    Bipolar 1 disorder ([facility]     Depression     Diabetes ([facility]     PTSD (post-traumatic stress disorder)       No past surgical history on file.    Allergies   Allergen Reactions    Toradol [Ketorolac] Nausea and Vomiting      Social History     Tobacco Use    Smoking status: Current Every Day Smoker     Packs/day: 1.00    Smokeless tobacco: Never Used   Substance Use Topics    Alcohol use: Yes      No family history on file.   Immunization History   Administered Date(s) Administered    Influenza Vaccine (Quad) PF 09/14/2020     PTA Medications:  Cannot display prior to admission medications because the patient has not been admitted in this contact. Objective:     Patient Vitals for the past 24 hrs:   Temp Pulse Resp BP SpO2   09/21/20 1207 98.9 °F (37.2 °C) 95 18 120/83 98 %     Oxygen Therapy  O2 Sat (%): 98 % (09/21/20 1207)  O2 Device: Room air (09/21/20 1207)    No intake or output data in the 24 hours ending 09/21/20 1221    *Note that automatically entered I/Os may not be accurate; dependent on patient compliance with collection and accurate  by assistants. Physical Exam:  General:    Unkempt, alert,awake,     Eyes:   Normal sclerae. Extraocular movements intact. HENT:  Normocephalic, atraumatic. Dry mucous membranes  CV:   Tachycardic, regular rhythm, no murmurs rubs or gallops  Lungs:  CTAB. No wheezing, rhonchi, or rales. Abdomen: Soft, non distended, diffusely tender, active bowel sounds, no organomegaly. Extremities: Warm and dry. No cyanosis or edema. Neurologic: CN II-XII grossly intact. Sensation intact. Skin:     No rashes or jaundice. Normal coloration  Psych:  Reports feeling depressed, but able to maintain conversation with eye contact. Suicidal thoughts,     I reviewed the labs, imaging, EKGs, telemetry, and other studies done this admission.     Data Review:   Recent Results (from the past 24 hour(s))   ETHYL ALCOHOL    Collection Time: 09/21/20  5:53 AM   Result Value Ref Range    ALCOHOL(ETHYL),SERUM 179 MG/DL   CBC WITH AUTOMATED DIFF    Collection Time: 09/21/20  5:56 AM   Result Value Ref Range    WBC 4.6 4.3 - 11.1 K/uL    RBC 4.49 4.23 - 5.6 M/uL    HGB 11.5 (L) 13.6 - 17.2 g/dL    HCT 41.3 41.1 - 50.3 %    MCV 92.0 79.6 - 97.8 FL    MCH 25.6 (L) 26.1 - 32.9 PG    MCHC 27.8 (L) 31.4 - 35.0 g/dL    RDW 19.9 (H) 11.9 - 14.6 %    PLATELET 263 120 - 565 K/uL    MPV 11.0 9.4 - 12.3 FL    ABSOLUTE NRBC 0.00 0.0 - 0.2 K/uL    DF AUTOMATED      NEUTROPHILS 69 43 - 78 %    LYMPHOCYTES 23 13 - 44 %    MONOCYTES 6 4.0 - 12.0 %    EOSINOPHILS 1 0.5 - 7.8 %    BASOPHILS 1 0.0 - 2.0 %    IMMATURE GRANULOCYTES 1 0.0 - 5.0 %    ABS. NEUTROPHILS 3.2 1.7 - 8.2 K/UL    ABS. LYMPHOCYTES 1.1 0.5 - 4.6 K/UL    ABS. MONOCYTES 0.3 0.1 - 1.3 K/UL    ABS. EOSINOPHILS 0.0 0.0 - 0.8 K/UL    ABS. BASOPHILS 0.1 0.0 - 0.2 K/UL    ABS. IMM. GRANS. 0.0 0.0 - 0.5 K/UL   METABOLIC PANEL, COMPREHENSIVE    Collection Time: 09/21/20  5:56 AM   Result Value Ref Range    Sodium 138 136 - 145 mmol/L    Potassium 4.1 3.5 - 5.1 mmol/L    Chloride 99 98 - 107 mmol/L    CO2 17 (L) 21 - 32 mmol/L    Anion gap 22 (H) 7 - 16 mmol/L    Glucose 826 (HH) 65 - 100 mg/dL    BUN 9 6 - 23 MG/DL    Creatinine 1.28 0.8 - 1.5 MG/DL    GFR est AA >60 >60 ml/min/1.73m2    GFR est non-AA >60 >60 ml/min/1.73m2    Calcium 8.5 8.3 - 10.4 MG/DL    Bilirubin, total 0.2 0.2 - 1.1 MG/DL    ALT (SGPT) 19 12 - 65 U/L    AST (SGOT) 32 15 - 37 U/L    Alk. phosphatase 104 50 - 136 U/L    Protein, total 6.9 6.3 - 8.2 g/dL    Albumin 3.2 (L) 3.5 - 5.0 g/dL    Globulin 3.7 (H) 2.3 - 3.5 g/dL    A-G Ratio 0.9 (L) 1.2 - 3.5     MAGNESIUM    Collection Time: 09/21/20  6:00 AM   Result Value Ref Range    Magnesium 2.1 1.8 - 2.4 mg/dL   EKG, 12 LEAD, INITIAL    Collection Time: 09/21/20  7:26 AM   Result Value Ref Range    Ventricular Rate 94 BPM    Atrial Rate 94 BPM    P-R Interval 154 ms    QRS Duration 134 ms    Q-T Interval 400 ms    QTC Calculation (Bezet) 500 ms    Calculated P Axis 61 degrees    Calculated R Axis 138 degrees    Calculated T Axis 19 degrees    Diagnosis       !! AGE AND GENDER SPECIFIC ECG ANALYSIS !! Normal sinus rhythm  Right bundle branch block  Left posterior fascicular block  !!! Bifascicular block !!!   Abnormal ECG  When compared with ECG of 11-SEP-2020 03:26,  Left posterior fascicular block is now Present  Confirmed by [facility]  MD (), CARLEEN MENDOZA ([id number]) on 9/21/2020 11:48:05 AM     POC VENOUS BLOOD GAS    Collection Time: 09/21/20  7:29 AM   Result Value Ref Range    Device: ROOM AIR      pH, venous (POC) 7.29 (L) 7.32 - 7.42      pCO2, venous (POC) 39.1 (L) 41 - 51 MMHG    pO2, venous (POC) 47 mmHg    HCO3, venous (POC) 18.9 (L) 23 - 28 MMOL/L    sO2, venous (POC) 78 65 - 88 %    Base deficit, venous (POC) 7 mmol/L    Allens test (POC) NO      Site OTHER      Specimen type (POC) VENOUS BLOOD      Performed by Hany     CO2, POC 20 MMOL/L    Critical value read back 00:01     COLLECT TIME 720     GLUCOSTABILIZER    Collection Time: 09/21/20  7:30 AM   Result Value Ref Range    Glucose 601 mg/dL    Insulin order 10.8 units/hour    Insulin adminstered 10.8 units/hour    Multiplier 0.020     Low target 150 mg/dL    High target 250 mg/dL    D50 order 0.0 ml    D50 administered 0.00 ml    Minutes until next BG 60 min    Order initials ht/sw     Administered initials ht/sw     GLSCOM Comments     GLUCOSE, POC    Collection Time: 09/21/20  8:32 AM   Result Value Ref Range    Glucose (POC) 496 (HH) 65 - 100 mg/dL   GLUCOSTABILIZER    Collection Time: 09/21/20  8:33 AM   Result Value Ref Range    Glucose 496 mg/dL    Insulin order 8.7 units/hour    Insulin adminstered 8.7 units/hour    Multiplier 0.020     Low target 150 mg/dL    High target 250 mg/dL    D50 order 0.0 ml    D50 administered 0.00 ml    Minutes until next BG 60 min    Order initials sw     Administered initials sw     GLSCOM Comments     GLUCOSE, POC    Collection Time: 09/21/20  9:49 AM   Result Value Ref Range    Glucose (POC) 342 (H) 65 - 100 mg/dL   GLUCOSTABILIZER    Collection Time: 09/21/20  9:50 AM   Result Value Ref Range    Glucose 342 mg/dL    Insulin order 5.6 units/hour    Insulin adminstered 5.6 units/hour    Multiplier 0.020     Low target 150 mg/dL    High target 250 mg/dL    D50 order 0.0 ml    D50 administered 0.00 ml    Minutes until next BG 60 min Order initials sw     Administered initials sw     GLSCOM Comments         All Micro Results     None          Current Facility-Administered Medications   Medication Dose Route Frequency    insulin regular (NOVOLIN R, HUMULIN R) 100 Units in 0.9% sodium chloride 100 mL infusion  0-50 Units/hr IntraVENous TITRATE    dextrose 40% (GLUTOSE) oral gel 1 Tube  15 g Oral PRN    glucagon (GLUCAGEN) injection 1 mg  1 mg IntraMUSCular PRN    dextrose (D50W) injection syrg 12.5-25 g  25-50 mL IntraVENous PRN    0.9% sodium chloride with KCl 20 mEq/L infusion   IntraVENous CONTINUOUS    sodium chloride (NS) flush 5-40 mL  5-40 mL IntraVENous Q8H    sodium chloride (NS) flush 5-40 mL  5-40 mL IntraVENous PRN    acetaminophen (TYLENOL) tablet 650 mg  650 mg Oral Q6H PRN    Or    acetaminophen (TYLENOL) suppository 650 mg  650 mg Rectal Q6H PRN    polyethylene glycol (MIRALAX) packet 17 g  17 g Oral DAILY PRN    promethazine (PHENERGAN) tablet 12.5 mg  12.5 mg Oral Q6H PRN    Or    ondansetron (ZOFRAN) injection 4 mg  4 mg IntraVENous Q6H PRN    enoxaparin (LOVENOX) injection 40 mg  40 mg SubCUTAneous Q24H    thiamine HCL (B-1) tablet 100 mg  100 mg Oral DAILY    folic acid (FOLVITE) tablet 1 mg  1 mg Oral DAILY    multivitamin, stress formula (STRESS TAB) tablet 1 Tab  1 Tab Oral DAILY    LORazepam (ATIVAN) tablet 1 mg  1 mg Oral Q4H PRN    LORazepam (ATIVAN) injection 1 mg  1 mg IntraVENous Q4H PRN    pantoprazole (PROTONIX) 40 mg in 0.9% sodium chloride 10 mL injection  40 mg IntraVENous Q12H       Other Studies:  Xr Abd Acute W 1 V Chest    Result Date: 9/21/2020  Acute abdominal series Comparison: September 18, 2020 Indication: Abdominal pain Findings: Chest: There is no focal pulmonary consolidation, pleural effusion or pneumothorax. No pulmonary edema. Cardiomediastinal contour is within normal limits. Abdomen: There is normal bowel gas pattern. No evidence of free air. Surrounding bones are unremarkable. There is pancreatic head calcification, similar to prior exam.     IMPRESSION: 1. No evidence of acute pulmonary disease. 2. Normal bowel gas pattern. Assessment and Plan:     Hospital Problems as of 9/21/2020 Never Reviewed          Codes Class Noted - Resolved POA    DKA, type 2 ([facility] ICD-10-CM: E11.10  ICD-9-CM: 250.12  9/21/2020 - Present Unknown        Narcotic dependence ([facility] ICD-10-CM: F11.20  ICD-9-CM: 304.90  9/11/2020 - Present Yes        Hx of chronic pancreatitis ICD-10-CM: Z87.19  ICD-9-CM: V12.79  9/11/2020 - Present Yes        Type II diabetes mellitus with complication, uncontrolled ([place] [id number] ICD-10-CM: E11.8, E11.65  ICD-9-CM: 250.92  5/31/2020 - Present Yes        DKA (diabetic ketoacidoses) ([facility] ICD-10-CM: E11.10  ICD-9-CM: 250.12  5/30/2020 - Present Yes        Bipolar disorder ([facility] ICD-10-CM: F31.9  ICD-9-CM: 296.80  5/30/2020 - Present Yes        HTN (hypertension) ICD-10-CM: I10  ICD-9-CM: 401.9  5/30/2020 - Present Yes        Alcohol dependence ([facility] ICD-10-CM: F10.20  ICD-9-CM: 303.90  5/30/2020 - Present Yes              Plan: This is a 24-year-old male with    Diabetic ketoacidosis secondary to medication noncompliance  Insulin drip and IV fluids per DKA protocol. BMP every 4 hours while on insulin drip. Diabetes management consulted. Hemoglobin A1c of 10.3 09/13/2020. N.p.o. for now. Plan to transition to subcu insulin when anion gap closed x2. Mildly elevated creatinine  Hydrate with IV fluids. Repeat BMP. ? GI Bleed- ? Reported hematemesis   Monitor hemoglobin and hematocrit every 8 hours. If any concerns for GI bleed will consult GI.  IV Protonix. Alcohol dependence  Monitor for signs of alcohol withdrawal.  Thiamine folic acid multivitamin  Ativan PRN alcohol withdrawal.    Chronic pancreatitis  N.p.o. IV fluids. Depression with suicidal thoughts  Suicidal precautions  Telemetry psychiatry consulted. Appreciate recommendations.     Hypertension  Patient reports not taking any medications at home. Monitor blood pressure.   Hydralazine PRN    Discharge planning: ICU inpatient    DVT ppx: Lovenox subcu    Code status:  Full    Estimated LOS:  Greater than 2 midnights    Risk:  high    Signed:  Isaak Tellez MD

## 2020-09-21 NOTE — PROGRESS NOTES
Patient alert and oriented. Follow commands. Patient complains of abdomen pain. 8/10. Talk with Silvino Hughes. Okay to give morphine 2 mg now.

## 2020-09-21 NOTE — PROGRESS NOTES
LM #2   Nutrition Note    Nursing admission malnutrition screening tool trigger received:  Recently lost weight without trying:  NO, If yes, how much weight loss-unsure, eating poorly due to decreased appetite: NO.  Malnutrition screening tool received in error. Nutrition screen deferred at this time. Follow up per nutrition standards of care.     Electronically signed by Vivek Carrasquillo RD on 9/21/2020 at 4:43 PM    Contact: [phone]

## 2020-09-21 NOTE — ED TRIAGE NOTES
Pt arrives via ems. Pt hx diabetes. Called out for hyperglycemia. bgl [id number] with ems. Pt had one episode of vomiting pta. Pt refusing temperature in triage. Pt requesting water.

## 2020-09-21 NOTE — ED NOTES
Pt repeatedly asking for water during triage. When told he has to wait and get results from tests back first and see a doc, he began cussing at staff and called this RN \"stupid and difficult\". Pt left in wheelchair in triage. Other staff to room to assist pt. Pt still yelling and cussing at other staff, heard in nurses station from triage.

## 2020-09-21 NOTE — ED PROVIDER NOTES
74-year-old male patient well-known to this department presents with reports of severe abdominal pain, nausea vomiting and concern for hyperglycemia. Patient states he is not taking insulin as he does not care about his diabetes anymore. He admits to drinking approximately half a liter of bourbon over the past 24 hours. He normally drinks more than this daily but has been unable to hold down alcohol secondary to ongoing nausea and vomiting. He also reports bright red hematic emesis at times mixed with his emesis. Abdominal pain is described as severe and generalized throughout the abdomen. Patient is well-known to this department and presents with these same complaints quite frequently. He was most recently evaluated yesterday. Past Medical History:   Diagnosis Date    Bipolar 1 disorder ([facility]     Depression     [facility]     PTSD (post-traumatic stress disorder)        No past surgical history on file. No family history on file. Social History     Socioeconomic History    Marital status: SINGLE     Spouse name: Not on file    Number of children: Not on file    Years of education: Not on file    Highest education level: Not on file   Occupational History    Not on file   Social Needs    Financial resource strain: Not on file    Food insecurity     Worry: Not on file     Inability: Not on file    Transportation needs     Medical: Not on file     Non-medical: Not on file   Tobacco Use    Smoking status: Current Every Day Smoker     Packs/day: 1.00    Smokeless tobacco: Never Used   Substance and Sexual Activity    Alcohol use:  Yes    Drug use: Not Currently    Sexual activity: Not on file   Lifestyle    Physical activity     Days per week: Not on file     Minutes per session: Not on file    Stress: Not on file   Relationships    Social connections     Talks on phone: Not on file     Gets together: Not on file     Attends [employer] service: Not on file     Active member of club or organization: Not on file     Attends meetings of clubs or organizations: Not on file     Relationship status: Not on file    Intimate partner violence     Fear of current or ex partner: Not on file     Emotionally abused: Not on file     Physically abused: Not on file     Forced sexual activity: Not on file   Other Topics Concern    Not on file   Social History Narrative    Not on file         ALLERGIES: Toradol [ketorolac]    Review of Systems   Constitutional: Negative for chills, diaphoresis and fever. HENT: Negative for congestion, sneezing and sore throat. Eyes: Negative for visual disturbance. Respiratory: Negative for cough, chest tightness, shortness of breath and wheezing. Cardiovascular: Negative for chest pain and leg swelling. Gastrointestinal: Positive for abdominal pain, nausea and vomiting. Negative for blood in stool and diarrhea. Endocrine: Negative for polyuria. Genitourinary: Positive for flank pain and urgency. Negative for difficulty urinating, dysuria and hematuria. Musculoskeletal: Negative for back pain, myalgias, neck pain and neck stiffness. Skin: Negative for color change and rash. Neurological: Negative for dizziness, syncope, speech difficulty, weakness, light-headedness, numbness and headaches. Psychiatric/Behavioral: Negative for behavioral problems. All other systems reviewed and are negative. Vitals:    09/21/20 0537   BP: (!) 140/73   Pulse: (!) 115   Resp: 16   SpO2: 100%   Weight: 63.5 kg (140 lb)   Height: 5' 8\" (1.727 m)            Physical Exam  Vitals signs and nursing note reviewed. Constitutional:       General: He is not in acute distress. Appearance: He is well-developed. He is not diaphoretic. Comments: Alert and oriented to person place and time. No acute distress, speaks in clear, fluid sentences. HENT:      Head: Normocephalic and atraumatic.       Right Ear: External ear normal.      Left Ear: External ear normal.      Nose: Nose normal.   Eyes:      Pupils: Pupils are equal, round, and reactive to light. Neck:      Musculoskeletal: Normal range of motion. Cardiovascular:      Rate and Rhythm: Normal rate and regular rhythm. Heart sounds: Normal heart sounds. No murmur. No friction rub. No gallop. Pulmonary:      Effort: Pulmonary effort is normal. No respiratory distress. Breath sounds: Normal breath sounds. No stridor. No decreased breath sounds, wheezing, rhonchi or rales. Chest:      Chest wall: No tenderness. Abdominal:      General: There is no distension. Palpations: Abdomen is soft. There is no mass. Tenderness: There is no abdominal tenderness. There is no guarding or rebound. Hernia: No hernia is present. Comments: Generally tender with voluntary guarding on exam.  The abdomen is not distended and there is no rebound appreciated. There is no pain at McBurney's point [place] sign is felt to be negative. There is some pain at the lower epigastrium on palpation. Musculoskeletal: Normal range of motion. General: No tenderness or deformity. Skin:     General: Skin is warm and dry. Neurological:      Mental Status: He is alert and oriented to person, place, and time. Cranial Nerves: No cranial nerve deficit. Psychiatric:         Attention and Perception: Attention normal.         Mood and Affect: Mood is anxious. Comments: Patient appears slightly anxious and tremulous on exam.          MDM  Number of Diagnoses or Management Options  Alcohol abuse: established and worsening  Diabetic ketoacidosis without coma associated with other specified diabetes mellitus ([facility]): new and requires workup  Diagnosis management comments: Patient's glucose level is over 938 with a metabolic gap acidosis present. Concerning for DKA. Orders placed for potassium supplementation, insulin drip.   He has had a liter of fluids that was ordered upon arrival as well as 10 of insulin. Subsequent liter ordered, placed order for Protonix as well as plain film imaging of the chest to assess for any evidence of perforated ulcer. EKG obtained on arrival shows sinus rhythm with a rate of 94 beats a minute. There is evidence of a right bundle branch block present as well as a left posterior fascicular block. Tracing today is consistent with previous tracings, most specifically the tracing from 8/13/2020.        Amount and/or Complexity of Data Reviewed  Clinical lab tests: reviewed and ordered  Tests in the radiology section of CPT®: ordered and reviewed  Tests in the medicine section of CPT®: ordered and reviewed  Discuss the patient with other providers: yes  Independent visualization of images, tracings, or specimens: yes    Risk of Complications, Morbidity, and/or Mortality  Presenting problems: high  Diagnostic procedures: high  Management options: high    Patient Progress  Patient progress: stable         Procedures

## 2020-09-22 PROBLEM — D69.6 THROMBOCYTOPENIA (HCC): Status: ACTIVE | Noted: 2020-09-22

## 2020-09-22 LAB
ALBUMIN SERPL-MCNC: 1.9 G/DL (ref 3.5–5)
ALBUMIN/GLOB SERPL: 0.5 {RATIO} (ref 1.2–3.5)
ALP SERPL-CCNC: 74 U/L (ref 50–136)
ALT SERPL-CCNC: 22 U/L (ref 12–65)
ANION GAP SERPL CALC-SCNC: 4 MMOL/L (ref 7–16)
AST SERPL-CCNC: 35 U/L (ref 15–37)
BASOPHILS # BLD: 0.1 K/UL (ref 0–0.2)
BASOPHILS NFR BLD: 1 % (ref 0–2)
BILIRUB SERPL-MCNC: 0.4 MG/DL (ref 0.2–1.1)
BUN SERPL-MCNC: 6 MG/DL (ref 6–23)
CALCIUM SERPL-MCNC: 8.3 MG/DL (ref 8.3–10.4)
CHLORIDE SERPL-SCNC: 107 MMOL/L (ref 98–107)
CO2 SERPL-SCNC: 20 MMOL/L (ref 21–32)
CREAT SERPL-MCNC: 0.63 MG/DL (ref 0.8–1.5)
DIFFERENTIAL METHOD BLD: ABNORMAL
EOSINOPHIL # BLD: 0.1 K/UL (ref 0–0.8)
EOSINOPHIL NFR BLD: 1 % (ref 0.5–7.8)
ERYTHROCYTE [DISTWIDTH] IN BLOOD BY AUTOMATED COUNT: 19.8 % (ref 11.9–14.6)
GLOBULIN SER CALC-MCNC: 3.5 G/DL (ref 2.3–3.5)
GLUCOSE BLD STRIP.AUTO-MCNC: 107 MG/DL (ref 65–100)
GLUCOSE BLD STRIP.AUTO-MCNC: 154 MG/DL (ref 65–100)
GLUCOSE BLD STRIP.AUTO-MCNC: 309 MG/DL (ref 65–100)
GLUCOSE BLD STRIP.AUTO-MCNC: 397 MG/DL (ref 65–100)
GLUCOSE BLD STRIP.AUTO-MCNC: 496 MG/DL (ref 65–100)
GLUCOSE SERPL-MCNC: 144 MG/DL (ref 65–100)
HCT VFR BLD AUTO: 37.7 % (ref 41.1–50.3)
HGB BLD-MCNC: 11 G/DL (ref 13.6–17.2)
IMM GRANULOCYTES # BLD AUTO: 0.1 K/UL (ref 0–0.5)
IMM GRANULOCYTES NFR BLD AUTO: 1 % (ref 0–5)
LYMPHOCYTES # BLD: 2.5 K/UL (ref 0.5–4.6)
LYMPHOCYTES NFR BLD: 43 % (ref 13–44)
MCH RBC QN AUTO: 26.5 PG (ref 26.1–32.9)
MCHC RBC AUTO-ENTMCNC: 29.2 G/DL (ref 31.4–35)
MCV RBC AUTO: 90.8 FL (ref 79.6–97.8)
MONOCYTES # BLD: 0.5 K/UL (ref 0.1–1.3)
MONOCYTES NFR BLD: 9 % (ref 4–12)
NEUTS SEG # BLD: 2.6 K/UL (ref 1.7–8.2)
NEUTS SEG NFR BLD: 45 % (ref 43–78)
NRBC # BLD: 0 K/UL (ref 0–0.2)
PLATELET # BLD AUTO: 117 K/UL (ref 150–450)
PLATELET COMMENTS,PCOM: SLIGHT
PMV BLD AUTO: 11.9 FL (ref 9.4–12.3)
POTASSIUM SERPL-SCNC: 4 MMOL/L (ref 3.5–5.1)
PROT SERPL-MCNC: 5.4 G/DL (ref 6.3–8.2)
RBC # BLD AUTO: 4.15 M/UL (ref 4.23–5.6)
RBC MORPH BLD: ABNORMAL
SODIUM SERPL-SCNC: 131 MMOL/L (ref 136–145)
WBC # BLD AUTO: 5.9 K/UL (ref 4.3–11.1)
WBC MORPH BLD: ABNORMAL

## 2020-09-22 PROCEDURE — 74011250636 HC RX REV CODE- 250/636: Performed by: INTERNAL MEDICINE

## 2020-09-22 PROCEDURE — 2709999900 HC NON-CHARGEABLE SUPPLY

## 2020-09-22 PROCEDURE — C9113 INJ PANTOPRAZOLE SODIUM, VIA: HCPCS | Performed by: HOSPITALIST

## 2020-09-22 PROCEDURE — 74011250637 HC RX REV CODE- 250/637: Performed by: HOSPITALIST

## 2020-09-22 PROCEDURE — 82962 GLUCOSE BLOOD TEST: CPT

## 2020-09-22 PROCEDURE — 74011250636 HC RX REV CODE- 250/636: Performed by: HOSPITALIST

## 2020-09-22 PROCEDURE — 65610000001 HC ROOM ICU GENERAL

## 2020-09-22 PROCEDURE — 36416 COLLJ CAPILLARY BLOOD SPEC: CPT

## 2020-09-22 PROCEDURE — 74011636637 HC RX REV CODE- 636/637: Performed by: INTERNAL MEDICINE

## 2020-09-22 PROCEDURE — 80053 COMPREHEN METABOLIC PANEL: CPT

## 2020-09-22 PROCEDURE — 74011000250 HC RX REV CODE- 250: Performed by: HOSPITALIST

## 2020-09-22 PROCEDURE — 74011250637 HC RX REV CODE- 250/637: Performed by: INTERNAL MEDICINE

## 2020-09-22 PROCEDURE — 85025 COMPLETE CBC W/AUTO DIFF WBC: CPT

## 2020-09-22 RX ORDER — QUETIAPINE FUMARATE 100 MG/1
100 TABLET, FILM COATED ORAL
Status: DISCONTINUED | OUTPATIENT
Start: 2020-09-22 | End: 2020-09-23 | Stop reason: HOSPADM

## 2020-09-22 RX ORDER — INSULIN GLARGINE 100 [IU]/ML
8 INJECTION, SOLUTION SUBCUTANEOUS
Status: DISCONTINUED | OUTPATIENT
Start: 2020-09-22 | End: 2020-09-23 | Stop reason: HOSPADM

## 2020-09-22 RX ADMIN — LORAZEPAM 1 MG: 1 TABLET ORAL at 08:16

## 2020-09-22 RX ADMIN — Medication 10 ML: at 13:43

## 2020-09-22 RX ADMIN — INSULIN GLARGINE 8 UNITS: 100 INJECTION, SOLUTION SUBCUTANEOUS at 20:09

## 2020-09-22 RX ADMIN — PROMETHAZINE HYDROCHLORIDE 12.5 MG: 25 TABLET ORAL at 11:35

## 2020-09-22 RX ADMIN — MORPHINE SULFATE 2 MG: 2 INJECTION, SOLUTION INTRAMUSCULAR; INTRAVENOUS at 04:14

## 2020-09-22 RX ADMIN — B-COMPLEX W/ C & FOLIC ACID TAB 1 TABLET: TAB at 08:15

## 2020-09-22 RX ADMIN — SODIUM CHLORIDE 40 MG: 9 INJECTION, SOLUTION INTRAMUSCULAR; INTRAVENOUS; SUBCUTANEOUS at 05:52

## 2020-09-22 RX ADMIN — SODIUM CHLORIDE 40 MG: 9 INJECTION, SOLUTION INTRAMUSCULAR; INTRAVENOUS; SUBCUTANEOUS at 17:13

## 2020-09-22 RX ADMIN — PROMETHAZINE HYDROCHLORIDE 12.5 MG: 25 TABLET ORAL at 23:06

## 2020-09-22 RX ADMIN — Medication 10 ML: at 19:13

## 2020-09-22 RX ADMIN — INSULIN LISPRO 4 UNITS: 100 INJECTION, SOLUTION INTRAVENOUS; SUBCUTANEOUS at 00:18

## 2020-09-22 RX ADMIN — Medication 10 ML: at 05:58

## 2020-09-22 RX ADMIN — OXYCODONE 5 MG: 5 TABLET ORAL at 23:06

## 2020-09-22 RX ADMIN — PROMETHAZINE HYDROCHLORIDE 12.5 MG: 25 TABLET ORAL at 17:15

## 2020-09-22 RX ADMIN — LORAZEPAM 1 MG: 2 INJECTION INTRAMUSCULAR; INTRAVENOUS at 12:01

## 2020-09-22 RX ADMIN — Medication 10 ML: at 21:18

## 2020-09-22 RX ADMIN — INSULIN LISPRO 5 UNITS: 100 INJECTION, SOLUTION INTRAVENOUS; SUBCUTANEOUS at 23:26

## 2020-09-22 RX ADMIN — Medication 10 ML: at 04:17

## 2020-09-22 RX ADMIN — OXYCODONE 5 MG: 5 TABLET ORAL at 17:14

## 2020-09-22 RX ADMIN — OXYCODONE 5 MG: 5 TABLET ORAL at 08:16

## 2020-09-22 RX ADMIN — Medication 100 MG: at 08:16

## 2020-09-22 RX ADMIN — LORAZEPAM 1 MG: 1 TABLET ORAL at 00:16

## 2020-09-22 RX ADMIN — QUETIAPINE FUMARATE 100 MG: 25 TABLET ORAL at 21:16

## 2020-09-22 RX ADMIN — INSULIN LISPRO 5 UNITS: 100 INJECTION, SOLUTION INTRAVENOUS; SUBCUTANEOUS at 11:35

## 2020-09-22 RX ADMIN — LORAZEPAM 1 MG: 1 TABLET ORAL at 21:16

## 2020-09-22 RX ADMIN — FOLIC ACID 1 MG: 1 TABLET ORAL at 08:16

## 2020-09-22 RX ADMIN — MORPHINE SULFATE 2 MG: 2 INJECTION, SOLUTION INTRAMUSCULAR; INTRAVENOUS at 00:18

## 2020-09-22 RX ADMIN — ONDANSETRON 4 MG: 2 INJECTION INTRAMUSCULAR; INTRAVENOUS at 19:09

## 2020-09-22 RX ADMIN — LORAZEPAM 1 MG: 1 TABLET ORAL at 17:15

## 2020-09-22 RX ADMIN — SODIUM CHLORIDE 100 ML/HR: 900 INJECTION, SOLUTION INTRAVENOUS at 04:18

## 2020-09-22 NOTE — PROGRESS NOTES
Patient was fussing way too much. Up out of bed. Bathed with warm water and soap. Teeth brushed. Hair washed. Patient fussing. Linen changed. Made him put his smelly clothes up. Will look for clothes in the cloths pantry in the ER.

## 2020-09-22 NOTE — PROGRESS NOTES
Patient resting quietly at present. Respiration even and unlabored. Oxygen saturation 95 % on room air. Bed in low/lock position. Bed alarm on. Call light and urinal within reach.

## 2020-09-22 NOTE — PROGRESS NOTES
Hospitalist Note     Admit Date:  2020 11:56 AM   Name:  Dmitri Smith   Age:  39 y.o.  :  1975   MRN:  [id number]   PCP:  None  Treatment Team: Attending Provider: Frederick Harris MD; Primary Nurse: Arabella Mejia, ANGELIQUE    HPI/Subjective:   Mr. Misti Hernadez is a 38 y/o WM with a h/o EtOH abuse, chronic pancreatitis, DM, medical non-compliance, homelesness and frequent admissions/ER visits transferred to [facility] for DKA (no ICU beds available at [facility]). He was admitted earlier this month for the same and discharged on . Since then he has visited the ER 6 times. AG closed, off insulin drip, now on basal/bolus insulin and DM diet. : C/o abdominal pain, no vomiting since yesterday, asking for food repeatedly. Labs improved. No other complaints  Objective:     Patient Vitals for the past 24 hrs:   Temp Pulse Resp BP SpO2   20 0700 98 °F (36.7 °C) 73 13 112/72    20 0623  73 14 105/65 97 %   20 0510  75 13  96 %   20 0500  76 11 (!) 98/58 96 %   20 0400  72 15 108/75 96 %   20 0300 98.2 °F (36.8 °C) 76 10 121/78 96 %   20 0200  77 10 107/65 96 %   20 0100  79 12 113/79 96 %   20 2300 98.5 °F (36.9 °C) 76 15 112/74 96 %   20 2200  80 10 133/85 98 %   20 2100  82 14 114/65 96 %   20 2000  78 23 120/73 98 %   20 1938 97.3 °F (36.3 °C) 80 17 (!) 149/100 100 %   20 1708  75 13 124/81 97 %   20 1207 98.9 °F (37.2 °C) 95 18 120/83 98 %     Oxygen Therapy  O2 Sat (%): 97 % (20 0623)  Pulse via Oximetry: 74 beats per minute (20 06)  O2 Device: Room air (20 0300)    Estimated body mass index is 23.93 kg/m² as calculated from the following:    Height as of an earlier encounter on 20: 5' 8\" (1.727 m). Weight as of this encounter: 71.4 kg (157 lb 6.5 oz).       Intake/Output Summary (Last 24 hours) at 2020 0836  Last data filed at 2020 0602  Gross per 24 hour   Intake 1581.67 ml   Output 1250 ml   Net 331.67 ml       *Note that automatically entered I/Os may not be accurate; dependent on patient compliance with collection and accurate  by techs. General:    Well nourished. Alert. Appears older than stated age. Poor dentition. CV:   RRR. No murmur, rub, or gallop. Lungs:   CTAB. No wheezing, rhonchi, or rales. Abdomen:   Soft, nondistended. Generalized tenderness to palpation, though when patient is distracted or talking his tenderness/guarding resolve. BS present throughout. Extremities: Warm and dry. No cyanosis or edema. Skin:     No rashes or jaundice.    Neuro:  No gross focal deficits    Data Reviewed:  I have reviewed all labs, meds, and studies from the last 24 hours:  Recent Results (from the past 24 hour(s))   GLUCOSE, POC    Collection Time: 09/21/20  9:49 AM   Result Value Ref Range    Glucose (POC) 342 (H) 65 - 100 mg/dL   GLUCOSTABILIZER    Collection Time: 09/21/20  9:50 AM   Result Value Ref Range    Glucose 342 mg/dL    Insulin order 5.6 units/hour    Insulin adminstered 5.6 units/hour    Multiplier 0.020     Low target 150 mg/dL    High target 250 mg/dL    D50 order 0.0 ml    D50 administered 0.00 ml    Minutes until next BG 60 min    Order initials sw     Administered initials sw     GLSCOM Comments     GLUCOSE, POC    Collection Time: 09/21/20 12:12 PM   Result Value Ref Range    Glucose (POC) 25 (LL) 65 - 100 mg/dL   GLUCOSE, POC    Collection Time: 09/21/20 12:32 PM   Result Value Ref Range    Glucose (POC) 99 65 - 100 mg/dL   CBC WITH AUTOMATED DIFF    Collection Time: 09/21/20 12:44 PM   Result Value Ref Range    WBC 6.4 4.3 - 11.1 K/uL    RBC 3.72 (L) 4.23 - 5.6 M/uL    HGB 9.8 (L) 13.6 - 17.2 g/dL    HCT 31.4 (L) 41.1 - 50.3 %    MCV 84.4 79.6 - 97.8 FL    MCH 26.3 26.1 - 32.9 PG    MCHC 31.2 (L) 31.4 - 35.0 g/dL    RDW 19.4 (H) 11.9 - 14.6 %    PLATELET 752 598 - 491 K/uL    MPV 9.2 (L) 9.4 - 12.3 FL    ABSOLUTE NRBC 0.00 0.0 - 0.2 K/uL DF AUTOMATED      NEUTROPHILS 53 43 - 78 %    LYMPHOCYTES 31 13 - 44 %    MONOCYTES 15 (H) 4.0 - 12.0 %    EOSINOPHILS 0 (L) 0.5 - 7.8 %    BASOPHILS 1 0.0 - 2.0 %    IMMATURE GRANULOCYTES 0 0.0 - 5.0 %    ABS. NEUTROPHILS 3.4 1.7 - 8.2 K/UL    ABS. LYMPHOCYTES 2.0 0.5 - 4.6 K/UL    ABS. MONOCYTES 1.0 0.1 - 1.3 K/UL    ABS. EOSINOPHILS 0.0 0.0 - 0.8 K/UL    ABS. BASOPHILS 0.1 0.0 - 0.2 K/UL    ABS. IMM. GRANS. 0.0 0.0 - 0.5 K/UL   METABOLIC PANEL, COMPREHENSIVE    Collection Time: 09/21/20 12:44 PM   Result Value Ref Range    Sodium 141 136 - 145 mmol/L    Potassium 3.4 (L) 3.5 - 5.1 mmol/L    Chloride 107 98 - 107 mmol/L    CO2 27 21 - 32 mmol/L    Anion gap 7 7 - 16 mmol/L    Glucose 86 65 - 100 mg/dL    BUN 8 6 - 23 MG/DL    Creatinine 0.72 (L) 0.8 - 1.5 MG/DL    GFR est AA >60 >60 ml/min/1.73m2    GFR est non-AA >60 >60 ml/min/1.73m2    Calcium 7.7 (L) 8.3 - 10.4 MG/DL    Bilirubin, total 0.3 0.2 - 1.1 MG/DL    ALT (SGPT) 14 12 - 65 U/L    AST (SGOT) 13 (L) 15 - 37 U/L    Alk. phosphatase 86 50 - 136 U/L    Protein, total 5.8 (L) 6.3 - 8.2 g/dL    Albumin 2.9 (L) 3.5 - 5.0 g/dL    Globulin 2.9 2.3 - 3.5 g/dL    A-G Ratio 1.0 (L) 1.2 - 3.5     LIPASE    Collection Time: 09/21/20 12:44 PM   Result Value Ref Range    Lipase 30 (L) 73 - 393 U/L   LACTIC ACID    Collection Time: 09/21/20 12:45 PM   Result Value Ref Range    Lactic acid 3.5 (HH) 0.4 - 2.0 MMOL/L   COLLECTION COMMENT    Collection Time: 09/21/20 12:45 PM   Result Value Ref Range    Collection Comment RUN WHEN RECEIVED. RESULTED ONCE ORDERED.     POC VENOUS BLOOD GAS    Collection Time: 09/21/20 12:47 PM   Result Value Ref Range    Device: ROOM AIR      pH, venous (POC) 7.41 7.32 - 7.42      pCO2, venous (POC) 42.3 41 - 51 MMHG    pO2, venous (POC) 44 mmHg    HCO3, venous (POC) 26.6 23 - 28 MMOL/L    sO2, venous (POC) 80 65 - 88 %    Base excess, venous (POC) 2 mmol/L    Allens test (POC) NOT APPLICABLE      Site OTHER      Specimen type (POC) VENOUS BLOOD      Performed by Efrain     CO2, POC 28 MMOL/L    Respiratory comment: PhysicianNotified     COLLECT TIME 1,245     GLUCOSE, POC    Collection Time: 09/21/20  1:31 PM   Result Value Ref Range    Glucose (POC) 83 65 - 100 mg/dL   GLUCOSE, POC    Collection Time: 09/21/20  3:28 PM   Result Value Ref Range    Glucose (POC) 98 65 - 645 mg/dL   METABOLIC PANEL, BASIC    Collection Time: 09/21/20  6:50 PM   Result Value Ref Range    Sodium 138 136 - 145 mmol/L    Potassium 3.6 3.5 - 5.1 mmol/L    Chloride 106 98 - 107 mmol/L    CO2 26 21 - 32 mmol/L    Anion gap 6 (L) 7 - 16 mmol/L    Glucose 153 (H) 65 - 100 mg/dL    BUN 8 6 - 23 MG/DL    Creatinine 0.58 (L) 0.8 - 1.5 MG/DL    GFR est AA >60 >60 ml/min/1.73m2    GFR est non-AA >60 >60 ml/min/1.73m2    Calcium 7.7 (L) 8.3 - 10.4 MG/DL   HGB & HCT    Collection Time: 09/21/20  9:14 PM   Result Value Ref Range    HGB 9.9 (L) 13.6 - 17.2 g/dL    HCT 32.4 (L) 41.1 - 50.3 %   GLUCOSE, POC    Collection Time: 09/22/20 12:06 AM   Result Value Ref Range    Glucose (POC) 309 (H) 65 - 809 mg/dL   METABOLIC PANEL, COMPREHENSIVE    Collection Time: 09/22/20  2:55 AM   Result Value Ref Range    Sodium 131 (L) 136 - 145 mmol/L    Potassium 4.0 3.5 - 5.1 mmol/L    Chloride 107 98 - 107 mmol/L    CO2 20 (L) 21 - 32 mmol/L    Anion gap 4 (L) 7 - 16 mmol/L    Glucose 144 (H) 65 - 100 mg/dL    BUN 6 6 - 23 MG/DL    Creatinine 0.63 (L) 0.8 - 1.5 MG/DL    GFR est AA >60 >60 ml/min/1.73m2    GFR est non-AA >60 >60 ml/min/1.73m2    Calcium 8.3 8.3 - 10.4 MG/DL    Bilirubin, total 0.4 0.2 - 1.1 MG/DL    ALT (SGPT) 22 12 - 65 U/L    AST (SGOT) 35 15 - 37 U/L    Alk.  phosphatase 74 50 - 136 U/L    Protein, total 5.4 (L) 6.3 - 8.2 g/dL    Albumin 1.9 (L) 3.5 - 5.0 g/dL    Globulin 3.5 2.3 - 3.5 g/dL    A-G Ratio 0.5 (L) 1.2 - 3.5     CBC WITH AUTOMATED DIFF    Collection Time: 09/22/20  2:55 AM   Result Value Ref Range    WBC 5.9 4.3 - 11.1 K/uL    RBC 4.15 (L) 4.23 - 5.6 M/uL HGB 11.0 (L) 13.6 - 17.2 g/dL    HCT 37.7 (L) 41.1 - 50.3 %    MCV 90.8 79.6 - 97.8 FL    MCH 26.5 26.1 - 32.9 PG    MCHC 29.2 (L) 31.4 - 35.0 g/dL    RDW 19.8 (H) 11.9 - 14.6 %    PLATELET 414 (L) 383 - 450 K/uL    MPV 11.9 9.4 - 12.3 FL    ABSOLUTE NRBC 0.00 0.0 - 0.2 K/uL    DF AUTOMATED      NEUTROPHILS 45 43 - 78 %    LYMPHOCYTES 43 13 - 44 %    MONOCYTES 9 4.0 - 12.0 %    EOSINOPHILS 1 0.5 - 7.8 %    BASOPHILS 1 0.0 - 2.0 %    IMMATURE GRANULOCYTES 1 0.0 - 5.0 %    ABS. NEUTROPHILS 2.6 1.7 - 8.2 K/UL    ABS. LYMPHOCYTES 2.5 0.5 - 4.6 K/UL    ABS. MONOCYTES 0.5 0.1 - 1.3 K/UL    ABS. EOSINOPHILS 0.1 0.0 - 0.8 K/UL    ABS. BASOPHILS 0.1 0.0 - 0.2 K/UL    ABS. IMM.  GRANS. 0.1 0.0 - 0.5 K/UL    RBC COMMENTS NORMOCYTIC/NORMOCHROMIC      WBC COMMENTS Result Confirmed By Smear      PLATELET COMMENTS SLIGHT     GLUCOSE, POC    Collection Time: 09/22/20  5:56 AM   Result Value Ref Range    Glucose (POC) 107 (H) 65 - 100 mg/dL        Current Meds:  Current Facility-Administered Medications   Medication Dose Route Frequency    insulin glargine (LANTUS) injection 8 Units  8 Units SubCUTAneous QHS    sodium chloride (NS) flush 5-40 mL  5-40 mL IntraVENous Q8H    sodium chloride (NS) flush 5-40 mL  5-40 mL IntraVENous PRN    acetaminophen (TYLENOL) tablet 650 mg  650 mg Oral Q6H PRN    Or    acetaminophen (TYLENOL) suppository 650 mg  650 mg Rectal Q6H PRN    polyethylene glycol (MIRALAX) packet 17 g  17 g Oral DAILY PRN    promethazine (PHENERGAN) tablet 12.5 mg  12.5 mg Oral Q6H PRN    Or    ondansetron (ZOFRAN) injection 4 mg  4 mg IntraVENous Q6H PRN    thiamine HCL (B-1) tablet 100 mg  100 mg Oral DAILY    folic acid (FOLVITE) tablet 1 mg  1 mg Oral DAILY    multivitamin, stress formula (STRESS TAB) tablet 1 Tab  1 Tab Oral DAILY    LORazepam (ATIVAN) tablet 1 mg  1 mg Oral Q4H PRN    LORazepam (ATIVAN) injection 1 mg  1 mg IntraVENous Q4H PRN    pantoprazole (PROTONIX) 40 mg in 0.9% sodium chloride 10 mL injection  40 mg IntraVENous Q12H    insulin lispro (HUMALOG) injection   SubCUTAneous Q6H    dextrose 40% (GLUTOSE) oral gel 1 Tube  15 g Oral PRN    glucagon (GLUCAGEN) injection 1 mg  1 mg IntraMUSCular PRN    dextrose (D50W) injection syrg 12.5-25 g  25-50 mL IntraVENous PRN    0.9% sodium chloride infusion  100 mL/hr IntraVENous CONTINUOUS    oxyCODONE IR (ROXICODONE) tablet 5 mg  5 mg Oral Q6H PRN       Other Studies:  No results found for this visit on 09/21/20. No results found. All Micro Results     None          SARS-CoV-2 Lab Results  \"Novel Coronavirus\" Test: No results found for: COV2NT   \"Emergent Disease\" Test: No results found for: EDPR  \"SARS-COV-2\" Test: No results found for: XGCOVT  \"Precision Labs\" Test: No results found for: RSLT  Rapid Test: No results found for: COVR         Assessment and Plan:     Hospital Problems as of 9/22/2020 Never Reviewed          Codes Class Noted - Resolved POA    Thrombocytopenia ([facility] ICD-10-CM: D69.6  ICD-9-CM: 287.5  9/22/2020 - Present Unknown        DKA, type 2 ([facility] ICD-10-CM: E11.10  ICD-9-CM: 250.12  9/21/2020 - Present Unknown        Narcotic dependence ([facility] ICD-10-CM: F11.20  ICD-9-CM: 304.90  9/11/2020 - Present Yes        Hx of chronic pancreatitis ICD-10-CM: Z87.19  ICD-9-CM: V12.79  9/11/2020 - Present Yes        Type II diabetes mellitus with complication, uncontrolled ([facility] ICD-10-CM: E11.8, E11.65  ICD-9-CM: 250.92  5/31/2020 - Present Yes        * (Principal) DKA (diabetic ketoacidoses) ([facility] ICD-10-CM: E11.10  ICD-9-CM: 250.12  5/30/2020 - Present Yes        Bipolar disorder ([facility] ICD-10-CM: F31.9  ICD-9-CM: 296.80  5/30/2020 - Present Yes        HTN (hypertension) ICD-10-CM: I10  ICD-9-CM: 401.9  5/30/2020 - Present Yes        Alcohol dependence ([facility] ICD-10-CM: F10.20  ICD-9-CM: 303.90  5/30/2020 - Present Yes              Plan:  # DKA   - 2/2 medication non-compliance. Resolved, now off insulin drip, on basal/bolus insulin. Titrate insulin as needed. Con't IVFs, DM diet. Daily labs. Compliance stressed. # Thrombocytopenia   - Large drop from admission, unclear. No bleeding. Not on heparin products here (but could have been during prior admission). CBC tomorrow. # Chronic pancreatitis   - C/o chronic abdominal pain, though tolerating diabetic diet. Stop IV morphine. # MDD/anxiety   - Pysch consult pending    # Hematemesis   - Subjective. Hb 11, around his baseline. No bleeding recently, monitor. # AGMA   - DKA vs lactic acidosis. Now resolved. # EtOH abuse   - Cessation advised. Thiamine, folate. Ativan prn w/d symptoms. DC planning/Dispo: Transfer to floor.    Diet:  DIET NUTRITIONAL SUPPLEMENTS  DIET DIABETIC CONSISTENT CARB  DVT ppx: SCDs, ambulation    Signed:  Martin Thayer MD

## 2020-09-22 NOTE — PROGRESS NOTES
Care Management Interventions  PCP Verified by CM: No  Palliative Care Criteria Met (RRAT>21 & CHF Dx)?: No(Risk 74 Dx DKA )  Transition of Care Consult (CM Consult): Discharge Planning  Discharge Durable Medical Equipment: No(none)  Physical Therapy Consult: No  Occupational Therapy Consult: No  Speech Therapy Consult: No  Current Support Network: Other(homeless)  Discharge Location  Discharge Placement: Unable to determine at this time  Met with patient for d/c planning. Patient reports being homeless for several months. He states does not have family does have 15 yo daughter but her mother will not allow him to see her and they are about an hour away. He states he lives in a field behind  on [address] He states his insulin and supplies are in the field area. He states he does not take his insulin and when CM asked reason he said \"I am at my wits end and about to give up\". He states that he has been so cold over the past couple of days that he doesn't take his insulin and comes to the ER. Asked if he has gone to the [facility] and he states that he cannot get into there facility. Patient has had 5 ER visits and 2 admission with last admit being 9/14/20. Patient reports that he has been to [facility] and due to his increased Blood sugars and difficulty controlling his diabetes he was not able to return. He states has tried [address] [facility] but is saying needs mental health eval first. CM contacted Lily Munson at [facility] that works with the [employer] [phone] ext  for any assistance they may offer. She is going to check into the case and get back with CM. Called and spoke with Mayra Bonner [phone] of [facility] and she states she has not been able to reach out to patient as he has no phone. She requested CM contact Kailash Banda who works with patient's that are admitted to the hospital [phone] but to call around 1 pm as she is currently in a meeting.  Will call back this afternoon and see what services they may be able to offer. Patient has been at [facility] in the past states was asked to leave because \"he slept too much\" but per patient they are telling everyone he was \"difficult\". He is not able to return to the [facility] until after 30 days which is after 9/30/2020 and then they could consider but unsure would accept patient. He is not eligible for [facility] as he is homeless and unable to do the [place] program for his medications as he does not have an address to have his medication sent. He states obtains medications at [facility] ER. He normally goes to [facility] downto but was transferred to [address] [place] [address] as they had no ICU beds downtown. CM called [facility] at [phone] spoke with Damaso Li and she stated to give patient their number and she will see if he qualifies for assistance through their program after she does phone screening. Patient is to transfer to floor today and will have access to phone so will provide patient with phone number to follow up. CM will continue to research and see if any options are available. Addendum: Spoke with Betito Foreman and she does not follow patient at [address] only follows patient as outpatient and patient does not have a phone to contact her when d/c. CM left message for Reilly Ramos to see if there are any other options. Spoke with Yung Rivera [phone] from [facility] and he stated he would be glad to speak with patient but he must call him and he does not have anything available at this time. Did discuss [facility] [phone] patient can also contact and speak with them to see if they have availabilities.

## 2020-09-22 NOTE — PROGRESS NOTES
No change in assessment. Call light and urinal within reach. Bed in low/lock position. Bed alarm on.

## 2020-09-22 NOTE — PROGRESS NOTES
Patient cautioned to not gulp down his food but he did. Patient vomited his food. Told him too bad you had your Phenergan. IVF off as it was dripping with vomit.   Admonished this patient to shut his eyes and rest.

## 2020-09-22 NOTE — ACP (ADVANCE CARE PLANNING)
Advance Care Planning     Advance Care Planning Activator (Inpatient)  Conversation Note      Date of ACP Conversation: 09/22/20     Conversation Conducted with:  Jalen Wallace     Attempted to have ACP discussion but patient not wanting to discuss at this time. He is homeless and states has no family or friends that he can contact are ask to assist him in his decisions. He is to have tele pysch eval today. ACP Activator: Navi Silva RN    *When Decision Maker makes decisions on behalf of the incapacitated patient: Decision Maker is asked to consider and make decisions based on patient values, known preferences, or best interests. Health Care Decision Maker:    Current Designated Health Care Decision Maker:   (If there is a valid [address] named in the [employer]\" box in the ACP activity, but it is not visible above, be sure to open that field and then select the health care decision maker relationship (ie \"primary\") in the blank space to the right of the name.) Validate  this information as still accurate & up-to-date; edit [address] field as needed.)    Note: Assess and validate information in current ACP documents, as indicated. If no Decision Maker listed above or available through scanned documents, then:    If no Authorized Decision Maker has previously been identified, then patient chooses [address]:  \"Who would you like to name as your primary health care decision-maker? \"    Name: Per Patient there is no one  Relationship:  Phone number:   \"Can this person be reached easily?     Note: If the relationship of these Decision-Makers to the patient does NOT follow your state's Next of Kin hierarchy, recommend that patient complete ACP document that meets state-specific requirements to allow them to act on the patient's behalf when appropriate.    [] This note routed to one or more involved healthcare providers      These are patient's current wishes as discussed at bedside today and are not intended to take place of [facility].

## 2020-09-22 NOTE — PROGRESS NOTES
Problem: Diabetes Self-Management  Goal: *Disease process and treatment process  Description: Define diabetes and identify own type of diabetes; list 3 options for treating diabetes. Outcome: Progressing Towards Goal  Goal: *Incorporating nutritional management into lifestyle  Description: Describe effect of type, amount and timing of food on blood glucose; list 3 methods for planning meals. Outcome: Progressing Towards Goal  Goal: *Incorporating physical activity into lifestyle  Description: State effect of exercise on blood glucose levels. Outcome: Progressing Towards Goal  Goal: *Developing strategies to promote health/change behavior  Description: Define the ABC's of diabetes; identify appropriate screenings, schedule and personal plan for screenings. Outcome: Progressing Towards Goal  Goal: *Using medications safely  Description: State effect of diabetes medications on diabetes; name diabetes medication taking, action and side effects. Outcome: Progressing Towards Goal  Goal: *Monitoring blood glucose, interpreting and using results  Description: Identify recommended blood glucose targets  and personal targets. Outcome: Progressing Towards Goal  Goal: *Prevention, detection, treatment of acute complications  Description: List symptoms of hyper- and hypoglycemia; describe how to treat low blood sugar and actions for lowering  high blood glucose level. Outcome: Progressing Towards Goal  Goal: *Prevention, detection and treatment of chronic complications  Description: Define the natural course of diabetes and describe the relationship of blood glucose levels to long term complications of diabetes.   Outcome: Progressing Towards Goal  Goal: *Developing strategies to address psychosocial issues  Description: Describe feelings about living with diabetes; identify support needed and support network  Outcome: Progressing Towards Goal  Goal: *Insulin pump training  Outcome: Progressing Towards Goal  Goal: *Sick day guidelines  Outcome: Progressing Towards Goal  Goal: *Patient Specific Goal (EDIT GOAL, INSERT TEXT)  Outcome: Progressing Towards Goal     Problem: Patient Education: Go to Patient Education Activity  Goal: Patient/Family Education  Outcome: Progressing Towards Goal     Problem: Falls - Risk of  Goal: *Absence of Falls  Description: Document Juventino Mallory Fall Risk and appropriate interventions in the flowsheet. Outcome: Progressing Towards Goal  Note: Fall Risk Interventions:            Medication Interventions: Assess postural VS orthostatic hypotension, Bed/chair exit alarm, Evaluate medications/consider consulting pharmacy, Patient to call before getting OOB, Teach patient to arise slowly, Utilize gait belt for transfers/ambulation    Elimination Interventions: Bed/chair exit alarm, Call light in reach, Patient to call for help with toileting needs, Urinal in reach              Problem: Patient Education: Go to Patient Education Activity  Goal: Patient/Family Education  Outcome: Progressing Towards Goal     Problem: Pressure Injury - Risk of  Goal: *Prevention of pressure injury  Description: Document Delta Scale and appropriate interventions in the flowsheet.   Outcome: Progressing Towards Goal  Note: Pressure Injury Interventions:  Sensory Interventions: Assess changes in LOC    Moisture Interventions: Minimize layers    Activity Interventions: PT/OT evaluation    Mobility Interventions: PT/OT evaluation    Nutrition Interventions: Document food/fluid/supplement intake                     Problem: Patient Education: Go to Patient Education Activity  Goal: Patient/Family Education  Outcome: Progressing Towards Goal

## 2020-09-22 NOTE — PROGRESS NOTES
Problem: Diabetes Self-Management  Goal: *Disease process and treatment process  Description: Define diabetes and identify own type of diabetes; list 3 options for treating diabetes. Outcome: Progressing Towards Goal     Problem: Diabetes Self-Management  Goal: *Incorporating nutritional management into lifestyle  Description: Describe effect of type, amount and timing of food on blood glucose; list 3 methods for planning meals. Outcome: Progressing Towards Goal     Problem: Diabetes Self-Management  Goal: *Incorporating physical activity into lifestyle  Description: State effect of exercise on blood glucose levels. Outcome: Progressing Towards Goal     Problem: Diabetes Self-Management  Goal: *Developing strategies to promote health/change behavior  Description: Define the ABC's of diabetes; identify appropriate screenings, schedule and personal plan for screenings. Outcome: Progressing Towards Goal     Problem: Diabetes Self-Management  Goal: *Using medications safely  Description: State effect of diabetes medications on diabetes; name diabetes medication taking, action and side effects. Outcome: Progressing Towards Goal     Problem: Diabetes Self-Management  Goal: *Monitoring blood glucose, interpreting and using results  Description: Identify recommended blood glucose targets  and personal targets. Outcome: Progressing Towards Goal     Problem: Diabetes Self-Management  Goal: *Prevention, detection, treatment of acute complications  Description: List symptoms of hyper- and hypoglycemia; describe how to treat low blood sugar and actions for lowering  high blood glucose level.   Outcome: Progressing Towards Goal

## 2020-09-22 NOTE — PROGRESS NOTES
Report received. Patient wants more pain and nausea medication with next dose. States he does not loose any of the pain but sleeps instead. Otherwise he is happy to be here so far and polite and looks forward to breakfast in spite of abdominal pain.

## 2020-09-23 ENCOUNTER — HOSPITAL ENCOUNTER (EMERGENCY)
Age: 45
Discharge: HOME | End: 2020-09-23
Attending: EMERGENCY MEDICINE

## 2020-09-23 VITALS
BODY MASS INDEX: 23.93 KG/M2 | WEIGHT: 157.41 LBS | HEART RATE: 94 BPM | DIASTOLIC BLOOD PRESSURE: 75 MMHG | SYSTOLIC BLOOD PRESSURE: 129 MMHG | OXYGEN SATURATION: 97 % | TEMPERATURE: 98 F | RESPIRATION RATE: 18 BRPM

## 2020-09-23 VITALS
RESPIRATION RATE: 18 BRPM | HEART RATE: 101 BPM | DIASTOLIC BLOOD PRESSURE: 58 MMHG | OXYGEN SATURATION: 99 % | TEMPERATURE: 98.3 F | SYSTOLIC BLOOD PRESSURE: 103 MMHG

## 2020-09-23 DIAGNOSIS — F10.920 ALCOHOLIC INTOXICATION WITHOUT COMPLICATION (HCC): Primary | ICD-10-CM

## 2020-09-23 DIAGNOSIS — Z91.199 NON COMPLIANCE WITH MEDICAL TREATMENT: ICD-10-CM

## 2020-09-23 DIAGNOSIS — R73.9 HYPERGLYCEMIA: ICD-10-CM

## 2020-09-23 PROBLEM — E11.10 DKA, TYPE 2 (HCC): Status: RESOLVED | Noted: 2020-09-21 | Resolved: 2020-09-23

## 2020-09-23 PROBLEM — E11.10 DKA (DIABETIC KETOACIDOSES): Status: RESOLVED | Noted: 2020-05-30 | Resolved: 2020-09-23

## 2020-09-23 LAB
ALBUMIN SERPL-MCNC: 3.1 G/DL (ref 3.5–5)
ALBUMIN/GLOB SERPL: 0.9 {RATIO} (ref 1.2–3.5)
ALP SERPL-CCNC: 88 U/L (ref 50–136)
ALT SERPL-CCNC: 17 U/L (ref 12–65)
ANION GAP SERPL CALC-SCNC: 13 MMOL/L (ref 7–16)
ANION GAP SERPL CALC-SCNC: 7 MMOL/L (ref 7–16)
AST SERPL-CCNC: 15 U/L (ref 15–37)
BASOPHILS # BLD: 0 K/UL (ref 0–0.2)
BASOPHILS # BLD: 0 K/UL (ref 0–0.2)
BASOPHILS NFR BLD: 0 % (ref 0–2)
BASOPHILS NFR BLD: 0 % (ref 0–2)
BILIRUB SERPL-MCNC: 0.2 MG/DL (ref 0.2–1.1)
BUN SERPL-MCNC: 5 MG/DL (ref 6–23)
BUN SERPL-MCNC: 6 MG/DL (ref 6–23)
CALCIUM SERPL-MCNC: 8.9 MG/DL (ref 8.3–10.4)
CALCIUM SERPL-MCNC: 8.9 MG/DL (ref 8.3–10.4)
CHLORIDE SERPL-SCNC: 101 MMOL/L (ref 98–107)
CHLORIDE SERPL-SCNC: 102 MMOL/L (ref 98–107)
CO2 SERPL-SCNC: 23 MMOL/L (ref 21–32)
CO2 SERPL-SCNC: 26 MMOL/L (ref 21–32)
CREAT SERPL-MCNC: 0.77 MG/DL (ref 0.8–1.5)
CREAT SERPL-MCNC: 1.07 MG/DL (ref 0.8–1.5)
DIFFERENTIAL METHOD BLD: ABNORMAL
DIFFERENTIAL METHOD BLD: ABNORMAL
EOSINOPHIL # BLD: 0 K/UL (ref 0–0.8)
EOSINOPHIL # BLD: 0.1 K/UL (ref 0–0.8)
EOSINOPHIL NFR BLD: 0 % (ref 0.5–7.8)
EOSINOPHIL NFR BLD: 2 % (ref 0.5–7.8)
ERYTHROCYTE [DISTWIDTH] IN BLOOD BY AUTOMATED COUNT: 18.4 % (ref 11.9–14.6)
ERYTHROCYTE [DISTWIDTH] IN BLOOD BY AUTOMATED COUNT: 18.6 % (ref 11.9–14.6)
ETHANOL SERPL-MCNC: 196 MG/DL
GLOBULIN SER CALC-MCNC: 3.4 G/DL (ref 2.3–3.5)
GLUCOSE BLD STRIP.AUTO-MCNC: 406 MG/DL (ref 65–100)
GLUCOSE SERPL-MCNC: 331 MG/DL (ref 65–100)
GLUCOSE SERPL-MCNC: 335 MG/DL (ref 65–100)
HCT VFR BLD AUTO: 32.2 % (ref 41.1–50.3)
HCT VFR BLD AUTO: 33.3 % (ref 41.1–50.3)
HGB BLD-MCNC: 10.5 G/DL (ref 13.6–17.2)
HGB BLD-MCNC: 9.8 G/DL (ref 13.6–17.2)
IMM GRANULOCYTES # BLD AUTO: 0 K/UL (ref 0–0.5)
IMM GRANULOCYTES # BLD AUTO: 0 K/UL (ref 0–0.5)
IMM GRANULOCYTES NFR BLD AUTO: 0 % (ref 0–5)
IMM GRANULOCYTES NFR BLD AUTO: 0 % (ref 0–5)
LYMPHOCYTES # BLD: 1.3 K/UL (ref 0.5–4.6)
LYMPHOCYTES # BLD: 1.6 K/UL (ref 0.5–4.6)
LYMPHOCYTES NFR BLD: 17 % (ref 13–44)
LYMPHOCYTES NFR BLD: 32 % (ref 13–44)
MAGNESIUM SERPL-MCNC: 1.6 MG/DL (ref 1.8–2.4)
MCH RBC QN AUTO: 25.4 PG (ref 26.1–32.9)
MCH RBC QN AUTO: 26.1 PG (ref 26.1–32.9)
MCHC RBC AUTO-ENTMCNC: 30.4 G/DL (ref 31.4–35)
MCHC RBC AUTO-ENTMCNC: 31.5 G/DL (ref 31.4–35)
MCV RBC AUTO: 82.8 FL (ref 79.6–97.8)
MCV RBC AUTO: 83.4 FL (ref 79.6–97.8)
MONOCYTES # BLD: 0.5 K/UL (ref 0.1–1.3)
MONOCYTES # BLD: 0.5 K/UL (ref 0.1–1.3)
MONOCYTES NFR BLD: 6 % (ref 4–12)
MONOCYTES NFR BLD: 9 % (ref 4–12)
NEUTS SEG # BLD: 2.9 K/UL (ref 1.7–8.2)
NEUTS SEG # BLD: 5.9 K/UL (ref 1.7–8.2)
NEUTS SEG NFR BLD: 57 % (ref 43–78)
NEUTS SEG NFR BLD: 76 % (ref 43–78)
NRBC # BLD: 0 K/UL (ref 0–0.2)
NRBC # BLD: 0 K/UL (ref 0–0.2)
PHOSPHATE SERPL-MCNC: 4.8 MG/DL (ref 2.5–4.5)
PLATELET # BLD AUTO: 238 K/UL (ref 150–450)
PLATELET # BLD AUTO: 260 K/UL (ref 150–450)
PMV BLD AUTO: 10.4 FL (ref 9.4–12.3)
PMV BLD AUTO: 9.7 FL (ref 9.4–12.3)
POTASSIUM SERPL-SCNC: 3.7 MMOL/L (ref 3.5–5.1)
POTASSIUM SERPL-SCNC: 3.7 MMOL/L (ref 3.5–5.1)
PROT SERPL-MCNC: 6.5 G/DL (ref 6.3–8.2)
RBC # BLD AUTO: 3.86 M/UL (ref 4.23–5.6)
RBC # BLD AUTO: 4.02 M/UL (ref 4.23–5.6)
SODIUM SERPL-SCNC: 134 MMOL/L (ref 136–145)
SODIUM SERPL-SCNC: 138 MMOL/L (ref 136–145)
WBC # BLD AUTO: 5.1 K/UL (ref 4.3–11.1)
WBC # BLD AUTO: 7.8 K/UL (ref 4.3–11.1)

## 2020-09-23 PROCEDURE — 80053 COMPREHEN METABOLIC PANEL: CPT

## 2020-09-23 PROCEDURE — 85025 COMPLETE CBC W/AUTO DIFF WBC: CPT

## 2020-09-23 PROCEDURE — 2709999900 HC NON-CHARGEABLE SUPPLY

## 2020-09-23 PROCEDURE — 74011250636 HC RX REV CODE- 250/636: Performed by: HOSPITALIST

## 2020-09-23 PROCEDURE — 36415 COLL VENOUS BLD VENIPUNCTURE: CPT

## 2020-09-23 PROCEDURE — 99283 EMERGENCY DEPT VISIT LOW MDM: CPT

## 2020-09-23 PROCEDURE — 82962 GLUCOSE BLOOD TEST: CPT

## 2020-09-23 PROCEDURE — 74011250637 HC RX REV CODE- 250/637: Performed by: HOSPITALIST

## 2020-09-23 PROCEDURE — 74011250637 HC RX REV CODE- 250/637: Performed by: INTERNAL MEDICINE

## 2020-09-23 PROCEDURE — 80048 BASIC METABOLIC PNL TOTAL CA: CPT

## 2020-09-23 PROCEDURE — 80307 DRUG TEST PRSMV CHEM ANLYZR: CPT

## 2020-09-23 PROCEDURE — 74011636637 HC RX REV CODE- 636/637: Performed by: INTERNAL MEDICINE

## 2020-09-23 PROCEDURE — 84100 ASSAY OF PHOSPHORUS: CPT

## 2020-09-23 PROCEDURE — 74011000250 HC RX REV CODE- 250: Performed by: HOSPITALIST

## 2020-09-23 PROCEDURE — 74011636637 HC RX REV CODE- 636/637: Performed by: EMERGENCY MEDICINE

## 2020-09-23 PROCEDURE — 83735 ASSAY OF MAGNESIUM: CPT

## 2020-09-23 PROCEDURE — 74011250637 HC RX REV CODE- 250/637: Performed by: EMERGENCY MEDICINE

## 2020-09-23 RX ORDER — IBUPROFEN 200 MG
1 TABLET ORAL EVERY 24 HOURS
Status: DISCONTINUED | OUTPATIENT
Start: 2020-09-23 | End: 2020-09-23 | Stop reason: HOSPADM

## 2020-09-23 RX ORDER — INSULIN GLARGINE 100 [IU]/ML
20 INJECTION, SOLUTION SUBCUTANEOUS
Qty: 3 PEN | Refills: 0 | Status: ON HOLD | OUTPATIENT
Start: 2020-09-23 | End: 2020-09-28 | Stop reason: SDUPTHER

## 2020-09-23 RX ORDER — PEN NEEDLE, DIABETIC 31 GX3/16"
NEEDLE, DISPOSABLE MISCELLANEOUS
Qty: 30 PEN NEEDLE | Refills: 0 | Status: SHIPPED | OUTPATIENT
Start: 2020-09-23

## 2020-09-23 RX ORDER — ONDANSETRON 4 MG/1
4 TABLET, ORALLY DISINTEGRATING ORAL
Qty: 20 TAB | Refills: 0 | Status: ON HOLD | OUTPATIENT
Start: 2020-09-23 | End: 2020-09-28 | Stop reason: SDUPTHER

## 2020-09-23 RX ORDER — INSULIN LISPRO 100 [IU]/ML
15 INJECTION, SOLUTION INTRAVENOUS; SUBCUTANEOUS ONCE
Status: COMPLETED | OUTPATIENT
Start: 2020-09-23 | End: 2020-09-23

## 2020-09-23 RX ORDER — ONDANSETRON 4 MG/1
4 TABLET, ORALLY DISINTEGRATING ORAL
Status: COMPLETED | OUTPATIENT
Start: 2020-09-23 | End: 2020-09-23

## 2020-09-23 RX ADMIN — LORAZEPAM 1 MG: 1 TABLET ORAL at 03:26

## 2020-09-23 RX ADMIN — ONDANSETRON 4 MG: 4 TABLET, ORALLY DISINTEGRATING ORAL at 18:21

## 2020-09-23 RX ADMIN — INSULIN HUMAN 7 UNITS: 100 INJECTION, SOLUTION PARENTERAL at 18:20

## 2020-09-23 RX ADMIN — OXYCODONE 5 MG: 5 TABLET ORAL at 04:30

## 2020-09-23 RX ADMIN — PROMETHAZINE HYDROCHLORIDE 12.5 MG: 25 TABLET ORAL at 06:04

## 2020-09-23 RX ADMIN — INSULIN LISPRO 15 UNITS: 100 INJECTION, SOLUTION INTRAVENOUS; SUBCUTANEOUS at 08:41

## 2020-09-23 RX ADMIN — WATER 10 MG: 1 INJECTION INTRAMUSCULAR; INTRAVENOUS; SUBCUTANEOUS at 02:54

## 2020-09-23 RX ADMIN — INSULIN LISPRO 5 UNITS: 100 INJECTION, SOLUTION INTRAVENOUS; SUBCUTANEOUS at 06:15

## 2020-09-23 NOTE — PROGRESS NOTES
Patient agitated. Walking in [place]. Wanting to go smoke. Inform patient [facility]oke [facility]. Patient remain agitated. Patient went back to room and slammed door.

## 2020-09-23 NOTE — PROGRESS NOTES
Patient up OOB and requested to go outside again for \"5 minutes. \" Patient states [facility] has not been out in 10 hours. \" Reminded patient he can not go outside and we will have to resort to physical restraints if rules are not followed. Patient states, \"this is ridiculous, you're not going to fucking tie me to bed,\" and with anger walks into room slamming door. Bed alarm re-activated.

## 2020-09-23 NOTE — DISCHARGE INSTRUCTIONS
Diabetes and Alcohol: Care Instructions  Your Care Instructions     People who have diabetes need to be more careful with alcohol. Before you drink, consider a few things: Is your diabetes well controlled? Do you know how drinking alcohol can affect you? Do you have high blood pressure, nerve damage, or eye problems from your diabetes? If you take insulin or another medicine for diabetes, drinking alcohol may cause low blood sugar. This could cause dangerous low blood sugar levels. Too much alcohol can also affect your ability to know your blood sugar is low and to treat it. Drinking alcohol can make you lightheaded at first and drowsy as you drink more, both of which may be similar to the symptoms of low blood sugar. Drinking a lot of alcohol over a long period of time can damage your liver (cirrhosis). If this happens, your body may lose its natural response to protect itself from low blood sugar. If you are controlling your diabetes and do not have other health issues, it may be okay to have a drink once in a while. Learning how alcohol affects your body can help you make the right choices. Follow-up care is a key part of your treatment and safety. Be sure to make and go to all appointments, and call your doctor if you are having problems. It's also a good idea to know your test results and keep a list of the medicines you take. How can you care for yourself at home? If you drink  · Work with your doctor or other diabetes expert to find what is best for you. Make sure you know whether it is safe to drink if you are taking insulin or another medicine for diabetes. · In general, limit alcohol to 1 drink a day with a meal if you are a woman. If you are a man, limit alcohol to 2 drinks a day with a meal. The following is considered a standard drink:  ? One 12-ounce bottle of beer or wine cooler  ? One 5-ounce glass of wine  ?  One mixed drink with 1.5 ounces of 80-proof hard liquor, such as gin, whiskey, or rum  · Choose alcoholic drinks wisely. With hard alcohol, use sugar-free mixers, such as diet tonic, water, or club soda. Pick drinks that have less alcohol, including light beer or dry wine. Or add club soda to wine to dilute it. Also remember that most alcoholic drinks have a lot of calories. · When you drink, check your blood sugar before you go to bed. Have a snack before bed so your blood sugar does not drop while you sleep. When not to drink  · Never drink on an empty stomach. If you do drink alcohol, drink it only with a meal or snack. Having as little as 2 drinks on an empty stomach could lead to low blood sugar. · Do not drink alcohol if you have problems recognizing the signs of low blood sugar until they become severe. · Do not drink alcohol after you exercise. The exercise itself lowers blood sugar. · Do not drink if you have nerve damage. Drinking can make it worse and increase the pain, numbness, and other symptoms. · Do not drink if you have high blood pressure. · Do not drink if you have diabetic eye disease. · Do not drink if you have high triglycerides, a type of fat in your blood. Drinking can raise triglycerides. · Do not drink if you are trying to lose weight. Alcohol provides empty calories that do not give you any nutrients. · Do not drink and drive. The effects of alcohol are greater if you have low blood sugar. When should you call for help? Call 911 anytime you think you may need emergency care. For example, call if:    · You passed out (lost consciousness).     · You are confused or cannot think clearly.     · Your blood sugar is very high or very low. Watch closely for changes in your health, and be sure to contact your doctor if:    · Your blood sugar stays outside the level your doctor set for you.     · You have any problems. Where can you learn more?   Go to http://pedrito-kandi.info/  Enter T236 in the search box to learn more about \"Diabetes and Alcohol: Care Instructions. \"  Current as of: December 20, 2019               Content Version: 12.6  © 2990-7769 [employer]. Care instructions adapted under license by [employer] (which disclaims liability or warranty for this information). If you have questions about a medical condition or this instruction, always ask your healthcare professional. [address] any warranty or liability for your use of this information. Patient Education        Diabetic Ketoacidosis (DKA): Care Instructions  Your Care Instructions  Diabetic ketoacidosis (DKA) happens when the body does not have enough insulin and can't get the sugar it needs for energy. When the body can't use sugar for energy, it starts to use fat for energy. This process makes fatty acids called ketones. The ketones build up in the blood and change the chemical balance in your body. This problem can be very dangerous and needs to be treated. Without treatment, it can lead to a coma or death. DKA occurs most often in people with type 1 diabetes. But people with type 2 diabetes also can get it. DKA can be caused by many things. It can happen if you don't take enough insulin. It can also happen if you have an infection or illness like the flu. Sometimes it happens if you are very dehydrated. DKA can only be treated with insulin and fluids. These are often given in a vein (IV). Follow-up care is a key part of your treatment and safety. Be sure to make and go to all appointments, and call your doctor if you are having problems. It's also a good idea to know your test results and keep a list of the medicines you take. How can you care for yourself at home? To reduce your chance of ketoacidosis:  · Take your insulin and other diabetes medicines on time and in the right dose. ? If an infection caused your DKA and your doctor prescribed antibiotics, take them as directed.  Do not stop taking them just because you feel better. You need to take the full course of antibiotics. · Test your blood sugar before meals and at bedtime or as often as your doctor advises. This is the best way to know when your blood sugar is high so you can treat it early. Watching for symptoms is not as helpful. This is because you may not have symptoms until your blood sugar is very high. Or you may not notice them. · Teach others at work and at home how to check your blood sugar. Make sure that someone else knows how do it in case you can't. · Wear or carry medical identification at all times. This is very important in case you are too sick or injured to speak for yourself. · Talk to your doctor about when you can start to exercise again. · Eat regular meals that spread your calories and carbohydrate throughout the day. This will help keep your blood sugar steady. · When you are sick:  ? Take your insulin and diabetes medicines. This is important even if you are vomiting and having trouble eating or drinking. Your blood sugar may go up because you are sick. If you are eating less than normal, you may need to change your dose of insulin. Talk with your doctor about a plan when you are well. Then you will know what to do when you are sick. ? Drink extra fluids to prevent dehydration. These include water, broth, and sugar-free drinks. If you don't drink enough, the insulin from your shot may not get into your blood. So your blood sugar may go up. ? Try to eat as you normally do, with a focus on healthy food choices. ? Check your blood sugar at least every 3 to 4 hours. Check it more often if it's rising fast. If your doctor has told you to take an extra insulin dose for high blood sugar levels (for example, above 240 mg/dL) be sure to take the right amount. If you're not sure how much to take, call your doctor. ? Check your temperature and pulse often. If your temperature goes up, call your doctor. You may be getting worse.   ? If you take insulin, check your urine or blood for ketones, especially when you have high blood sugar (for example, above 240 mg/dL). Call your doctor if your ketone level is moderate or high. If you know your blood sugar is high, treat it before it gets worse. · If you missed your usual dose of insulin or other diabetes medicine, take the missed dose or take the amount your doctor told you to take if this happens. · If you and your doctor decide on a dose of extra-fast-acting insulin, give yourself the right dose. If you take insulin and your doctor has not told you how much fast-acting insulin to take based on your blood sugar level, call your doctor. · Drink extra water or sugar-free drinks to prevent dehydration. · Wait 30 minutes after you take extra insulin or missed medicines. Then check your blood sugar again. · If symptoms of high blood sugar get worse or your blood sugar level keeps rising, call your doctor. If you start to feel sleepy or confused, call 911. When should you call for help? Call 911 anytime you think you may need emergency care. For example, call if:    · You passed out (lost consciousness).     · You are confused or cannot think clearly.     · Your blood sugar is very high or very low. Watch closely for changes in your health, and be sure to contact your doctor if:    · Your blood sugar stays outside the level your doctor set for you.     · You have any problems. Where can you learn more? Go to http://pedrito-kandi.info/  Enter [id number] in the search box to learn more about \"Diabetic Ketoacidosis (DKA): Care Instructions. \"  Current as of: December 20, 2019               Content Version: 12.6  © 1540-1435 [employer]. Care instructions adapted under license by [employer] (which disclaims liability or warranty for this information).  If you have questions about a medical condition or this instruction, always ask your healthcare professional. [employer] disclaims any warranty or liability for your use of this information.

## 2020-09-23 NOTE — PROGRESS NOTES
Patient up OOB in hallway again stating, \"I got to get to the bank to put some more money on my card. \" Told patient the bank is not open at this time. Patient complies and goes back into room.

## 2020-09-23 NOTE — ED NOTES
Pt refusing to sign discharge paperwork, states \"dont even read me that paperwork\" pt states  pt cussing at this RN stating \"stop asking stupid f*ing questions\". When attempting to remove IV pt pulls away and states \"why would I make it easier for you to discharge me? ill just be back tomorrow. \" pt reassured that testing has been normal.

## 2020-09-23 NOTE — PROGRESS NOTES
Patient continues to ask about going out for a smoke. Informed again regarding non-smoking campus. Refuses nicotine patch.

## 2020-09-23 NOTE — PROGRESS NOTES
TRANSFER - OUT REPORT:    Verbal report given to [facility] RN on Ham  being transferred to [facility](unit) for routine progression of care       Report consisted of patients Situation, Background, Assessment and   Recommendations(SBAR). Information from the following report(s) SBAR, ED Summary, Intake/Output, MAR and Recent Results was reviewed with the receiving nurse. Lines:   Peripheral IV 09/21/20 Left External jugular (Active)   Site Assessment Clean, dry, & intact 09/22/20 2309   Phlebitis Assessment 0 09/22/20 2309   Infiltration Assessment 0 09/22/20 2309   Dressing Status Clean, dry, & intact 09/22/20 2309   Dressing Type Tape;Transparent 09/22/20 2309   Hub Color/Line Status Green;Flushed;Capped 09/22/20 2309   Alcohol Cap Used No 09/22/20 2309       Peripheral IV 09/21/20 Distal;Right Basilic (Active)   Site Assessment Drainage (comment) 09/22/20 2309   Phlebitis Assessment 0 09/22/20 2309   Infiltration Assessment 0 09/22/20 2309   Dressing Status Intact 09/22/20 2309   Dressing Type Tape;Transparent 09/22/20 2309   Hub Color/Line Status Pink;Capped 09/22/20 2309   Alcohol Cap Used No 09/22/20 2309        Opportunity for questions and clarification was provided.       Patient transported with:   Registered Nurse

## 2020-09-23 NOTE — PROGRESS NOTES
Into patient room at 0120 to give requested soup, crackers, and diet sodas. Patient is not in the room. Med Surg called at 0125 to notify us that patient is walking around on med-surg side. Immediately over to med surg and patient unable to be found. Security notified. Security found patient outside ER bay in parking lot. Returned to room per Jimbo at [phone]. Patient notified of procedures and policies regarding smoking. Verbal understanding noted. Bed alarm active.

## 2020-09-23 NOTE — PROGRESS NOTES
Notified Dr. Dashawn Salgado of patients 2nd ALTHEA from the unit. Notified Blayne in security to escort patient to the ER.

## 2020-09-23 NOTE — PROGRESS NOTES
Updated patient on request for more pain medication. No orders received per Dr. Jeannette Rosado. Patient upset and states, \"Im gonna leave and drink. \" Patient remains in room at this time.

## 2020-09-23 NOTE — PROGRESS NOTES
Ambulating in hallway stating that he is going to smoke. Redirected to room and reminded him of our no smoking policy. Also reminded him of the nicotine patch. Patient became angry and began cursing.  States that he wants to see his dr. Dr had already been notified of this request.

## 2020-09-23 NOTE — PROGRESS NOTES
DC instructions given and explained. Pt promises to call new hospitals for an appointment ASAP. Transportation provided.
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
## 2020-09-23 NOTE — PROGRESS NOTES
Patient requesting Seroquel for bedtime. Patient states he takes this medication at home. Dr. Brian Brambila notified. Orders received.

## 2020-09-23 NOTE — PROGRESS NOTES
Would not allow me to do an assessment until the dr gets him more pain medication and lets him go smoke. MD notified.

## 2020-09-23 NOTE — DISCHARGE INSTRUCTIONS
Patient Education        Acute Alcohol Intoxication: Care Instructions  Your Care Instructions     You have had treatment to help your body rid itself of alcohol. Too much alcohol upsets the body's fluid balance. Your doctor may have given you fluids and vitamins. For some people, drinking too much alcohol is a one-time event. For others, it is an ongoing problem. In either case, it is serious. It can be life-threatening. Follow-up care is a key part of your treatment and safety. Be sure to make and go to all appointments, and call your doctor if you are having problems. It's also a good idea to know your test results and keep a list of the medicines you take. How can you care for yourself at home? · Do not drink and drive. · Be safe with medicines. Take your medicines exactly as prescribed. Call your doctor if you think you are having a problem with your medicine. · Your doctor may have prescribed disulfiram (Antabuse). Do not drink any alcohol while you are taking this medicine. You may have severe or even life-threatening side effects from even small amounts of alcohol. · If you were given medicine to prevent nausea, be sure to take it exactly as prescribed. · Before you take any medicine, tell your doctor if:  ? You have had a bad reaction to any medicines in the past.  ? You are taking other medicines, including over-the-counter ones, or have other health problems. ? You are or could be pregnant. · Be prepared to have some symptoms of withdrawal in the next few days. · Drink plenty of liquids in the next few days. · Seek help if you need it to stop drinking. Getting counseling and joining a support group can help you stay sober. Try a support group such as Alcoholics Anonymous. · Avoid alcohol when you take medicines. It can react with many medicines and cause serious problems. When should you call for help? Call 911 anytime you think you may need emergency care.  For example, call if:    · You feel confused and are seeing things that are not there.     · You are thinking about killing yourself or hurting others.     · You have a seizure.     · You vomit blood or what looks like coffee grounds. Call your doctor now or seek immediate medical care if:    · You have trembling, restlessness, sweating, and other withdrawal symptoms that are new or that get worse.     · Your withdrawal symptoms come back after not bothering you for days or weeks.     · You can't stop vomiting. Watch closely for changes in your health, and be sure to contact your doctor if:    · You need help to stop drinking. Where can you learn more? Go to http://www.gray.com/  Enter T102 in the search box to learn more about \"Acute Alcohol Intoxication: Care Instructions. \"  Current as of: June 29, 2020               Content Version: 12.6  © 4640-6729 [employer]. Care instructions adapted under license by [employer] (which disclaims liability or warranty for this information). If you have questions about a medical condition or this instruction, always ask your healthcare professional. [place] [address] any warranty or liability for your use of this information. Patient Education        Learning About Alcohol Use Disorder  What is alcohol use disorder? Alcohol use disorder means that a person drinks alcohol even though it causes harm to themselves or others. It can range from mild to severe. The more signs of this disorder you have, the more severe it may be. Moderate to severe alcohol use disorder is sometimes called addiction. People who have it may find it hard to control their use of alcohol. People who have this disorder may argue with others about how much they're drinking. Their job may be affected because of drinking. They may drink when it's dangerous or illegal, such as when they drive. They also may have a strong need, or craving, to drink.  They may feel like they must drink just to get by. Their drinking may increase their risk of getting hurt or being in a car crash. Over time, drinking too much alcohol may cause health problems. These may include high blood pressure, liver problems, or problems with digestion. What are the signs? Maybe you've wondered about your alcohol habits, or how to tell if your drinking is becoming a problem. Here are some of the signs of alcohol use disorder. You may have it if you have two or more of the following signs:  · You drink larger amounts of alcohol than you ever meant to. Or you've been drinking for a longer time than you ever meant to. · You can't cut down or control your use. Or you constantly wish you could cut down. · You spend a lot of time getting or drinking alcohol or recovering from its effects. · You have strong cravings for alcohol. · You can no longer do your main jobs at work, at school, or at home. · You keep drinking alcohol, even though your use hurts your relationships. · You have stopped doing important activities because of your alcohol use. · You drink alcohol in situations where doing so is dangerous. · You keep drinking alcohol even though you know it's causing health problems. · You need more and more alcohol to get the same effect, or you get less effect from the same amount over time. This is called tolerance. · You have uncomfortable symptoms when you stop drinking alcohol or use less. This is called withdrawal.  Alcohol use disorder can range from mild to severe. The more signs you have, the more severe the disorder may be. Moderate to severe alcohol use disorder is sometimes called addiction. You might not realize that your drinking is a problem. You might not drink large amounts when you drink. Or you might go for days or weeks between drinking episodes. But even if you don't drink very often, your drinking could still be harmful and put you at risk.   How is alcohol use disorder treated? Getting help is up to you. But you don't have to do it alone. There are many people and kinds of treatments that can help. Treatment for alcohol use disorder can include:  · Group therapy, one or more types of counseling, and alcohol education. · Medicines that help to:  ? Reduce withdrawal symptoms and help you safely stop drinking. ? Reduce cravings for alcohol. · Support groups. These groups include Alcoholics Anonymous and [employer] (Self-Management and Recovery Training). Some people are able to stop or cut back on drinking with help from a counselor. People who have moderate to severe alcohol use disorder may need medical treatment. They may need to stay in a hospital or treatment center. You may have a treatment team to help you. This team may include a psychologist or psychiatrist, counselors, doctors, social workers, nurses, and a . A  helps plan and manage your treatment. Follow-up care is a key part of your treatment and safety. Be sure to make and go to all appointments, and call your doctor if you are having problems. It's also a good idea to know your test results and keep a list of the medicines you take. Where can you learn more? Go to http://pedrito-kandi.info/  Enter H758 in the search box to learn more about \"Learning About Alcohol Use Disorder. \"  Current as of: June 29, 2020               Content Version: 12.6  © 9064-0675 [employer]. Care instructions adapted under license by [employer] (which disclaims liability or warranty for this information). If you have questions about a medical condition or this instruction, always ask your healthcare professional. [place] [address] any warranty or liability for your use of this information.
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
## 2020-09-23 NOTE — PROGRESS NOTES
Care Management Interventions  PCP Verified by CM: No  Palliative Care Criteria Met (RRAT>21 & CHF Dx)?: No(Risk 74 Dx DKA )  Transition of Care Consult (CM Consult): Discharge Planning  Discharge Durable Medical Equipment: No(none)  Physical Therapy Consult: No  Occupational Therapy Consult: No  Speech Therapy Consult: No  Current Support Network: Other(homeless)  Discharge Location  Discharge Placement: Unable to determine at this time    Patient discharged today. Patient anxious for d/c and pacing in hallway. Discharge obtained and transport arranged w/ [employer]  [place] his requested location-   [place].   ETA 9:16

## 2020-09-23 NOTE — ED TRIAGE NOTES
Pt arrives via EMS from the [facility] ( was being released) with c/o of ETOH and hyperglycemia. , pt noncompliant with medications. BP 93/58, HR 110s. Alert and oriented, urinated on self.

## 2020-09-23 NOTE — PROGRESS NOTES
TRANSFER - IN REPORT:    Verbal report received from Piyush Marin on Goldy Alegre  being received from ICU for routine progression of care      Report consisted of patients Situation, Background, Assessment and   Recommendations(SBAR). Information from the following report(s) SBAR was reviewed with the receiving nurse. Opportunity for questions and clarification was provided.

## 2020-09-23 NOTE — PROGRESS NOTES
Patient up outside room again asking if he can 'walk 5 minutes up the street for a smoke.' Asked again if he wanted a nicotine patch. Patient agrees at this time.

## 2020-09-23 NOTE — PROGRESS NOTES
Sitting outside patient room related to bed alarm activation x3. Patient in bed eating a sandwich tray at this time. No distress noted. Shift assessment provided at this time.

## 2020-09-23 NOTE — DISCHARGE SUMMARY
Hospitalist Discharge Summary     Admit Date:  2020 11:56 AM   DC note date: 2020  Name:  Dionisio Thompson   Age:  39 y.o.  :  1975   MRN:  [id number]   PCP:  None  Treatment Team: Attending Provider: Tona Palacio MD; Care Manager: Luh Jones RN    Problem List for this Hospitalization:  Hospital Problems as of 2020 Never Reviewed          Codes Class Noted - Resolved POA    Thrombocytopenia ([facility] ICD-10-CM: D69.6  ICD-9-CM: 287.5  2020 - Present Yes        Narcotic dependence ([facility] ICD-10-CM: F11.20  ICD-9-CM: 304.90  2020 - Present Yes        Hx of chronic pancreatitis ICD-10-CM: Z87.19  ICD-9-CM: V12.79  2020 - Present Yes        Type II diabetes mellitus with complication, uncontrolled ([facility] ICD-10-CM: E11.8, E11.65  ICD-9-CM: 250.92  2020 - Present Yes        Bipolar disorder ([facility] ICD-10-CM: F31.9  ICD-9-CM: 296.80  2020 - Present Yes        HTN (hypertension) ICD-10-CM: I10  ICD-9-CM: 401.9  2020 - Present Yes        Alcohol dependence ([facility] ICD-10-CM: F10.20  ICD-9-CM: 303.90  2020 - Present Yes        RESOLVED: DKA, type 2 ([facility] ICD-10-CM: E11.10  ICD-9-CM: 250.12  2020 - 2020 Yes        * (Principal) RESOLVED: DKA (diabetic ketoacidoses) ([facility] ICD-10-CM: E11.10  ICD-9-CM: 250.12  2020 - 2020 Yes            Hospital Course:  Mr. Myrna Avilez is a 40 y/o WM with a h/o EtOH abuse, chronic pancreatitis, DM, medical non-compliance, homelesness and frequent admissions/ER visits transferred to [facility] for DKA. He was admitted earlier this month for the same and discharged on . Since then he has visited the ER 6 times. He was admitted and started on an insulin drip. Electrolytes were replaced. Labs improved and he was changed to basal/bolus insulin. He was transferred out of the ICU. He left the floor at least 3 times to go smoke, despite repeatedly being told that's against hospital policy. He is tolerating his diet.  Sugars are up and he will receive another dose of Humalog this morning. He says his home insulin regimen is Lantus 12U qhs pluse Admelog sliding scale. Says he has insulin pens. Increase basals to 20 units, con't sliding scale. He is medically stable for discharge, however he remains very high risk for re-admission given his social status and medical non-compliance. Disposition: Home or Self Care  Activity: Activity as tolerated  Diet: DIET NUTRITIONAL SUPPLEMENTS PM Snack; Glucerna Shake  DIET DIABETIC CONSISTENT CARB Regular  Code Status: Full Code    Follow Up Orders: Follow-up Appointments   Procedures    FOLLOW UP VISIT Appointment in: Other (Specify) [facility] -- needs to establish care     [facility] -- needs to establish care     Standing Status:   Standing     Number of Occurrences:   1     Order Specific Question:   Appointment in     Answer: Other (Specify)       Follow-up Information     Follow up With Specialties Details Why Buddy Rosenberg   Call for appointment ASAP [address]  [phone]          Discharge meds at bottom of this note. Plan was discussed with patient, nursing, CM. All questions answered. Patient was stable at time of discharge. Given instructions to call a physician or return if any concerns. Discharge summary and encounter summary was sent to PCP electronically via \"Comm Mgt\" link in [facility], if possible. Diagnostic Imaging/Tests:   Xr Abd Acute W 1 V Chest    Result Date: 9/21/2020  Acute abdominal series Comparison: September 18, 2020 Indication: Abdominal pain Findings: Chest: There is no focal pulmonary consolidation, pleural effusion or pneumothorax. No pulmonary edema. Cardiomediastinal contour is within normal limits. Abdomen: There is normal bowel gas pattern. No evidence of free air. Surrounding bones are unremarkable.  There is pancreatic head calcification, similar to prior exam.     IMPRESSION: 1. No evidence of acute pulmonary disease. 2. Normal bowel gas pattern. Xr Abd Acute W 1 V Chest    Result Date: 9/18/2020  Clinical History: The Male patient is 39years old  presenting with symptoms of abdominal pain. Comparison:  Two-view chest 7/27/2020 and abdominal series 7/18/2020 Findings: The lungs are free of acute infiltrate. There is no pleural effusion or pneumothorax. The cardiomediastinal silhouette is within normal limits. There is no acute osseous abnormality. A remote posterior left sixth rib fractures demonstrated The bowel gas pattern is nonspecific. There is no soft tissue mass or organomegaly. No abnormal calcifications are identified. There is no free intraperitoneal air. No acute osseous abnormality is demonstrated. Impression: 1. No acute cardiopulmonary disease. 2.   No free air or bowel obstruction. CPT code(s) [id number]       Echocardiogram results:  No results found for this visit on 09/21/20. Procedures done this admission:  * No surgery found *    All Micro Results     None          SARS-CoV-2 Lab Results  \"Novel Coronavirus\" Test: No results found for: COV2NT   \"Emergent Disease\" Test: No results found for: EDPR  \"SARS-COV-2\" Test: No results found for: XGCOVT  \"Precision Labs\" Test: No results found for: RSLT  Rapid Test: No results found for: COVR         Labs: Results:       BMP, Mg, Phos Recent Labs     09/23/20  0353 09/22/20  0255 09/21/20  1850  09/21/20  0600   * 131* 138   < >  --    K 3.7 4.0 3.6   < >  --     107 106   < >  --    CO2 26 20* 26   < >  --    AGAP 7 4* 6*   < >  --    BUN 5* 6 8   < >  --    CREA 0.77* 0.63* 0.58*   < >  --    CA 8.9 8.3 7.7*   < >  --    * 144* 153*   < >  --    MG 1.6*  --   --   --  2.1   PHOS 4.8*  --   --   --   --     < > = values in this interval not displayed.       CBC Recent Labs     09/23/20  0353 09/22/20  0255 09/21/20  2114 09/21/20  1244   WBC 5.1 5.9  --  6.4   RBC 3.86* 4.15*  --  3.72*   HGB 9.8* 11.0* 9.9* 9.8*   HCT 32.2* 37.7* 32.4* 31.4*    117*  --  260   GRANS 57 45  --  53   LYMPH 32 43  --  31   EOS 2 1  --  0*   MONOS 9 9  --  15*   BASOS 0 1  --  1   IG 0 1  --  0   ANEU 2.9 2.6  --  3.4   ABL 1.6 2.5  --  2.0   BROOKS 0.1 0.1  --  0.0   ABM 0.5 0.5  --  1.0   ABB 0.0 0.1  --  0.1   AIG 0.0 0.1  --  0.0      LFT Recent Labs     09/22/20  0255 09/21/20  1244 09/21/20  0556   ALT 22 14 19   AP 74 86 104   TP 5.4* 5.8* 6.9   ALB 1.9* 2.9* 3.2*   GLOB 3.5 2.9 3.7*   AGRAT 0.5* 1.0* 0.9*      Cardiac Testing No results found for: BNPP, BNP, CPK, RCK1, RCK2, RCK3, RCK4, CKMB, CKNDX, CKND1, TROPT, TROIQ   Coagulation Tests No results found for: PTP, INR, APTT, INREXT   A1c Lab Results   Component Value Date/Time    Hemoglobin A1c 10.3 (H) 09/11/2020 03:10 AM    Hemoglobin A1c 10.2 (H) 09/11/2020 12:11 AM    Hemoglobin A1c 11.4 (H) 06/11/2020 12:25 PM      Lipid Panel No results found for: CHOL, CHOLPOCT, CHOLX, CHLST, CHOLV, 266270, HDL, HDLP, LDL, LDLC, DLDLP, 339665, VLDLC, VLDL, TGLX, TRIGL, TRIGP, TGLPOCT, CHHD, CHHDX   Thyroid Panel No results found for: TSH, T4, FT4, TT3, T3U, TSHEXT     Most Recent UA Lab Results   Component Value Date/Time    Color YELLOW 06/16/2020 11:23 PM    Appearance CLEAR 06/16/2020 11:23 PM    Specific gravity 1.026 (H) 06/16/2020 11:23 PM    pH (UA) 6.0 06/16/2020 11:23 PM    Protein Negative 06/16/2020 11:23 PM    Glucose >1,000 06/16/2020 11:23 PM    Ketone Negative 06/16/2020 11:23 PM    Bilirubin Negative 06/16/2020 11:23 PM    Blood Negative 06/16/2020 11:23 PM    Urobilinogen 0.2 06/16/2020 11:23 PM    Nitrites Negative 06/16/2020 11:23 PM    Leukocyte Esterase Negative 06/16/2020 11:23 PM    WBC 0 06/19/2020 08:12 PM    RBC 0 06/19/2020 08:12 PM    Epithelial cells 0 06/19/2020 08:12 PM    Bacteria 0 06/19/2020 08:12 PM    Casts 0 06/19/2020 08:12 PM        Allergies   Allergen Reactions    Toradol [Ketorolac] Nausea and Vomiting     Immunization History   Administered Date(s) Administered    Influenza Vaccine (Quad) PF 09/14/2020       All Labs from Last 24 Hrs:  Recent Results (from the past 24 hour(s))   GLUCOSE, POC    Collection Time: 09/22/20 11:30 AM   Result Value Ref Range    Glucose (POC) 397 (H) 65 - 100 mg/dL   GLUCOSE, POC    Collection Time: 09/22/20  4:23 PM   Result Value Ref Range    Glucose (POC) 154 (H) 65 - 100 mg/dL   GLUCOSE, POC    Collection Time: 09/22/20 11:12 PM   Result Value Ref Range    Glucose (POC) 496 (HH) 65 - 100 mg/dL   CBC WITH AUTOMATED DIFF    Collection Time: 09/23/20  3:53 AM   Result Value Ref Range    WBC 5.1 4.3 - 11.1 K/uL    RBC 3.86 (L) 4.23 - 5.6 M/uL    HGB 9.8 (L) 13.6 - 17.2 g/dL    HCT 32.2 (L) 41.1 - 50.3 %    MCV 83.4 79.6 - 97.8 FL    MCH 25.4 (L) 26.1 - 32.9 PG    MCHC 30.4 (L) 31.4 - 35.0 g/dL    RDW 18.4 (H) 11.9 - 14.6 %    PLATELET 459 957 - 121 K/uL    MPV 10.4 9.4 - 12.3 FL    ABSOLUTE NRBC 0.00 0.0 - 0.2 K/uL    DF AUTOMATED      NEUTROPHILS 57 43 - 78 %    LYMPHOCYTES 32 13 - 44 %    MONOCYTES 9 4.0 - 12.0 %    EOSINOPHILS 2 0.5 - 7.8 %    BASOPHILS 0 0.0 - 2.0 %    IMMATURE GRANULOCYTES 0 0.0 - 5.0 %    ABS. NEUTROPHILS 2.9 1.7 - 8.2 K/UL    ABS. LYMPHOCYTES 1.6 0.5 - 4.6 K/UL    ABS. MONOCYTES 0.5 0.1 - 1.3 K/UL    ABS. EOSINOPHILS 0.1 0.0 - 0.8 K/UL    ABS. BASOPHILS 0.0 0.0 - 0.2 K/UL    ABS. IMM.  GRANS. 0.0 0.0 - 0.5 K/UL   METABOLIC PANEL, BASIC    Collection Time: 09/23/20  3:53 AM   Result Value Ref Range    Sodium 134 (L) 136 - 145 mmol/L    Potassium 3.7 3.5 - 5.1 mmol/L    Chloride 101 98 - 107 mmol/L    CO2 26 21 - 32 mmol/L    Anion gap 7 7 - 16 mmol/L    Glucose 331 (H) 65 - 100 mg/dL    BUN 5 (L) 6 - 23 MG/DL    Creatinine 0.77 (L) 0.8 - 1.5 MG/DL    GFR est AA >60 >60 ml/min/1.73m2    GFR est non-AA >60 >60 ml/min/1.73m2    Calcium 8.9 8.3 - 10.4 MG/DL   MAGNESIUM    Collection Time: 09/23/20  3:53 AM   Result Value Ref Range    Magnesium 1.6 (L) 1.8 - 2.4 mg/dL   PHOSPHORUS    Collection Time: 09/23/20  3:53 AM   Result Value Ref Range    Phosphorus 4.8 (H) 2.5 - 4.5 MG/DL   GLUCOSE, POC    Collection Time: 09/23/20  6:08 AM   Result Value Ref Range    Glucose (POC) 406 (H) 65 - 100 mg/dL       Discharge Exam:  Patient Vitals for the past 24 hrs:   Temp Pulse Resp BP SpO2   09/23/20 0721 98 °F (36.7 °C) 94 18 129/75 97 %   09/23/20 0329 98.4 °F (36.9 °C) 92 18 122/78 97 %   09/22/20 2309 98.4 °F (36.9 °C) 85 20 108/61 100 %   09/22/20 2030   18     09/22/20 1924 98.4 °F (36.9 °C) 71 20 112/75 99 %   09/22/20 1500  82  98/65 95 %   09/22/20 1300  81  (!) 159/106 100 %   09/22/20 1200 98 °F (36.7 °C) 87 22 117/80 100 %     Oxygen Therapy  O2 Sat (%): 97 % (09/23/20 0721)  Pulse via Oximetry: (!) 175 beats per minute (09/22/20 1500)  O2 Device: Room air (09/22/20 2309)    Estimated body mass index is 23.93 kg/m² as calculated from the following:    Height as of an earlier encounter on 9/21/20: 5' 8\" (1.727 m). Weight as of this encounter: 71.4 kg (157 lb 6.5 oz). Intake/Output Summary (Last 24 hours) at 9/23/2020 0837  Last data filed at 9/22/2020 2309  Gross per 24 hour   Intake 1525 ml   Output 1550 ml   Net -25 ml       *Note that automatically entered I/Os may not be accurate; dependent on patient compliance with collection and accurate  by assistants. General:    Well nourished. Alert. Appears older than stated age. Eyes:   Normal sclerae. Extraocular movements intact. ENT:  Normocephalic, atraumatic. Moist mucous membranes. Poor dentition. CV:   Regular rate and rhythm. No murmur, rub, or gallop. Lungs:  Clear to auscultation bilaterally. No wheezing, rhonchi, or rales. Abdomen: Soft, nontender, nondistended. Extremities: Warm and dry. No cyanosis or edema. Neurologic: CN II-XII grossly intact. No gross focal deficits   Skin:     No rashes or jaundice. Psych:  Normal mood and affect.     Current Med List in Hospital:   Current Facility-Administered Medications   Medication Dose Route Frequency    nicotine (NICODERM CQ) 21 mg/24 hr patch 1 Patch  1 Patch TransDERmal Q24H    insulin lispro (HUMALOG) injection 15 Units  15 Units SubCUTAneous ONCE    insulin glargine (LANTUS) injection 8 Units  8 Units SubCUTAneous QHS    QUEtiapine (SEROquel) tablet 100 mg  100 mg Oral QHS    sodium chloride (NS) flush 5-40 mL  5-40 mL IntraVENous Q8H    sodium chloride (NS) flush 5-40 mL  5-40 mL IntraVENous PRN    acetaminophen (TYLENOL) tablet 650 mg  650 mg Oral Q6H PRN    Or    acetaminophen (TYLENOL) suppository 650 mg  650 mg Rectal Q6H PRN    polyethylene glycol (MIRALAX) packet 17 g  17 g Oral DAILY PRN    promethazine (PHENERGAN) tablet 12.5 mg  12.5 mg Oral Q6H PRN    Or    ondansetron (ZOFRAN) injection 4 mg  4 mg IntraVENous Q6H PRN    thiamine HCL (B-1) tablet 100 mg  100 mg Oral DAILY    folic acid (FOLVITE) tablet 1 mg  1 mg Oral DAILY    multivitamin, stress formula (STRESS TAB) tablet 1 Tab  1 Tab Oral DAILY    LORazepam (ATIVAN) tablet 1 mg  1 mg Oral Q4H PRN    LORazepam (ATIVAN) injection 1 mg  1 mg IntraVENous Q4H PRN    pantoprazole (PROTONIX) 40 mg in 0.9% sodium chloride 10 mL injection  40 mg IntraVENous Q12H    insulin lispro (HUMALOG) injection   SubCUTAneous Q6H    dextrose 40% (GLUTOSE) oral gel 1 Tube  15 g Oral PRN    glucagon (GLUCAGEN) injection 1 mg  1 mg IntraMUSCular PRN    dextrose (D50W) injection syrg 12.5-25 g  25-50 mL IntraVENous PRN    oxyCODONE IR (ROXICODONE) tablet 5 mg  5 mg Oral Q6H PRN       Discharge Info:   Current Discharge Medication List      START taking these medications    Details   insulin glargine (LANTUS,BASAGLAR) 100 unit/mL (3 mL) inpn 20 Units by SubCUTAneous route nightly.   Qty: 3 Pen, Refills: 0      Insulin Needles, Disposable, 32 gauge x 5/32\" ndle For Lantus 20u qhs  Qty: 30 Pen Needle, Refills: 0         CONTINUE these medications which have NOT CHANGED    Details   insulin lispro (HUMALOG) 100 unit/mL injection   GRL RX SLIDING SCALE SF30    For Blood Sugar of:  150-174 = 2 Units; 175-204 = 3 Units  205-234 = 4 Units; 235-264 = 5 Units  265-294 = 6 Units; 295-324 = 7 Units  325-354 = 8 Units; 355 or > = Call MD    This drug may increase the risk of patient falls Fast Acting - Administer Immediately - or within 15 minutes of start of meal, if mealtime coverage. Qty: 1 Vial, Refills: 0      lancets misc As directed  Qty: 1 Each, Refills: 0      Syringe, Disposable, 1 mL syrg Insulin syringe  Qty: 100 Syringe, Refills: 1      ondansetron (Zofran ODT) 4 mg disintegrating tablet Take 1 Tab by mouth every eight (8) hours as needed for Nausea. Qty: 20 Tab, Refills: 0      simethicone (MYLICON) 80 mg chewable tablet Take 1 Tab by mouth four (4) times daily as needed for GI Pain. Qty: 30 Tab, Refills: 0      acetaminophen (TYLENOL) 500 mg tablet Take 1 Tab by mouth every four (4) hours as needed for Pain. Qty: 20 Tab, Refills: 0      glucose blood VI test strips (ASCENSIA AUTODISC VI, ONE TOUCH ULTRA TEST VI) strip As directed  Qty: 100 Strip, Refills: 1         STOP taking these medications       insulin NPH (NOVOLIN N, HUMULIN N) 100 unit/mL injection Comments:   Reason for Stopping:                 Time spent in patient discharge planning and coordination 35 minutes.     Signed:  Khadra Livingston MD

## 2020-09-26 ENCOUNTER — HOSPITAL ENCOUNTER (EMERGENCY)
Age: 45
Discharge: LEFT BEFORE DISCHARGE | End: 2020-09-26
Attending: EMERGENCY MEDICINE

## 2020-09-26 VITALS
OXYGEN SATURATION: 100 % | DIASTOLIC BLOOD PRESSURE: 86 MMHG | HEIGHT: 68 IN | WEIGHT: 157 LBS | BODY MASS INDEX: 23.79 KG/M2 | RESPIRATION RATE: 22 BRPM | HEART RATE: 114 BPM | SYSTOLIC BLOOD PRESSURE: 140 MMHG

## 2020-09-26 PROCEDURE — 96372 THER/PROPH/DIAG INJ SC/IM: CPT

## 2020-09-26 PROCEDURE — 75810000275 HC EMERGENCY DEPT VISIT NO LEVEL OF CARE

## 2020-09-26 PROCEDURE — 96374 THER/PROPH/DIAG INJ IV PUSH: CPT

## 2020-09-26 PROCEDURE — 99284 EMERGENCY DEPT VISIT MOD MDM: CPT

## 2020-09-27 ENCOUNTER — HOSPITAL ENCOUNTER (EMERGENCY)
Age: 45
Discharge: HOME | End: 2020-09-27
Attending: EMERGENCY MEDICINE
Payer: SUBSIDIZED

## 2020-09-27 VITALS
OXYGEN SATURATION: 99 % | HEART RATE: 99 BPM | RESPIRATION RATE: 18 BRPM | TEMPERATURE: 99.4 F | DIASTOLIC BLOOD PRESSURE: 56 MMHG | SYSTOLIC BLOOD PRESSURE: 119 MMHG

## 2020-09-27 DIAGNOSIS — R11.2 NON-INTRACTABLE VOMITING WITH NAUSEA, UNSPECIFIED VOMITING TYPE: ICD-10-CM

## 2020-09-27 DIAGNOSIS — Z91.199 NONCOMPLIANCE: Primary | ICD-10-CM

## 2020-09-27 DIAGNOSIS — R73.9 HYPERGLYCEMIA: ICD-10-CM

## 2020-09-27 LAB
ALBUMIN SERPL-MCNC: 3.2 G/DL (ref 3.5–5)
ALBUMIN/GLOB SERPL: 1 {RATIO} (ref 1.2–3.5)
ALP SERPL-CCNC: 90 U/L (ref 50–136)
ALT SERPL-CCNC: 15 U/L (ref 12–65)
ANION GAP SERPL CALC-SCNC: 15 MMOL/L (ref 7–16)
AST SERPL-CCNC: 12 U/L (ref 15–37)
BASOPHILS # BLD: 0.1 K/UL (ref 0–0.2)
BASOPHILS NFR BLD: 1 % (ref 0–2)
BILIRUB SERPL-MCNC: 0.4 MG/DL (ref 0.2–1.1)
BUN SERPL-MCNC: 9 MG/DL (ref 6–23)
CALCIUM SERPL-MCNC: 8.1 MG/DL (ref 8.3–10.4)
CHLORIDE SERPL-SCNC: 94 MMOL/L (ref 98–107)
CO2 SERPL-SCNC: 25 MMOL/L (ref 21–32)
CREAT SERPL-MCNC: 0.77 MG/DL (ref 0.8–1.5)
DIFFERENTIAL METHOD BLD: ABNORMAL
EOSINOPHIL # BLD: 0 K/UL (ref 0–0.8)
EOSINOPHIL NFR BLD: 0 % (ref 0.5–7.8)
ERYTHROCYTE [DISTWIDTH] IN BLOOD BY AUTOMATED COUNT: 19.1 % (ref 11.9–14.6)
ETHANOL SERPL-MCNC: <3 MG/DL
GLOBULIN SER CALC-MCNC: 3.3 G/DL (ref 2.3–3.5)
GLUCOSE SERPL-MCNC: 471 MG/DL (ref 65–100)
HCT VFR BLD AUTO: 33.9 % (ref 41.1–50.3)
HGB BLD-MCNC: 10.8 G/DL (ref 13.6–17.2)
IMM GRANULOCYTES # BLD AUTO: 0.1 K/UL (ref 0–0.5)
IMM GRANULOCYTES NFR BLD AUTO: 1 % (ref 0–5)
LIPASE SERPL-CCNC: 69 U/L (ref 73–393)
LYMPHOCYTES # BLD: 1.3 K/UL (ref 0.5–4.6)
LYMPHOCYTES NFR BLD: 15 % (ref 13–44)
MAGNESIUM SERPL-MCNC: 1.8 MG/DL (ref 1.8–2.4)
MCH RBC QN AUTO: 26.2 PG (ref 26.1–32.9)
MCHC RBC AUTO-ENTMCNC: 31.9 G/DL (ref 31.4–35)
MCV RBC AUTO: 82.3 FL (ref 79.6–97.8)
MONOCYTES # BLD: 0.7 K/UL (ref 0.1–1.3)
MONOCYTES NFR BLD: 9 % (ref 4–12)
NEUTS SEG # BLD: 6.3 K/UL (ref 1.7–8.2)
NEUTS SEG NFR BLD: 75 % (ref 43–78)
NRBC # BLD: 0 K/UL (ref 0–0.2)
PLATELET # BLD AUTO: 325 K/UL (ref 150–450)
PMV BLD AUTO: 9.6 FL (ref 9.4–12.3)
POTASSIUM SERPL-SCNC: 3.4 MMOL/L (ref 3.5–5.1)
PROT SERPL-MCNC: 6.5 G/DL (ref 6.3–8.2)
RBC # BLD AUTO: 4.12 M/UL (ref 4.23–5.6)
SODIUM SERPL-SCNC: 134 MMOL/L (ref 136–145)
WBC # BLD AUTO: 8.4 K/UL (ref 4.3–11.1)

## 2020-09-27 PROCEDURE — 80053 COMPREHEN METABOLIC PANEL: CPT

## 2020-09-27 PROCEDURE — 83690 ASSAY OF LIPASE: CPT

## 2020-09-27 PROCEDURE — 74011000250 HC RX REV CODE- 250

## 2020-09-27 PROCEDURE — 83735 ASSAY OF MAGNESIUM: CPT

## 2020-09-27 PROCEDURE — 74011250636 HC RX REV CODE- 250/636

## 2020-09-27 PROCEDURE — 74011250637 HC RX REV CODE- 250/637

## 2020-09-27 PROCEDURE — 85025 COMPLETE CBC W/AUTO DIFF WBC: CPT

## 2020-09-27 PROCEDURE — 80307 DRUG TEST PRSMV CHEM ANLYZR: CPT

## 2020-09-27 RX ORDER — HYOSCYAMINE SULFATE 0.12 MG/1
0.12 TABLET SUBLINGUAL
Status: COMPLETED | OUTPATIENT
Start: 2020-09-27 | End: 2020-09-27

## 2020-09-27 RX ORDER — SODIUM CHLORIDE 9 MG/ML
1000 INJECTION, SOLUTION INTRAVENOUS ONCE
Status: COMPLETED | OUTPATIENT
Start: 2020-09-27 | End: 2020-09-27

## 2020-09-27 RX ORDER — DIPHENHYDRAMINE HYDROCHLORIDE 50 MG/ML
25 INJECTION, SOLUTION INTRAMUSCULAR; INTRAVENOUS
Status: DISCONTINUED | OUTPATIENT
Start: 2020-09-27 | End: 2020-09-27

## 2020-09-27 RX ORDER — HALOPERIDOL 5 MG/ML
10 INJECTION INTRAMUSCULAR
Status: COMPLETED | OUTPATIENT
Start: 2020-09-27 | End: 2020-09-27

## 2020-09-27 RX ORDER — METOCLOPRAMIDE 10 MG/1
10 TABLET ORAL
Qty: 12 TAB | Refills: 0 | Status: SHIPPED | OUTPATIENT
Start: 2020-09-27 | End: 2020-09-29

## 2020-09-27 RX ORDER — DIPHENHYDRAMINE HYDROCHLORIDE 50 MG/ML
25 INJECTION, SOLUTION INTRAMUSCULAR; INTRAVENOUS
Status: COMPLETED | OUTPATIENT
Start: 2020-09-27 | End: 2020-09-27

## 2020-09-27 RX ADMIN — HALOPERIDOL LACTATE 10 MG: 5 INJECTION, SOLUTION INTRAMUSCULAR at 04:53

## 2020-09-27 RX ADMIN — FAMOTIDINE 20 MG: 10 INJECTION INTRAVENOUS at 08:26

## 2020-09-27 RX ADMIN — HYOSCYAMINE SULFATE 0.12 MG: 0.12 TABLET ORAL; SUBLINGUAL at 04:52

## 2020-09-27 RX ADMIN — DIPHENHYDRAMINE HYDROCHLORIDE 25 MG: 50 INJECTION, SOLUTION INTRAMUSCULAR; INTRAVENOUS at 04:55

## 2020-09-27 RX ADMIN — SODIUM CHLORIDE 1000 ML: 900 INJECTION, SOLUTION INTRAVENOUS at 08:26

## 2020-09-27 NOTE — ED TRIAGE NOTES
Pt arrives again, ambulating to triage, mask in place, with c/o abdominal and back pain. Pt states, \"I think it's my pancreas again, sure feels like it\" Pt known to this facility, has had several flair ups of pancreatitis. Pt is homeless and continues to use alcohol.

## 2020-09-27 NOTE — ED NOTES
Pt difficult stick for IV. BGL performed in triage. Last visits to this facility pt has had IV placement by US.

## 2020-09-27 NOTE — DISCHARGE INSTRUCTIONS
Patient Education        Nausea and Vomiting: Care Instructions  Your Care Instructions     When you are nauseated, you may feel weak and sweaty and notice a lot of saliva in your mouth. Nausea often leads to vomiting. Most of the time you do not need to worry about nausea and vomiting, but they can be signs of other illnesses. Two common causes of nausea and vomiting are stomach flu and food poisoning. Nausea and vomiting from viral stomach flu will usually start to improve within 24 hours. Nausea and vomiting from food poisoning may last from 12 to 48 hours. The doctor has checked you carefully, but problems can develop later. If you notice any problems or new symptoms, get medical treatment right away. Follow-up care is a key part of your treatment and safety. Be sure to make and go to all appointments, and call your doctor if you are having problems. It's also a good idea to know your test results and keep a list of the medicines you take. How can you care for yourself at home? · To prevent dehydration, drink plenty of fluids, enough so that your urine is light yellow or clear like water. Choose water and other caffeine-free clear liquids until you feel better. If you have kidney, heart, or liver disease and have to limit fluids, talk with your doctor before you increase the amount of fluids you drink. · Rest in bed until you feel better. · When you are able to eat, try clear soups, mild foods, and liquids until all symptoms are gone for 12 to 48 hours. Other good choices include dry toast, crackers, cooked cereal, and gelatin dessert, such as Jell-O. When should you call for help? Call 911 anytime you think you may need emergency care. For example, call if:    · You passed out (lost consciousness). Call your doctor now or seek immediate medical care if:    · You have symptoms of dehydration, such as:  ? Dry eyes and a dry mouth. ? Passing only a little dark urine. ?  Feeling thirstier than usual.   · You have new or worsening belly pain.     · You have a new or higher fever.     · You vomit blood or what looks like coffee grounds. Watch closely for changes in your health, and be sure to contact your doctor if:    · You have ongoing nausea and vomiting.     · Your vomiting is getting worse.     · Your vomiting lasts longer than 2 days.     · You are not getting better as expected. Where can you learn more? Go to http://pedrito-kandi.info/  Enter H591 in the search box to learn more about \"Nausea and Vomiting: Care Instructions. \"  Current as of: June 26, 2019               Content Version: 12.6  © 2155-6816 [employer]. Care instructions adapted under license by [employer] (which disclaims liability or warranty for this information). If you have questions about a medical condition or this instruction, always ask your healthcare professional. [address] any warranty or liability for your use of this information. Patient Education        Learning About Diabetes Food Guidelines  Your Care Instructions     Meal planning is important to manage diabetes. It helps keep your blood sugar at a target level (which you set with your doctor). You don't have to eat special foods. You can eat what your family eats, including sweets once in a while. But you do have to pay attention to how often you eat and how much you eat of certain foods. You may want to work with a dietitian or a certified diabetes educator (CDE) to help you plan meals and snacks. A dietitian or CDE can also help you lose weight if that is one of your goals. What should you know about eating carbs? Managing the amount of carbohydrate (carbs) you eat is an important part of healthy meals when you have diabetes. Carbohydrate is found in many foods. · Learn which foods have carbs. And learn the amounts of carbs in different foods.   ? Bread, cereal, pasta, and rice have about 15 grams of carbs in a serving. A serving is 1 slice of bread (1 ounce), ½ cup of cooked cereal, or 1/3 cup of cooked pasta or rice. ? Fruits have 15 grams of carbs in a serving. A serving is 1 small fresh fruit, such as an apple or orange; ½ of a banana; ½ cup of cooked or canned fruit; ½ cup of fruit juice; 1 cup of melon or raspberries; or 2 tablespoons of dried fruit. ? Milk and no-sugar-added yogurt have 15 grams of carbs in a serving. A serving is 1 cup of milk or 2/3 cup of no-sugar-added yogurt. ? Starchy vegetables have 15 grams of carbs in a serving. A serving is ½ cup of mashed potatoes or sweet potato; 1 cup winter squash; ½ of a small baked potato; ½ cup of cooked beans; or ½ cup cooked corn or green peas. · Learn how much carbs to eat each day and at each meal. A dietitian or CDE can teach you how to keep track of the amount of carbs you eat. This is called carbohydrate counting. · If you are not sure how to count carbohydrate grams, use the Plate Method to plan meals. It is a good, quick way to make sure that you have a balanced meal. It also helps you spread carbs throughout the day. ? Divide your plate by types of foods. Put non-starchy vegetables on half the plate, meat or other protein food on one-quarter of the plate, and a grain or starchy vegetable in the final quarter of the plate. To this you can add a small piece of fruit and 1 cup of milk or yogurt, depending on how many carbs you are supposed to eat at a meal.  · Try to eat about the same amount of carbs at each meal. Do not \"save up\" your daily allowance of carbs to eat at one meal.  · Proteins have very little or no carbs per serving. Examples of proteins are beef, chicken, turkey, fish, eggs, tofu, cheese, cottage cheese, and peanut butter. A serving size of meat is 3 ounces, which is about the size of a deck of cards.  Examples of meat substitute serving sizes (equal to 1 ounce of meat) are 1/4 cup of cottage cheese, 1 egg, 1 tablespoon of peanut butter, and ½ cup of tofu. How can you eat out and still eat healthy? · Learn to estimate the serving sizes of foods that have carbohydrate. If you measure food at home, it will be easier to estimate the amount in a serving of restaurant food. · If the meal you order has too much carbohydrate (such as potatoes, corn, or baked beans), ask to have a low-carbohydrate food instead. Ask for a salad or green vegetables. · If you use insulin, check your blood sugar before and after eating out to help you plan how much to eat in the future. · If you eat more carbohydrate at a meal than you had planned, take a walk or do other exercise. This will help lower your blood sugar. What else should you know? · Limit saturated fat, such as the fat from meat and dairy products. This is a healthy choice because people who have diabetes are at higher risk of heart disease. So choose lean cuts of meat and nonfat or low-fat dairy products. Use olive or canola oil instead of butter or shortening when cooking. · Don't skip meals. Your blood sugar may drop too low if you skip meals and take insulin or certain medicines for diabetes. · Check with your doctor before you drink alcohol. Alcohol can cause your blood sugar to drop too low. Alcohol can also cause a bad reaction if you take certain diabetes medicines. Follow-up care is a key part of your treatment and safety. Be sure to make and go to all appointments, and call your doctor if you are having problems. It's also a good idea to know your test results and keep a list of the medicines you take. Where can you learn more? Go to http://pedrito-kandi.info/  Enter I147 in the search box to learn more about \"Learning About Diabetes Food Guidelines. \"  Current as of: December 20, 2019               Content Version: 12.6  © 5553-4316 [employer].    Care instructions adapted under license by [employer] (which disclaims liability or warranty for this information). If you have questions about a medical condition or this instruction, always ask your healthcare professional. [place] [address] any warranty or liability for your use of this information.

## 2020-09-27 NOTE — ED NOTES
Patient refuses to accept discharge papers or have final set of VS upon discharge. Patient requesting ride home, informed that the ED is unable to provide cab at this time. Patient informed not to take the blankets from the ED lobby, but noted patient leaving with hospital blankets.

## 2020-09-27 NOTE — ED NOTES
Pt arrives again, ambulating to triage, mask in place, with c/o abdominal and back pain. Pt states, \"I think it's my pancreas again, sure feels like it\" Pt known to this facility, has had several flair ups of pancreatitis. Pt is homeless and continues to use alcohol. pt has not been cooperative. Has left and returned several times. Now in bed J, being seen by Dr. Walter Echavarria.

## 2020-09-27 NOTE — ED PROVIDER NOTES
47-year-old man with a history of diabetes and chronic pancreatitis reports to the ED for abdominal pain and nausea vomiting. This is the third visit today to the ED to come to other times but left before being evaluated by MD.  Is not clear where he is been going. He does have a history of alcohol use. Patient's complaint of nausea vomiting upon return to the ED. Vomiting    This is a chronic problem. The current episode started 12 to 24 hours ago. The problem occurs 2 to 4 times per day. The problem has not changed since onset. The emesis has an appearance of bilious material. There has been no fever. Associated symptoms include abdominal pain. Risk factors include alcohol. Past Medical History:   Diagnosis Date    B[facility] 1 disorder ([facility]     Depression     [facility]     PTSD (post-traumatic stress disorder)        History reviewed. No pertinent surgical history. History reviewed. No pertinent family history. Social History     Socioeconomic History    Marital status: SINGLE     Spouse name: Not on file    Number of children: Not on file    Years of education: Not on file    Highest education level: Not on file   Occupational History    Not on file   Social Needs    Financial resource strain: Not on file    Food insecurity     Worry: Not on file     Inability: Not on file    Transportation needs     Medical: Not on file     Non-medical: Not on file   Tobacco Use    Smoking status: Current Every Day Smoker     Packs/day: 1.00    Smokeless tobacco: Never Used   Substance and Sexual Activity    Alcohol use:  Yes    Drug use: Not Currently    Sexual activity: Not on file   Lifestyle    Physical activity     Days per week: Not on file     Minutes per session: Not on file    Stress: Not on file   Relationships    Social connections     Talks on phone: Not on file     Gets together: Not on file     Attends Restorationist service: Not on file     Active member of club or organization: Not on file     Attends meetings of clubs or organizations: Not on file     Relationship status: Not on file    Intimate partner violence     Fear of current or ex partner: Not on file     Emotionally abused: Not on file     Physically abused: Not on file     Forced sexual activity: Not on file   Other Topics Concern    Not on file   Social History Narrative    Not on file         ALLERGIES: Toradol [ketorolac]    Review of Systems   Constitutional: Negative. Negative for activity change. HENT: Negative. Eyes: Negative. Respiratory: Negative. Cardiovascular: Negative. Gastrointestinal: Positive for abdominal pain and vomiting. Genitourinary: Negative. Musculoskeletal: Negative. Skin: Negative. Neurological: Negative. Psychiatric/Behavioral: Negative. All other systems reviewed and are negative. Vitals:    09/27/20 0500 09/27/20 0640 09/27/20 0643   BP: (!) 148/90 (!) 119/56    Pulse: 96 99    Resp: 18 18    Temp: 98.8 °F (37.1 °C) 99.4 °F (37.4 °C)    SpO2: 96% 99% 99%            Physical Exam  Vitals signs and nursing note reviewed. Constitutional:       General: He is not in acute distress. Appearance: He is well-developed. He is not diaphoretic. Comments: Patient's has a small amount emesis in the bucket and his complaints of nausea pain keep getting louder. HENT:      Head: Normocephalic and atraumatic. Right Ear: External ear normal.      Left Ear: External ear normal.      Nose: Nose normal.      Mouth/Throat:      Pharynx: No oropharyngeal exudate. Eyes:      General: No scleral icterus. Right eye: No discharge. Left eye: No discharge. Conjunctiva/sclera: Conjunctivae normal.      Pupils: Pupils are equal, round, and reactive to light. Neck:      Musculoskeletal: Normal range of motion and neck supple. Vascular: No JVD. Trachea: No tracheal deviation.    Cardiovascular:      Rate and Rhythm: Normal rate and regular rhythm. Pulmonary:      Effort: Pulmonary effort is normal. No respiratory distress. Breath sounds: Normal breath sounds. No stridor. No wheezing. Chest:      Chest wall: No tenderness. Abdominal:      General: Bowel sounds are normal. There is no distension. Palpations: Abdomen is soft. There is no mass. Tenderness: There is no abdominal tenderness. Musculoskeletal: Normal range of motion. General: No tenderness. Skin:     General: Skin is warm and dry. Coloration: Skin is not pale. Findings: No erythema or rash. Neurological:      Mental Status: He is alert and oriented to person, place, and time. Cranial Nerves: No cranial nerve deficit. Psychiatric:         Behavior: Behavior normal.         Thought Content: Thought content normal.          MDM  Number of Diagnoses or Management Options  Hyperglycemia:   Noncompliance:   Non-intractable vomiting with nausea, unspecified vomiting type:   Diagnosis management comments: Abdominal exam is benign without findings of peritonitis or acute abdominal findings. Patient was given Haldol because he was starting to get loud in the ED seem to help his nausea and vomiting as well patient was allowed to sleep for several hours given fluids for his hyperglycemia. This is very common issue for him. He is noncompliant many ways including the use of alcohol. Will hydrate him and discharged him with instructions to contact his primary care doctor or go to the free clinic or call [facility] CHEMICAL DEPENDENCY [facility].        Amount and/or Complexity of Data Reviewed  Clinical lab tests: ordered and reviewed  Tests in the radiology section of CPT®: ordered and reviewed  Tests in the medicine section of CPT®: ordered and reviewed           Procedures

## 2020-09-28 ENCOUNTER — HOSPITAL ENCOUNTER (OUTPATIENT)
Age: 45
Setting detail: OBSERVATION
Discharge: HOME | End: 2020-09-29
Admitting: FAMILY MEDICINE

## 2020-09-28 ENCOUNTER — APPOINTMENT (OUTPATIENT)
Dept: CT IMAGING | Age: 45
End: 2020-09-28
Attending: FAMILY MEDICINE

## 2020-09-28 DIAGNOSIS — R73.9 HYPERGLYCEMIA: Primary | ICD-10-CM

## 2020-09-28 PROBLEM — E11.10 DKA (DIABETIC KETOACIDOSES): Status: ACTIVE | Noted: 2020-09-28

## 2020-09-28 LAB
ADMINISTERED INITIALS, ADMINIT: NORMAL
ALBUMIN SERPL-MCNC: 3.7 G/DL (ref 3.5–5)
ALBUMIN/GLOB SERPL: 0.9 {RATIO} (ref 1.2–3.5)
ALP SERPL-CCNC: 113 U/L (ref 50–136)
ALT SERPL-CCNC: 20 U/L (ref 12–65)
ANION GAP SERPL CALC-SCNC: 10 MMOL/L (ref 7–16)
ANION GAP SERPL CALC-SCNC: 20 MMOL/L (ref 7–16)
ANION GAP SERPL CALC-SCNC: 6 MMOL/L (ref 7–16)
ANION GAP SERPL CALC-SCNC: 7 MMOL/L (ref 7–16)
ARTERIAL PATENCY WRIST A: YES
AST SERPL-CCNC: 19 U/L (ref 15–37)
BASE DEFICIT BLD-SCNC: 4 MMOL/L
BASOPHILS # BLD: 0.1 K/UL (ref 0–0.2)
BASOPHILS NFR BLD: 1 % (ref 0–2)
BDY SITE: ABNORMAL
BILIRUB SERPL-MCNC: 0.3 MG/DL (ref 0.2–1.1)
BUN SERPL-MCNC: 5 MG/DL (ref 6–23)
CALCIUM SERPL-MCNC: 8.2 MG/DL (ref 8.3–10.4)
CALCIUM SERPL-MCNC: 8.3 MG/DL (ref 8.3–10.4)
CALCIUM SERPL-MCNC: 8.5 MG/DL (ref 8.3–10.4)
CALCIUM SERPL-MCNC: 8.8 MG/DL (ref 8.3–10.4)
CHLORIDE SERPL-SCNC: 102 MMOL/L (ref 98–107)
CHLORIDE SERPL-SCNC: 92 MMOL/L (ref 98–107)
CHLORIDE SERPL-SCNC: 95 MMOL/L (ref 98–107)
CHLORIDE SERPL-SCNC: 99 MMOL/L (ref 98–107)
CO2 BLD-SCNC: 21 MMOL/L
CO2 SERPL-SCNC: 17 MMOL/L (ref 21–32)
CO2 SERPL-SCNC: 29 MMOL/L (ref 21–32)
CO2 SERPL-SCNC: 30 MMOL/L (ref 21–32)
CO2 SERPL-SCNC: 30 MMOL/L (ref 21–32)
COLLECT TIME,HTIME: 242
CREAT SERPL-MCNC: 0.7 MG/DL (ref 0.8–1.5)
CREAT SERPL-MCNC: 0.76 MG/DL (ref 0.8–1.5)
CREAT SERPL-MCNC: 0.96 MG/DL (ref 0.8–1.5)
CREAT SERPL-MCNC: 1.03 MG/DL (ref 0.8–1.5)
D50 ADMINISTERED, D50ADM: 0 ML
D50 ADMINISTERED, D50ADM: 14 ML
D50 ORDER, D50ORD: 0 ML
D50 ORDER, D50ORD: 14 ML
DIFFERENTIAL METHOD BLD: ABNORMAL
EOSINOPHIL # BLD: 0 K/UL (ref 0–0.8)
EOSINOPHIL NFR BLD: 1 % (ref 0.5–7.8)
ERYTHROCYTE [DISTWIDTH] IN BLOOD BY AUTOMATED COUNT: 19.5 % (ref 11.9–14.6)
EST. AVERAGE GLUCOSE BLD GHB EST-MCNC: 272 MG/DL
ETHANOL SERPL-MCNC: 25 MG/DL
GAS FLOW.O2 O2 DELIVERY SYS: ABNORMAL L/MIN
GLOBULIN SER CALC-MCNC: 4.1 G/DL (ref 2.3–3.5)
GLSCOM COMMENTS: NORMAL
GLUCOSE BLD STRIP.AUTO-MCNC: 182 MG/DL (ref 65–100)
GLUCOSE BLD STRIP.AUTO-MCNC: 188 MG/DL (ref 65–100)
GLUCOSE BLD STRIP.AUTO-MCNC: 210 MG/DL (ref 65–100)
GLUCOSE BLD STRIP.AUTO-MCNC: 273 MG/DL (ref 65–100)
GLUCOSE BLD STRIP.AUTO-MCNC: 382 MG/DL (ref 65–100)
GLUCOSE BLD STRIP.AUTO-MCNC: 571 MG/DL (ref 65–100)
GLUCOSE BLD STRIP.AUTO-MCNC: 66 MG/DL (ref 65–100)
GLUCOSE BLD STRIP.AUTO-MCNC: 87 MG/DL (ref 65–100)
GLUCOSE SERPL-MCNC: 191 MG/DL (ref 65–100)
GLUCOSE SERPL-MCNC: 378 MG/DL (ref 65–100)
GLUCOSE SERPL-MCNC: 61 MG/DL (ref 65–100)
GLUCOSE SERPL-MCNC: 636 MG/DL (ref 65–100)
GLUCOSE, GLC: 188 MG/DL
GLUCOSE, GLC: 571 MG/DL
GLUCOSE, GLC: 66 MG/DL
GLUCOSE, GLC: 87 MG/DL
HBA1C MFR BLD: 11.1 % (ref 4.8–6)
HCO3 BLD-SCNC: 19.8 MMOL/L (ref 22–26)
HCT VFR BLD AUTO: 43 % (ref 41.1–50.3)
HGB BLD-MCNC: 13.1 G/DL (ref 13.6–17.2)
HIGH TARGET, HITG: 250 MG/DL
IMM GRANULOCYTES # BLD AUTO: 0 K/UL (ref 0–0.5)
IMM GRANULOCYTES NFR BLD AUTO: 1 % (ref 0–5)
INSULIN ADMINSTERED, INSADM: 0 UNITS/HOUR
INSULIN ADMINSTERED, INSADM: 0.3 UNITS/HOUR
INSULIN ADMINSTERED, INSADM: 10.2 UNITS/HOUR
INSULIN ADMINSTERED, INSADM: 2.6 UNITS/HOUR
INSULIN ORDER, INSORD: 0 UNITS/HOUR
INSULIN ORDER, INSORD: 0.3 UNITS/HOUR
INSULIN ORDER, INSORD: 10.2 UNITS/HOUR
INSULIN ORDER, INSORD: 2.6 UNITS/HOUR
LIPASE SERPL-CCNC: 68 U/L (ref 73–393)
LOW TARGET, LOT: 150 MG/DL
LYMPHOCYTES # BLD: 0.9 K/UL (ref 0.5–4.6)
LYMPHOCYTES NFR BLD: 16 % (ref 13–44)
MAGNESIUM SERPL-MCNC: 1.6 MG/DL (ref 1.8–2.4)
MAGNESIUM SERPL-MCNC: 1.9 MG/DL (ref 1.8–2.4)
MAGNESIUM SERPL-MCNC: 2.2 MG/DL (ref 1.8–2.4)
MAGNESIUM SERPL-MCNC: 2.4 MG/DL (ref 1.8–2.4)
MCH RBC QN AUTO: 25.9 PG (ref 26.1–32.9)
MCHC RBC AUTO-ENTMCNC: 30.5 G/DL (ref 31.4–35)
MCV RBC AUTO: 85 FL (ref 79.6–97.8)
MINUTES UNTIL NEXT BG, NBG: 15 MIN
MINUTES UNTIL NEXT BG, NBG: 60 MIN
MONOCYTES # BLD: 0.4 K/UL (ref 0.1–1.3)
MONOCYTES NFR BLD: 7 % (ref 4–12)
MULTIPLIER, MUL: 0
MULTIPLIER, MUL: 0.01
MULTIPLIER, MUL: 0.02
MULTIPLIER, MUL: 0.02
NEUTS SEG # BLD: 4.4 K/UL (ref 1.7–8.2)
NEUTS SEG NFR BLD: 76 % (ref 43–78)
NRBC # BLD: 0 K/UL (ref 0–0.2)
O2/TOTAL GAS SETTING VFR VENT: 21 %
ORDER INITIALS, ORDINIT: NORMAL
PCO2 BLD: 30.7 MMHG (ref 35–45)
PH BLD: 7.42 [PH] (ref 7.35–7.45)
PHOSPHATE SERPL-MCNC: 1.3 MG/DL (ref 2.5–4.5)
PHOSPHATE SERPL-MCNC: 2.6 MG/DL (ref 2.5–4.5)
PLATELET # BLD AUTO: 276 K/UL (ref 150–450)
PMV BLD AUTO: 9.8 FL (ref 9.4–12.3)
PO2 BLD: 88 MMHG (ref 75–100)
POTASSIUM SERPL-SCNC: 3 MMOL/L (ref 3.5–5.1)
POTASSIUM SERPL-SCNC: 3.4 MMOL/L (ref 3.5–5.1)
POTASSIUM SERPL-SCNC: 3.8 MMOL/L (ref 3.5–5.1)
POTASSIUM SERPL-SCNC: 3.9 MMOL/L (ref 3.5–5.1)
PROT SERPL-MCNC: 7.8 G/DL (ref 6.3–8.2)
RBC # BLD AUTO: 5.06 M/UL (ref 4.23–5.6)
SAO2 % BLD: 97 % (ref 95–98)
SERVICE CMNT-IMP: ABNORMAL
SERVICE CMNT-IMP: ABNORMAL
SODIUM SERPL-SCNC: 129 MMOL/L (ref 136–145)
SODIUM SERPL-SCNC: 134 MMOL/L (ref 136–145)
SODIUM SERPL-SCNC: 135 MMOL/L (ref 136–145)
SODIUM SERPL-SCNC: 139 MMOL/L (ref 136–145)
SPECIMEN TYPE: ABNORMAL
WBC # BLD AUTO: 5.8 K/UL (ref 4.3–11.1)

## 2020-09-28 PROCEDURE — 74011000636 HC RX REV CODE- 636: Performed by: FAMILY MEDICINE

## 2020-09-28 PROCEDURE — 96376 TX/PRO/DX INJ SAME DRUG ADON: CPT

## 2020-09-28 PROCEDURE — 80048 BASIC METABOLIC PNL TOTAL CA: CPT

## 2020-09-28 PROCEDURE — 96375 TX/PRO/DX INJ NEW DRUG ADDON: CPT

## 2020-09-28 PROCEDURE — 74011250637 HC RX REV CODE- 250/637: Performed by: INTERNAL MEDICINE

## 2020-09-28 PROCEDURE — 74011000258 HC RX REV CODE- 258: Performed by: EMERGENCY MEDICINE

## 2020-09-28 PROCEDURE — 83735 ASSAY OF MAGNESIUM: CPT

## 2020-09-28 PROCEDURE — 83036 HEMOGLOBIN GLYCOSYLATED A1C: CPT

## 2020-09-28 PROCEDURE — 74176 CT ABD & PELVIS W/O CONTRAST: CPT

## 2020-09-28 PROCEDURE — 85025 COMPLETE CBC W/AUTO DIFF WBC: CPT

## 2020-09-28 PROCEDURE — 2709999900 HC NON-CHARGEABLE SUPPLY

## 2020-09-28 PROCEDURE — 74011636637 HC RX REV CODE- 636/637: Performed by: EMERGENCY MEDICINE

## 2020-09-28 PROCEDURE — 74011636637 HC RX REV CODE- 636/637: Performed by: INTERNAL MEDICINE

## 2020-09-28 PROCEDURE — 82962 GLUCOSE BLOOD TEST: CPT

## 2020-09-28 PROCEDURE — 36600 WITHDRAWAL OF ARTERIAL BLOOD: CPT

## 2020-09-28 PROCEDURE — 99218 HC RM OBSERVATION: CPT

## 2020-09-28 PROCEDURE — 74011250636 HC RX REV CODE- 250/636: Performed by: FAMILY MEDICINE

## 2020-09-28 PROCEDURE — 74011000250 HC RX REV CODE- 250

## 2020-09-28 PROCEDURE — 83690 ASSAY OF LIPASE: CPT

## 2020-09-28 PROCEDURE — 80307 DRUG TEST PRSMV CHEM ANLYZR: CPT

## 2020-09-28 PROCEDURE — 84100 ASSAY OF PHOSPHORUS: CPT

## 2020-09-28 PROCEDURE — 96366 THER/PROPH/DIAG IV INF ADDON: CPT

## 2020-09-28 PROCEDURE — 74011250636 HC RX REV CODE- 250/636: Performed by: INTERNAL MEDICINE

## 2020-09-28 PROCEDURE — 82803 BLOOD GASES ANY COMBINATION: CPT

## 2020-09-28 PROCEDURE — 74011250636 HC RX REV CODE- 250/636: Performed by: EMERGENCY MEDICINE

## 2020-09-28 PROCEDURE — 99284 EMERGENCY DEPT VISIT MOD MDM: CPT

## 2020-09-28 PROCEDURE — 65270000029 HC RM PRIVATE

## 2020-09-28 PROCEDURE — 80053 COMPREHEN METABOLIC PANEL: CPT

## 2020-09-28 PROCEDURE — 74011250636 HC RX REV CODE- 250/636

## 2020-09-28 PROCEDURE — 74011250637 HC RX REV CODE- 250/637: Performed by: FAMILY MEDICINE

## 2020-09-28 PROCEDURE — 96365 THER/PROPH/DIAG IV INF INIT: CPT

## 2020-09-28 PROCEDURE — 74011000258 HC RX REV CODE- 258

## 2020-09-28 PROCEDURE — 36415 COLL VENOUS BLD VENIPUNCTURE: CPT

## 2020-09-28 RX ORDER — GABAPENTIN 400 MG/1
400 CAPSULE ORAL 3 TIMES DAILY
Qty: 90 CAP | Refills: 0 | Status: SHIPPED | OUTPATIENT
Start: 2020-09-28

## 2020-09-28 RX ORDER — SODIUM CHLORIDE 9 MG/ML
150 INJECTION, SOLUTION INTRAVENOUS CONTINUOUS
Status: DISCONTINUED | OUTPATIENT
Start: 2020-09-28 | End: 2020-09-29 | Stop reason: HOSPADM

## 2020-09-28 RX ORDER — QUETIAPINE FUMARATE 200 MG/1
200 TABLET, FILM COATED ORAL
Qty: 30 TAB | Refills: 0 | Status: SHIPPED | OUTPATIENT
Start: 2020-09-28

## 2020-09-28 RX ORDER — POTASSIUM CHLORIDE 20 MEQ/1
40 TABLET, EXTENDED RELEASE ORAL
Status: DISCONTINUED | OUTPATIENT
Start: 2020-09-28 | End: 2020-09-28

## 2020-09-28 RX ORDER — CHLORDIAZEPOXIDE HYDROCHLORIDE 10 MG/1
10 CAPSULE, GELATIN COATED ORAL 3 TIMES DAILY
Status: DISCONTINUED | OUTPATIENT
Start: 2020-09-28 | End: 2020-09-29 | Stop reason: HOSPADM

## 2020-09-28 RX ORDER — INSULIN GLARGINE 100 [IU]/ML
20 INJECTION, SOLUTION SUBCUTANEOUS DAILY
Status: DISCONTINUED | OUTPATIENT
Start: 2020-09-29 | End: 2020-09-29 | Stop reason: HOSPADM

## 2020-09-28 RX ORDER — ACETAMINOPHEN 325 MG/1
650 TABLET ORAL
Status: DISCONTINUED | OUTPATIENT
Start: 2020-09-28 | End: 2020-09-29 | Stop reason: HOSPADM

## 2020-09-28 RX ORDER — ESCITALOPRAM OXALATE 20 MG/1
20 TABLET ORAL DAILY
Qty: 30 TAB | Refills: 0 | Status: SHIPPED | OUTPATIENT
Start: 2020-09-28

## 2020-09-28 RX ORDER — MAG HYDROX/ALUMINUM HYD/SIMETH 200-200-20
30 SUSPENSION, ORAL (FINAL DOSE FORM) ORAL ONCE
Status: COMPLETED | OUTPATIENT
Start: 2020-09-28 | End: 2020-09-28

## 2020-09-28 RX ORDER — ONDANSETRON 2 MG/ML
INJECTION INTRAMUSCULAR; INTRAVENOUS
Status: COMPLETED
Start: 2020-09-28 | End: 2020-09-28

## 2020-09-28 RX ORDER — INSULIN GLARGINE 100 [IU]/ML
20 INJECTION, SOLUTION SUBCUTANEOUS
Qty: 3 PEN | Refills: 0 | Status: SHIPPED | OUTPATIENT
Start: 2020-09-28

## 2020-09-28 RX ORDER — BUSPIRONE HYDROCHLORIDE 10 MG/1
20 TABLET ORAL 3 TIMES DAILY
Qty: 180 TAB | Refills: 0 | Status: SHIPPED | OUTPATIENT
Start: 2020-09-28

## 2020-09-28 RX ORDER — METOCLOPRAMIDE 10 MG/1
10 TABLET ORAL
Status: CANCELLED | OUTPATIENT
Start: 2020-09-28

## 2020-09-28 RX ORDER — MORPHINE SULFATE 4 MG/ML
INJECTION INTRAVENOUS
Status: COMPLETED
Start: 2020-09-28 | End: 2020-09-28

## 2020-09-28 RX ORDER — INSULIN GLARGINE 100 [IU]/ML
10 INJECTION, SOLUTION SUBCUTANEOUS ONCE
Status: COMPLETED | OUTPATIENT
Start: 2020-09-28 | End: 2020-09-28

## 2020-09-28 RX ORDER — ONDANSETRON 2 MG/ML
4 INJECTION INTRAMUSCULAR; INTRAVENOUS
Status: DISCONTINUED | OUTPATIENT
Start: 2020-09-28 | End: 2020-09-29 | Stop reason: HOSPADM

## 2020-09-28 RX ORDER — DEXTROSE MONOHYDRATE AND SODIUM CHLORIDE 5; .9 G/100ML; G/100ML
100 INJECTION, SOLUTION INTRAVENOUS CONTINUOUS
Status: DISCONTINUED | OUTPATIENT
Start: 2020-09-28 | End: 2020-09-28

## 2020-09-28 RX ORDER — MAGNESIUM SULFATE HEPTAHYDRATE 40 MG/ML
2 INJECTION, SOLUTION INTRAVENOUS ONCE
Status: COMPLETED | OUTPATIENT
Start: 2020-09-28 | End: 2020-09-28

## 2020-09-28 RX ORDER — GABAPENTIN 400 MG/1
400 CAPSULE ORAL 3 TIMES DAILY
Status: DISCONTINUED | OUTPATIENT
Start: 2020-09-28 | End: 2020-09-29 | Stop reason: HOSPADM

## 2020-09-28 RX ORDER — ONDANSETRON 4 MG/1
4 TABLET, ORALLY DISINTEGRATING ORAL
Qty: 20 TAB | Refills: 0 | Status: SHIPPED | OUTPATIENT
Start: 2020-09-28

## 2020-09-28 RX ORDER — INSULIN LISPRO 100 [IU]/ML
INJECTION, SOLUTION INTRAVENOUS; SUBCUTANEOUS
Status: DISCONTINUED | OUTPATIENT
Start: 2020-09-28 | End: 2020-09-29 | Stop reason: HOSPADM

## 2020-09-28 RX ORDER — SODIUM CHLORIDE 9 MG/ML
200 INJECTION, SOLUTION INTRAVENOUS CONTINUOUS
Status: DISCONTINUED | OUTPATIENT
Start: 2020-09-28 | End: 2020-09-28 | Stop reason: SDUPTHER

## 2020-09-28 RX ORDER — DEXTROSE 50 % IN WATER (D50W) INTRAVENOUS SYRINGE
25
Status: COMPLETED | OUTPATIENT
Start: 2020-09-28 | End: 2020-09-28

## 2020-09-28 RX ORDER — DEXTROSE 40 %
15 GEL (GRAM) ORAL AS NEEDED
Status: DISCONTINUED | OUTPATIENT
Start: 2020-09-28 | End: 2020-09-28

## 2020-09-28 RX ORDER — MORPHINE SULFATE 4 MG/ML
4 INJECTION INTRAVENOUS
Status: COMPLETED | OUTPATIENT
Start: 2020-09-28 | End: 2020-09-28

## 2020-09-28 RX ORDER — MORPHINE SULFATE 4 MG/ML
4 INJECTION INTRAVENOUS ONCE
Status: COMPLETED | OUTPATIENT
Start: 2020-09-28 | End: 2020-09-28

## 2020-09-28 RX ORDER — PANCRELIPASE 24000; 76000; 120000 [USP'U]/1; [USP'U]/1; [USP'U]/1
3 CAPSULE, DELAYED RELEASE PELLETS ORAL
Qty: 270 CAP | Refills: 0 | Status: SHIPPED | OUTPATIENT
Start: 2020-09-28 | End: 2020-10-28

## 2020-09-28 RX ORDER — ESCITALOPRAM OXALATE 10 MG/1
20 TABLET ORAL DAILY
Status: DISCONTINUED | OUTPATIENT
Start: 2020-09-29 | End: 2020-09-29 | Stop reason: HOSPADM

## 2020-09-28 RX ORDER — MORPHINE SULFATE 4 MG/ML
4 INJECTION INTRAVENOUS
Status: DISCONTINUED | OUTPATIENT
Start: 2020-09-28 | End: 2020-09-28

## 2020-09-28 RX ORDER — DEXTROSE 50 % IN WATER (D50W) INTRAVENOUS SYRINGE
25-50 AS NEEDED
Status: DISCONTINUED | OUTPATIENT
Start: 2020-09-28 | End: 2020-09-28

## 2020-09-28 RX ORDER — INSULIN LISPRO 100 [IU]/ML
INJECTION, SOLUTION INTRAVENOUS; SUBCUTANEOUS
Qty: 2 VIAL | Refills: 0 | Status: SHIPPED | OUTPATIENT
Start: 2020-09-28

## 2020-09-28 RX ORDER — ONDANSETRON 2 MG/ML
4 INJECTION INTRAMUSCULAR; INTRAVENOUS
Status: COMPLETED | OUTPATIENT
Start: 2020-09-28 | End: 2020-09-28

## 2020-09-28 RX ORDER — POTASSIUM CHLORIDE 14.9 MG/ML
20 INJECTION INTRAVENOUS
Status: COMPLETED | OUTPATIENT
Start: 2020-09-28 | End: 2020-09-28

## 2020-09-28 RX ORDER — LORAZEPAM 2 MG/ML
2 INJECTION INTRAMUSCULAR
Status: DISCONTINUED | OUTPATIENT
Start: 2020-09-28 | End: 2020-09-29 | Stop reason: HOSPADM

## 2020-09-28 RX ADMIN — INSULIN GLARGINE 10 UNITS: 100 INJECTION, SOLUTION SUBCUTANEOUS at 10:27

## 2020-09-28 RX ADMIN — HUMAN INSULIN 10 UNITS: 100 INJECTION, SOLUTION SUBCUTANEOUS at 03:33

## 2020-09-28 RX ADMIN — CHLORDIAZEPOXIDE HYDROCHLORIDE 10 MG: 10 CAPSULE ORAL at 18:15

## 2020-09-28 RX ADMIN — SODIUM CHLORIDE 10.2 UNITS/HR: 9 INJECTION, SOLUTION INTRAVENOUS at 04:00

## 2020-09-28 RX ADMIN — SODIUM CHLORIDE 150 ML/HR: 900 INJECTION, SOLUTION INTRAVENOUS at 04:00

## 2020-09-28 RX ADMIN — DIATRIZOATE MEGLUMINE AND DIATRIZOATE SODIUM 15 ML: 660; 100 LIQUID ORAL; RECTAL at 06:12

## 2020-09-28 RX ADMIN — INSULIN LISPRO 8 UNITS: 100 INJECTION, SOLUTION INTRAVENOUS; SUBCUTANEOUS at 12:45

## 2020-09-28 RX ADMIN — SODIUM CHLORIDE 150 ML/HR: 900 INJECTION, SOLUTION INTRAVENOUS at 10:28

## 2020-09-28 RX ADMIN — INSULIN LISPRO 6 UNITS: 100 INJECTION, SOLUTION INTRAVENOUS; SUBCUTANEOUS at 22:00

## 2020-09-28 RX ADMIN — GABAPENTIN 400 MG: 400 CAPSULE ORAL at 21:59

## 2020-09-28 RX ADMIN — POTASSIUM CHLORIDE 20 MEQ: 14.9 INJECTION, SOLUTION INTRAVENOUS at 10:28

## 2020-09-28 RX ADMIN — CHLORDIAZEPOXIDE HYDROCHLORIDE 10 MG: 10 CAPSULE ORAL at 21:59

## 2020-09-28 RX ADMIN — DEXTROSE MONOHYDRATE 25 G: 25 INJECTION, SOLUTION INTRAVENOUS at 06:56

## 2020-09-28 RX ADMIN — DEXTROSE MONOHYDRATE AND SODIUM CHLORIDE 100 ML/HR: 5; .9 INJECTION, SOLUTION INTRAVENOUS at 06:56

## 2020-09-28 RX ADMIN — MORPHINE SULFATE 4 MG: 4 INJECTION INTRAVENOUS at 06:27

## 2020-09-28 RX ADMIN — ONDANSETRON 4 MG: 2 INJECTION INTRAMUSCULAR; INTRAVENOUS at 03:33

## 2020-09-28 RX ADMIN — SODIUM CHLORIDE 1000 ML: 900 INJECTION, SOLUTION INTRAVENOUS at 03:33

## 2020-09-28 RX ADMIN — ALUMINUM HYDROXIDE, MAGNESIUM HYDROXIDE, AND SIMETHICONE 30 ML: 200; 200; 20 SUSPENSION ORAL at 06:26

## 2020-09-28 RX ADMIN — MORPHINE SULFATE 4 MG: 4 INJECTION INTRAVENOUS at 05:09

## 2020-09-28 RX ADMIN — POTASSIUM CHLORIDE 20 MEQ: 14.9 INJECTION, SOLUTION INTRAVENOUS at 12:50

## 2020-09-28 RX ADMIN — MAGNESIUM SULFATE IN WATER 2 G: 40 INJECTION, SOLUTION INTRAVENOUS at 10:27

## 2020-09-28 RX ADMIN — ACETAMINOPHEN 650 MG: 325 TABLET, FILM COATED ORAL at 12:44

## 2020-09-28 RX ADMIN — MORPHINE SULFATE 4 MG: 4 INJECTION INTRAVENOUS at 03:33

## 2020-09-28 RX ADMIN — LORAZEPAM 2 MG: 2 INJECTION INTRAMUSCULAR; INTRAVENOUS at 19:50

## 2020-09-28 RX ADMIN — INSULIN LISPRO 2 UNITS: 100 INJECTION, SOLUTION INTRAVENOUS; SUBCUTANEOUS at 18:15

## 2020-09-28 NOTE — ED NOTES
TRANSFER - OUT REPORT:    Verbal report given to Nazia(name) on Baron Juan  being transferred to 806(unit) for routine progression of care       Report consisted of patients Situation, Background, Assessment and   Recommendations(SBAR). Information from the following report(s) ED Summary was reviewed with the receiving nurse. Lines:   Peripheral IV 09/28/20 Left External jugular (Active)   Site Assessment Clean, dry, & intact 09/28/20 0405   Phlebitis Assessment 0 09/28/20 0405   Infiltration Assessment 0 09/28/20 0405        Opportunity for questions and clarification was provided.       Patient transported with:   [employer]

## 2020-09-28 NOTE — ED NOTES
This patient was seen by Dr. Viktoriya Taylor during downtime.   He evaluated the patient and evaluated his labs and consult with the hospitalist for admission

## 2020-09-28 NOTE — H&P
HOSPITALIST H&P/CONSULT  NAME:  Olman Graves   Age:  39 y.o.  :   1975   MRN:   [id number]  PCP: None  Consulting MD:  Treatment Team: Attending Provider: Viv Hernandez MD; Primary Nurse: Dyana Russell  HPI:   Patient is a 43yoM with PMH significant for DM, bipolar, homelessness, multiple hospitalizations, and known non-compliance who presents with vomiting. Patient was recently discharged from the hospital for DKA 4 days ago. Patient reports \"worst abdominal pain in my life. \"  Also requests food/milk. He is concerned for alcohol withdrawal as he only drank a \"few beers\" today. Hospitalist consulted for admission for DKA. Patient was evaluated during DOWNTIME. Complete ROS done and is as stated in HPI or otherwise negative. Past Medical History:   Diagnosis Date    B[facility] 1 disorder ([facility]     Depression     Diabetes ([facility]     PTSD (post-traumatic stress disorder)     Past Medical History reviewed. No past surgical history on file. Prior to Admission Medications   Prescriptions Last Dose Informant Patient Reported? Taking? Insulin Needles, Disposable, 32 gauge x 5/32\" ndle   No No   Sig: For Lantus 20u qhs   Syringe, Disposable, 1 mL syrg   No No   Sig: Insulin syringe   acetaminophen (TYLENOL) 500 mg tablet   No No   Sig: Take 1 Tab by mouth every four (4) hours as needed for Pain.   glucose blood VI test strips (ASCENSIA AUTODISC VI, ONE TOUCH ULTRA TEST VI) strip   No No   Sig: As directed   insulin glargine (LANTUS,BASAGLAR) 100 unit/mL (3 mL) inpn   No No   Si Units by SubCUTAneous route nightly.    insulin lispro (HUMALOG) 100 unit/mL injection   No No   Sig:   GRL RX SLIDING SCALE SF30    For Blood Sugar of:  150-174 = 2 Units; 175-204 = 3 Units  205-234 = 4 Units; 235-264 = 5 Units  265-294 = 6 Units; 295-324 = 7 Units  325-354 = 8 Units; 355 or > = Call MD    This drug may increase the risk of patient falls Fast Acting - Administer Immediately - or within 15 minutes of start of meal, if mealtime coverage. lancets misc   No No   Sig: As directed   metoclopramide HCl (Reglan) 10 mg tablet   No No   Sig: Take 1 Tab by mouth every six (6) hours as needed for Nausea for up to 10 days. ondansetron (Zofran ODT) 4 mg disintegrating tablet   No No   Sig: Take 1 Tab by mouth every eight (8) hours as needed for Nausea. simethicone (MYLICON) 80 mg chewable tablet   No No   Sig: Take 1 Tab by mouth four (4) times daily as needed for GI Pain. Facility-Administered Medications: None     Allergies   Allergen Reactions    Toradol [Ketorolac] Nausea and Vomiting      Social History     Tobacco Use    Smoking status: Current Every Day Smoker     Packs/day: 1.00    Smokeless tobacco: Never Used   Substance Use Topics    Alcohol use: Yes      No family history on file. Family History reviewed and is non-contributory to current presentation. Objective:     Visit Vitals  BP (!) 156/90   Pulse (!) 112   Temp 98.2 °F (36.8 °C)   Resp 17   Ht 5' 8\" (1.727 m)   Wt 68 kg (150 lb)   SpO2 98%   BMI 22.81 kg/m²      Temp (24hrs), Av.2 °F (36.8 °C), Min:98.2 °F (36.8 °C), Max:98.2 °F (36.8 °C)    Oxygen Therapy  O2 Sat (%): 98 % (20 0352)  Pulse via Oximetry: 112 beats per minute (20 0352)  O2 Device: Room air (20 0352)  Physical Exam:  General:    Alert, no distress, appears stated age. Head:   Normocephalic, without obvious abnormality, atraumatic. Nose:  Nares normal. No drainage or sinus tenderness. Lungs:   Clear to auscultation bilaterally. No Wheezing or Rhonchi. No rales. Heart:   Regular rate and rhythm, no murmur, rub or gallop. Abdomen:   Patient would not allow full abdominal exam.   Extremities: No cyanosis. No edema. No clubbing. Skin:     Texture, turgor normal.  Not Jaundiced. Neurologic: Alert and oriented x 3, no focal deficits.    Data Review:   Recent Results (from the past 24 hour(s))   METABOLIC PANEL, COMPREHENSIVE    Collection Time: 09/27/20  6:27 AM   Result Value Ref Range    Sodium 134 (L) 136 - 145 mmol/L    Potassium 3.4 (L) 3.5 - 5.1 mmol/L    Chloride 94 (L) 98 - 107 mmol/L    CO2 25 21 - 32 mmol/L    Anion gap 15 7 - 16 mmol/L    Glucose 471 (HH) 65 - 100 mg/dL    BUN 9 6 - 23 MG/DL    Creatinine 0.77 (L) 0.8 - 1.5 MG/DL    GFR est AA >60 >60 ml/min/1.73m2    GFR est non-AA >60 >60 ml/min/1.73m2    Calcium 8.1 (L) 8.3 - 10.4 MG/DL    Bilirubin, total 0.4 0.2 - 1.1 MG/DL    ALT (SGPT) 15 12 - 65 U/L    AST (SGOT) 12 (L) 15 - 37 U/L    Alk. phosphatase 90 50 - 136 U/L    Protein, total 6.5 6.3 - 8.2 g/dL    Albumin 3.2 (L) 3.5 - 5.0 g/dL    Globulin 3.3 2.3 - 3.5 g/dL    A-G Ratio 1.0 (L) 1.2 - 3.5     CBC WITH AUTOMATED DIFF    Collection Time: 09/27/20  6:27 AM   Result Value Ref Range    WBC 8.4 4.3 - 11.1 K/uL    RBC 4.12 (L) 4.23 - 5.6 M/uL    HGB 10.8 (L) 13.6 - 17.2 g/dL    HCT 33.9 (L) 41.1 - 50.3 %    MCV 82.3 79.6 - 97.8 FL    MCH 26.2 26.1 - 32.9 PG    MCHC 31.9 31.4 - 35.0 g/dL    RDW 19.1 (H) 11.9 - 14.6 %    PLATELET 752 045 - 358 K/uL    MPV 9.6 9.4 - 12.3 FL    ABSOLUTE NRBC 0.00 0.0 - 0.2 K/uL    DF AUTOMATED      NEUTROPHILS 75 43 - 78 %    LYMPHOCYTES 15 13 - 44 %    MONOCYTES 9 4.0 - 12.0 %    EOSINOPHILS 0 (L) 0.5 - 7.8 %    BASOPHILS 1 0.0 - 2.0 %    IMMATURE GRANULOCYTES 1 0.0 - 5.0 %    ABS. NEUTROPHILS 6.3 1.7 - 8.2 K/UL    ABS. LYMPHOCYTES 1.3 0.5 - 4.6 K/UL    ABS. MONOCYTES 0.7 0.1 - 1.3 K/UL    ABS. EOSINOPHILS 0.0 0.0 - 0.8 K/UL    ABS. BASOPHILS 0.1 0.0 - 0.2 K/UL    ABS. IMM.  GRANS. 0.1 0.0 - 0.5 K/UL   MAGNESIUM    Collection Time: 09/27/20  6:27 AM   Result Value Ref Range    Magnesium 1.8 1.8 - 2.4 mg/dL   LIPASE    Collection Time: 09/27/20  6:27 AM   Result Value Ref Range    Lipase 69 (L) 73 - 393 U/L   ETHYL ALCOHOL    Collection Time: 09/27/20  6:27 AM   Result Value Ref Range    ALCOHOL(ETHYL),SERUM <3 MG/DL   CBC WITH AUTOMATED DIFF    Collection Time: 09/28/20 12:41 AM   Result Value Ref Range    WBC 5.8 4.3 - 11.1 K/uL    RBC 5.06 4.23 - 5.6 M/uL    HGB 13.1 (L) 13.6 - 17.2 g/dL    HCT 43.0 41.1 - 50.3 %    MCV 85.0 79.6 - 97.8 FL    MCH 25.9 (L) 26.1 - 32.9 PG    MCHC 30.5 (L) 31.4 - 35.0 g/dL    RDW 19.5 (H) 11.9 - 14.6 %    PLATELET 205 806 - 802 K/uL    MPV 9.8 9.4 - 12.3 FL    ABSOLUTE NRBC 0.00 0.0 - 0.2 K/uL    DF AUTOMATED      NEUTROPHILS 76 43 - 78 %    LYMPHOCYTES 16 13 - 44 %    MONOCYTES 7 4.0 - 12.0 %    EOSINOPHILS 1 0.5 - 7.8 %    BASOPHILS 1 0.0 - 2.0 %    IMMATURE GRANULOCYTES 1 0.0 - 5.0 %    ABS. NEUTROPHILS 4.4 1.7 - 8.2 K/UL    ABS. LYMPHOCYTES 0.9 0.5 - 4.6 K/UL    ABS. MONOCYTES 0.4 0.1 - 1.3 K/UL    ABS. EOSINOPHILS 0.0 0.0 - 0.8 K/UL    ABS. BASOPHILS 0.1 0.0 - 0.2 K/UL    ABS. IMM. GRANS. 0.0 0.0 - 0.5 K/UL   METABOLIC PANEL, COMPREHENSIVE    Collection Time: 09/28/20 12:41 AM   Result Value Ref Range    Sodium 129 (L) 136 - 145 mmol/L    Potassium 3.8 3.5 - 5.1 mmol/L    Chloride 92 (L) 98 - 107 mmol/L    CO2 17 (L) 21 - 32 mmol/L    Anion gap 20 (H) 7 - 16 mmol/L    Glucose 636 (HH) 65 - 100 mg/dL    BUN 5 (L) 6 - 23 MG/DL    Creatinine 1.03 0.8 - 1.5 MG/DL    GFR est AA >60 >60 ml/min/1.73m2    GFR est non-AA >60 >60 ml/min/1.73m2    Calcium 8.8 8.3 - 10.4 MG/DL    Bilirubin, total 0.3 0.2 - 1.1 MG/DL    ALT (SGPT) 20 12 - 65 U/L    AST (SGOT) 19 15 - 37 U/L    Alk.  phosphatase 113 50 - 136 U/L    Protein, total 7.8 6.3 - 8.2 g/dL    Albumin 3.7 3.5 - 5.0 g/dL    Globulin 4.1 (H) 2.3 - 3.5 g/dL    A-G Ratio 0.9 (L) 1.2 - 3.5     ETHYL ALCOHOL    Collection Time: 09/28/20 12:41 AM   Result Value Ref Range    ALCOHOL(ETHYL),SERUM 25 MG/DL   LIPASE    Collection Time: 09/28/20 12:41 AM   Result Value Ref Range    Lipase 68 (L) 73 - 393 U/L   MAGNESIUM    Collection Time: 09/28/20 12:41 AM   Result Value Ref Range    Magnesium 1.9 1.8 - 2.4 mg/dL   PHOSPHORUS    Collection Time: 09/28/20 12:41 AM   Result Value Ref Range    Phosphorus 2.6 2.5 - 4.5 MG/DL   POC G3 Collection Time: 09/28/20  2:51 AM   Result Value Ref Range    Device: ROOM AIR      FIO2 (POC) 21 %    pH (POC) 7.42 7.35 - 7.45      pCO2 (POC) 30.7 (L) 35 - 45 MMHG    pO2 (POC) 88 75 - 100 MMHG    HCO3 (POC) 19.8 (L) 22 - 26 MMOL/L    sO2 (POC) 97 95 - 98 %    Base deficit (POC) 4 mmol/L    Allens test (POC) YES      Site RIGHT RADIAL      Specimen type (POC) ARTERIAL      Performed by Radha     CO2, POC 21 MMOL/L    Respiratory comment: PhysicianNotified     COLLECT TIME 242     GLUCOSE, POC    Collection Time: 09/28/20  3:39 AM   Result Value Ref Range    Glucose (POC) 571 (HH) 65 - 100 mg/dL   GLUCOSTABILIZER    Collection Time: 09/28/20  3:55 AM   Result Value Ref Range    Glucose 571 mg/dL    Insulin order 10.2 units/hour    Insulin adminstered 10.2 units/hour    Multiplier 0.020     Low target 150 mg/dL    High target 250 mg/dL    D50 order 0.0 ml    D50 administered 0.00 ml    Minutes until next BG 60 min    Order initials RJ     Administered initials AJ     GLSCOM Comments     GLUCOSE, POC    Collection Time: 09/28/20  5:06 AM   Result Value Ref Range    Glucose (POC) 188 (H) 65 - 100 mg/dL   GLUCOSTABILIZER    Collection Time: 09/28/20  5:10 AM   Result Value Ref Range    Glucose 188 mg/dL    Insulin order 2.6 units/hour    Insulin adminstered 2.6 units/hour    Multiplier 0.020     Low target 150 mg/dL    High target 250 mg/dL    D50 order 0.0 ml    D50 administered 0.00 ml    Minutes until next BG 60 min    Order initials RJ     Administered initials AJ     GLSCOM Comments       Imaging /Procedures /Studies     Assessment and Plan:      Active Hospital Problems    Diagnosis Date Noted    DKA (diabetic ketoacidoses) ([facility] 09/28/2020    ETOH abuse 06/11/2020    Bipolar disorder ([facility] 05/30/2020    HTN (hypertension) 05/30/2020       PLAN  DKA  - Technically not acidotic on ABG, but still will require insulin drip  - Glucostabilizer orders started  - Pt reports that he took his \"6 mg of insulin\" this morning, upon clarification, he has only been taking his short-acting insulin and \"didn't realize\" he needed his long-acting insulin. Spoke with ER staff who treated him yesterday, and at that time he said he had \"run out\" of his insulin.   Known non-compliant.  - Diabetic education  - NPO for now given BG and AG    EtOH  - Report daily consumption of 1L bourbon, but \"only\" drank a few beers today  - Watch for s/sx withdrawal, patient seems to want benzodiazepines now    Bipolar  - Home meds  - Pt attempted to manipulate this physician to obtain narcotics and food    HTN  - Home meds    Abdominal Pain  - Reports \"worst pain\" in his \"life\"  - Also requested food/milk, thinks he has pancreatitis, or it could be his gastroparesis, or alcohol withdrawal  - Lipase normal  - NPO  - Will check CT A/P as he would not allow me to do full abdominal exam      Anticipated discharge: 1-2 days, pending clinical course    Signed By: Michael Rai MD     September 28, 2020

## 2020-09-28 NOTE — ED TRIAGE NOTES
Pt arrives via EMS from a shelter not wearing a mask. Pt states he might be going through alcohol withdrawal because he drank much less than he usually does. Pt states he started vomiting 1 hour PTA. Pt's BGL read HI during triage. Blood work obtained in triage. Unable to establish an IV.

## 2020-09-28 NOTE — PROGRESS NOTES
MSN, CM:  Spoke with patient this AM about discharge planning. Patient very angry. Patient states he does not have a bus pass and no one will given him one. Patient is homeless and states the shelters will not allow him to come in. When asked about plans for discharge patient states \"You tell me, no one will help me\". Patient has no family and he [place] a [place] where he states he [place] his medications. Patient states we gave him a list of alcohol abuse facilities and he has called them all and no one will help him. Patient asked if he seen a MD since discharged and patient yelled \"I told you I could not get there\". Patient really does not appear to care about his health. Case Management will follow for discharge needs. Care Management Interventions  PCP Verified by CM: Yes(No PCP)  Mode of Transport at Discharge: Other (see comment)(walk)  Transition of Care Consult (CM Consult): Discharge Planning  Current Support Network:  Other(homeless)  Confirm Follow Up Transport: Self  Freedom of Choice List was Provided with Basic Dialogue that Supports the Patient's Individualized Plan of Care/Goals, Treatment Preferences and Shares the Quality Data Associated with the Providers?: Yes  Discharge Location  Discharge Placement: Homeless

## 2020-09-28 NOTE — PROGRESS NOTES
TRANSFER - IN REPORT:    Verbal report received from [address] [place] [address] Tarik  being received from [facility] for routine progression of care      Report consisted of patients Situation, Background, Assessment and   Recommendations(SBAR). Information from the following report(s) ED Summary, MAR and Recent Results was reviewed with the receiving nurse. Opportunity for questions and clarification was provided. Assessment to be completed upon patients arrival to unit and care will be assumed.

## 2020-09-28 NOTE — ED NOTES
History and Physical    Patient: Mich Ferguson MRN: [id number]  SSN: [id number]    YOB: 1975  Age: 39 y.o. Sex: male      Subjective:      Mich Ferguson is a 39 y.o. male who presented to the ER by EMS. Pt is well known to the ED with almost daily visits for the past several weeks with the same complaints of abdominal pain, high blood sugar, and alcohol intoxication. He has a history of DM with chronic noncompliance. He states he has taken his insulin today and has been abstaining from alcohol (by drinking much less than usual). No difference in abdominal pain from prior presentations. Past Medical History:   Diagnosis Date    Bipolar 1 disorder ([facility]     Depression     Diabetes ([facility]     PTSD (post-traumatic stress disorder)      No past surgical history on file. No family history on file. Social History     Tobacco Use    Smoking status: Current Every Day Smoker     Packs/day: 1.00    Smokeless tobacco: Never Used   Substance Use Topics    Alcohol use: Yes      Prior to Admission medications    Medication Sig Start Date End Date Taking? Authorizing Provider   metoclopramide HCl (Reglan) 10 mg tablet Take 1 Tab by mouth every six (6) hours as needed for Nausea for up to 10 days. 9/27/20 10/7/20  Rebecca Harris MD   insulin glargine (LANTUS,BASAGLAR) 100 unit/mL (3 mL) inpn 20 Units by SubCUTAneous route nightly. 9/23/20   Hannah Jacobs MD   Insulin Needles, Disposable, 32 gauge x 5/32\" ndle For Lantus 20u qhs 9/23/20   Hannah Jacobs MD   ondansetron (Zofran ODT) 4 mg disintegrating tablet Take 1 Tab by mouth every eight (8) hours as needed for Nausea.  9/23/20   Jorge Allan MD   insulin lispro (HUMALOG) 100 unit/mL injection   GRL RX SLIDING SCALE SF30    For Blood Sugar of:  150-174 = 2 Units; 175-204 = 3 Units  205-234 = 4 Units; 235-264 = 5 Units  265-294 = 6 Units; 295-324 = 7 Units  325-354 = 8 Units; 355 or > = Call MD    This drug may increase the risk of patient falls Fast Acting - Administer Immediately - or within 15 minutes of start of meal, if mealtime coverage. 7/28/20   Jermain Miranda MD   lancets misc As directed 7/28/20   Jermain Miranda MD   Syringe, Disposable, 1 mL syrg Insulin syringe 7/28/20   Jermain Miranda MD   simethicone (MYLICON) 80 mg chewable tablet Take 1 Tab by mouth four (4) times daily as needed for GI Pain. 6/15/20   Katerina Bishop MD   acetaminophen (TYLENOL) 500 mg tablet Take 1 Tab by mouth every four (4) hours as needed for Pain. 6/15/20   Katerina Bishop MD   glucose blood VI test strips (ASCENSIA AUTODISC VI, ONE TOUCH ULTRA TEST VI) strip As directed 6/15/20   Katerina Bishop MD        Allergies   Allergen Reactions    Toradol [Ketorolac] Nausea and Vomiting       Review of Systems:  Constitutional: positive for malaise; Abdominal pain, vomiting, polyuria, polydipsia. Negative for fever, chills, hematemesis, hematochezia    Objective:     Vitals:    09/28/20 0659 09/28/20 0713 09/28/20 0727 09/28/20 0912   BP: 118/63 118/61  123/70   Pulse: 100 94 99 91   Resp: 10 17 20 20   Temp:    98.2 °F (36.8 °C)   SpO2: 95% 96% 94% 97%   Weight:    65.3 kg (144 lb)   Height:    5' 5\" (1.651 m)        Physical Exam:  General:  Alert, moaning in pain; disheveled; appears older than stated age. Eyes:   PERRL, EOMs intact. Conjunctiva injected. Ears:  Normal TMs and external ear canals both ears. Nose: Nares normal. Septum midline. Mucosa normal. No drainage or sinus tenderness. Mouth/Throat: Dry mucous membranes   Neck: Supple, symmetrical, trachea midline, no adenopathy, thyroid: no enlargment/tenderness/nodules, no carotid bruit and no JVD. Back:   Symmetric, no curvature. ROM normal. No CVA tenderness. Lungs:   Clear to auscultation bilaterally. Heart:  Regular rate and rhythm, S1, S2 normal, no murmur, click, rub or gallop.    Abdomen:   Flat, decreased bowel sounds; diffusely tender   Extremities: Extremities normal, atraumatic, no cyanosis or edema. Pulses: 2+ and symmetric all extremities. Skin: Skin color, texture, turgor normal. No rashes or lesions   Lymph nodes: Cervical, supraclavicular, and axillary nodes normal.   Neurologic: CNII-XII intact. Normal strength, sensation and reflexes throughout. Assessment:     Hospital Problems  Never Reviewed          Codes Class Noted POA    * (Principal) DKA (diabetic ketoacidoses) ([facility] ICD-10-CM: E11.10  ICD-9-CM: 250.12  9/28/2020 Unknown        ETOH abuse ICD-10-CM: F10.10  ICD-9-CM: 305.00  6/11/2020 Yes        Bipolar disorder ([facility] ICD-10-CM: F31.9  ICD-9-CM: 296.80  5/30/2020 Yes        HTN (hypertension) ICD-10-CM: I10  ICD-9-CM: 401.9  5/30/2020 Yes              Plan:     Emergency department evaluation confirms hyperglycemia with acidosis present based on CO2 of 17.       Signed By: Holly Garcia DO     September 28, 2020

## 2020-09-28 NOTE — ED PROVIDER NOTES
HPI     Past Medical History:   Diagnosis Date    Bipolar 1 disorder ([facility]     Depression     Diabetes ([facility]     PTSD (post-traumatic stress disorder)        No past surgical history on file. No family history on file. Social History     Socioeconomic History    Marital status: SINGLE     Spouse name: Not on file    Number of children: Not on file    Years of education: Not on file    Highest education level: Not on file   Occupational History    Not on file   Social Needs    Financial resource strain: Not on file    Food insecurity     Worry: Not on file     Inability: Not on file    Transportation needs     Medical: Not on file     Non-medical: Not on file   Tobacco Use    Smoking status: Current Every Day Smoker     Packs/day: 1.00    Smokeless tobacco: Never Used   Substance and Sexual Activity    Alcohol use:  Yes    Drug use: Not Currently    Sexual activity: Not on file   Lifestyle    Physical activity     Days per week: Not on file     Minutes per session: Not on file    Stress: Not on file   Relationships    Social connections     Talks on phone: Not on file     Gets together: Not on file     Attends Congregation service: Not on file     Active member of club or organization: Not on file     Attends meetings of clubs or organizations: Not on file     Relationship status: Not on file    Intimate partner violence     Fear of current or ex partner: Not on file     Emotionally abused: Not on file     Physically abused: Not on file     Forced sexual activity: Not on file   Other Topics Concern    Not on file   Social History Narrative    Not on file         ALLERGIES: Toradol [ketorolac]    Review of Systems    Vitals:    09/28/20 0024   BP: (!) 161/79   Pulse: (!) 108   Resp: 20   Temp: 98.2 °F (36.8 °C)   SpO2: 100%   Weight: 68 kg (150 lb)   Height: 5' 8\" (1.727 m)            Physical Exam     MDM       Procedures

## 2020-09-28 NOTE — DISCHARGE SUMMARY
Hospitalist Discharge Summary     Patient ID:  Candie Champion  [id number]  89 y.o.  1975  Admit date: 9/28/2020  3:52 AM  Discharge date and time: 9/28/2020  Attending: Aleica Johnson MD  PCP:  None  Treatment Team: Attending Provider: Alecia Johnson MD; Care Manager: Emma Lyons RN    Principal Diagnosis DKA (diabetic ketoacidoses) [facility])   Principal Problem:    DKA (diabetic ketoacidoses) ([facility] [address] [facility] (9/28/2020)    Active Problems:    Bipolar disorder ([facility] (5/30/2020)      HTN (hypertension) (5/30/2020)      ETOH abuse (6/11/2020)             Hospital Course:  Please refer to the admission H&P for details of presentation. In summary, the patient is 43yoM with PMH significant for DM, bipolar, homelessness, multiple hospitalizations, and known non-compliance who presents with vomiting. Patient was recently discharged from the hospital for DKA 4 days ago. Patient reports \"worst abdominal pain in my life. \"  Also requests food/milk. He is concerned for alcohol withdrawal as he only drank a \"few beers\" today. Usual daily consumption of 1 liter bourbon. Hospitalist consulted for admission for DKA.     Patient started on insulin gtt and IVF. His anion gap has corrected. He has eaten well today asking for more food. Electrolytes have been repleted. Complained of abdominal and rib pain. CT AP with evidence of chronic pancreatitis. Asking for rx for his previously rx'd psych meds for discharge. Medically stable and discharge orders written. Pt now refusing to discharge. Start librium to avoid alcohol withdrawal  Holding seroquel and buspar while giving librium inpatient. Significant Diagnostic Studies:   Final [99]  9/28/2020  07:38  9/28/2020  07:42    Study Result     History: Abdominal pain     EXAM: CT abdomen and pelvis without contrast     TECHNIQUE: Thin section axial CT images are obtained from the lung bases through  the pubic symphysis.  Radiation dose reduction techniques were used for this  study. Our CT scanners use one or all of the following: Automated exposure  control, adjustment of the mA and/or kV according to patient size, use of  iterative reconstruction.     COMPARISON: 6/3/2020     FINDINGS: No contour deforming abnormality of the liver or spleen. Pancreatic  calcifications are present. The adrenal glands are normal. No contour deforming  normality of the kidneys. Bowel loops in the upper abdomen are normal. No upper  abdominal lymphadenopathy.     CT PELVIS: The appendix is normal. The bladder and rectum are unremarkable. No  pelvic adenopathy. No pelvic free fluid.     Bone window evaluation demonstrates no aggressive osseous lesions.     IMPRESSION  IMPRESSION:  1. Pancreatic calcifications. Findings compatible with chronic pancreatitis. 2. No acute abnormality of the abdomen or pelvis. Labs: Results:       Chemistry Recent Labs     09/28/20  1245 09/28/20  0621 09/28/20  0041 09/27/20  0627   * 61* 636* 471*   * 139 129* 134*   K 3.4* 3.0* 3.8 3.4*   CL 95* 102 92* 94*   CO2 29 30 17* 25   BUN 5* 5* 5* 9   CREA 0.76* 0.70* 1.03 0.77*   CA 8.3 8.2* 8.8 8.1*   AGAP 10 7 20* 15   AP  --   --  113 90   TP  --   --  7.8 6.5   ALB  --   --  3.7 3.2*   GLOB  --   --  4.1* 3.3   AGRAT  --   --  0.9* 1.0*      CBC w/Diff Recent Labs     09/28/20  0041 09/27/20 0627   WBC 5.8 8.4   RBC 5.06 4.12*   HGB 13.1* 10.8*   HCT 43.0 33.9*    325   GRANS 76 75   LYMPH 16 15   EOS 1 0*      Cardiac Enzymes No results for input(s): CPK, CKND1, MARYJO in the last 72 hours. No lab exists for component: CKRMB, TROIP   Coagulation No results for input(s): PTP, INR, APTT, INREXT in the last 72 hours. Lipid Panel No results found for: CHOL, CHOLPOCT, CHOLX, CHLST, CHOLV, 186069, HDL, HDLP, LDL, LDLC, DLDLP, 259834, VLDLC, VLDL, TGLX, TRIGL, TRIGP, TGLPOCT, CHHD, CHHDX   BNP No results for input(s): BNPP in the last 72 hours.    Liver Enzymes Recent Labs 09/28/20  0041   TP 7.8   ALB 3.7         Thyroid Studies No results found for: T4, T3U, TSH, TSHEXT         Discharge Exam:  Visit Vitals  BP (!) 116/59   Pulse 85   Temp 98.7 °F (37.1 °C)   Resp 20   Ht 5' 5\" (1.651 m)   Wt 65.3 kg (144 lb)   SpO2 99%   BMI 23.96 kg/m²     General appearance: alert, cooperative, no distress, appears stated age  Lungs: clear to auscultation bilaterally  Heart: regular rate and rhythm, S1, S2 normal, no murmur, click, rub or gallop  Abdomen: soft, non-tender. Bowel sounds normal. No masses,  no organomegaly  Extremities: no cyanosis or edema  Neurologic: Grossly normal    Disposition: shelters have been offered. Discharge Condition: stable  Patient Instructions:   Current Discharge Medication List      START taking these medications    Details   lipase-protease-amylase (Creon) 24,000-76,000 -120,000 unit capsule Take 3 Caps by mouth three (3) times daily (with meals) for 30 days. Qty: 270 Cap, Refills: 0      gabapentin (NEURONTIN) 400 mg capsule Take 1 Cap by mouth three (3) times daily. Qty: 90 Cap, Refills: 0      QUEtiapine (SEROquel) 200 mg tablet Take 1 Tab by mouth nightly. Qty: 30 Tab, Refills: 0      escitalopram oxalate (LEXAPRO) 20 mg tablet Take 1 Tab by mouth daily. Qty: 30 Tab, Refills: 0      busPIRone (BUSPAR) 10 mg tablet Take 2 Tabs by mouth three (3) times daily. Qty: 180 Tab, Refills: 0         CONTINUE these medications which have CHANGED    Details   insulin glargine (LANTUS,BASAGLAR) 100 unit/mL (3 mL) inpn 20 Units by SubCUTAneous route nightly. Qty: 3 Pen, Refills: 0      ondansetron (Zofran ODT) 4 mg disintegrating tablet Take 1 Tab by mouth every eight (8) hours as needed for Nausea.   Qty: 20 Tab, Refills: 0      insulin lispro (HUMALOG) 100 unit/mL injection   GRL RX SLIDING SCALE SF30    For Blood Sugar of:  150-174 = 2 Units; 175-204 = 3 Units  205-234 = 4 Units; 235-264 = 5 Units  265-294 = 6 Units; 295-324 = 7 Units  325-354 = 8 Units; 355 or > = Call MD    This drug may increase the risk of patient falls Fast Acting - Administer Immediately - or within 15 minutes of start of meal, if mealtime coverage. Qty: 2 Vial, Refills: 0         CONTINUE these medications which have NOT CHANGED    Details   Insulin Needles, Disposable, 32 gauge x 5/32\" ndle For Lantus 20u qhs  Qty: 30 Pen Needle, Refills: 0      lancets misc As directed  Qty: 1 Each, Refills: 0      Syringe, Disposable, 1 mL syrg Insulin syringe  Qty: 100 Syringe, Refills: 1      simethicone (MYLICON) 80 mg chewable tablet Take 1 Tab by mouth four (4) times daily as needed for GI Pain. Qty: 30 Tab, Refills: 0      acetaminophen (TYLENOL) 500 mg tablet Take 1 Tab by mouth every four (4) hours as needed for Pain.   Qty: 20 Tab, Refills: 0      glucose blood VI test strips (ASCENSIA AUTODISC VI, ONE TOUCH ULTRA TEST VI) strip As directed  Qty: 100 Strip, Refills: 1         STOP taking these medications       metoclopramide HCl (Reglan) 10 mg tablet Comments:   Reason for Stopping:               Activity: {as tolerated  Diet: diabetic      Follow-up  · PCP 1 week CM notified    Time spent to discharge patient 35 minutes  Signed:  Janak Kraus DO  9/28/2020  5:33 PM

## 2020-09-28 NOTE — DIABETES MGMT
Patient admitted with DKA. Admitting glucose 636. HbA1c 11.1 (eAG 272). Patient was given IV regular 10 units, was started on insulin gtt. Blood glucose this morning was 66. Insulin gtt stopped and patient given D50W 25g. Most recent FSBS 210. Patient given Lantus 10 units. Patient well known to diabetes management team as he has been admitted multiple times for DKA this year. Patient has a history of noncompliance, alcohol abuse, and DKA. Per previous conversations patient was diagnosed with diabetes \"5-6 years ago. \" Spoke with patient today he states he has insulin but not with him. When questioned patient regarding the insulins he has patient states \"the ones I'm supposed to have. \" Questioned if patient missed insulin doses patient states \"I think so. \" Questioned if patient has a glucometer and supplies patient states \"I think so. \" Patient breakfast tray at bedside states he is \"starving. \" Questioned how patient is currently getting his insulin patient states \"yall give it to me here. \" Again educated patient as previously discussed I do not have the capability to voucher insulin. Noted previously patient was using Bessie Half at one point. Encouraged compliance with discharge regimen and cessation of alcohol as directed by provider. Patient room phone ringing at this time. Patient voices no questions regarding diabetes education. Updated provider via Patterns regarding patient glycemic control, during a previous admission patient required Humalog 4 units with meals.

## 2020-09-28 NOTE — PROGRESS NOTES
Patient is complaining of rib pain from being arrested yesterday and \"beaten\" by police. The morphine he was receiving has been discontinued. Sent message to Dr. Mp Lozoya requesting PO pain medication.

## 2020-09-29 ENCOUNTER — APPOINTMENT (OUTPATIENT)
Dept: GENERAL RADIOLOGY | Age: 45
End: 2020-09-29
Attending: INTERNAL MEDICINE

## 2020-09-29 VITALS
BODY MASS INDEX: 23.99 KG/M2 | HEART RATE: 82 BPM | TEMPERATURE: 98.7 F | OXYGEN SATURATION: 97 % | WEIGHT: 144 LBS | HEIGHT: 65 IN | SYSTOLIC BLOOD PRESSURE: 134 MMHG | DIASTOLIC BLOOD PRESSURE: 84 MMHG | RESPIRATION RATE: 22 BRPM

## 2020-09-29 LAB
ALBUMIN SERPL-MCNC: 2.4 G/DL (ref 3.5–5)
ALBUMIN/GLOB SERPL: 0.9 {RATIO} (ref 1.2–3.5)
ALP SERPL-CCNC: 65 U/L (ref 50–136)
ALT SERPL-CCNC: 13 U/L (ref 12–65)
ANION GAP SERPL CALC-SCNC: 5 MMOL/L (ref 7–16)
AST SERPL-CCNC: 17 U/L (ref 15–37)
BASOPHILS # BLD: 0 K/UL (ref 0–0.2)
BASOPHILS NFR BLD: 1 % (ref 0–2)
BILIRUB SERPL-MCNC: 0.3 MG/DL (ref 0.2–1.1)
BUN SERPL-MCNC: 4 MG/DL (ref 6–23)
CALCIUM SERPL-MCNC: 8 MG/DL (ref 8.3–10.4)
CHLORIDE SERPL-SCNC: 108 MMOL/L (ref 98–107)
CO2 SERPL-SCNC: 29 MMOL/L (ref 21–32)
CREAT SERPL-MCNC: 0.56 MG/DL (ref 0.8–1.5)
DIFFERENTIAL METHOD BLD: ABNORMAL
EOSINOPHIL # BLD: 0.1 K/UL (ref 0–0.8)
EOSINOPHIL NFR BLD: 1 % (ref 0.5–7.8)
ERYTHROCYTE [DISTWIDTH] IN BLOOD BY AUTOMATED COUNT: 19.2 % (ref 11.9–14.6)
GLOBULIN SER CALC-MCNC: 2.7 G/DL (ref 2.3–3.5)
GLUCOSE BLD STRIP.AUTO-MCNC: 170 MG/DL (ref 65–100)
GLUCOSE BLD STRIP.AUTO-MCNC: 286 MG/DL (ref 65–100)
GLUCOSE SERPL-MCNC: 168 MG/DL (ref 65–100)
HCT VFR BLD AUTO: 31.5 % (ref 41.1–50.3)
HGB BLD-MCNC: 9.8 G/DL (ref 13.6–17.2)
IMM GRANULOCYTES # BLD AUTO: 0 K/UL (ref 0–0.5)
IMM GRANULOCYTES NFR BLD AUTO: 0 % (ref 0–5)
LYMPHOCYTES # BLD: 1.7 K/UL (ref 0.5–4.6)
LYMPHOCYTES NFR BLD: 38 % (ref 13–44)
MCH RBC QN AUTO: 26.3 PG (ref 26.1–32.9)
MCHC RBC AUTO-ENTMCNC: 31.1 G/DL (ref 31.4–35)
MCV RBC AUTO: 84.7 FL (ref 79.6–97.8)
MONOCYTES # BLD: 0.6 K/UL (ref 0.1–1.3)
MONOCYTES NFR BLD: 13 % (ref 4–12)
NEUTS SEG # BLD: 2.1 K/UL (ref 1.7–8.2)
NEUTS SEG NFR BLD: 47 % (ref 43–78)
NRBC # BLD: 0 K/UL (ref 0–0.2)
PLATELET # BLD AUTO: 243 K/UL (ref 150–450)
PMV BLD AUTO: 10 FL (ref 9.4–12.3)
POTASSIUM SERPL-SCNC: 3.4 MMOL/L (ref 3.5–5.1)
PROT SERPL-MCNC: 5.1 G/DL (ref 6.3–8.2)
RBC # BLD AUTO: 3.72 M/UL (ref 4.23–5.6)
SODIUM SERPL-SCNC: 142 MMOL/L (ref 136–145)
WBC # BLD AUTO: 4.4 K/UL (ref 4.3–11.1)

## 2020-09-29 PROCEDURE — 90686 IIV4 VACC NO PRSV 0.5 ML IM: CPT | Performed by: INTERNAL MEDICINE

## 2020-09-29 PROCEDURE — 36415 COLL VENOUS BLD VENIPUNCTURE: CPT

## 2020-09-29 PROCEDURE — 85025 COMPLETE CBC W/AUTO DIFF WBC: CPT

## 2020-09-29 PROCEDURE — 74011250636 HC RX REV CODE- 250/636: Performed by: INTERNAL MEDICINE

## 2020-09-29 PROCEDURE — 71045 X-RAY EXAM CHEST 1 VIEW: CPT

## 2020-09-29 PROCEDURE — 74011250637 HC RX REV CODE- 250/637: Performed by: INTERNAL MEDICINE

## 2020-09-29 PROCEDURE — 90471 IMMUNIZATION ADMIN: CPT

## 2020-09-29 PROCEDURE — 80053 COMPREHEN METABOLIC PANEL: CPT

## 2020-09-29 PROCEDURE — 99218 HC RM OBSERVATION: CPT

## 2020-09-29 PROCEDURE — 82962 GLUCOSE BLOOD TEST: CPT

## 2020-09-29 PROCEDURE — 74011250636 HC RX REV CODE- 250/636: Performed by: EMERGENCY MEDICINE

## 2020-09-29 PROCEDURE — 74011636637 HC RX REV CODE- 636/637: Performed by: INTERNAL MEDICINE

## 2020-09-29 RX ADMIN — CHLORDIAZEPOXIDE HYDROCHLORIDE 10 MG: 10 CAPSULE ORAL at 08:42

## 2020-09-29 RX ADMIN — LORAZEPAM 2 MG: 2 INJECTION INTRAMUSCULAR; INTRAVENOUS at 11:54

## 2020-09-29 RX ADMIN — INSULIN LISPRO 6 UNITS: 100 INJECTION, SOLUTION INTRAVENOUS; SUBCUTANEOUS at 12:01

## 2020-09-29 RX ADMIN — ESCITALOPRAM OXALATE 20 MG: 10 TABLET ORAL at 08:42

## 2020-09-29 RX ADMIN — INSULIN GLARGINE 20 UNITS: 100 INJECTION, SOLUTION SUBCUTANEOUS at 08:43

## 2020-09-29 RX ADMIN — INSULIN LISPRO 2 UNITS: 100 INJECTION, SOLUTION INTRAVENOUS; SUBCUTANEOUS at 08:44

## 2020-09-29 RX ADMIN — SODIUM CHLORIDE 150 ML/HR: 900 INJECTION, SOLUTION INTRAVENOUS at 00:28

## 2020-09-29 RX ADMIN — INFLUENZA VIRUS VACCINE 0.5 ML: 15; 15; 15; 15 SUSPENSION INTRAMUSCULAR at 08:43

## 2020-09-29 RX ADMIN — GABAPENTIN 400 MG: 400 CAPSULE ORAL at 08:42

## 2020-09-29 NOTE — DISCHARGE SUMMARY
Date of Admission: 9/28/2020  Date of Discharge: 9/29/2020    Discharge Diagnoses:  1. DKA    Discharge Medications:  Discharge Medication List as of 9/29/2020  1:16 PM      START taking these medications    Details   lipase-protease-amylase (Creon) 24,000-76,000 -120,000 unit capsule Take 3 Caps by mouth three (3) times daily (with meals) for 30 days. , Print, Disp-270 Cap,R-0      gabapentin (NEURONTIN) 400 mg capsule Take 1 Cap by mouth three (3) times daily. , Print, Disp-90 Cap,R-0      QUEtiapine (SEROquel) 200 mg tablet Take 1 Tab by mouth nightly. , Print, Disp-30 Tab,R-0      escitalopram oxalate (LEXAPRO) 20 mg tablet Take 1 Tab by mouth daily. , Print, Disp-30 Tab,R-0      busPIRone (BUSPAR) 10 mg tablet Take 2 Tabs by mouth three (3) times daily. , Print, Disp-180 Tab,R-0         CONTINUE these medications which have CHANGED    Details   insulin glargine (LANTUS,BASAGLAR) 100 unit/mL (3 mL) inpn 20 Units by SubCUTAneous route nightly. , Print, Disp-3 Pen,R-0      ondansetron (Zofran ODT) 4 mg disintegrating tablet Take 1 Tab by mouth every eight (8) hours as needed for Nausea. , Print, Disp-20 Tab,R-0      insulin lispro (HUMALOG) 100 unit/mL injection   GRL RX SLIDING SCALE SF30    For Blood Sugar of:  150-174 = 2 Units; 175-204 = 3 Units  205-234 = 4 Units; 235-264 = 5 Units  265-294 = 6 Units; 295-324 = 7 Units  325-354 = 8 Units; 355 or > = Call MD    This drug may increase the risk of patient fall s Fast Acting - Administer Immediately - or within 15 minutes of start of meal, if mealtime coverage. , Print, Disp-2 Vial,R-0         CONTINUE these medications which have NOT CHANGED    Details   Insulin Needles, Disposable, 32 gauge x 5/32\" ndle For Lantus 20u qhs, Print, Disp-30 Pen Needle,R-0      lancets misc As directed, Print, Disp-1 Each,R-0      Syringe, Disposable, 1 mL syrg Insulin syringe, Print, Disp-100 Syringe,R-1      simethicone (MYLICON) 80 mg chewable tablet Take 1 Tab by mouth four (4) times daily as needed for GI Pain., Print, Disp-30 Tab, R-0      acetaminophen (TYLENOL) 500 mg tablet Take 1 Tab by mouth every four (4) hours as needed for Pain., Print, Disp-20 Tab, R-0      glucose blood VI test strips (ASCENSIA AUTODISC VI, ONE TOUCH ULTRA TEST VI) strip As directed, Print, Disp-100 Strip, R-1         STOP taking these medications       metoclopramide HCl (Reglan) 10 mg tablet Comments:   Reason for Stopping:                Pending Labs:  None    Follow-up (including scheduled tests): Follow-up Information     Follow up With Specialties Details Why Contact Info    None    None (395) Patient stated that they have no PCP             History of Present Illness:  43yoM with PMH significant for DM, bipolar, homelessness, multiple hospitalizations, and known non-compliance who presents with vomiting. Patient was recently discharged from the hospital for DKA 4 days ago. Patient reports \"worst abdominal pain in my life. \"  Also requests food/milk. He is concerned for alcohol withdrawal as he only drank a \"few beers\" today. Hospitalist consulted for admission for DKA. Past Medical History:  Past Medical History:   Diagnosis Date    B[facility] 1 disorder ([facility]     Depression     Diabetes ([facility]     PTSD (post-traumatic stress disorder)        Allergies: Allergies   Allergen Reactions    Toradol [Ketorolac] Nausea and Vomiting       Hospital Course:  43yoM with PMH significant for DM, bipolar, homelessness, multiple hospitalizations, and known non-compliance who presents with vomiting. Patient was recently discharged from the hospital for DKA 4 days ago. David Beach reports \"worst abdominal pain in my life. \"  Also requests Radha Cava is concerned for alcohol withdrawal as he only drank a \"few beers\" today. Usual daily consumption of 1 liter bourbon.  Hospitalist consulted for admission for DKA. Patient started on insulin gtt and IVF. His anion gap has corrected.  He has eaten well today asking for more food. Electrolytes have been repleted. Complained of abdominal and rib pain. CT AP with evidence of chronic pancreatitis. Asking for rx for his previously rx'd psych meds for discharge. Patient stating having diana PATINO consulted and they believe he is not at risk of harming himself or others, no need for commitment. Hemodynamically stable on discharge. Was advice to be compliant with insulin to avoid DKA and yo avoid alcohol.      Procedures:  None    Discharge Day Information:  Follow with PMD    Discharge Physical Exam:  General: Alert, oriented, NAD  HEENT: NC/AT, EOM are intact  Neck: supple, no JVD  Cardiovascular: RRR, S1, S2, no murmurs  Respiratory: Lungs are clear, no wheezes or rales  Abdomen: Soft, NT, ND  Back: No CVA tenderness, no paraspinal tenderness  Extremities: LE without pedal edema, no erythema  Neuro: A&O, CN are intact, no focal deficits  Skin: no rash or ulcers    Condition: Improved    Disposition: Home    Consultants During This Hospitalization: Psych

## 2020-09-29 NOTE — PROGRESS NOTES
[]Herrera for details  Discharge instructions provided to patient. Patient refused to sign AVS Summary page. Patient is aware of whom to call in case of emergency. Patient is insistent he had 4 beers in his closet. Patient is also aware we disposed of the beer in his presence. The patient is demanding he be taken to the pharmacy of his choice. Explained to patient the pharmacy has been chosen for him as per our previous conversation yesterday. Patient was discharged to the front to [facility].

## 2020-09-29 NOTE — PROGRESS NOTES
Problem: Diabetes Self-Management  Goal: *Disease process and treatment process  Description: Define diabetes and identify own type of diabetes; list 3 options for treating diabetes. Outcome: Progressing Towards Goal  Goal: *Incorporating nutritional management into lifestyle  Description: Describe effect of type, amount and timing of food on blood glucose; list 3 methods for planning meals. Outcome: Progressing Towards Goal  Goal: *Incorporating physical activity into lifestyle  Description: State effect of exercise on blood glucose levels. Outcome: Progressing Towards Goal  Goal: *Developing strategies to promote health/change behavior  Description: Define the ABC's of diabetes; identify appropriate screenings, schedule and personal plan for screenings. Outcome: Progressing Towards Goal  Goal: *Using medications safely  Description: State effect of diabetes medications on diabetes; name diabetes medication taking, action and side effects. Outcome: Progressing Towards Goal  Goal: *Monitoring blood glucose, interpreting and using results  Description: Identify recommended blood glucose targets  and personal targets. Outcome: Progressing Towards Goal  Goal: *Prevention, detection, treatment of acute complications  Description: List symptoms of hyper- and hypoglycemia; describe how to treat low blood sugar and actions for lowering  high blood glucose level. Outcome: Progressing Towards Goal  Goal: *Prevention, detection and treatment of chronic complications  Description: Define the natural course of diabetes and describe the relationship of blood glucose levels to long term complications of diabetes.   Outcome: Progressing Towards Goal  Goal: *Developing strategies to address psychosocial issues  Description: Describe feelings about living with diabetes; identify support needed and support network  Outcome: Progressing Towards Goal  Goal: *Insulin pump training  Outcome: Progressing Towards Goal  Goal: *Sick day guidelines  Outcome: Progressing Towards Goal  Goal: *Patient Specific Goal (EDIT GOAL, INSERT TEXT)  Outcome: Progressing Towards Goal     Problem: Patient Education: Go to Patient Education Activity  Goal: Patient/Family Education  Outcome: Progressing Towards Goal     Problem: Falls - Risk of  Goal: *Absence of Falls  Description: Document Drew Maravilla Fall Risk and appropriate interventions in the flowsheet.   Outcome: Progressing Towards Goal  Note: Fall Risk Interventions:  Mobility Interventions: Bed/chair exit alarm, Patient to call before getting OOB    Medication Interventions: Bed/chair exit alarm, Patient to call before getting OOB, Teach patient to arise slowly       Problem: Patient Education: Go to Patient Education Activity  Goal: Patient/Family Education  Outcome: Progressing Towards Goal

## 2020-09-29 NOTE — PROGRESS NOTES
MSN, CM:  Telepsych completed with Dr. Garfield Whitmore and no need for commitment. Patient to be discharged with [facility] vouchers for medications. Yellow cab to transport at 1:00 and take patient to get medications and drop patient off at [facility]. Patient informed and was upset that we would no pay for a place for him to stay. Patient informed that all shelters had been called and there were no beds available. Patient was instructed to get in line early tomorrow and he might get a bed. Patient continues to divert from being discharged. Patient continues to yell at staff. Care Management Interventions  PCP Verified by CM: Yes(No PCP)  Mode of Transport at Discharge: Other (see comment)(yellow cab)  Transition of Care Consult (CM Consult): Discharge Planning  Current Support Network:  Other(homeless)  Confirm Follow Up Transport: Self  Freedom of Choice List was Provided with Basic Dialogue that Supports the Patient's Individualized Plan of Care/Goals, Treatment Preferences and Shares the Quality Data Associated with the Providers?: Yes  Discharge Location  Discharge Placement: Homeless

## 2020-09-29 NOTE — PROGRESS NOTES
Dr. Lianne Casas to patient's bedside. Patient is homeless and being discharged today. He is complaining this am of increased rib pain throughout his chest and bilateral chest flanks. He also commented to Dr. Lianne Casas he was having suicidal thoughts and has a plan to carry out this action. Patient has been placed on suicide precautions. Sitter has been ordered.

## 2020-09-29 NOTE — DIABETES MGMT
Patient admitted with DKA. Blood glucose ranged  yesterday post insulin gtt with patient receiving Lantus 10 units, Humalog 16 units, and D50W 25g. Blood glucose this morning was 168. Reviewed patient current regimen: Lantus 20 units daily and Humalog SSI. Patient has consistent carb diet ordered. Patient would likely benefit from prandial insulin to help offset hyperglycemia related to caloric intake. Provider updated via Pager regarding patient glycemic control. Provider plans to adjust regimen.

## 2020-09-29 NOTE — PROGRESS NOTES
MSN, CM:  Patient consulted psych r/t patient stated he wanted to harm himself. Cedrick Vargas NP unable to see today. Telepsych notified and called that this evaluation needed to be completed asap and to speak with CM prior to evaluation. Case Management will continue to follow.

## 2020-09-29 NOTE — PROGRESS NOTES
Report received from \[facility]\"". PM assessment completed. PT is AAO x 4 with normal unlabored respirations present on RA. IV site is cdi and infusing. PT is very anxious, given PRN Ativan per orders @ 1950. Bed is low and locked with call light in reach and alarm set. Will continue to monitor.

## 2020-09-29 NOTE — PROGRESS NOTES
CM called to assist with discharge. Pt is home less and is requesting to have his psych medication voucher. Pt states that he [place] in [place] behind the [place] on [address] . Pt also confirms that he has his insulin stored in the field and does not need vouchers for the insulin. Pt is highly defensive at the fact that he is being discharged. Pt states that it is too late in the day and in a hour it will be dark and cold and he will need to find shelter. CM completed voucher to [facility] with [facility]    Ondansetron 4mg  $29.75  Quetiapine 200mg  $18.75  Gabapentin 400mg $33.65  Escitalopram 20mg $40.57  Buspirone 10mg      $45.39  Total voucher           $168.29  Medications prescriptions and [facility] Vouchers are located in patients hard chart on the 8th floor [address]. Pt was informed CM would not voucher medications again. Pt was given number to call for assistance with medications. 1800 CM attempted to provide transport to [facility] for pt to receive voucher medications. Pt states with anger in voice \"I cant got get them there, and get to where I live\" Pt is unable to tell me specifically what street the [facility] is on that has the field he sleeps in. CM spoke with RN both agreed that pt would not get medications, if he is requesting to go to the . Patient would be back at the ED stating he didn't have his medications.  MD contacted pt will stay the night and discharge first this am. This will enable the patient to get his medications and establish his sleeping arrangements   The following numbers given to patient again to call for [address] [phone] from [facility] at [address] [facility] overcomers [phone]

## 2020-09-30 ENCOUNTER — HOSPITAL ENCOUNTER (EMERGENCY)
Age: 45
Discharge: HOME | End: 2020-09-30
Payer: SUBSIDIZED

## 2020-09-30 ENCOUNTER — HOSPITAL ENCOUNTER (EMERGENCY)
Age: 45
Discharge: HOME | End: 2020-09-30
Attending: EMERGENCY MEDICINE
Payer: SUBSIDIZED

## 2020-09-30 VITALS
HEIGHT: 68 IN | OXYGEN SATURATION: 99 % | DIASTOLIC BLOOD PRESSURE: 64 MMHG | HEART RATE: 90 BPM | TEMPERATURE: 97.8 F | RESPIRATION RATE: 16 BRPM | WEIGHT: 145 LBS | SYSTOLIC BLOOD PRESSURE: 120 MMHG | BODY MASS INDEX: 21.98 KG/M2

## 2020-09-30 VITALS
WEIGHT: 145 LBS | TEMPERATURE: 98 F | OXYGEN SATURATION: 99 % | BODY MASS INDEX: 21.98 KG/M2 | HEIGHT: 68 IN | DIASTOLIC BLOOD PRESSURE: 89 MMHG | SYSTOLIC BLOOD PRESSURE: 141 MMHG | RESPIRATION RATE: 16 BRPM | HEART RATE: 88 BPM

## 2020-09-30 DIAGNOSIS — Z59.00 HOMELESSNESS: Primary | ICD-10-CM

## 2020-09-30 DIAGNOSIS — Z76.5 MALINGERING: Primary | ICD-10-CM

## 2020-09-30 LAB
GLUCOSE BLD STRIP.AUTO-MCNC: 312 MG/DL (ref 65–100)
GLUCOSE BLD STRIP.AUTO-MCNC: 535 MG/DL (ref 65–100)

## 2020-09-30 PROCEDURE — 99283 EMERGENCY DEPT VISIT LOW MDM: CPT

## 2020-09-30 PROCEDURE — 82962 GLUCOSE BLOOD TEST: CPT

## 2020-09-30 PROCEDURE — 99284 EMERGENCY DEPT VISIT MOD MDM: CPT

## 2020-09-30 PROCEDURE — 74011636637 HC RX REV CODE- 636/637

## 2020-09-30 RX ORDER — INSULIN GLARGINE 100 [IU]/ML
20 INJECTION, SOLUTION SUBCUTANEOUS
Status: COMPLETED | OUTPATIENT
Start: 2020-09-30 | End: 2020-09-30

## 2020-09-30 RX ADMIN — INSULIN HUMAN 6 UNITS: 100 INJECTION, SOLUTION PARENTERAL at 03:25

## 2020-09-30 RX ADMIN — INSULIN GLARGINE 20 UNITS: 100 INJECTION, SOLUTION SUBCUTANEOUS at 03:32

## 2020-09-30 SDOH — ECONOMIC STABILITY - HOUSING INSECURITY: HOMELESSNESS UNSPECIFIED: Z59.00

## 2020-09-30 NOTE — PROGRESS NOTES
Chart reviewed. Patient was seen at [facility], established with MAREK, and discharged with a bus ticket to [place] for a residential treatment program. The patient somehow missed his bus, reasoning is unclear. SW checked [employer], patient is able to wait for the next available bus. Patient provided with self-pay, homeless, and addictions resources. SW called GIANA JARA. Patient will need a cab ride to the bus station at discharge. Letter provided.     Wilmar Mcghee, [address] [facility]    [address] [facility]    * [email or web address].    ( [phone]

## 2020-09-30 NOTE — ED PROVIDER NOTES
28-year-old male comes to the ED by ambulance. Patient has had multiple visits to the ED over the last few days. He has had 21 visits to the emergency room this month for the same complaint. Patient is a diabetic and is noncompliant. He also has had alcoholic pancreatitis as well. Patient was discharged yesterday from [address] [place] [address] and immediately went to [facility] emergency department where he stayed for several hours as they got his blood sugar down and he feigned suicidal ideation. He even stated that if he got what he wanted he would no longer be suicidal.  What he wanted was a place to stay and someone to take care of him. The  at [facility] were extremely generous. They arranged for him to get into a clinic in [facility] and provided him with a bus pass to get to [facility] his bus was leaving at midnight they arranged to get him to the bus station by paying for a lyft. Once he left the hospital he deviated from that plan ended up calling ambulance and coming to the ED at [facility]. Abdominal Pain    This is a chronic problem. The problem has not changed since onset. The pain is located in the generalized abdominal region. The pain is worsened by eating and drinking alcohol. The pain is relieved by nothing. Past workup includes CT scan, ultrasound. His past medical history is significant for pancreatitis and DM. The patient's surgical history non-contributory. Past Medical History:   Diagnosis Date    Bipolar 1 disorder ([facility]     Depression     [facility]     PTSD (post-traumatic stress disorder)        History reviewed. No pertinent surgical history. History reviewed. No pertinent family history.     Social History     Socioeconomic History    Marital status: SINGLE     Spouse name: Not on file    Number of children: Not on file    Years of education: Not on file    Highest education level: Not on file   Occupational History    Not on file   Social Needs    Financial resource strain: Not on file    Food insecurity     Worry: Not on file     Inability: Not on file    Transportation needs     Medical: Not on file     Non-medical: Not on file   Tobacco Use    Smoking status: Current Every Day Smoker     Packs/day: 1.00    Smokeless tobacco: Never Used   Substance and Sexual Activity    Alcohol use: Yes    Drug use: Not Currently    Sexual activity: Not on file   Lifestyle    Physical activity     Days per week: Not on file     Minutes per session: Not on file    Stress: Not on file   Relationships    Social connections     Talks on phone: Not on file     Gets together: Not on file     Attends Gnosticism service: Not on file     Active member of club or organization: Not on file     Attends meetings of clubs or organizations: Not on file     Relationship status: Not on file    Intimate partner violence     Fear of current or ex partner: Not on file     Emotionally abused: Not on file     Physically abused: Not on file     Forced sexual activity: Not on file   Other Topics Concern    Not on file   Social History Narrative    Not on file         ALLERGIES: Toradol [ketorolac]    Review of Systems   Constitutional: Negative. Negative for activity change. HENT: Negative. Eyes: Negative. Respiratory: Negative. Cardiovascular: Negative. Gastrointestinal: Positive for abdominal pain. Genitourinary: Negative. Musculoskeletal: Negative. Skin: Negative. Neurological: Negative. Psychiatric/Behavioral: Negative. All other systems reviewed and are negative. Vitals:    09/30/20 0256 09/30/20 0300   BP: 120/64    Pulse: 90    Resp: 18    Temp: 97.8 °F (36.6 °C)    SpO2: 99% 99%   Weight: 65.8 kg (145 lb)    Height: 5' 8\" (1.727 m)             Physical Exam  Vitals signs and nursing note reviewed. Constitutional:       General: He is not in acute distress. Appearance: He is well-developed.  He is not diaphoretic. HENT:      Head: Normocephalic and atraumatic. Right Ear: External ear normal.      Left Ear: External ear normal.      Nose: Nose normal.      Mouth/Throat:      Pharynx: No oropharyngeal exudate. Eyes:      General: No scleral icterus. Right eye: No discharge. Left eye: No discharge. Conjunctiva/sclera: Conjunctivae normal.      Pupils: Pupils are equal, round, and reactive to light. Neck:      Musculoskeletal: Normal range of motion and neck supple. Vascular: No JVD. Trachea: No tracheal deviation. Cardiovascular:      Rate and Rhythm: Normal rate and regular rhythm. Pulmonary:      Effort: Pulmonary effort is normal. No respiratory distress. Breath sounds: Normal breath sounds. No stridor. No wheezing. Chest:      Chest wall: No tenderness. Abdominal:      General: Bowel sounds are normal. There is no distension. Palpations: Abdomen is soft. There is no mass. Tenderness: There is no abdominal tenderness. Musculoskeletal: Normal range of motion. General: No tenderness. Skin:     General: Skin is warm and dry. Coloration: Skin is not pale. Findings: No erythema or rash. Neurological:      Mental Status: He is alert and oriented to person, place, and time. Cranial Nerves: No cranial nerve deficit. Psychiatric:         Behavior: Behavior normal.         Thought Content: Thought content normal.          MDM  Number of Diagnoses or Management Options  Malingering: minor  Diagnosis management comments: Patient deliberately is noncompliant with his insulin in order to get his blood sugar elevated so that he can come to the hospital and get food and a place to sleep and someone to wait on him. Patient is malingering. He has been at the hospital 21 times in the last 30 days. He has been admitted several times and signs out AMA or gets discharged the next day.   He will make himself throw up at times and he defecates and urinates in the ER room. He has been known to get unruly with staff using foul language and threats. And also has used suicidal ideation in order to prolong his stay. Upon leaving the hospital he will eat high sugar foods to make his sugar back up. Tonight we will give him his insulin as per usual both short acting and long-acting and he does not appear to be in DKA. We will encourage him to stay on the regimen, however compliance is unlikely. Patient should still have a bus ticket although he denies they ever gave him 1 and he can try to get on his way to [place] this morning.          Amount and/or Complexity of Data Reviewed  Decide to obtain previous medical records or to obtain history from someone other than the patient: yes  Obtain history from someone other than the patient: yes  Review and summarize past medical records: yes    Risk of Complications, Morbidity, and/or Mortality  Presenting problems: minimal  Diagnostic procedures: minimal  Management options: minimal    Patient Progress  Patient progress: stable         Procedures

## 2020-09-30 NOTE — ED PROVIDER NOTES
53-year-old male with history of diabetes, bipolar disorder, PTSD presents with complaint of \"I missed the bus\". Patient states that he was wearing a local gas station for bus to arrive when he was instructed that the bus had left. Patient was seen earlier this morning and discharged around 3-4 AM.  Patient denies abdominal pain, chest pain, shortness of breath, nausea, vomiting, fever, chills. Patient reports history of alcohol abuse. States that he last drank several days ago. Denies any illicit drug use. Patient denies SI, HI, AVH, paranoid delusions. Patient is currently here because he has no way of getting to [place] for rehab. Pt with no other complaints. Patient denies any withdrawal symptoms today. Denies tremor, seizure-like activity. The history is provided by the patient. No  was used. Other   This is a new problem. The current episode started 3 to 5 hours ago. The problem occurs constantly. The problem has not changed since onset. Pertinent negatives include no chest pain, no abdominal pain, no headaches and no shortness of breath. Past Medical History:   Diagnosis Date    Bipolar 1 disorder ([facility]     Depression     [facility]     PTSD (post-traumatic stress disorder)        No past surgical history on file. No family history on file. Social History     Socioeconomic History    Marital status: SINGLE     Spouse name: Not on file    Number of children: Not on file    Years of education: Not on file    Highest education level: Not on file   Occupational History    Not on file   Social Needs    Financial resource strain: Not on file    Food insecurity     Worry: Not on file     Inability: Not on file    Transportation needs     Medical: Not on file     Non-medical: Not on file   Tobacco Use    Smoking status: Current Every Day Smoker     Packs/day: 1.00    Smokeless tobacco: Never Used   Substance and Sexual Activity    Alcohol use:  Yes  Drug use: Not Currently    Sexual activity: Not on file   Lifestyle    Physical activity     Days per week: Not on file     Minutes per session: Not on file    Stress: Not on file   Relationships    Social connections     Talks on phone: Not on file     Gets together: Not on file     Attends Sabianist service: Not on file     Active member of club or organization: Not on file     Attends meetings of clubs or organizations: Not on file     Relationship status: Not on file    Intimate partner violence     Fear of current or ex partner: Not on file     Emotionally abused: Not on file     Physically abused: Not on file     Forced sexual activity: Not on file   Other Topics Concern    Not on file   Social History Narrative    Not on file         ALLERGIES: Toradol [ketorolac]    Review of Systems   Constitutional: Negative for fatigue and fever. HENT: Negative for congestion and rhinorrhea. Respiratory: Negative for cough and shortness of breath. Cardiovascular: Negative for chest pain. Gastrointestinal: Negative for abdominal pain, diarrhea, nausea and vomiting. Endocrine: Negative for polydipsia and polyuria. Genitourinary: Negative for dysuria and flank pain. Musculoskeletal: Negative for myalgias and neck pain. Skin: Negative for rash and wound. Neurological: Negative for dizziness, syncope, weakness, light-headedness and headaches. Vitals:    09/30/20 0829   BP: (!) 137/91   Pulse: (!) 113   Resp: 16   Temp: 97.5 °F (36.4 °C)   SpO2: 98%   Weight: 65.8 kg (145 lb)   Height: 5' 8\" (1.727 m)            Physical Exam  Vitals signs and nursing note reviewed. Constitutional:       Appearance: Normal appearance. Comments: Malodorous. HENT:      Head: Normocephalic. Nose: Nose normal.      Mouth/Throat:      Mouth: Mucous membranes are moist.   Eyes:      Extraocular Movements: Extraocular movements intact. Pupils: Pupils are equal, round, and reactive to light. Neck:      Musculoskeletal: No neck rigidity. Cardiovascular:      Rate and Rhythm: Normal rate and regular rhythm. Pulses: Normal pulses. Heart sounds: Normal heart sounds. Pulmonary:      Effort: Pulmonary effort is normal.      Breath sounds: Normal breath sounds. Abdominal:      General: Bowel sounds are normal.      Palpations: Abdomen is soft. Tenderness: There is no abdominal tenderness. There is no guarding. Musculoskeletal: Normal range of motion. Skin:     Findings: No erythema. Neurological:      General: No focal deficit present. Mental Status: He is alert and oriented to person, place, and time. Motor: No weakness. Comments: No focal deficits. No tremor noted. No seizure-like activity noted. Psychiatric:         Mood and Affect: Mood normal.      Comments: Denies SI, HI, AVH, paranoid delusions. MDM  Number of Diagnoses or Management Options  Homelessness: new and requires workup  Diagnosis management comments: Patient presents with complaint of \"I missed the bus\".  discussed options with patient. Has been ordered for patient to be taken to the bus stop where he is to speak with staff there. Upon further research it was noted that patient will be able to use previous bus ticket on new trip today. Clarke Shepherd () has spoken with patient. Glucose 312. Patient instructed to follow-up with local mental health and primary care physician. Patient structure no need for importance of compliance with medication regimen. Amount and/or Complexity of Data Reviewed  Clinical lab tests: ordered and reviewed  Review and summarize past medical records: yes    Risk of Complications, Morbidity, and/or Mortality  Presenting problems: low  Diagnostic procedures: low  Management options: low    Patient Progress  Patient progress: stable    ED Course as of Sep 30 0953   Wed Sep 30, 2020   0916 POC glucose 312.     [DF]      ED Course User Index  [DF] Usha Maloney MD       Procedures        Mannie Cantu MD; 9/30/2020 @10:57 AM Voice dictation software was used during the making of this note. This software is not perfect and grammatical and other typographical errors may be present.   This note has not been proofread for errors.  ===================================================================

## 2020-09-30 NOTE — ED NOTES
I have reviewed discharge instructions with the patient. The patient verbalized understanding. Patient left ED via Discharge Method: ambulatory to to bus station with (round trip that was set up by Linn Miles). Opportunity for questions and clarification provided. Patient given 0 scripts. To continue your aftercare when you leave the hospital, you may receive an automated call from our care team to check in on how you are doing. This is a free service and part of our promise to provide the best care and service to meet your aftercare needs.  If you have questions, or wish to unsubscribe from this service please call [phone]. Thank you for Choosing our [facility] Emergency Department.

## 2020-09-30 NOTE — ED NOTES
Patient states he is wanting us to get him a bus ticket to [place] after dandre gave him a ticket and states it was 2 hours late and he still missed it after sitting there 3 hours. Patient was discharged from this facility at 0409 this morning with understanding he is suppose to find his own way to [place]. Mask on during triage. Patient being rude with RN during triage. Patient states \" I am homeless and can't do it myself\". Patient states he has history of alcohol abuse.

## 2020-09-30 NOTE — DISCHARGE INSTRUCTIONS
Take home medications as prescribed. Schedule close follow-up with [facility]. Schedule follow-up with primary care physician. Check blood glucose regularly. Refrain from eating high fat, high sugar foods.         Learning About High Blood Sugar  What is high blood sugar? Your body turns the food you eat into glucose (sugar), which it uses for energy. But if your body isn't able to use the sugar right away, it can build up in your blood and lead to high blood sugar. When the amount of sugar in your blood stays too high for too much of the time, you may have diabetes. Diabetes is a disease that can cause serious health problems. The good news is that lifestyle changes may help you get your blood sugar back to normal and avoid or delay diabetes. What causes high blood sugar? Sugar (glucose) can build up in your blood if you:  · Are overweight. · Have a family history of diabetes. · Take certain medicines, such as steroids. What are the symptoms? Having high blood sugar may not cause any symptoms at all. Or it may make you feel very thirsty or very hungry. You may also urinate more often than usual, have blurry vision, or lose weight without trying. How is high blood sugar treated? You can take steps to lower your blood sugar level if you understand what makes it get higher. Your doctor may want you to learn how to test your blood sugar level at home. Then you can see how illness, stress, or different kinds of food or medicine raise or lower your blood sugar level. Other tests may be needed to see if you have diabetes. How can you prevent high blood sugar? · Watch your weight. If you're overweight, losing just a small amount of weight may help. Reducing fat around your waist is most important. · Limit the amount of calories, sweets, and unhealthy fat you eat. Ask your doctor if a dietitian can help you. A registered dietitian can help you create meal plans that fit your lifestyle.   · Get at least 30 minutes of exercise on most days of the week. Exercise helps control your blood sugar. It also helps you maintain a healthy weight. Walking is a good choice. You also may want to do other activities, such as running, swimming, cycling, or playing tennis or team sports. · If your doctor prescribed medicines, take them exactly as prescribed. Call your doctor if you think you are having a problem with your medicine. You will get more details on the specific medicines your doctor prescribes. Follow-up care is a key part of your treatment and safety. Be sure to make and go to all appointments, and call your doctor if you are having problems. It's also a good idea to know your test results and keep a list of the medicines you take. Where can you learn more? Go to http://www.gray.com/  Enter O108 in the search box to learn more about \"Learning About High Blood Sugar. \"  Current as of: December 20, 2019               Content Version: 12.6  © 3523-9233 [employer]. Care instructions adapted under license by "[employer]" (which disclaims liability or warranty for this information).  If you have questions about a medical condition or this instruction, always ask your healthcare professional. [address] any warranty or liability for your use of this information.

## 2020-09-30 NOTE — LETTER
9/30/2020 9:45 AM 
 
Mr. Alycia Hamman 
[address] [id number] To Whom It May Concern,  
 
Mr. Concepcion Burton was seen in the ER on 9/29/30-9/30/20 where he was treated and discharged. Should you have any questions, please contact me with the information provided below. Sincerely, Nereyda Burleson, [address],[facility] [address],  
[place], [address] 
[phone]

## 2020-09-30 NOTE — ED NOTES
I have reviewed discharge instructions with the patient. The patient verbalized understanding. Patient left ED via Discharge Method: ambulatory to Home with self. Opportunity for questions and clarification provided. Patient given 0 scripts. To continue your aftercare when you leave the hospital, you may receive an automated call from our care team to check in on how you are doing. This is a free service and part of our promise to provide the best care and service to meet your aftercare needs.  If you have questions, or wish to unsubscribe from this service please call [phone]. Thank you for Choosing our [facility] Emergency Department.

## 2020-09-30 NOTE — ED TRIAGE NOTES
Patient presents via EMS, after having just left [place] where he was given a bus pass to a rehab facility in [place], per EMS patient missed the bus so he decided to call EMS and requested to be brought here. Patient states he is having the worst belly pain of his life for the past month. States \"he needs friends and help finding a home\".  Patient masked on arrival.

## 2020-09-30 NOTE — DISCHARGE INSTRUCTIONS
Patient Education        Learning About Positive Thinking  What is positive thinking? Positive thinking, or healthy thinking, is a way to help you stay well or cope with a health problem by changing how you think. It's based on research that shows that you can change how you think. And how you think affects how you feel. Cognitive-behavioral therapy, also called CBT, is a therapy that is often used to help people think in a healthy way. It focuses on thought (cognitive) and action (behavioral). How can positive thinking help you? If you think in a positive way, you may be more able to care for yourself and handle life's challenges. You will feel better. And you may be more able to avoid or cope with stress, anxiety, and depression. CBT may be able to help you sleep better and lose weight. How can you get started with positive thinking? CBT involves techniques that you can practice every day so that healthy thinking comes naturally. Here are the steps for one technique. 1. Stop. When you notice a negative thought, stop it in its tracks and write it down. 2. Ask. Look at that thought and ask yourself whether it is helpful or unhelpful right now. 3. Choose. Choose a new, helpful thought to replace a negative one. Here's an example of how this might work:  · In a job review, your boss praised several things about your work. But you're feeling down because she had one small criticism. You might even think, \"I'm no good at my job\" or \"She doesn't like me. I must be bad. \" These are negative thoughts. You want to stop them. · Ask yourself questions about the situation and your negative thoughts. You might ask, \"What did my boss say exactly? \" \"Were there positive comments? \" \"Why do I focus only on one criticism? \" Your answers can help you find more accurate and helpful statements. · Now choose a helpful thought to replace the negative thoughts.  For example, you might think, \"I've done a lot of good work this year, and my boss noticed it. She thought there was one area I can improve. So I'll think of some things I can do to get stronger in that area. \"  With time and practice, you can learn to see that the harsh things you say to yourself may keep you from enjoying your life and work. You can replace them with more helpful thoughts. Where can you learn more? Go to http://www.gray.com/  Enter [id number] in the search box to learn more about \"Learning About Positive Thinking. \"  Current as of: January 31, 2020               Content Version: 12.6  © 2368-8704 [employer]. Care instructions adapted under license by [employer] (which disclaims liability or warranty for this information). If you have questions about a medical condition or this instruction, always ask your healthcare professional. [address] any warranty or liability for your use of this information.

## 2022-07-05 NOTE — ED NOTES
Pt resting in bed. No distress at this time. Will continue to monitor. Primary Defect Length In Cm (Final Defect Size - Required For Flaps/Grafts): 2

## 2022-07-13 NOTE — PROGRESS NOTES
Discussed with patient that cold weather shelter will be open tonight. Dapsone Pregnancy And Lactation Text: This medication is Pregnancy Category C and is not considered safe during pregnancy or breast feeding.

## 2023-05-24 NOTE — ED NOTES
Pt is belligerent and yelling at RN during attempt to do initial assessment. Pt is uncooperative and has to be repeatedly asked questions before he answers. Pt yells, \"I'm in pain. Physical pain and mental pain. I'm gonna die tonight! \" [Follow-Up: _____] : a [unfilled] follow-up visit [Family Member] : family member

## 2024-07-20 NOTE — DIABETES MGMT
Patient admitted with DKA. Admitting blood glucose 371. HbA1c 10.3 (eAG 246). Currently on insulin gtt. Blood glucose this morning was 127. Creatinine 0.70. Anion gap 18 at 0310. Patient well known to diabetes management team as he has been seen multiple times during previous admissions. Patient has a history of noncompliance, alcohol abuse, and DKA. Per previous conversations patient was diagnosed with diabetes \"5-6 years ago. \" Spoke with patient today patient states he does not have any insulin with him. Patient states he has been out of insulin for \"a week. \" Questioned if patient is getting his insulin through [facility] patient states \"yall give it to me here. \" Educated patient that as previously discussed I do not have the capability to voucher insulin. Patient given handouts regarding [employer] insulins, over-the-counter glucometers, and insulin affordability programs. Patient states he has a meter but is unsure if he has supplies. Patient states he has not checked his glucose in \"days. \" Reviewed the importance of compliance with diabetes regimen and follow up with PCP. Patient refuses diabetes education states \"I just need help drying out. \" Left handout regarding alcohol and diabetes in folder at bedside. Patient last seen in June 2020 by diabetes management team at that time patient was receiving NPH 12 units BID, Humalog 4 units with meals, and Humalog SSI. impairments found

## 2025-03-10 NOTE — PROGRESS NOTES
Hospitalist     Admit Date:  9/10/2020  6:35 PM   Name:  Cali Cristobal   Age:  39 y.o.  :  1975   MRN:  [id number]   PCP:  None  Treatment Team: Attending Provider: Citlali Gomez MD; Student Nurse: Marin sun     [id number] alcoholic male with chronic pancreatitis and chronic abdominal pain likely in part due to alcohol gastritis is also diabetic requiring insulin. He is homeless. He admits that he recently gave up and is tired of being homeless and tired of being an alcoholic and presents with worsened chronic abdominal pain with nausea vomiting and evidence of diabetic ketoacidosis again. He has magnesium level 1.6 serum sodium 128 with a serum glucose of 436, serum potassium 5.2 creatinine is 1.04. He has a positive serum alcohol level and admits to drinking bourbon. He has chronic constipation. He reports that his stools have had significant amounts of oil floating and the past several days have been orange. Suspect he may have chronic pancreatic insufficiency. Most recent discharge insulin dosage was NPH 6 units twice daily and sliding scale prandial Humalog coverage as well as metformin. He admits to noncompliance at least several days all these medications because he is tired of being homeless and tired of being an alcoholic. Denies suicidal thoughts or ideations.   Does smoke cigarettes    Today, asymptomatic    Objective:     Patient Vitals for the past 24 hrs:   Temp Pulse Resp BP SpO2   20 1126 97.9 °F (36.6 °C) 100 18 113/77 98 %   20 0742 97.2 °F (36.2 °C) 83 18 106/72 97 %   20 0334 97.8 °F (36.6 °C) 80 20 113/73 96 %   20 2320 98.1 °F (36.7 °C) 74 20 113/81 96 %   20 2227  79  127/78    20 2038 98.1 °F (36.7 °C) 91 20 119/75 96 %   20 1502 98.1 °F (36.7 °C) (!) 108 16 130/73 97 %     Oxygen Therapy  O2 Sat (%): 98 % (20 1126)  Pulse via Oximetry: 90 beats per minute (20 1154)  O2 Device: Room air (20 1155)    Intake/Output Summary (Last 24 hours) at 9/12/2020 1348  Last data filed at 9/12/2020 0851  Gross per 24 hour   Intake 240 ml   Output 950 ml   Net -710 ml       Physical Exam:  General:    AAO3, mild distress  CV:   RRR. No m/r/g. Lungs:  CTAB. No wheezing, rhonchi, or rales. Abdomen: Soft, diffusely tender poorly localizednondistended. Extremities: Warm and dry. No cyanosis or edema. Neurologic: CN II-XII grossly intact. Sensation intact. Skin:     No rashes or jaundice. Normal coloration  Psych:  Normal mood and affect. I reviewed the labs, imaging, EKGs, telemetry, and other studies done this admission.   Data Review:   Recent Results (from the past 24 hour(s))   GLUCOSE, POC    Collection Time: 09/11/20  3:59 PM   Result Value Ref Range    Glucose (POC) 308 (H) 65 - 100 mg/dL   GLUCOSE, POC    Collection Time: 09/11/20 10:07 PM   Result Value Ref Range    Glucose (POC) 229 (H) 65 - 100 mg/dL   MAGNESIUM    Collection Time: 09/12/20  4:47 AM   Result Value Ref Range    Magnesium 1.7 (L) 1.8 - 2.4 mg/dL   CBC W/O DIFF    Collection Time: 09/12/20  4:47 AM   Result Value Ref Range    WBC 5.4 4.3 - 11.1 K/uL    RBC 4.08 (L) 4.23 - 5.6 M/uL    HGB 10.5 (L) 13.6 - 17.2 g/dL    HCT 34.1 (L) 41.1 - 50.3 %    MCV 83.6 79.6 - 97.8 FL    MCH 25.7 (L) 26.1 - 32.9 PG    MCHC 30.8 (L) 31.4 - 35.0 g/dL    RDW 18.4 (H) 11.9 - 14.6 %    PLATELET 030 008 - 384 K/uL    MPV 9.5 9.4 - 12.3 FL    ABSOLUTE NRBC 0.00 0.0 - 0.2 K/uL   METABOLIC PANEL, BASIC    Collection Time: 09/12/20  4:47 AM   Result Value Ref Range    Sodium 136 136 - 145 mmol/L    Potassium 3.3 (L) 3.5 - 5.1 mmol/L    Chloride 102 98 - 107 mmol/L    CO2 29 21 - 32 mmol/L    Anion gap 5 (L) 7 - 16 mmol/L    Glucose 182 (H) 65 - 100 mg/dL    BUN 6 6 - 23 MG/DL    Creatinine 0.61 (L) 0.8 - 1.5 MG/DL    GFR est AA >60 >60 ml/min/1.73m2    GFR est non-AA >60 >60 ml/min/1.73m2    Calcium 8.2 (L) 8.3 - 10.4 MG/DL   PHOSPHORUS    Collection Time: 09/12/20  4:47 AM   Result Value Ref Range    Phosphorus 2.3 (L) 2.5 - 4.5 MG/DL   GLUCOSE, POC    Collection Time: 09/12/20  7:14 AM   Result Value Ref Range    Glucose (POC) 275 (H) 65 - 100 mg/dL   GLUCOSE, POC    Collection Time: 09/12/20 11:11 AM   Result Value Ref Range    Glucose (POC) 210 (H) 65 - 100 mg/dL       All Micro Results     None          Assessment and Plan:     1- DKA due to non-compliance, hx of IDDM,  treated with protocol, resolved. 2- Bipolar disorder, stable. 3- Hypertension, controlled.   4- Alcoholism, patient threat to drink himself to death, to transfer to inpatient detox    Dispo: inpatient detox whenever available    Signed:  Efra Tovar MD Detail Level: Detailed Depth Of Biopsy: dermis Was A Bandage Applied: Yes Size Of Lesion In Cm: 0.6 X Size Of Lesion In Cm: 0 Biopsy Type: H and E Biopsy Method: Dermablade Anesthesia Type: 1% lidocaine with epinephrine and a 1:10 solution of 8.4% sodium bicarbonate Anesthesia Volume In Cc: 0.5 Hemostasis: Drysol Wound Care: Vaseline Dressing: bandage Destruction After The Procedure: No Type Of Destruction Used: Curettage Curettage Text: The wound bed was treated with curettage after the biopsy was performed. Cryotherapy Text: The wound bed was treated with cryotherapy after the biopsy was performed. Electrodesiccation Text: The wound bed was treated with electrodesiccation after the biopsy was performed. Electrodesiccation And Curettage Text: The wound bed was treated with electrodesiccation and curettage after the biopsy was performed. Silver Nitrate Text: The wound bed was treated with silver nitrate after the biopsy was performed. Lab: [id number] Lab Facility: [id number] Medical Necessity Information: It is in your best interest to select a reason for this procedure from the list below. All of these items fulfill various CMS LCD requirements except the new and changing color options. Consent: Written consent was obtained and risks were reviewed including but not limited to scarring, infection, bleeding, scabbing, incomplete removal, nerve damage and allergy to anesthesia. Post-Care Instructions: I reviewed with the patient in detail post-care instructions. Patient is to keep the biopsy site dry overnight, and then apply bacitracin twice daily until healed. Patient may apply hydrogen peroxide soaks to remove any crusting. Notification Instructions: Patient will be notified of biopsy results. However, patient instructed to call the office if not contacted within 2 weeks. Billing Type: Third-Party Bill Information: Selecting Yes will display possible errors in your note based on the variables you have selected. This validation is only offered as a suggestion for you. PLEASE NOTE THAT THE VALIDATION TEXT WILL BE REMOVED WHEN YOU FINALIZE YOUR NOTE. IF YOU WANT TO FAX A PRELIMINARY NOTE YOU WILL NEED TO TOGGLE THIS TO 'NO' IF YOU DO NOT WANT IT IN YOUR FAXED NOTE.

## 2025-05-05 NOTE — ED PROVIDER NOTES
Patient presents the ER complaint of continued maryana pain. States he was discharged from outside hospital today. States he was ready to go. States he has continued abdominal pain. Reports of vomiting and feeling terrible. Denies any fevers and chills. States he is homeless. The history is provided by the patient. Abdominal Pain    This is a recurrent problem. The current episode started 2 days ago. The problem occurs constantly. The problem has not changed since onset. The pain is located in the generalized abdominal region. The quality of the pain is aching. The pain is at a severity of 4/10. Associated symptoms include nausea. Pertinent negatives include no fever, no vomiting, no hematuria, no chest pain and no back pain. Past Medical History:   Diagnosis Date    Bipolar 1 disorder ([facility]     Depression     Diabetes ([facility]     PTSD (post-traumatic stress disorder)        No past surgical history on file. No family history on file. Social History     Socioeconomic History    Marital status: SINGLE     Spouse name: Not on file    Number of children: Not on file    Years of education: Not on file    Highest education level: Not on file   Occupational History    Not on file   Social Needs    Financial resource strain: Not on file    Food insecurity     Worry: Not on file     Inability: Not on file    Transportation needs     Medical: Not on file     Non-medical: Not on file   Tobacco Use    Smoking status: Current Every Day Smoker     Packs/day: 1.00    Smokeless tobacco: Never Used   Substance and Sexual Activity    Alcohol use:  Yes    Drug use: Not Currently    Sexual activity: Not on file   Lifestyle    Physical activity     Days per week: Not on file     Minutes per session: Not on file    Stress: Not on file   Relationships    Social connections     Talks on phone: Not on file     Gets together: Not on file     Attends Anab[employer]ist service: Not on file     Active member of club or organization: Not on file     Attends meetings of clubs or organizations: Not on file     Relationship status: Not on file    Intimate partner violence     Fear of current or ex partner: Not on file     Emotionally abused: Not on file     Physically abused: Not on file     Forced sexual activity: Not on file   Other Topics Concern    Not on file   Social History Narrative    Not on file         ALLERGIES: Toradol [ketorolac]    Review of Systems   Constitutional: Negative for diaphoresis and fever. HENT: Negative for congestion. Eyes: Negative for redness and visual disturbance. Respiratory: Negative for chest tightness. Cardiovascular: Negative for chest pain and leg swelling. Gastrointestinal: Positive for abdominal pain and nausea. Negative for vomiting. Endocrine: Negative for polydipsia, polyphagia and polyuria. Genitourinary: Negative for hematuria and urgency. Musculoskeletal: Negative for back pain and gait problem. Skin: Negative for color change and pallor. Neurological: Negative for tremors and weakness. Hematological: Negative for adenopathy. Does not bruise/bleed easily. Psychiatric/Behavioral: Negative for behavioral problems. All other systems reviewed and are negative. Vitals:    06/24/20 1743   BP: 112/72   Pulse: (!) 111   Resp: 18   Temp: 97.8 °F (36.6 °C)   SpO2: 98%            Physical Exam  Vitals signs and nursing note reviewed. Constitutional:       Appearance: Normal appearance. HENT:      Head: Normocephalic and atraumatic. Neck:      Musculoskeletal: Normal range of motion and neck supple. Cardiovascular:      Rate and Rhythm: Normal rate and regular rhythm. Pulses: Normal pulses. Heart sounds: Normal heart sounds. Pulmonary:      Effort: Pulmonary effort is normal. No respiratory distress. Breath sounds: Normal breath sounds. No stridor. Abdominal:      General: Abdomen is flat.  Bowel sounds are normal. Palpations: Abdomen is soft. There is no mass. Musculoskeletal: Normal range of motion. General: No swelling or tenderness. Skin:     General: Skin is warm. Capillary Refill: Capillary refill takes less than 2 seconds. Coloration: Skin is not jaundiced. Neurological:      General: No focal deficit present. Mental Status: He is alert. Mental status is at baseline. MDM  Number of Diagnoses or Management Options  Diagnosis management comments: Labs stable here initially. Patient actually eating candy upon my reassessment. This is a chronic recurrent problem for patient. We will treat here with Haldol here    8:19 PM  As stated labs stable. Patient becoming more demanding of nurse, will discharge. Amount and/or Complexity of Data Reviewed  Clinical lab tests: ordered and reviewed    Risk of Complications, Morbidity, and/or Mortality  Presenting problems: moderate  Diagnostic procedures: low  Management options: moderate    Patient Progress  Patient progress: stable         Procedures               Results Include:    Recent Results (from the past 24 hour(s))   CBC WITH AUTOMATED DIFF    Collection Time: 06/24/20  5:44 PM   Result Value Ref Range    WBC 8.7 4.3 - 11.1 K/uL    RBC 4.09 (L) 4.23 - 5.6 M/uL    HGB 10.7 (L) 13.6 - 17.2 g/dL    HCT 34.2 (L) 41.1 - 50.3 %    MCV 83.6 79.6 - 97.8 FL    MCH 26.2 26.1 - 32.9 PG    MCHC 31.3 (L) 31.4 - 35.0 g/dL    RDW 19.6 (H) 11.9 - 14.6 %    PLATELET 380 924 - 694 K/uL    MPV 10.1 9.4 - 12.3 FL    ABSOLUTE NRBC 0.00 0.0 - 0.2 K/uL    DF AUTOMATED      NEUTROPHILS 78 43 - 78 %    LYMPHOCYTES 14 13 - 44 %    MONOCYTES 6 4.0 - 12.0 %    EOSINOPHILS 1 0.5 - 7.8 %    BASOPHILS 0 0.0 - 2.0 %    IMMATURE GRANULOCYTES 1 0.0 - 5.0 %    ABS. NEUTROPHILS 6.7 1.7 - 8.2 K/UL    ABS. LYMPHOCYTES 1.2 0.5 - 4.6 K/UL    ABS. MONOCYTES 0.5 0.1 - 1.3 K/UL    ABS. EOSINOPHILS 0.1 0.0 - 0.8 K/UL    ABS. BASOPHILS 0.0 0.0 - 0.2 K/UL    ABS. IMM. GRANS. 0.0 0.0 - 0.5 K/UL   METABOLIC PANEL, COMPREHENSIVE    Collection Time: 06/24/20  5:44 PM   Result Value Ref Range    Sodium 134 (L) 136 - 145 mmol/L    Potassium 4.8 3.5 - 5.1 mmol/L    Chloride 100 98 - 107 mmol/L    CO2 26 21 - 32 mmol/L    Anion gap 8 7 - 16 mmol/L    Glucose 277 (H) 65 - 100 mg/dL    BUN 12 6 - 23 MG/DL    Creatinine 1.20 0.8 - 1.5 MG/DL    GFR est AA >60 >60 ml/min/1.73m2    GFR est non-AA >60 >60 ml/min/1.73m2    Calcium 8.6 8.3 - 10.4 MG/DL    Bilirubin, total 0.3 0.2 - 1.1 MG/DL    ALT (SGPT) 26 12 - 65 U/L    AST (SGOT) 33 15 - 37 U/L    Alk. phosphatase 84 50 - 136 U/L    Protein, total 7.0 6.3 - 8.2 g/dL    Albumin 3.3 (L) 3.5 - 5.0 g/dL    Globulin 3.7 (H) 2.3 - 3.5 g/dL    A-G Ratio 0.9 (L) 1.2 - 3.5     LIPASE    Collection Time: 06/24/20  5:44 PM   Result Value Ref Range    Lipase 19 (L) 73 - 393 U/L     Voice dictation software was used during the making of this note. This software is not perfect and grammatical and other typographical errors may be present. This note has been proofread, but may still contain errors.   Kallie Lim MD; 6/24/2020 @8:20 PM   =================================================================== [Normal Gait and Station] : normal gait and station [Normal muscle strength, symmetry and tone of facial, head and neck musculature] : normal muscle strength, symmetry and tone of facial, head and neck musculature [Normal] : no cervical lymphadenopathy [1+] : 1+ [Exposed Vessel] : left anterior vessel not exposed [Wheezing] : no wheezing [Increased Work of Breathing] : no increased work of breathing with use of accessory muscles and retractions